# Patient Record
Sex: FEMALE | Race: WHITE | NOT HISPANIC OR LATINO | Employment: UNEMPLOYED | URBAN - METROPOLITAN AREA
[De-identification: names, ages, dates, MRNs, and addresses within clinical notes are randomized per-mention and may not be internally consistent; named-entity substitution may affect disease eponyms.]

---

## 2019-10-01 ENCOUNTER — HOSPITAL ENCOUNTER (EMERGENCY)
Facility: HOSPITAL | Age: 24
Discharge: HOME | End: 2019-10-01
Attending: EMERGENCY MEDICINE | Admitting: EMERGENCY MEDICINE

## 2019-10-01 VITALS
HEIGHT: 66 IN | SYSTOLIC BLOOD PRESSURE: 105 MMHG | BODY MASS INDEX: 33.27 KG/M2 | DIASTOLIC BLOOD PRESSURE: 58 MMHG | RESPIRATION RATE: 18 BRPM | HEART RATE: 87 BPM | OXYGEN SATURATION: 98 % | WEIGHT: 207 LBS | TEMPERATURE: 97.6 F

## 2019-10-01 DIAGNOSIS — J02.9 VIRAL PHARYNGITIS: Primary | ICD-10-CM

## 2019-10-01 LAB — S PYO AG THROAT QL: NEGATIVE

## 2019-10-01 PROCEDURE — 99284 EMERGENCY DEPT VISIT MOD MDM: CPT | Performed by: PHYSICIAN ASSISTANT

## 2019-10-01 PROCEDURE — 99283 EMERGENCY DEPT VISIT LOW MDM: CPT

## 2019-10-01 PROCEDURE — 87430 STREP A AG IA: CPT | Performed by: EMERGENCY MEDICINE

## 2019-10-01 RX ORDER — ACETAMINOPHEN 325 MG/1
650 TABLET ORAL ONCE
Status: COMPLETED | OUTPATIENT
Start: 2019-10-01 | End: 2019-10-01

## 2019-10-01 RX ADMIN — ACETAMINOPHEN 650 MG: 325 TABLET, FILM COATED ORAL at 12:23

## 2019-10-01 NOTE — ED PROVIDER NOTES
History  Chief Complaint   Patient presents with    Sore Throat     sore throat and bilat ear pain for about 5 days, no care Prior to today     Patient is a 28 y/o F that presents to the ED with sore throat and B/L ear pain x 4-5 days  She denies fevers, chills  She states other people at work are sick with similar symptoms  No rhinorrhea or cough  History provided by:  Patient  Sore Throat   Location:  Generalized  Quality:  Aching  Severity:  Moderate  Onset quality:  Gradual  Duration:  4 days  Timing:  Constant  Progression:  Worsening  Chronicity:  New  Relieved by:  Nothing  Worsened by:  Nothing  Ineffective treatments:  None tried  Associated symptoms: adenopathy and ear pain    Associated symptoms: no abdominal pain, no chills, no cough, no drooling, no fever, no rash, no rhinorrhea, no shortness of breath, no trouble swallowing and no voice change    Risk factors: sick contacts        None       Past Medical History:   Diagnosis Date    Asthma        History reviewed  No pertinent surgical history  History reviewed  No pertinent family history  I have reviewed and agree with the history as documented  Social History     Tobacco Use    Smoking status: Former Smoker    Smokeless tobacco: Never Used   Substance Use Topics    Alcohol use: Yes     Comment: socially    Drug use: Not Currently        Review of Systems   Constitutional: Negative for chills and fever  HENT: Positive for ear pain and sore throat  Negative for drooling, rhinorrhea, trouble swallowing and voice change  Respiratory: Negative for cough and shortness of breath  Gastrointestinal: Negative for abdominal pain  Musculoskeletal: Negative for back pain and neck pain  Skin: Negative for rash  Neurological: Negative for dizziness, weakness and numbness  Hematological: Positive for adenopathy  All other systems reviewed and are negative        Physical Exam  Physical Exam   Constitutional: She is oriented to person, place, and time  She appears well-developed and well-nourished  She is cooperative  She does not appear ill  No distress  HENT:   Head: Normocephalic and atraumatic  Right Ear: Hearing and tympanic membrane normal    Left Ear: Hearing and tympanic membrane normal    Nose: Nose normal    Mouth/Throat: Mucous membranes are normal  Oropharyngeal exudate and posterior oropharyngeal erythema present  No posterior oropharyngeal edema  Tonsillar exudate  Eyes: Conjunctivae are normal    Neck: Normal range of motion  Neck supple  Cardiovascular: Normal rate, regular rhythm and normal heart sounds  No murmur heard  Pulmonary/Chest: Effort normal and breath sounds normal  She has no wheezes  She has no rhonchi  She has no rales  Musculoskeletal: Normal range of motion  She exhibits no edema  Lymphadenopathy:     She has cervical adenopathy  Right cervical: Superficial cervical adenopathy present  Left cervical: Superficial cervical adenopathy present  Neurological: She is alert and oriented to person, place, and time  She has normal strength  No sensory deficit  Gait normal    Skin: Skin is warm and dry  No rash noted  She is not diaphoretic  No pallor  Nursing note and vitals reviewed        Vital Signs  ED Triage Vitals   Temperature Pulse Respirations Blood Pressure SpO2   10/01/19 1053 10/01/19 1053 10/01/19 1053 10/01/19 1053 10/01/19 1053   97 6 °F (36 4 °C) 87 18 105/58 98 %      Temp Source Heart Rate Source Patient Position - Orthostatic VS BP Location FiO2 (%)   10/01/19 1053 10/01/19 1053 -- -- --   Temporal Monitor         Pain Score       10/01/19 1052       7           Vitals:    10/01/19 1053   BP: 105/58   Pulse: 87         Visual Acuity      ED Medications  Medications   acetaminophen (TYLENOL) tablet 650 mg (650 mg Oral Given 10/1/19 1223)       Diagnostic Studies  Results Reviewed     Procedure Component Value Units Date/Time    Rapid Strep A Screen Only, Adults [[id number]]  (Normal) Collected:  10/01/19 1140    Lab Status:  Final result Specimen:  Throat Updated:  10/01/19 1216     Rapid Strep A Screen Negative                 No orders to display              Procedures  Procedures       ED Course                               MDM  Number of Diagnoses or Management Options  Viral pharyngitis: new and requires workup     Amount and/or Complexity of Data Reviewed  Clinical lab tests: ordered and reviewed    Patient Progress  Patient progress: stable      Disposition  Final diagnoses:   Viral pharyngitis     Time reflects when diagnosis was documented in both MDM as applicable and the Disposition within this note     Time User Action Codes Description Comment    10/1/2019 12:40 PM Je Fraction Add [J02 9] Viral pharyngitis       ED Disposition     ED Disposition Condition Date/Time Comment    Discharge Stable Tue Oct 1, 2019 12:40 PM Cristin De La Cruz discharge to home/self care  Follow-up Information     Follow up With Specialties Details Why Contact Info    your family doctor  Call in 1 week As needed, For recheck           There are no discharge medications for this patient  No discharge procedures on file      ED Provider  Electronically Signed by           Sheela Noel PA-C  10/01/19 1246

## 2019-10-01 NOTE — ED NOTES
Patient complains of sore throat and bilateral ear pain    White pustules noted on her tonsils     Emilia Castellanos RN  10/01/19 4370

## 2019-10-01 NOTE — DISCHARGE INSTRUCTIONS
Rest, increase fluids  Tylenol/motrin for discomfort  Warm salt water gargles  Follow up with family doctor if no improvement in 5-7 days

## 2019-10-06 ENCOUNTER — HOSPITAL ENCOUNTER (EMERGENCY)
Facility: HOSPITAL | Age: 24
Discharge: HOME | End: 2019-10-06
Attending: EMERGENCY MEDICINE | Admitting: EMERGENCY MEDICINE

## 2019-10-06 ENCOUNTER — APPOINTMENT (EMERGENCY)
Dept: RADIOLOGY | Facility: HOSPITAL | Age: 24
End: 2019-10-06

## 2019-10-06 VITALS
BODY MASS INDEX: 33.41 KG/M2 | SYSTOLIC BLOOD PRESSURE: 131 MMHG | DIASTOLIC BLOOD PRESSURE: 77 MMHG | HEART RATE: 79 BPM | OXYGEN SATURATION: 99 % | RESPIRATION RATE: 16 BRPM | TEMPERATURE: 97.4 F | WEIGHT: 207.01 LBS

## 2019-10-06 DIAGNOSIS — J06.9 URI (UPPER RESPIRATORY INFECTION): Primary | ICD-10-CM

## 2019-10-06 PROCEDURE — 71046 X-RAY EXAM CHEST 2 VIEWS: CPT

## 2019-10-06 PROCEDURE — 99284 EMERGENCY DEPT VISIT MOD MDM: CPT | Performed by: PHYSICIAN ASSISTANT

## 2019-10-06 PROCEDURE — 99283 EMERGENCY DEPT VISIT LOW MDM: CPT

## 2019-10-06 RX ORDER — AZITHROMYCIN 250 MG/1
TABLET, FILM COATED ORAL
Qty: 6 TABLET | Refills: 0 | Status: SHIPPED | OUTPATIENT
Start: 2019-10-06 | End: 2019-10-10

## 2019-10-06 NOTE — ED PROVIDER NOTES
History  Chief Complaint   Patient presents with    Sore Throat     sore throat x 1 5 weeks  Previously seen here  Patient is a 26 y/o F that presents to the ED with sore throat, rhinorrhea, cough x 10 days  SHe was seen in there ER 5 days ago and had a negative rapid strep test   SHe denies fevers,chills  She has taken tylenol cold medicine, but states it hasn't helped  She denies sick contacts  She is coughing up green sputum  History provided by:  Patient  Sore Throat   Location:  Generalized  Quality:  Aching  Severity:  Moderate  Onset quality:  Sudden  Duration:  10 days  Timing:  Constant  Progression:  Worsening  Chronicity:  New  Relieved by:  Nothing  Worsened by:  Nothing  Ineffective treatments:  Acetaminophen, NSAIDs and OTC medications  Associated symptoms: cough and rhinorrhea    Associated symptoms: no abdominal pain, no adenopathy, no chest pain, no chills, no fever, no rash, no shortness of breath and no trouble swallowing    Risk factors: no sick contacts        None       Past Medical History:   Diagnosis Date    Asthma        History reviewed  No pertinent surgical history  History reviewed  No pertinent family history  I have reviewed and agree with the history as documented  Social History     Tobacco Use    Smoking status: Former Smoker    Smokeless tobacco: Never Used   Substance Use Topics    Alcohol use: Yes     Comment: socially    Drug use: Not Currently        Review of Systems   Constitutional: Negative for chills and fever  HENT: Positive for congestion, rhinorrhea and sore throat  Negative for trouble swallowing  Respiratory: Positive for cough  Negative for shortness of breath  Cardiovascular: Negative for chest pain  Gastrointestinal: Negative for abdominal pain  Skin: Negative for color change and rash  Hematological: Negative for adenopathy  All other systems reviewed and are negative        Physical Exam  Physical Exam Constitutional: She is oriented to person, place, and time  She appears well-developed and well-nourished  She is cooperative  She does not appear ill  No distress  HENT:   Head: Normocephalic and atraumatic  Right Ear: Hearing and tympanic membrane normal    Left Ear: Hearing and tympanic membrane normal    Nose: Rhinorrhea present  No mucosal edema  Mouth/Throat: Uvula is midline, oropharynx is clear and moist and mucous membranes are normal    Eyes: Conjunctivae are normal    Neck: Normal range of motion  Cardiovascular: Normal rate, regular rhythm and normal heart sounds  No murmur heard  Pulmonary/Chest: Effort normal and breath sounds normal  She has no wheezes  She has no rhonchi  She has no rales  Musculoskeletal: Normal range of motion  She exhibits no edema  Neurological: She is alert and oriented to person, place, and time  She has normal strength  No sensory deficit  Skin: Skin is warm and dry  No rash noted  She is not diaphoretic  No pallor  Nursing note and vitals reviewed  Vital Signs  ED Triage Vitals [10/06/19 1256]   Temperature Pulse Respirations Blood Pressure SpO2   (!) 97 4 °F (36 3 °C) 79 16 131/77 99 %      Temp Source Heart Rate Source Patient Position - Orthostatic VS BP Location FiO2 (%)   Oral Monitor Lying Right arm --      Pain Score       8           Vitals:    10/06/19 1256   BP: 131/77   Pulse: 79   Patient Position - Orthostatic VS: Lying         Visual Acuity      ED Medications  Medications - No data to display    Diagnostic Studies  Results Reviewed     None                 XR chest 2 views   ED Interpretation by Franki Mckoy PA-C (10/06 1459)   No acute abnormalities                    Procedures  Procedures       ED Course                               MDM  Number of Diagnoses or Management Options  URI (upper respiratory infection): established and worsening  Diagnosis management comments: Patient with sore throat, runny nose, productive cough, will order CXR to r/o pneumonia  Rapid strep from last week negative  Most likely viral uri  ADvised OTC meds and zpak if no improvement in 1 week  Amount and/or Complexity of Data Reviewed  Tests in the radiology section of CPT®: ordered and reviewed  Independent visualization of images, tracings, or specimens: yes    Patient Progress  Patient progress: stable      Disposition  Final diagnoses:   URI (upper respiratory infection)     Time reflects when diagnosis was documented in both MDM as applicable and the Disposition within this note     Time User Action Codes Description Comment    10/6/2019  2:20 PM Lazarus Broderick Add [J06 9] URI (upper respiratory infection)       ED Disposition     ED Disposition Condition Date/Time Comment    Discharge Stable Sun Oct 6, 2019  2:20 PM Cristin De La Cruz discharge to home/self care  Follow-up Information     Follow up With Specialties Details Why Contact Info    your family doctor  Call in 1 week As needed, For recheck           Discharge Medication List as of 10/6/2019  2:21 PM      START taking these medications    Details   azithromycin (ZITHROMAX Z-LISA) 250 mg tablet Take 2 tablets today then 1 tablet daily x 4 days, Print           No discharge procedures on file      ED Provider  Electronically Signed by           Paulo Ware PA-C  10/06/19 0435

## 2020-09-08 ENCOUNTER — OCCMED (OUTPATIENT)
Dept: URGENT CARE | Facility: CLINIC | Age: 25
End: 2020-09-08

## 2020-09-08 DIAGNOSIS — Z02.1 PHYSICAL EXAM, PRE-EMPLOYMENT: Primary | ICD-10-CM

## 2020-09-08 PROCEDURE — 86480 TB TEST CELL IMMUN MEASURE: CPT | Performed by: PHYSICIAN ASSISTANT

## 2020-09-09 LAB
GAMMA INTERFERON BACKGROUND BLD IA-ACNC: 0.02 IU/ML
M TB IFN-G BLD-IMP: NEGATIVE
M TB IFN-G CD4+ BCKGRND COR BLD-ACNC: 0 IU/ML
M TB IFN-G CD4+ BCKGRND COR BLD-ACNC: 0 IU/ML
MITOGEN IGNF BCKGRD COR BLD-ACNC: >10 IU/ML

## 2020-09-30 ENCOUNTER — HOSPITAL ENCOUNTER (EMERGENCY)
Facility: HOSPITAL | Age: 25
Discharge: HOME | End: 2020-09-30
Attending: EMERGENCY MEDICINE
Payer: COMMERCIAL

## 2020-09-30 VITALS
HEART RATE: 85 BPM | OXYGEN SATURATION: 96 % | BODY MASS INDEX: 40.35 KG/M2 | SYSTOLIC BLOOD PRESSURE: 121 MMHG | DIASTOLIC BLOOD PRESSURE: 58 MMHG | RESPIRATION RATE: 16 BRPM | TEMPERATURE: 97.6 F | WEIGHT: 250 LBS

## 2020-09-30 DIAGNOSIS — H65.93 MIDDLE EAR EFFUSION, BILATERAL: ICD-10-CM

## 2020-09-30 DIAGNOSIS — R42 VERTIGO: Primary | ICD-10-CM

## 2020-09-30 LAB
ALBUMIN SERPL BCP-MCNC: 4.3 G/DL (ref 3.5–5)
ALP SERPL-CCNC: 73 U/L (ref 46–116)
ALT SERPL W P-5'-P-CCNC: 42 U/L (ref 12–78)
ANION GAP SERPL CALCULATED.3IONS-SCNC: 8 MMOL/L (ref 4–13)
AST SERPL W P-5'-P-CCNC: 16 U/L (ref 5–45)
ATRIAL RATE: 86 BPM
BASOPHILS # BLD AUTO: 0.03 THOUSANDS/ΜL (ref 0–0.1)
BASOPHILS NFR BLD AUTO: 0 % (ref 0–1)
BILIRUB SERPL-MCNC: 0.59 MG/DL (ref 0.2–1)
BILIRUB UR QL STRIP: NEGATIVE
BUN SERPL-MCNC: 13 MG/DL (ref 5–25)
CALCIUM SERPL-MCNC: 9.2 MG/DL (ref 8.3–10.1)
CHLORIDE SERPL-SCNC: 102 MMOL/L (ref 100–108)
CLARITY UR: CLEAR
CO2 SERPL-SCNC: 26 MMOL/L (ref 21–32)
COLOR UR: YELLOW
CREAT SERPL-MCNC: 0.82 MG/DL (ref 0.6–1.3)
D DIMER PPP FEU-MCNC: 0.33 UG/ML FEU
EOSINOPHIL # BLD AUTO: 0.12 THOUSAND/ΜL (ref 0–0.61)
EOSINOPHIL NFR BLD AUTO: 2 % (ref 0–6)
ERYTHROCYTE [DISTWIDTH] IN BLOOD BY AUTOMATED COUNT: 13.3 % (ref 11.6–15.1)
EXT PREG TEST URINE: NEGATIVE
EXT. CONTROL ED NAV: NORMAL
GFR SERPL CREATININE-BSD FRML MDRD: 101 ML/MIN/1.73SQ M
GLUCOSE SERPL-MCNC: 81 MG/DL (ref 65–140)
GLUCOSE UR STRIP-MCNC: NEGATIVE MG/DL
HCT VFR BLD AUTO: 41.6 % (ref 34.8–46.1)
HGB BLD-MCNC: 13.5 G/DL (ref 11.5–15.4)
HGB UR QL STRIP.AUTO: NEGATIVE
IMM GRANULOCYTES # BLD AUTO: 0.01 THOUSAND/UL (ref 0–0.2)
IMM GRANULOCYTES NFR BLD AUTO: 0 % (ref 0–2)
KETONES UR STRIP-MCNC: NEGATIVE MG/DL
LEUKOCYTE ESTERASE UR QL STRIP: NEGATIVE
LYMPHOCYTES # BLD AUTO: 2.01 THOUSANDS/ΜL (ref 0.6–4.47)
LYMPHOCYTES NFR BLD AUTO: 28 % (ref 14–44)
MCH RBC QN AUTO: 29.3 PG (ref 26.8–34.3)
MCHC RBC AUTO-ENTMCNC: 32.5 G/DL (ref 31.4–37.4)
MCV RBC AUTO: 90 FL (ref 82–98)
MONOCYTES # BLD AUTO: 0.32 THOUSAND/ΜL (ref 0.17–1.22)
MONOCYTES NFR BLD AUTO: 4 % (ref 4–12)
NEUTROPHILS # BLD AUTO: 4.76 THOUSANDS/ΜL (ref 1.85–7.62)
NEUTS SEG NFR BLD AUTO: 66 % (ref 43–75)
NITRITE UR QL STRIP: NEGATIVE
NRBC BLD AUTO-RTO: 0 /100 WBCS
P AXIS: 57 DEGREES
PH UR STRIP.AUTO: 5.5 [PH] (ref 4.5–8)
PLATELET # BLD AUTO: 298 THOUSANDS/UL (ref 149–390)
PMV BLD AUTO: 9.9 FL (ref 8.9–12.7)
POTASSIUM SERPL-SCNC: 4.1 MMOL/L (ref 3.5–5.3)
PR INTERVAL: 156 MS
PROT SERPL-MCNC: 8.5 G/DL (ref 6.4–8.2)
PROT UR STRIP-MCNC: NEGATIVE MG/DL
QRS AXIS: 69 DEGREES
QRSD INTERVAL: 92 MS
QT INTERVAL: 346 MS
QTC INTERVAL: 397 MS
RBC # BLD AUTO: 4.61 MILLION/UL (ref 3.81–5.12)
SODIUM SERPL-SCNC: 136 MMOL/L (ref 136–145)
SP GR UR STRIP.AUTO: 1.02 (ref 1–1.03)
T WAVE AXIS: 40 DEGREES
TROPONIN I SERPL-MCNC: <0.02 NG/ML
UROBILINOGEN UR QL STRIP.AUTO: 0.2 E.U./DL
VENTRICULAR RATE: 79 BPM
WBC # BLD AUTO: 7.25 THOUSAND/UL (ref 4.31–10.16)

## 2020-09-30 PROCEDURE — 93005 ELECTROCARDIOGRAM TRACING: CPT

## 2020-09-30 PROCEDURE — 96374 THER/PROPH/DIAG INJ IV PUSH: CPT

## 2020-09-30 PROCEDURE — 99284 EMERGENCY DEPT VISIT MOD MDM: CPT

## 2020-09-30 PROCEDURE — 85379 FIBRIN DEGRADATION QUANT: CPT | Performed by: PHYSICIAN ASSISTANT

## 2020-09-30 PROCEDURE — 99284 EMERGENCY DEPT VISIT MOD MDM: CPT | Performed by: PHYSICIAN ASSISTANT

## 2020-09-30 PROCEDURE — 80053 COMPREHEN METABOLIC PANEL: CPT | Performed by: PHYSICIAN ASSISTANT

## 2020-09-30 PROCEDURE — 36415 COLL VENOUS BLD VENIPUNCTURE: CPT | Performed by: PHYSICIAN ASSISTANT

## 2020-09-30 PROCEDURE — 81025 URINE PREGNANCY TEST: CPT | Performed by: PHYSICIAN ASSISTANT

## 2020-09-30 PROCEDURE — 85025 COMPLETE CBC W/AUTO DIFF WBC: CPT | Performed by: PHYSICIAN ASSISTANT

## 2020-09-30 PROCEDURE — 81003 URINALYSIS AUTO W/O SCOPE: CPT

## 2020-09-30 PROCEDURE — 84484 ASSAY OF TROPONIN QUANT: CPT | Performed by: PHYSICIAN ASSISTANT

## 2020-09-30 PROCEDURE — 93010 ELECTROCARDIOGRAM REPORT: CPT | Performed by: INTERNAL MEDICINE

## 2020-09-30 PROCEDURE — 96361 HYDRATE IV INFUSION ADD-ON: CPT

## 2020-09-30 RX ORDER — MECLIZINE HYDROCHLORIDE 25 MG/1
25 TABLET ORAL 3 TIMES DAILY PRN
Qty: 30 TABLET | Refills: 0 | Status: SHIPPED | OUTPATIENT
Start: 2020-09-30 | End: 2021-01-07

## 2020-09-30 RX ORDER — CETIRIZINE HYDROCHLORIDE, PSEUDOEPHEDRINE HYDROCHLORIDE 5; 120 MG/1; MG/1
1 TABLET, FILM COATED, EXTENDED RELEASE ORAL 2 TIMES DAILY
Qty: 14 TABLET | Refills: 0 | Status: SHIPPED | OUTPATIENT
Start: 2020-09-30 | End: 2020-10-01

## 2020-09-30 RX ORDER — MECLIZINE HCL 12.5 MG/1
25 TABLET ORAL ONCE
Status: COMPLETED | OUTPATIENT
Start: 2020-09-30 | End: 2020-09-30

## 2020-09-30 RX ORDER — FLUTICASONE PROPIONATE 50 MCG
1 SPRAY, SUSPENSION (ML) NASAL DAILY
Qty: 16 G | Refills: 0 | Status: SHIPPED | OUTPATIENT
Start: 2020-09-30 | End: 2021-03-16 | Stop reason: SDUPTHER

## 2020-09-30 RX ORDER — ONDANSETRON 2 MG/ML
4 INJECTION INTRAMUSCULAR; INTRAVENOUS ONCE
Status: COMPLETED | OUTPATIENT
Start: 2020-09-30 | End: 2020-09-30

## 2020-09-30 RX ADMIN — SODIUM CHLORIDE 1000 ML: 0.9 INJECTION, SOLUTION INTRAVENOUS at 11:36

## 2020-09-30 RX ADMIN — ONDANSETRON 4 MG: 2 INJECTION INTRAMUSCULAR; INTRAVENOUS at 11:35

## 2020-09-30 RX ADMIN — MECLIZINE 25 MG: 12.5 TABLET ORAL at 11:36

## 2020-10-01 ENCOUNTER — TELEPHONE (OUTPATIENT)
Dept: FAMILY MEDICINE CLINIC | Facility: CLINIC | Age: 25
End: 2020-10-01

## 2020-10-01 ENCOUNTER — OFFICE VISIT (OUTPATIENT)
Dept: FAMILY MEDICINE CLINIC | Facility: CLINIC | Age: 25
End: 2020-10-01
Payer: COMMERCIAL

## 2020-10-01 VITALS
BODY MASS INDEX: 40.4 KG/M2 | HEIGHT: 66 IN | RESPIRATION RATE: 18 BRPM | WEIGHT: 251.4 LBS | DIASTOLIC BLOOD PRESSURE: 60 MMHG | SYSTOLIC BLOOD PRESSURE: 110 MMHG | HEART RATE: 93 BPM | TEMPERATURE: 99.1 F | OXYGEN SATURATION: 98 %

## 2020-10-01 DIAGNOSIS — Z13.6 SCREENING FOR CARDIOVASCULAR CONDITION: ICD-10-CM

## 2020-10-01 DIAGNOSIS — E66.01 MORBID OBESITY (HCC): ICD-10-CM

## 2020-10-01 DIAGNOSIS — F41.9 ANXIETY: ICD-10-CM

## 2020-10-01 DIAGNOSIS — M79.10 MYALGIA: ICD-10-CM

## 2020-10-01 DIAGNOSIS — Z23 ENCOUNTER FOR IMMUNIZATION: ICD-10-CM

## 2020-10-01 DIAGNOSIS — Z13.1 SCREENING FOR DIABETES MELLITUS: ICD-10-CM

## 2020-10-01 DIAGNOSIS — R11.2 NAUSEA AND VOMITING, INTRACTABILITY OF VOMITING NOT SPECIFIED, UNSPECIFIED VOMITING TYPE: ICD-10-CM

## 2020-10-01 DIAGNOSIS — H81.13 BENIGN PAROXYSMAL POSITIONAL VERTIGO DUE TO BILATERAL VESTIBULAR DISORDER: ICD-10-CM

## 2020-10-01 DIAGNOSIS — M54.16 LUMBAR BACK PAIN WITH RADICULOPATHY AFFECTING LOWER EXTREMITY: ICD-10-CM

## 2020-10-01 DIAGNOSIS — J45.20 MILD INTERMITTENT ASTHMA WITHOUT COMPLICATION: ICD-10-CM

## 2020-10-01 DIAGNOSIS — Z12.4 SCREENING FOR CERVICAL CANCER: ICD-10-CM

## 2020-10-01 DIAGNOSIS — Z00.00 ANNUAL PHYSICAL EXAM: Primary | ICD-10-CM

## 2020-10-01 DIAGNOSIS — Z12.83 SKIN CANCER SCREENING: ICD-10-CM

## 2020-10-01 DIAGNOSIS — F32.A DEPRESSION, UNSPECIFIED DEPRESSION TYPE: ICD-10-CM

## 2020-10-01 PROBLEM — J45.909 ASTHMA: Status: ACTIVE | Noted: 2020-10-01

## 2020-10-01 PROBLEM — J45.909 ASTHMA: Status: RESOLVED | Noted: 2020-10-01 | Resolved: 2020-10-01

## 2020-10-01 PROCEDURE — 99203 OFFICE O/P NEW LOW 30 MIN: CPT | Performed by: FAMILY MEDICINE

## 2020-10-01 PROCEDURE — 99395 PREV VISIT EST AGE 18-39: CPT | Performed by: FAMILY MEDICINE

## 2020-10-01 PROCEDURE — 90471 IMMUNIZATION ADMIN: CPT | Performed by: FAMILY MEDICINE

## 2020-10-01 PROCEDURE — 90732 PPSV23 VACC 2 YRS+ SUBQ/IM: CPT | Performed by: FAMILY MEDICINE

## 2020-10-01 RX ORDER — LORATADINE AND PSEUDOEPHEDRINE 10; 240 MG/1; MG/1
1 TABLET, EXTENDED RELEASE ORAL DAILY
COMMUNITY
End: 2021-01-04 | Stop reason: ALTCHOICE

## 2020-10-01 RX ORDER — ONDANSETRON 8 MG/1
8 TABLET, ORALLY DISINTEGRATING ORAL EVERY 8 HOURS PRN
Qty: 30 TABLET | Refills: 0 | Status: SHIPPED | OUTPATIENT
Start: 2020-10-01 | End: 2021-04-05

## 2020-10-01 RX ORDER — ALBUTEROL SULFATE 90 UG/1
2 AEROSOL, METERED RESPIRATORY (INHALATION) EVERY 4 HOURS PRN
COMMUNITY
End: 2020-10-01 | Stop reason: SDUPTHER

## 2020-10-01 RX ORDER — DULOXETIN HYDROCHLORIDE 30 MG/1
30 CAPSULE, DELAYED RELEASE ORAL DAILY
Qty: 7 CAPSULE | Refills: 0 | Status: SHIPPED | OUTPATIENT
Start: 2020-10-01 | End: 2020-10-13

## 2020-10-01 RX ORDER — DULOXETIN HYDROCHLORIDE 60 MG/1
60 CAPSULE, DELAYED RELEASE ORAL DAILY
Qty: 30 CAPSULE | Refills: 1 | Status: SHIPPED | OUTPATIENT
Start: 2020-10-01 | End: 2020-11-16 | Stop reason: SDUPTHER

## 2020-10-01 RX ORDER — ALBUTEROL SULFATE 90 UG/1
2 AEROSOL, METERED RESPIRATORY (INHALATION) EVERY 4 HOURS PRN
Qty: 1 INHALER | Refills: 0 | Status: SHIPPED | OUTPATIENT
Start: 2020-10-01 | End: 2021-03-16 | Stop reason: SDUPTHER

## 2020-10-02 ENCOUNTER — TELEPHONE (OUTPATIENT)
Dept: PHYSICAL THERAPY | Facility: OTHER | Age: 25
End: 2020-10-02

## 2020-10-02 ENCOUNTER — TELEPHONE (OUTPATIENT)
Dept: ADMINISTRATIVE | Facility: OTHER | Age: 25
End: 2020-10-02

## 2020-10-05 ENCOUNTER — TELEPHONE (OUTPATIENT)
Dept: DERMATOLOGY | Facility: CLINIC | Age: 25
End: 2020-10-05

## 2020-10-08 ENCOUNTER — HOSPITAL ENCOUNTER (EMERGENCY)
Facility: HOSPITAL | Age: 25
Discharge: HOME | End: 2020-10-08
Attending: EMERGENCY MEDICINE
Payer: COMMERCIAL

## 2020-10-08 ENCOUNTER — EVALUATION (OUTPATIENT)
Dept: PHYSICAL THERAPY | Facility: REHABILITATION | Age: 25
End: 2020-10-08
Payer: COMMERCIAL

## 2020-10-08 ENCOUNTER — APPOINTMENT (EMERGENCY)
Dept: RADIOLOGY | Facility: HOSPITAL | Age: 25
End: 2020-10-08
Payer: COMMERCIAL

## 2020-10-08 VITALS
BODY MASS INDEX: 41.65 KG/M2 | HEART RATE: 89 BPM | OXYGEN SATURATION: 98 % | SYSTOLIC BLOOD PRESSURE: 142 MMHG | HEIGHT: 65 IN | RESPIRATION RATE: 16 BRPM | WEIGHT: 250 LBS | TEMPERATURE: 99 F | DIASTOLIC BLOOD PRESSURE: 98 MMHG

## 2020-10-08 DIAGNOSIS — R42 VERTIGO: Primary | ICD-10-CM

## 2020-10-08 DIAGNOSIS — H81.13 BENIGN PAROXYSMAL POSITIONAL VERTIGO DUE TO BILATERAL VESTIBULAR DISORDER: ICD-10-CM

## 2020-10-08 PROCEDURE — 97161 PT EVAL LOW COMPLEX 20 MIN: CPT | Performed by: PHYSICAL THERAPIST

## 2020-10-08 PROCEDURE — 99282 EMERGENCY DEPT VISIT SF MDM: CPT | Performed by: EMERGENCY MEDICINE

## 2020-10-08 PROCEDURE — 99284 EMERGENCY DEPT VISIT MOD MDM: CPT

## 2020-10-08 RX ORDER — MECLIZINE HYDROCHLORIDE 25 MG/1
50 TABLET ORAL ONCE
Status: DISCONTINUED | OUTPATIENT
Start: 2020-10-08 | End: 2020-10-09 | Stop reason: HOSPADM

## 2020-10-09 ENCOUNTER — NURSE TRIAGE (OUTPATIENT)
Dept: PHYSICAL THERAPY | Facility: OTHER | Age: 25
End: 2020-10-09

## 2020-10-09 DIAGNOSIS — M54.50 CHRONIC BILATERAL LOW BACK PAIN, UNSPECIFIED WHETHER SCIATICA PRESENT: Primary | ICD-10-CM

## 2020-10-09 DIAGNOSIS — G89.29 CHRONIC BILATERAL LOW BACK PAIN, UNSPECIFIED WHETHER SCIATICA PRESENT: Primary | ICD-10-CM

## 2020-10-12 ENCOUNTER — APPOINTMENT (OUTPATIENT)
Dept: PHYSICAL THERAPY | Facility: REHABILITATION | Age: 25
End: 2020-10-12
Payer: COMMERCIAL

## 2020-10-13 ENCOUNTER — OFFICE VISIT (OUTPATIENT)
Dept: FAMILY MEDICINE CLINIC | Facility: CLINIC | Age: 25
End: 2020-10-13
Payer: COMMERCIAL

## 2020-10-13 VITALS
WEIGHT: 253.2 LBS | HEIGHT: 65 IN | RESPIRATION RATE: 18 BRPM | DIASTOLIC BLOOD PRESSURE: 72 MMHG | BODY MASS INDEX: 42.19 KG/M2 | SYSTOLIC BLOOD PRESSURE: 116 MMHG | HEART RATE: 90 BPM | TEMPERATURE: 98.9 F | OXYGEN SATURATION: 97 %

## 2020-10-13 DIAGNOSIS — F41.9 ANXIETY: ICD-10-CM

## 2020-10-13 DIAGNOSIS — R11.0 NAUSEA: ICD-10-CM

## 2020-10-13 DIAGNOSIS — E66.01 MORBID OBESITY (HCC): ICD-10-CM

## 2020-10-13 DIAGNOSIS — H81.13 BENIGN PAROXYSMAL POSITIONAL VERTIGO DUE TO BILATERAL VESTIBULAR DISORDER: Primary | ICD-10-CM

## 2020-10-13 DIAGNOSIS — F32.A DEPRESSION, UNSPECIFIED DEPRESSION TYPE: ICD-10-CM

## 2020-10-13 PROCEDURE — 99214 OFFICE O/P EST MOD 30 MIN: CPT | Performed by: FAMILY MEDICINE

## 2020-10-15 ENCOUNTER — OFFICE VISIT (OUTPATIENT)
Dept: PHYSICAL THERAPY | Facility: REHABILITATION | Age: 25
End: 2020-10-15
Payer: COMMERCIAL

## 2020-10-15 DIAGNOSIS — G89.29 CHRONIC BILATERAL LOW BACK PAIN, UNSPECIFIED WHETHER SCIATICA PRESENT: Primary | ICD-10-CM

## 2020-10-15 DIAGNOSIS — H81.13 BENIGN PAROXYSMAL POSITIONAL VERTIGO DUE TO BILATERAL VESTIBULAR DISORDER: ICD-10-CM

## 2020-10-15 DIAGNOSIS — M54.50 CHRONIC BILATERAL LOW BACK PAIN, UNSPECIFIED WHETHER SCIATICA PRESENT: Primary | ICD-10-CM

## 2020-10-15 PROCEDURE — 97112 NEUROMUSCULAR REEDUCATION: CPT | Performed by: PHYSICAL THERAPIST

## 2020-10-15 PROCEDURE — 97110 THERAPEUTIC EXERCISES: CPT | Performed by: PHYSICAL THERAPIST

## 2020-10-20 ENCOUNTER — EVALUATION (OUTPATIENT)
Dept: PHYSICAL THERAPY | Facility: REHABILITATION | Age: 25
End: 2020-10-20
Payer: COMMERCIAL

## 2020-10-20 ENCOUNTER — APPOINTMENT (OUTPATIENT)
Dept: PHYSICAL THERAPY | Facility: REHABILITATION | Age: 25
End: 2020-10-20
Payer: COMMERCIAL

## 2020-10-20 DIAGNOSIS — G89.29 CHRONIC BILATERAL LOW BACK PAIN, UNSPECIFIED WHETHER SCIATICA PRESENT: ICD-10-CM

## 2020-10-20 DIAGNOSIS — H81.13 BENIGN PAROXYSMAL POSITIONAL VERTIGO DUE TO BILATERAL VESTIBULAR DISORDER: Primary | ICD-10-CM

## 2020-10-20 DIAGNOSIS — M54.50 CHRONIC BILATERAL LOW BACK PAIN, UNSPECIFIED WHETHER SCIATICA PRESENT: ICD-10-CM

## 2020-10-20 PROCEDURE — 97112 NEUROMUSCULAR REEDUCATION: CPT | Performed by: PHYSICAL THERAPIST

## 2020-10-20 PROCEDURE — 97110 THERAPEUTIC EXERCISES: CPT | Performed by: PHYSICAL THERAPIST

## 2020-10-20 PROCEDURE — 97164 PT RE-EVAL EST PLAN CARE: CPT | Performed by: PHYSICAL THERAPIST

## 2020-10-22 ENCOUNTER — OFFICE VISIT (OUTPATIENT)
Dept: PHYSICAL THERAPY | Facility: REHABILITATION | Age: 25
End: 2020-10-22
Payer: COMMERCIAL

## 2020-10-22 DIAGNOSIS — G89.29 CHRONIC BILATERAL LOW BACK PAIN, UNSPECIFIED WHETHER SCIATICA PRESENT: Primary | ICD-10-CM

## 2020-10-22 DIAGNOSIS — M54.50 CHRONIC BILATERAL LOW BACK PAIN, UNSPECIFIED WHETHER SCIATICA PRESENT: Primary | ICD-10-CM

## 2020-10-22 DIAGNOSIS — H81.13 BENIGN PAROXYSMAL POSITIONAL VERTIGO DUE TO BILATERAL VESTIBULAR DISORDER: ICD-10-CM

## 2020-10-22 PROCEDURE — 97110 THERAPEUTIC EXERCISES: CPT | Performed by: PHYSICAL THERAPIST

## 2020-10-22 PROCEDURE — 97112 NEUROMUSCULAR REEDUCATION: CPT | Performed by: PHYSICAL THERAPIST

## 2020-10-26 ENCOUNTER — OFFICE VISIT (OUTPATIENT)
Dept: PHYSICAL THERAPY | Facility: REHABILITATION | Age: 25
End: 2020-10-26
Payer: COMMERCIAL

## 2020-10-26 ENCOUNTER — TELEPHONE (OUTPATIENT)
Dept: PSYCHIATRY | Facility: CLINIC | Age: 25
End: 2020-10-26

## 2020-10-26 DIAGNOSIS — G89.29 CHRONIC BILATERAL LOW BACK PAIN, UNSPECIFIED WHETHER SCIATICA PRESENT: Primary | ICD-10-CM

## 2020-10-26 DIAGNOSIS — M54.50 CHRONIC BILATERAL LOW BACK PAIN, UNSPECIFIED WHETHER SCIATICA PRESENT: Primary | ICD-10-CM

## 2020-10-26 DIAGNOSIS — H81.13 BENIGN PAROXYSMAL POSITIONAL VERTIGO DUE TO BILATERAL VESTIBULAR DISORDER: ICD-10-CM

## 2020-10-26 PROCEDURE — 97140 MANUAL THERAPY 1/> REGIONS: CPT | Performed by: PHYSICAL THERAPIST

## 2020-10-26 PROCEDURE — 97110 THERAPEUTIC EXERCISES: CPT | Performed by: PHYSICAL THERAPIST

## 2020-10-26 PROCEDURE — 97112 NEUROMUSCULAR REEDUCATION: CPT | Performed by: PHYSICAL THERAPIST

## 2020-10-29 ENCOUNTER — OFFICE VISIT (OUTPATIENT)
Dept: OBGYN CLINIC | Facility: CLINIC | Age: 25
End: 2020-10-29
Payer: COMMERCIAL

## 2020-10-29 ENCOUNTER — APPOINTMENT (OUTPATIENT)
Dept: PHYSICAL THERAPY | Facility: REHABILITATION | Age: 25
End: 2020-10-29
Payer: COMMERCIAL

## 2020-10-29 ENCOUNTER — HOSPITAL ENCOUNTER (OUTPATIENT)
Dept: RADIOLOGY | Facility: HOSPITAL | Age: 25
Discharge: HOME | End: 2020-10-29
Payer: COMMERCIAL

## 2020-10-29 VITALS
DIASTOLIC BLOOD PRESSURE: 86 MMHG | SYSTOLIC BLOOD PRESSURE: 128 MMHG | TEMPERATURE: 97.8 F | BODY MASS INDEX: 40.34 KG/M2 | HEIGHT: 66 IN | WEIGHT: 251 LBS

## 2020-10-29 DIAGNOSIS — M54.16 LUMBAR BACK PAIN WITH RADICULOPATHY AFFECTING LOWER EXTREMITY: ICD-10-CM

## 2020-10-29 DIAGNOSIS — N92.6 IRREGULAR MENSTRUAL CYCLE: Primary | ICD-10-CM

## 2020-10-29 PROCEDURE — 72148 MRI LUMBAR SPINE W/O DYE: CPT

## 2020-10-29 PROCEDURE — G1004 CDSM NDSC: HCPCS

## 2020-10-29 PROCEDURE — 99204 OFFICE O/P NEW MOD 45 MIN: CPT | Performed by: OBSTETRICS & GYNECOLOGY

## 2020-11-05 ENCOUNTER — OFFICE VISIT (OUTPATIENT)
Dept: PHYSICAL THERAPY | Facility: REHABILITATION | Age: 25
End: 2020-11-05
Payer: COMMERCIAL

## 2020-11-05 DIAGNOSIS — G89.29 CHRONIC BILATERAL LOW BACK PAIN, UNSPECIFIED WHETHER SCIATICA PRESENT: Primary | ICD-10-CM

## 2020-11-05 DIAGNOSIS — H81.13 BENIGN PAROXYSMAL POSITIONAL VERTIGO DUE TO BILATERAL VESTIBULAR DISORDER: ICD-10-CM

## 2020-11-05 DIAGNOSIS — M54.50 CHRONIC BILATERAL LOW BACK PAIN, UNSPECIFIED WHETHER SCIATICA PRESENT: Primary | ICD-10-CM

## 2020-11-05 PROCEDURE — 97110 THERAPEUTIC EXERCISES: CPT | Performed by: PHYSICAL THERAPIST

## 2020-11-05 PROCEDURE — 97112 NEUROMUSCULAR REEDUCATION: CPT | Performed by: PHYSICAL THERAPIST

## 2020-11-06 ENCOUNTER — LAB (OUTPATIENT)
Dept: LAB | Facility: AMBULARY SURGERY CENTER | Age: 25
End: 2020-11-06
Payer: COMMERCIAL

## 2020-11-06 DIAGNOSIS — N92.6 IRREGULAR MENSTRUAL CYCLE: ICD-10-CM

## 2020-11-06 DIAGNOSIS — F41.9 ANXIETY: ICD-10-CM

## 2020-11-06 DIAGNOSIS — Z13.1 SCREENING FOR DIABETES MELLITUS: ICD-10-CM

## 2020-11-06 DIAGNOSIS — Z13.6 SCREENING FOR CARDIOVASCULAR CONDITION: ICD-10-CM

## 2020-11-06 DIAGNOSIS — F32.A DEPRESSION, UNSPECIFIED DEPRESSION TYPE: ICD-10-CM

## 2020-11-06 DIAGNOSIS — E66.01 MORBID OBESITY (HCC): ICD-10-CM

## 2020-11-06 DIAGNOSIS — M79.10 MYALGIA: ICD-10-CM

## 2020-11-06 LAB
25(OH)D3 SERPL-MCNC: 17.4 NG/ML (ref 30–100)
ALBUMIN SERPL BCP-MCNC: 4.2 G/DL (ref 3.5–5)
ALP SERPL-CCNC: 81 U/L (ref 46–116)
ALT SERPL W P-5'-P-CCNC: 30 U/L (ref 12–78)
ANION GAP SERPL CALCULATED.3IONS-SCNC: 7 MMOL/L (ref 4–13)
AST SERPL W P-5'-P-CCNC: 13 U/L (ref 5–45)
BILIRUB SERPL-MCNC: 0.98 MG/DL (ref 0.2–1)
BUN SERPL-MCNC: 12 MG/DL (ref 5–25)
CALCIUM SERPL-MCNC: 9.6 MG/DL (ref 8.3–10.1)
CHLORIDE SERPL-SCNC: 107 MMOL/L (ref 100–108)
CHOLEST SERPL-MCNC: 194 MG/DL (ref 50–200)
CO2 SERPL-SCNC: 26 MMOL/L (ref 21–32)
CREAT SERPL-MCNC: 0.81 MG/DL (ref 0.6–1.3)
EST. AVERAGE GLUCOSE BLD GHB EST-MCNC: 117 MG/DL
GFR SERPL CREATININE-BSD FRML MDRD: 103 ML/MIN/1.73SQ M
GLUCOSE P FAST SERPL-MCNC: 97 MG/DL (ref 65–99)
HBA1C MFR BLD: 5.7 %
HDLC SERPL-MCNC: 62 MG/DL
LDLC SERPL CALC-MCNC: 108 MG/DL (ref 0–100)
LDLC SERPL DIRECT ASSAY-MCNC: 124 MG/DL (ref 0–100)
NONHDLC SERPL-MCNC: 132 MG/DL
POTASSIUM SERPL-SCNC: 4.4 MMOL/L (ref 3.5–5.3)
PROGEST SERPL-MCNC: 1.1 NG/ML
PROLACTIN SERPL-MCNC: 13.9 NG/ML
PROT SERPL-MCNC: 8 G/DL (ref 6.4–8.2)
SODIUM SERPL-SCNC: 140 MMOL/L (ref 136–145)
TRIGL SERPL-MCNC: 121 MG/DL
TSH SERPL DL<=0.05 MIU/L-ACNC: 2.25 UIU/ML (ref 0.36–3.74)

## 2020-11-06 PROCEDURE — 83721 ASSAY OF BLOOD LIPOPROTEIN: CPT

## 2020-11-06 PROCEDURE — 83036 HEMOGLOBIN GLYCOSYLATED A1C: CPT

## 2020-11-06 PROCEDURE — 83498 ASY HYDROXYPROGESTERONE 17-D: CPT

## 2020-11-06 PROCEDURE — 84402 ASSAY OF FREE TESTOSTERONE: CPT

## 2020-11-06 PROCEDURE — 80053 COMPREHEN METABOLIC PANEL: CPT

## 2020-11-06 PROCEDURE — 84443 ASSAY THYROID STIM HORMONE: CPT

## 2020-11-06 PROCEDURE — 80061 LIPID PANEL: CPT

## 2020-11-06 PROCEDURE — 84144 ASSAY OF PROGESTERONE: CPT

## 2020-11-06 PROCEDURE — 82306 VITAMIN D 25 HYDROXY: CPT

## 2020-11-06 PROCEDURE — 84146 ASSAY OF PROLACTIN: CPT

## 2020-11-06 PROCEDURE — 84403 ASSAY OF TOTAL TESTOSTERONE: CPT

## 2020-11-06 PROCEDURE — 36415 COLL VENOUS BLD VENIPUNCTURE: CPT

## 2020-11-07 LAB
TESTOST FREE SERPL-MCNC: 3.6 PG/ML (ref 0–4.2)
TESTOST SERPL-MCNC: 38 NG/DL (ref 8–48)

## 2020-11-09 ENCOUNTER — OFFICE VISIT (OUTPATIENT)
Dept: PHYSICAL THERAPY | Facility: REHABILITATION | Age: 25
End: 2020-11-09
Payer: COMMERCIAL

## 2020-11-09 DIAGNOSIS — H81.13 BENIGN PAROXYSMAL POSITIONAL VERTIGO DUE TO BILATERAL VESTIBULAR DISORDER: ICD-10-CM

## 2020-11-09 DIAGNOSIS — M54.50 CHRONIC BILATERAL LOW BACK PAIN, UNSPECIFIED WHETHER SCIATICA PRESENT: Primary | ICD-10-CM

## 2020-11-09 DIAGNOSIS — G89.29 CHRONIC BILATERAL LOW BACK PAIN, UNSPECIFIED WHETHER SCIATICA PRESENT: Primary | ICD-10-CM

## 2020-11-09 PROCEDURE — 97112 NEUROMUSCULAR REEDUCATION: CPT | Performed by: PHYSICAL THERAPIST

## 2020-11-11 ENCOUNTER — HOSPITAL ENCOUNTER (OUTPATIENT)
Dept: RADIOLOGY | Facility: HOSPITAL | Age: 25
Discharge: HOME | End: 2020-11-11
Attending: OBSTETRICS & GYNECOLOGY
Payer: COMMERCIAL

## 2020-11-11 ENCOUNTER — TRANSCRIBE ORDERS (OUTPATIENT)
Dept: RADIOLOGY | Facility: HOSPITAL | Age: 25
End: 2020-11-11

## 2020-11-11 DIAGNOSIS — N92.6 IRREGULAR MENSTRUAL CYCLE: ICD-10-CM

## 2020-11-11 PROCEDURE — 76830 TRANSVAGINAL US NON-OB: CPT

## 2020-11-11 PROCEDURE — 76856 US EXAM PELVIC COMPLETE: CPT

## 2020-11-12 ENCOUNTER — APPOINTMENT (OUTPATIENT)
Dept: PHYSICAL THERAPY | Facility: REHABILITATION | Age: 25
End: 2020-11-12
Payer: COMMERCIAL

## 2020-11-13 LAB — 17OHP SERPL-MCNC: 49 NG/DL

## 2020-11-16 ENCOUNTER — OFFICE VISIT (OUTPATIENT)
Dept: FAMILY MEDICINE CLINIC | Facility: CLINIC | Age: 25
End: 2020-11-16
Payer: COMMERCIAL

## 2020-11-16 ENCOUNTER — APPOINTMENT (OUTPATIENT)
Dept: PHYSICAL THERAPY | Facility: REHABILITATION | Age: 25
End: 2020-11-16
Payer: COMMERCIAL

## 2020-11-16 VITALS
DIASTOLIC BLOOD PRESSURE: 60 MMHG | TEMPERATURE: 97.6 F | OXYGEN SATURATION: 97 % | BODY MASS INDEX: 39.86 KG/M2 | SYSTOLIC BLOOD PRESSURE: 108 MMHG | RESPIRATION RATE: 16 BRPM | HEART RATE: 97 BPM | HEIGHT: 66 IN | WEIGHT: 248 LBS

## 2020-11-16 DIAGNOSIS — E66.01 MORBID OBESITY (HCC): ICD-10-CM

## 2020-11-16 DIAGNOSIS — L50.9 HIVES: ICD-10-CM

## 2020-11-16 DIAGNOSIS — M54.16 LUMBAR BACK PAIN WITH RADICULOPATHY AFFECTING LOWER EXTREMITY: ICD-10-CM

## 2020-11-16 DIAGNOSIS — Z23 NEED FOR VACCINATION: ICD-10-CM

## 2020-11-16 DIAGNOSIS — E78.5 HYPERLIPIDEMIA, UNSPECIFIED HYPERLIPIDEMIA TYPE: ICD-10-CM

## 2020-11-16 DIAGNOSIS — F32.A DEPRESSION, UNSPECIFIED DEPRESSION TYPE: ICD-10-CM

## 2020-11-16 DIAGNOSIS — J45.20 MILD INTERMITTENT ASTHMA WITHOUT COMPLICATION: ICD-10-CM

## 2020-11-16 DIAGNOSIS — R73.01 IFG (IMPAIRED FASTING GLUCOSE): Primary | ICD-10-CM

## 2020-11-16 DIAGNOSIS — E55.9 VITAMIN D DEFICIENCY: ICD-10-CM

## 2020-11-16 DIAGNOSIS — F41.9 ANXIETY: ICD-10-CM

## 2020-11-16 PROCEDURE — 90471 IMMUNIZATION ADMIN: CPT | Performed by: FAMILY MEDICINE

## 2020-11-16 PROCEDURE — 99214 OFFICE O/P EST MOD 30 MIN: CPT | Performed by: FAMILY MEDICINE

## 2020-11-16 PROCEDURE — 90682 RIV4 VACC RECOMBINANT DNA IM: CPT | Performed by: FAMILY MEDICINE

## 2020-11-16 RX ORDER — DULOXETIN HYDROCHLORIDE 60 MG/1
60 CAPSULE, DELAYED RELEASE ORAL DAILY
Qty: 90 CAPSULE | Refills: 2 | Status: SHIPPED | OUTPATIENT
Start: 2020-11-16 | End: 2021-01-07

## 2020-11-16 RX ORDER — ERGOCALCIFEROL (VITAMIN D2) 1250 MCG
50000 CAPSULE ORAL WEEKLY
Qty: 12 CAPSULE | Refills: 0 | Status: SHIPPED | OUTPATIENT
Start: 2020-11-16 | End: 2021-01-07

## 2020-11-23 ENCOUNTER — SOCIAL WORK (OUTPATIENT)
Dept: BEHAVIORAL/MENTAL HEALTH CLINIC | Facility: CLINIC | Age: 25
End: 2020-11-23
Payer: COMMERCIAL

## 2020-11-23 ENCOUNTER — APPOINTMENT (OUTPATIENT)
Dept: PHYSICAL THERAPY | Facility: REHABILITATION | Age: 25
End: 2020-11-23
Payer: COMMERCIAL

## 2020-11-23 DIAGNOSIS — F41.9 ANXIETY: Primary | ICD-10-CM

## 2020-11-23 PROCEDURE — 90834 PSYTX W PT 45 MINUTES: CPT | Performed by: SOCIAL WORKER

## 2020-11-25 ENCOUNTER — APPOINTMENT (OUTPATIENT)
Dept: PHYSICAL THERAPY | Facility: REHABILITATION | Age: 25
End: 2020-11-25
Payer: COMMERCIAL

## 2020-11-30 ENCOUNTER — APPOINTMENT (OUTPATIENT)
Dept: PHYSICAL THERAPY | Facility: REHABILITATION | Age: 25
End: 2020-11-30
Payer: COMMERCIAL

## 2020-11-30 ENCOUNTER — OFFICE VISIT (OUTPATIENT)
Dept: OBGYN CLINIC | Facility: CLINIC | Age: 25
End: 2020-11-30
Payer: COMMERCIAL

## 2020-11-30 VITALS — SYSTOLIC BLOOD PRESSURE: 120 MMHG | DIASTOLIC BLOOD PRESSURE: 74 MMHG | WEIGHT: 249 LBS | BODY MASS INDEX: 40.19 KG/M2

## 2020-11-30 DIAGNOSIS — N97.0 FEMALE INFERTILITY ASSOCIATED WITH ANOVULATION: Primary | ICD-10-CM

## 2020-11-30 PROCEDURE — 99214 OFFICE O/P EST MOD 30 MIN: CPT | Performed by: OBSTETRICS & GYNECOLOGY

## 2020-12-11 ENCOUNTER — TELEPHONE (OUTPATIENT)
Dept: DERMATOLOGY | Facility: CLINIC | Age: 25
End: 2020-12-11

## 2020-12-11 NOTE — TELEPHONE ENCOUNTER
Patient called today about making an appointment with us  She has been on the wait list since October  I informed her that our January schedule isn't available yet and to give us a call the week of charito

## 2020-12-16 ENCOUNTER — CONSULT (OUTPATIENT)
Dept: DERMATOLOGY | Facility: CLINIC | Age: 25
End: 2020-12-16
Payer: COMMERCIAL

## 2020-12-16 VITALS — TEMPERATURE: 96.6 F | HEIGHT: 66 IN | BODY MASS INDEX: 39.53 KG/M2 | WEIGHT: 246 LBS

## 2020-12-16 DIAGNOSIS — L91.8 ACHROCHORDON: ICD-10-CM

## 2020-12-16 DIAGNOSIS — D22.9 MULTIPLE MELANOCYTIC NEVI: ICD-10-CM

## 2020-12-16 DIAGNOSIS — L70.0 ACNE VULGARIS: ICD-10-CM

## 2020-12-16 DIAGNOSIS — D23.9 DERMATOFIBROMA: ICD-10-CM

## 2020-12-16 DIAGNOSIS — L30.4 INTERTRIGO: ICD-10-CM

## 2020-12-16 DIAGNOSIS — L85.8 KERATOSIS PILARIS: Primary | ICD-10-CM

## 2020-12-16 PROCEDURE — 11900 INJECT SKIN LESIONS </W 7: CPT | Performed by: STUDENT IN AN ORGANIZED HEALTH CARE EDUCATION/TRAINING PROGRAM

## 2020-12-16 PROCEDURE — 99244 OFF/OP CNSLTJ NEW/EST MOD 40: CPT | Performed by: STUDENT IN AN ORGANIZED HEALTH CARE EDUCATION/TRAINING PROGRAM

## 2020-12-16 RX ORDER — CLINDAMYCIN PHOSPHATE 10 UG/ML
LOTION TOPICAL
Qty: 60 ML | Refills: 2 | Status: SHIPPED | OUTPATIENT
Start: 2020-12-16 | End: 2021-04-05

## 2020-12-16 RX ORDER — ADAPALENE 0.1 G/100G
CREAM TOPICAL
Qty: 45 G | Refills: 3 | Status: SHIPPED | OUTPATIENT
Start: 2020-12-16 | End: 2021-01-04

## 2020-12-18 ENCOUNTER — TELEPHONE (OUTPATIENT)
Dept: FAMILY MEDICINE CLINIC | Facility: CLINIC | Age: 25
End: 2020-12-18

## 2020-12-18 ENCOUNTER — OFFICE VISIT (OUTPATIENT)
Dept: URGENT CARE | Facility: CLINIC | Age: 25
End: 2020-12-18
Payer: COMMERCIAL

## 2020-12-18 VITALS
WEIGHT: 274 LBS | TEMPERATURE: 98.4 F | HEART RATE: 98 BPM | HEIGHT: 66 IN | BODY MASS INDEX: 44.03 KG/M2 | RESPIRATION RATE: 20 BRPM | OXYGEN SATURATION: 98 %

## 2020-12-18 DIAGNOSIS — J45.901 MILD ASTHMA WITH ACUTE EXACERBATION, UNSPECIFIED WHETHER PERSISTENT: Primary | ICD-10-CM

## 2020-12-18 DIAGNOSIS — Z11.59 SPECIAL SCREENING EXAMINATION FOR UNSPECIFIED VIRAL DISEASE: ICD-10-CM

## 2020-12-18 PROCEDURE — U0003 INFECTIOUS AGENT DETECTION BY NUCLEIC ACID (DNA OR RNA); SEVERE ACUTE RESPIRATORY SYNDROME CORONAVIRUS 2 (SARS-COV-2) (CORONAVIRUS DISEASE [COVID-19]), AMPLIFIED PROBE TECHNIQUE, MAKING USE OF HIGH THROUGHPUT TECHNOLOGIES AS DESCRIBED BY CMS-2020-01-R: HCPCS | Performed by: PHYSICIAN ASSISTANT

## 2020-12-18 PROCEDURE — G0382 LEV 3 HOSP TYPE B ED VISIT: HCPCS | Performed by: PHYSICIAN ASSISTANT

## 2020-12-18 RX ORDER — PREDNISONE 20 MG/1
40 TABLET ORAL DAILY
Qty: 10 TABLET | Refills: 0 | Status: SHIPPED | OUTPATIENT
Start: 2020-12-18 | End: 2020-12-23

## 2020-12-22 LAB — SARS-COV-2 RNA SPEC QL NAA+PROBE: NOT DETECTED

## 2020-12-28 ENCOUNTER — TELEPHONE (OUTPATIENT)
Dept: FAMILY MEDICINE CLINIC | Facility: CLINIC | Age: 25
End: 2020-12-28

## 2021-01-04 ENCOUNTER — TELEPHONE (OUTPATIENT)
Dept: OBGYN CLINIC | Facility: CLINIC | Age: 26
End: 2021-01-04

## 2021-01-04 NOTE — TELEPHONE ENCOUNTER
LMP 11/21/2020 postive UPT just wanted to review medication list  Advised she should stop Differin (face cream does contain retinol) and Claritin D (can take plain Claritin) advised to change to PNV  Advised she can take all other medication but will checkin with provider regarding Cymbalta and meclizine  Routing to provider for advice

## 2021-01-05 ENCOUNTER — TELEPHONE (OUTPATIENT)
Dept: FAMILY MEDICINE CLINIC | Facility: CLINIC | Age: 26
End: 2021-01-05

## 2021-01-05 NOTE — TELEPHONE ENCOUNTER
Yael Cody is calling because her OB said to check with her PCP if she should stop the  Metformin and duloxetine due to pregnancy      Please advise    Wayne Boyd

## 2021-01-05 NOTE — TELEPHONE ENCOUNTER
Reviewed provider recommendations and congratulations  Advised there is not much research on Cymbalta and meclizine for or against use in pregnancy  If she can do without than we would recommend, if she needs please talk to PCP or ordering provider to see if they can change Cymbalta to Zoloft or something more studied  If she is doing well on Cymbalta and wants to stay on it provider supports as well  Patient would like to know if she can use CeraVe products with hyaluronic acid      Routing to provider for advice
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
## 2021-01-07 ENCOUNTER — HOSPITAL ENCOUNTER (EMERGENCY)
Facility: HOSPITAL | Age: 26
Discharge: HOME | End: 2021-01-07
Attending: EMERGENCY MEDICINE
Payer: COMMERCIAL

## 2021-01-07 ENCOUNTER — APPOINTMENT (EMERGENCY)
Dept: ULTRASOUND IMAGING | Facility: HOSPITAL | Age: 26
End: 2021-01-07
Payer: COMMERCIAL

## 2021-01-07 ENCOUNTER — TELEPHONE (OUTPATIENT)
Dept: OBGYN CLINIC | Facility: CLINIC | Age: 26
End: 2021-01-07

## 2021-01-07 ENCOUNTER — PATIENT OUTREACH (OUTPATIENT)
Dept: FAMILY MEDICINE CLINIC | Facility: CLINIC | Age: 26
End: 2021-01-07

## 2021-01-07 ENCOUNTER — TELEMEDICINE (OUTPATIENT)
Dept: FAMILY MEDICINE CLINIC | Facility: CLINIC | Age: 26
End: 2021-01-07
Payer: COMMERCIAL

## 2021-01-07 VITALS
SYSTOLIC BLOOD PRESSURE: 119 MMHG | OXYGEN SATURATION: 97 % | WEIGHT: 248.6 LBS | HEART RATE: 95 BPM | RESPIRATION RATE: 20 BRPM | DIASTOLIC BLOOD PRESSURE: 68 MMHG | BODY MASS INDEX: 40.13 KG/M2 | TEMPERATURE: 98.5 F

## 2021-01-07 VITALS — WEIGHT: 247 LBS | TEMPERATURE: 98.2 F | BODY MASS INDEX: 39.7 KG/M2 | HEIGHT: 66 IN

## 2021-01-07 DIAGNOSIS — N93.9 VAGINAL BLEEDING: Primary | ICD-10-CM

## 2021-01-07 DIAGNOSIS — Z11.4 ENCOUNTER FOR SCREENING FOR HIV: Primary | ICD-10-CM

## 2021-01-07 DIAGNOSIS — E55.9 VITAMIN D DEFICIENCY: ICD-10-CM

## 2021-01-07 DIAGNOSIS — Z78.9 NEED FOR FOLLOW-UP BY SOCIAL WORKER: Primary | ICD-10-CM

## 2021-01-07 DIAGNOSIS — E66.01 CLASS 2 SEVERE OBESITY WITH SERIOUS COMORBIDITY AND BODY MASS INDEX (BMI) OF 39.0 TO 39.9 IN ADULT, UNSPECIFIED OBESITY TYPE (HCC): ICD-10-CM

## 2021-01-07 DIAGNOSIS — R73.01 IFG (IMPAIRED FASTING GLUCOSE): ICD-10-CM

## 2021-01-07 DIAGNOSIS — Z11.59 ENCOUNTER FOR HEPATITIS C SCREENING TEST FOR LOW RISK PATIENT: ICD-10-CM

## 2021-01-07 DIAGNOSIS — M54.16 LUMBAR BACK PAIN WITH RADICULOPATHY AFFECTING LOWER EXTREMITY: ICD-10-CM

## 2021-01-07 DIAGNOSIS — F32.A DEPRESSION, UNSPECIFIED DEPRESSION TYPE: ICD-10-CM

## 2021-01-07 DIAGNOSIS — F41.9 ANXIETY: ICD-10-CM

## 2021-01-07 LAB
ABO GROUP BLD: NORMAL
ABO GROUP BLD: NORMAL
ALBUMIN SERPL BCP-MCNC: 4.1 G/DL (ref 3.5–5)
ALP SERPL-CCNC: 65 U/L (ref 46–116)
ALT SERPL W P-5'-P-CCNC: 39 U/L (ref 12–78)
ANION GAP SERPL CALCULATED.3IONS-SCNC: 12 MMOL/L (ref 4–13)
AST SERPL W P-5'-P-CCNC: 20 U/L (ref 5–45)
B-HCG SERPL-ACNC: <2 MIU/ML
BACTERIA UR QL AUTO: NORMAL /HPF
BASOPHILS # BLD AUTO: 0.03 THOUSANDS/ΜL (ref 0–0.1)
BASOPHILS NFR BLD AUTO: 0 % (ref 0–1)
BILIRUB SERPL-MCNC: 0.9 MG/DL (ref 0.2–1)
BILIRUB UR QL STRIP: NEGATIVE
BLD GP AB SCN SERPL QL: NEGATIVE
BUN SERPL-MCNC: 11 MG/DL (ref 5–25)
CALCIUM SERPL-MCNC: 8.9 MG/DL (ref 8.3–10.1)
CHLORIDE SERPL-SCNC: 104 MMOL/L (ref 100–108)
CLARITY UR: CLEAR
CO2 SERPL-SCNC: 23 MMOL/L (ref 21–32)
COLOR UR: YELLOW
CREAT SERPL-MCNC: 0.79 MG/DL (ref 0.6–1.3)
EOSINOPHIL # BLD AUTO: 0.13 THOUSAND/ΜL (ref 0–0.61)
EOSINOPHIL NFR BLD AUTO: 1 % (ref 0–6)
ERYTHROCYTE [DISTWIDTH] IN BLOOD BY AUTOMATED COUNT: 14 % (ref 11.6–15.1)
EXT PREG TEST URINE: NEGATIVE
EXT. CONTROL ED NAV: NORMAL
GFR SERPL CREATININE-BSD FRML MDRD: 106 ML/MIN/1.73SQ M
GLUCOSE SERPL-MCNC: 91 MG/DL (ref 65–140)
GLUCOSE UR STRIP-MCNC: NEGATIVE MG/DL
HCT VFR BLD AUTO: 39.5 % (ref 34.8–46.1)
HGB BLD-MCNC: 13.1 G/DL (ref 11.5–15.4)
HGB UR QL STRIP.AUTO: ABNORMAL
IMM GRANULOCYTES # BLD AUTO: 0.03 THOUSAND/UL (ref 0–0.2)
IMM GRANULOCYTES NFR BLD AUTO: 0 % (ref 0–2)
KETONES UR STRIP-MCNC: NEGATIVE MG/DL
LEUKOCYTE ESTERASE UR QL STRIP: NEGATIVE
LYMPHOCYTES # BLD AUTO: 2.3 THOUSANDS/ΜL (ref 0.6–4.47)
LYMPHOCYTES NFR BLD AUTO: 25 % (ref 14–44)
MCH RBC QN AUTO: 29.7 PG (ref 26.8–34.3)
MCHC RBC AUTO-ENTMCNC: 33.2 G/DL (ref 31.4–37.4)
MCV RBC AUTO: 90 FL (ref 82–98)
MONOCYTES # BLD AUTO: 0.47 THOUSAND/ΜL (ref 0.17–1.22)
MONOCYTES NFR BLD AUTO: 5 % (ref 4–12)
NEUTROPHILS # BLD AUTO: 6.19 THOUSANDS/ΜL (ref 1.85–7.62)
NEUTS SEG NFR BLD AUTO: 69 % (ref 43–75)
NITRITE UR QL STRIP: NEGATIVE
NON-SQ EPI CELLS URNS QL MICRO: NORMAL /HPF
NRBC BLD AUTO-RTO: 0 /100 WBCS
PH UR STRIP.AUTO: 7 [PH] (ref 4.5–8)
PLATELET # BLD AUTO: 293 THOUSANDS/UL (ref 149–390)
PMV BLD AUTO: 9.9 FL (ref 8.9–12.7)
POTASSIUM SERPL-SCNC: 3.8 MMOL/L (ref 3.5–5.3)
PROT SERPL-MCNC: 8 G/DL (ref 6.4–8.2)
PROT UR STRIP-MCNC: NEGATIVE MG/DL
RBC # BLD AUTO: 4.41 MILLION/UL (ref 3.81–5.12)
RBC #/AREA URNS AUTO: NORMAL /HPF
RH BLD: POSITIVE
RH BLD: POSITIVE
SODIUM SERPL-SCNC: 139 MMOL/L (ref 136–145)
SP GR UR STRIP.AUTO: 1.02 (ref 1–1.03)
SPECIMEN EXPIRATION DATE: NORMAL
UROBILINOGEN UR QL STRIP.AUTO: 0.2 E.U./DL
WBC # BLD AUTO: 9.15 THOUSAND/UL (ref 4.31–10.16)
WBC #/AREA URNS AUTO: NORMAL /HPF

## 2021-01-07 PROCEDURE — 86900 BLOOD TYPING SEROLOGIC ABO: CPT | Performed by: PHYSICIAN ASSISTANT

## 2021-01-07 PROCEDURE — 80053 COMPREHEN METABOLIC PANEL: CPT | Performed by: PHYSICIAN ASSISTANT

## 2021-01-07 PROCEDURE — 99214 OFFICE O/P EST MOD 30 MIN: CPT | Performed by: FAMILY MEDICINE

## 2021-01-07 PROCEDURE — 81001 URINALYSIS AUTO W/SCOPE: CPT

## 2021-01-07 PROCEDURE — 84702 CHORIONIC GONADOTROPIN TEST: CPT | Performed by: PHYSICIAN ASSISTANT

## 2021-01-07 PROCEDURE — 85025 COMPLETE CBC W/AUTO DIFF WBC: CPT | Performed by: PHYSICIAN ASSISTANT

## 2021-01-07 PROCEDURE — 87086 URINE CULTURE/COLONY COUNT: CPT

## 2021-01-07 PROCEDURE — 99284 EMERGENCY DEPT VISIT MOD MDM: CPT

## 2021-01-07 PROCEDURE — 86901 BLOOD TYPING SEROLOGIC RH(D): CPT | Performed by: PHYSICIAN ASSISTANT

## 2021-01-07 PROCEDURE — 76815 OB US LIMITED FETUS(S): CPT

## 2021-01-07 PROCEDURE — 99285 EMERGENCY DEPT VISIT HI MDM: CPT | Performed by: PHYSICIAN ASSISTANT

## 2021-01-07 PROCEDURE — 86850 RBC ANTIBODY SCREEN: CPT | Performed by: PHYSICIAN ASSISTANT

## 2021-01-07 PROCEDURE — 81025 URINE PREGNANCY TEST: CPT | Performed by: PHYSICIAN ASSISTANT

## 2021-01-07 PROCEDURE — 36415 COLL VENOUS BLD VENIPUNCTURE: CPT | Performed by: PHYSICIAN ASSISTANT

## 2021-01-07 RX ORDER — DULOXETIN HYDROCHLORIDE 20 MG/1
CAPSULE, DELAYED RELEASE ORAL
Qty: 30 CAPSULE | Refills: 0 | Status: SHIPPED | OUTPATIENT
Start: 2021-01-07 | End: 2021-03-16 | Stop reason: ALTCHOICE

## 2021-01-07 NOTE — TELEPHONE ENCOUNTER
RC - approx 6 wks pregnant c/o vaginal bleeding with wiping and nausea  She is currently in ED  States she did have intercourse in last 24-48 hours  Advised this can be normal   Monitor bleeding if she bleeds like a period call the office otherwise monitor  Nausea is normal in the first trimester  Please try over the counter Vitamin B6 25 mg three times a day, Unisom (doxylamine) 25 mg at night (sleeping medication will make you tired), anything kristin in flavor (gingerale, kristin capsules 1000mg daily, kristin root)  Try to keep something in your stomach - small frequent meals  Avoid things that are gassy, greasy or spicy  Lemonade or lemon in your water will help, mint tea or anything orange in flavor  Try to eat a carbohydrate with a protein  Drink liquids sips of sports drinks and bouillon to avoid dehydration  Brothy soups and with noodles, avoid cream based soups, as fatty foods delay gastric emptying  Add solid starches like potatoes, rice, and pasta  Salty and sour foods are tolerated best  (salt and vinegar potato chips)  If you try all these measures and still feel nauseated, please call the office  Advised she can continue to wait in ED if she wants but we would recommend to just monitor for now  Routing to provider for further recommendations

## 2021-01-07 NOTE — ASSESSMENT & PLAN NOTE
Pending labs  Continue Metformin 500mg 2 pills in AM & 1 pill in PM   Recommend lifestyle modifications

## 2021-01-07 NOTE — DISCHARGE INSTRUCTIONS
Your urine and blood pregnancy test were negative today  We would like you to follow up with OBGYN in regards to your vaginal bleeding and recent positive pregnancy test     Urine pregnancy test   GENERAL INFORMATION:  What is this test?  This test measures a hormone called human chorionic gonadotropin (hCG) in urine  This test is used when pregnancy is suspected  Why do I need this test?  Laboratory tests may be done for many reasons  Tests are performed for routine health screenings or if a disease or toxicity is suspected  Lab tests may be used to determine if a medical condition is improving or worsening  Lab tests may also be used to measure the success or failure of a medication or treatment plan  Lab tests may be ordered for professional or legal reasons  You may need this test if you have:   Possible pregnancy  When and how often should I have this test?  When and how often laboratory tests are done may depend on many factors  The timing of laboratory tests may rely on the results or completion of other tests, procedures, or treatments  Lab tests may be performed immediately in an emergency, or tests may be delayed as a condition is treated or monitored  A test may be suggested or become necessary when certain signs or symptoms appear  Due to changes in the way your body naturally functions through the course of a day, lab tests may need to be performed at a certain time of day  If you have prepared for a test by changing your food or fluid intake, lab tests may be timed in accordance with those changes  Timing of tests may be based on increased and decreased levels of medications, drugs or other substances in the body  The age or gender of the person being tested may affect when and how often a lab test is required  Chronic or progressive conditions may need ongoing monitoring through the use of lab tests  Conditions that worsen and improve may also need frequent monitoring   Certain tests may be repeated to obtain a series of results, or tests may need to be repeated to confirm or disprove results  Timing and frequency of lab tests may vary if they are performed for professional or legal reasons  How should I get ready for the test?  To prepare for giving a urine sample, be sure to drink enough fluids before the test, unless you have been given other instructions  Try not to empty your bladder before the test   How is the test done? To provide a sample of urine, you will be asked to urinate into a container  Fill the container as much as you can, but do not overfill it  Urine samples may also be taken from a catheter  A sample of your first urination in the morning is preferred for this test   How will the test feel? The amount of discomfort you feel will depend on many factors, including your sensitivity to pain  Communicate how you are feeling with the person doing the test  Inform the person doing the test if you feel that you cannot continue with the test   This test usually causes no discomfort  What should I do after the test?  After collecting a urine sample, close the container if it has a lid  Place the container where the healthcare worker asked you to put it  Clean your hands with soap and water  What are the risks? Urine: A urine test is generally considered safe  Talk to your healthcare worker if you have questions or concerns about this test   What are normal results for this test?  Laboratory test results may vary depending on your age, gender, health history, the method used for the test, and many other factors  If your results are different from the results suggested below, this may not mean that you have a disease  Contact your healthcare worker if you have any questions  The following is considered to be a normal result for this test:  Negative  What follow up should I do after this test?  Ask your healthcare worker how you will be informed of the test results   You may be asked to call for results, schedule an appointment to discuss results, or notified of results by mail  Follow up care varies depending on many factors related to your test  Sometimes there is no follow up after you have been notified of test results  At other times follow up may be suggested or necessary  Some examples of follow up care include changes to medication or treatment plans, referral to a specialist, more or less frequent monitoring, and additional tests or procedures  Talk with your healthcare worker about any concerns or questions you have regarding follow up care or instructions  Where can I get more information? Related Companies   [employer]st[employer]nec[employer] - http://[employer]/  org  American Academy of Family Physicians - http://www  aafp org  [employer] - [employer] be  com    CARE AGREEMENT:   You have the right to help plan your care  To help with this plan, you must learn about your health condition and how it may be treated  You can then discuss treatment options with your caregivers  Work with them to decide what care may be used to treat you  You always have the right to refuse treatment  © Copyright [employer] 2018  Information is for End User's use only and may not be sold, redistributed or otherwise used for commercial purposes  The above information is an  only  It is not intended as a substitute for medical advice for your individual conditions or treatments  Talk to your doctor, nurse or pharmacist before following any medical regimen to see if it is safe and effective for you

## 2021-01-07 NOTE — ED PROVIDER NOTES
History  Chief Complaint   Patient presents with    Vaginal Bleeding - Pregnant     PT presents 6 weeks and 5 days pregnant with c/o vaginal bleeding and "some cramping"     Armin Sommers is a 23 y o   female who presents to the ED with complaints of light vaginal bleeding and lower abdominal cramping approximately 1 hour PTA  Patient states she saw pink blood with wiping  Patient states she did have intercourse in the last 24 hours  Patient has been taking prenatal vitamins and is currently being weaned from her Cymbalta due to current pregnancy status  LMP 2020  Patient believes her blood type is A+  Patient admits to increased heartburn and nausea  Patient has her first prenatal appointment scheduled in 2 weeks  Patient states she took 2 pregnancy tests at home (digital) which were positive  Last test was 3 days ago  Patient states she has been having unprotected sex for the past year  History provided by:  Patient  Vaginal Bleeding  Quality:  Spotting  Duration:  1 hour  Associated symptoms: abdominal pain and nausea    Associated symptoms: no back pain, no dizziness, no dyspareunia, no dysuria, no fatigue, no fever and no vaginal discharge        Prior to Admission Medications   Prescriptions Last Dose Informant Patient Reported? Taking? DULoxetine (CYMBALTA) 20 mg capsule   No No   Si pill by mouth daily x 1 week, then 1 pill by mouth daily x 1 week, then 1 pill by mouth every other day x 1 week, then stop     Prenatal Vit-Fe Fumarate-FA (PRENATAL VITAMIN PO)   Yes No   Sig: Take 1 capsule by mouth daily   albuterol (PROVENTIL HFA,VENTOLIN HFA) 90 mcg/act inhaler   No No   Sig: Inhale 2 puffs every 4 (four) hours as needed for wheezing   clindamycin (CLEOCIN T) 1 % lotion   No No   Sig: Apply topically once a day to face   fluticasone (FLONASE) 50 mcg/act nasal spray   No No   Si spray into each nostril daily   Patient taking differently: 2 sprays into each nostril daily as needed metFORMIN (GLUCOPHAGE) 500 mg tablet   No No   Sig: Increase Metformin 500mg every 3 days stomach tolerates:  1 pill in AM; 1 pill twice daily; 2 pills in AM & 1 pill in PM    Patient not taking: Reported on 1/7/2021   ondansetron (ZOFRAN-ODT) 8 mg disintegrating tablet   No No   Sig: Take 1 tablet (8 mg total) by mouth every 8 (eight) hours as needed for nausea or vomiting   Patient not taking: Reported on 1/7/2021      Facility-Administered Medications: None       Past Medical History:   Diagnosis Date    Anxiety     Depression     Endometriosis     History of ovarian cyst 2018    Hyperlipidemia     IFG (impaired fasting glucose)     Migraine     Mild intermittent asthma     Morbid obesity (HCC)     PID (pelvic inflammatory disease) 2018    Polycystic ovary syndrome     Vitamin D deficiency        Past Surgical History:   Procedure Laterality Date    WISDOM TOOTH EXTRACTION         Family History   Problem Relation Age of Onset    JOSELINE disease Mother     Seizures Father 15    Asperger's syndrome Sister     Autism Brother     Pancreatic cancer Maternal Grandmother     Heart attack Maternal Grandfather     No Known Problems Paternal Grandmother     No Known Problems Paternal Grandfather      I have reviewed and agree with the history as documented  E-Cigarette/Vaping    E-Cigarette Use Never User      E-Cigarette/Vaping Substances    Nicotine No     THC No     CBD No     Flavoring No      Social History     Tobacco Use    Smoking status: Former Smoker     Packs/day: 0 10     Years: 0 20     Pack years: 0 02     Types: Cigarettes     Start date: 5/1/2017     Quit date: 10/1/2017     Years since quitting: 3 2    Smokeless tobacco: Never Used    Tobacco comment: Smoked socially    Substance Use Topics    Alcohol use:  Yes     Alcohol/week: 1 0 standard drinks     Types: 1 Shots of liquor per week     Frequency: Monthly or less     Drinks per session: 1 or 2     Binge frequency: Never Comment: socially    Drug use: Never       Review of Systems   Constitutional: Negative for appetite change, chills, fatigue, fever and unexpected weight change  HENT: Negative for congestion, drooling, ear pain, rhinorrhea, sore throat, trouble swallowing and voice change  Eyes: Negative for pain, discharge, redness and visual disturbance  Respiratory: Negative for cough, shortness of breath, wheezing and stridor  Cardiovascular: Negative for chest pain, palpitations and leg swelling  Gastrointestinal: Positive for abdominal pain and nausea  Negative for blood in stool, constipation, diarrhea and vomiting  Genitourinary: Positive for vaginal bleeding  Negative for dyspareunia, dysuria, flank pain, frequency, hematuria, urgency and vaginal discharge  Musculoskeletal: Negative for back pain, gait problem, joint swelling, neck pain and neck stiffness  Skin: Negative for color change and rash  Neurological: Negative for dizziness, seizures, light-headedness and headaches  Physical Exam  Physical Exam  Vitals signs and nursing note reviewed  Constitutional:       Appearance: She is well-developed  HENT:      Head: Normocephalic and atraumatic  Nose: Nose normal    Eyes:      Conjunctiva/sclera: Conjunctivae normal       Pupils: Pupils are equal, round, and reactive to light  Cardiovascular:      Rate and Rhythm: Normal rate and regular rhythm  Pulmonary:      Effort: Pulmonary effort is normal       Breath sounds: Normal breath sounds  Abdominal:      General: Abdomen is flat  Bowel sounds are normal  There is no distension  Tenderness: There is no abdominal tenderness  There is no right CVA tenderness or left CVA tenderness  Musculoskeletal: Normal range of motion  Skin:     General: Skin is warm and dry  Capillary Refill: Capillary refill takes less than 2 seconds  Neurological:      Mental Status: She is alert and oriented to person, place, and time  Vital Signs  ED Triage Vitals [01/07/21 1454]   Temperature Pulse Respirations Blood Pressure SpO2   98 5 °F (36 9 °C) 95 20 119/68 97 %      Temp Source Heart Rate Source Patient Position - Orthostatic VS BP Location FiO2 (%)   Oral Monitor Sitting Left arm --      Pain Score       4           Vitals:    01/07/21 1454   BP: 119/68   Pulse: 95   Patient Position - Orthostatic VS: Sitting         Visual Acuity      ED Medications  Medications - No data to display    Diagnostic Studies  Results Reviewed     Procedure Component Value Units Date/Time    POCT pregnancy, urine [[id number]]  (Normal) Resulted: 01/07/21 1758    Lab Status: Final result Updated: 01/07/21 1758     EXT PREG TEST UR (Ref: Negative) negative     Control valid    Urine Microscopic [[id number]]  (Normal) Collected: 01/07/21 1659    Lab Status: Final result Specimen: Urine, Clean Catch Updated: 01/07/21 1753     RBC, UA None Seen /hpf      WBC, UA None Seen /hpf      Epithelial Cells Occasional /hpf      Bacteria, UA Occasional /hpf     Comprehensive metabolic panel [[id number]] Collected: 01/07/21 1623    Lab Status: Final result Specimen: Blood from Hand, Left Updated: 01/07/21 1714     Sodium 139 mmol/L      Potassium 3 8 mmol/L      Chloride 104 mmol/L      CO2 23 mmol/L      ANION GAP 12 mmol/L      BUN 11 mg/dL      Creatinine 0 79 mg/dL      Glucose 91 mg/dL      Calcium 8 9 mg/dL      AST 20 U/L      ALT 39 U/L      Alkaline Phosphatase 65 U/L      Total Protein 8 0 g/dL      Albumin 4 1 g/dL      Total Bilirubin 0 90 mg/dL      eGFR 106 ml/min/1 73sq m     Narrative:      Suleiman guidelines for Chronic Kidney Disease (CKD):     Stage 1 with normal or high GFR (GFR > 90 mL/min/1 73 square meters)    Stage 2 Mild CKD (GFR = 60-89 mL/min/1 73 square meters)    Stage 3A Moderate CKD (GFR = 45-59 mL/min/1 73 square meters)    Stage 3B Moderate CKD (GFR = 30-44 mL/min/1 73 square meters)    Stage 4 Severe CKD (GFR = 15-29 mL/min/1 73 square meters)    Stage 5 End Stage CKD (GFR <15 mL/min/1 73 square meters)  Note: GFR calculation is accurate only with a steady state creatinine    hCG, quantitative [[id number]]  (Normal) Collected: 01/07/21 1623    Lab Status: Final result Specimen: Blood from Hand, Left Updated: 01/07/21 1714     HCG, Quant <2 mIU/mL     Narrative:       Expected Ranges:     Approximate               Approximate HCG  Gestation age          Concentration ( mIU/mL)  _____________          ______________________   Asher Dunn                      HCG values  0 2-1                       5-50  1-2                           2-3                         100-5000  3-4                         500-09072  4-5                         1000-39419  5-6                         [id number]  6-8                         [id number]  8-12                        [id number]      Urine culture [[id number]] Collected: 01/07/21 1659    Lab Status:  In process Specimen: Urine, Clean Catch Updated: 01/07/21 1707    Urine Macroscopic, POC [[id number]]  (Abnormal) Collected: 01/07/21 1659    Lab Status: Final result Specimen: Urine Updated: 01/07/21 1700     Color, UA Yellow     Clarity, UA Clear     pH, UA 7 0     Leukocytes, UA Negative     Nitrite, UA Negative     Protein, UA Negative mg/dl      Glucose, UA Negative mg/dl      Ketones, UA Negative mg/dl      Urobilinogen, UA 0 2 E U /dl      Bilirubin, UA Negative     Blood, UA Moderate     Specific [place], UA 1 020    Narrative:      CLINITEK RESULT    CBC and differential [[id number]] Collected: 01/07/21 1623    Lab Status: Final result Specimen: Blood from Hand, Left Updated: 01/07/21 1641     WBC 9 15 Thousand/uL      RBC 4 41 Million/uL      Hemoglobin 13 1 g/dL      Hematocrit 39 5 %      MCV 90 fL      MCH 29 7 pg      MCHC 33 2 g/dL      RDW 14 0 %      MPV 9 9 fL      Platelets 204 Thousands/uL      nRBC 0 /100 WBCs      Neutrophils Relative 69 %      Immat GRANS % 0 % Lymphocytes Relative 25 %      Monocytes Relative 5 %      Eosinophils Relative 1 %      Basophils Relative 0 %      Neutrophils Absolute 6 19 Thousands/µL      Immature Grans Absolute 0 03 Thousand/uL      Lymphocytes Absolute 2 30 Thousands/µL      Monocytes Absolute 0 47 Thousand/µL      Eosinophils Absolute 0 13 Thousand/µL      Basophils Absolute 0 03 Thousands/µL                  US OB pregnancy limited with transvaginal   Final Result by Jc Oneill MD ( 1703)   No intrauterine gestation or adnexal mass identified  Indeterminate rounded hypoechoic structure interposed between the left ovary and adjacent sigmoid, only partially visible  Unclear whether this represents evolving left ovarian follicle or adjacent    ectopic pregnancy  Correlate with quantitative beta hCG  Differential remains early IUP, spontaneous  and ectopic pregnancy  The study was marked in [facility] for immediate notification  Workstation performed: [id number]                    Procedures  Procedures         ED Course  ED Course as of 1806   Thu  Educated patient regarding diagnosis and management  Advised patient to follow up with PCP  Advised patient to RTER for persistent or worsening symptoms  MDM  Number of Diagnoses or Management Options  Vaginal bleeding: new and requires workup  Diagnosis management comments: Urine pregnancy negative  HCG quantitative < 2  Labs WNL  TVUS without intrauterine gestation or adnexal mass identified  Indeterminate rounded hypoechoic structure interposed between the left ovary and adjacent sigmoid, only partially visible  Unclear whether this represents evolving left ovarian follicle or adjacent ectopic pregnancy  Correlate with quantitative beta hCG  Differential remains early IUP, spontaneous  and ectopic pregnancy   Case was discussed with OBGYN resident Dr Genevieve Villarreal who thinks the patient may have had a miscarriage or false positive pregnancy test  I provided patient with strict RTER precautions  I advised patient follow-up with PCP in 24-48 hours  Patient verbalized understanding  Amount and/or Complexity of Data Reviewed  Clinical lab tests: reviewed and ordered  Tests in the radiology section of CPT®: ordered and reviewed  Review and summarize past medical records: yes  Discuss the patient with other providers: yes (OBGYN Resident )    Patient Progress  Patient progress: stable      Disposition  Final diagnoses:   Vaginal bleeding     Time reflects when diagnosis was documented in both MDM as applicable and the Disposition within this note     Time User Action Codes Description Comment    1/7/2021  5:56 PM Kieran Scott Add [N93 9] Vaginal bleeding       ED Disposition     ED Disposition Condition Date/Time Comment    Discharge Stable u Jan 7, 2021  6:01 PM Talib discharge to home/self care  Follow-up Information     Follow up With Specialties Details Why Contact Info Additional [address] [facility] Emergency Department Emergency Medicine Go to  If symptoms worsen [address] [facility] [address],  [address], [place], [place], [address] [place] [address] Family Medicine Call   [facility] [address]  [address]  [place] [address]  [phone]             Patient's Medications   Discharge Prescriptions    No medications on file     No discharge procedures on file      PDMP Review       Value Time User    PDMP Reviewed  Yes 10/1/2020  1:42 PM Nick Shine DO          ED Provider  Electronically Signed by           Lucho Crawford PA-C  01/07/21 2111

## 2021-01-07 NOTE — ASSESSMENT & PLAN NOTE
Improved on Cymbalta 60 mg daily, but patient desires to wean to due pregnancy  Wean Cymbalta 20m pill by mouth daily x 1 week, then 1 pill by mouth daily x 1 week, then 1 pill by mouth every other day x 1 week, then stop  OB Dr Jack Tao states that although there are no studies, it would be ok to resume Cymbalta if benefits outweigh risks or try Zoloft instead   contacted to assist with patient's request to see female counselor and psychiatrist as access is limited at [place] [address] at this time, as well as insurance options, and HR communication

## 2021-01-07 NOTE — PATIENT INSTRUCTIONS
Vitamin D - Recommend start prenatal vitamin and over-the-counter vitamin D3 1000 - 3000 International Units daily  Apps and Websites for Counseling, Anxiety/Depression, Chronic Pain, Lifestyle Change, Problem-solving, Self-Esteem, Anger Management, Coping with Uncertainty    (Prices current as of 9/5/19)    1  Mood Kit - Available in [employer] Ama[employer] Store for $4 99  Supports a person's success in specific situations, includes thought , mood tracker, and journal   Can be accessed in an unstructured way and used as an unguided self-help amaury  2   Moodnotes - Available in [employer] Store for $4 99  Focuses on healthier thinking habits and identifying "traps" in thinking  Tracks mood over a period of time and identifies factors that influence mood  3   MoodMission - Available in [facility] for free  Includes five "missions" to promote confidence in handling stressors and coping in specific situations  The amaury personalizes its style and techniques based on when the user engages it most frequently  In-amaury rewards are used to motivate, increase fun and self-confidence  Helpful for patients who need improvement in mood or a decrease in anxiety and depression symptoms  4    What's Up - Available in [employer] for free  Recognizes negative thinking patterns and methods to overcome them  Uses helpful metaphors, a catastrophe scale, grounding techniques, and breathing exercises  Has the ability to sync data across multiple devices and protect this information with a unique pass code  Has the capability to be active in forums where people can discuss similar feelings and strategies that have been helpful  5   Moodpath - Available in [employer] for free  Uses daily screenings to create a better understanding of thoughts, feelings, and emotions    When needed, it provides a discussion guide to talking with a medical professional based on the responses to daily screenings  Includes over 150 psychological exercises and videos to promote and strengthen overall mental health  6   MindShift - Available in [employer] for free  Helpful for youth and young adults in coping with anxiety  Creates an individualized toolbox to help users deal with test anxiety, perfectionism, social anxiety, worry, panic, and conflict  Includes directions on how to construct belief experiments to trial common beliefs that feed anxiety, guided relaxation, as well as tools and tips to help establish and accomplish goals  Differentiates between helpful and unhelpful anxiety, and explains how to overcome fears by gradually facing them in manageable steps  7   CBT-I  - Available in [employer] for free  For insomnia  Educates about sleep, developing positive sleep routines, and improved sleep environment  Encourages user to change behaviors which will provide confidence for better sleep on a regular basis  8   Huoshi  - Free website  Provides self-help and therapy resources that encourages change to combat negative and destructive thought patterns  Includes access to numerous handouts on a variety of symptoms related to anxiety, depression, low self-esteem, panic attacks, social disorders, and more  The solution section has interventions that can be printed and saved for future use  9   Woebot - Available in [employer] for free  Recommended for age 16+  Friendly self-care  that helps you think through situations with step-by-step guidance using counseling tools, learn about yourself with intelligent mood tracking, and master skills to reduce stress and live happier through 100+ evidence-based stories from clinicians  Chat as often or as little as you'd like, whenever you'd like to        10   Wilfrido:  LUKE  CBT DBT Chatbot - Available in Apple appsstore and Google Play for free, has in-amaury purchases  Recommended for 12+  Psychologist support at $30/month, 50% off to start  Sydna Au / Kvng Form is free  Uses techniques of CBT, DBT, yoga, and meditation to support your needs regarding stress, anxiety, sleep, loss, and a full range of other mental health and wellness issues  11   Curable Pain Relief - Available in Apple Amaury store and Google Play for free, has in-amaury purchases  Teaches about the chronic pain cycle and how to reverse it, while retraining your brain and nervous system for long-term results  Smart  Kalina guides user through updates in pain science to identify, target, eliminate the factors that are keeping user "stuck" in the pain cycle  12   Talkspace Online Therapy - Available in Apple Amaury store and Google Play for a fee  Subscription service, starting at $59/week (billed monthly)  All plans include unlimited messaging, and add live video sessions if desired  Unlimited messaging therapy for teens ages 15-14 and special services for couples therapy  You can change therapists or stop subscription renewal at any time  Recommended for 13+  User is matched with a licensed therapist in your state and communicate on your device by text, audio, and video from anywhere, at any time  User will hear back at least once a day, 5 days per week  Refer to the back of insurance card for counseling options      Counseling services:    [facility]   82 [facility], [address] [place] [address], [facility], [address] (they have equine therapy too)  [address] [place] [address],  [facility], 120 [facility] 242, [address],  [facility], [address] [facility]  [address]    70 [facility], [address] [place] [address]   [phone]     1234 [facility]  200 [facility], 301 [facility] 83,8Th [address] 3  [place], [address]     162 [facility] Gallo Russel [address], [place], [address]     1200 [facility]   [address] [facility], [place], [address] [place] [address] Distad, MSW, LSW  (patients age 11-adult)   [address] [place] [address], 243 [facility], 243 [facility] [address] 69 [facility]  [address]  209 [facility], [address], [address]   [facility], [address] [place] [address] Ne  1000 [facility], 1777 [facility] 2131 [facility], [facility], [place], [address]  [facility] 108 (specializing in addiction)  [facility], [address]  1441 [facility]  [address], [place], 6100 [facility]   [place]      [address], 403 [facility] , [address], [place], 1601 [facility], [place], 9575 [facility], [address]  100 [facility], [address], [place], [address]  [address] [address], 2601 [facility], [address]   [phone]    Lucero Allison, [address]  [place] [address] [place] [id number] [phone]      Hotlines:   Please program these numbers into your phone in case you or someone you know needs them  All services are free  WarmLine:  [phone] or [phone]   They provide a supportive listening ear if you need it  They also can also provide information about mental health concerns and services  Crisis Line:  [facility] [phone],  [facility] [phone], [address] [place] [address]: [phone]   24/7 crisis counseling, on-site counseling and walk-in counseling services available  National Suicide Prevention Lifeline:  0-995-267-720-366-2272  En 1200 [facility] [phone]   This is free, confidential, and available 24/7  [place]: [phone]   For those facing or having survived abuse 24 hour confidential help line, emergency safe house, counseling, advocacy and education    Crisis Text Line: text 630696     You are connected to a Crisis Counselor to bring a hot moment to a cool calm through active listening and collaborative problem solving  If you or someone your know are feeling as though you are going to hurt yourself, do not wait - GET HELP RIGHT AWAY  Go to the closest Emergency Room, call 911, or call someone you trust, family member or friend to be with you until you can get some help

## 2021-01-07 NOTE — ASSESSMENT & PLAN NOTE
Improved on Cymbalta 60 mg daily, but patient desires to wean to due pregnancy  Wean Cymbalta 20m pill by mouth daily x 1 week, then 1 pill by mouth daily x 1 week, then 1 pill by mouth every other day x 1 week, then stop  OB Dr Turner Mendez states that although there are no studies, it would be ok to resume Cymbalta if benefits outweigh risks or try Zoloft instead   contacted to assist with patient's request to see female counselor and psychiatrist as access is limited at [place] [address] at this time, as well as insurance options, and HR communication

## 2021-01-07 NOTE — ASSESSMENT & PLAN NOTE
D/C Drisdol as not recommended in pregnancy or while breastfeeding  Vitamin D - Recommend prenatal vitamin and over-the-counter vitamin D3 1000 - 3000 International Units daily

## 2021-01-07 NOTE — PROGRESS NOTES
Virtual Regular Visit      Assessment/Plan:    Problem List Items Addressed This Visit        Endocrine    IFG (impaired fasting glucose)     Pending labs  Continue Metformin 500mg 2 pills in AM & 1 pill in PM   Recommend lifestyle modifications  Other    Class 2 severe obesity with serious comorbidity and body mass index (BMI) of 39 0 to 39 9 in adult (HCC)     Stable  Recommend lifestyle modifications  Anxiety     Improved on Cymbalta 60 mg daily, but patient desires to wean to due pregnancy  Wean Cymbalta 20m pill by mouth daily x 1 week, then 1 pill by mouth daily x 1 week, then 1 pill by mouth every other day x 1 week, then stop  OB Dr Blair Varghese states that although there are no studies, it would be ok to resume Cymbalta if benefits outweigh risks or try Zoloft instead   contacted to assist with patient's request to see female counselor and psychiatrist as access is limited at [place] [address] at this time, as well as insurance options, and HR communication  Relevant Medications    DULoxetine (CYMBALTA) 20 mg capsule    Depression     Improved on Cymbalta 60 mg daily, but patient desires to wean to due pregnancy  Wean Cymbalta 20m pill by mouth daily x 1 week, then 1 pill by mouth daily x 1 week, then 1 pill by mouth every other day x 1 week, then stop  OB Dr Blair Varghese states that although there are no studies, it would be ok to resume Cymbalta if benefits outweigh risks or try Zoloft instead   contacted to assist with patient's request to see female counselor and psychiatrist as access is limited at [place] [address] at this time, as well as insurance options, and HR communication  Relevant Medications    DULoxetine (CYMBALTA) 20 mg capsule    Vitamin D deficiency     D/C Drisdol as not recommended in pregnancy or while breastfeeding        Vitamin D - Recommend prenatal vitamin and over-the-counter vitamin D3 1000 - 3000 International Units daily  Other Visit Diagnoses     Encounter for screening for HIV    -  Primary    Relevant Orders    HIV 1/2 Antigen/Antibody (4th Generation) w Reflex [facility]    Encounter for hepatitis C screening test for low risk patient        Relevant Orders    Hepatitis C antibody    Lumbar back pain with radiculopathy affecting lower extremity        Relevant Medications    DULoxetine (CYMBALTA) 20 mg capsule               Reason for visit is   Chief Complaint   Patient presents with    Virtual Regular Visit     discuss medications in pregnancy (6 weeks 5 days)     Virtual Regular Visit        Encounter provider Michaela Schmitt DO    Provider located at [address] [place] [address] 200  404 [facility] [id number] [phone]      Recent Visits  Date Type Provider Dept   01/05/21 Telephone 1125 Sir Jonathan Lopez recent visits within past 7 days and meeting all other requirements     Today's Visits  Date Type Provider Dept   01/07/21 Telemedicine Lori Stein DO Pg [facility] 121 [facility] today's visits and meeting all other requirements     Future Appointments  No visits were found meeting these conditions  Showing future appointments within next 150 days and meeting all other requirements        The patient was identified by name and date of birth  [facility] was informed that this is a telemedicine visit and that the visit is being conducted through [facility] and patient was informed that this is a secure, HIPAA-compliant platform  She agrees to proceed     My office door was closed  No one else was in the room  She acknowledged consent and understanding of privacy and security of the video platform  The patient has agreed to participate and understands they can discontinue the visit at any time  Patient is aware this is a billable service  Subjective  Hermes Credit is a 23 y o  female          HPI     See other note         Past Medical History:   Diagnosis Date    Anxiety     Depression     Endometriosis     History of ovarian cyst 2018    Hyperlipidemia     IFG (impaired fasting glucose)     Migraine     Mild intermittent asthma     Morbid obesity (HCC)     PID (pelvic inflammatory disease) 2018    Polycystic ovary syndrome     Vitamin D deficiency        Past Surgical History:   Procedure Laterality Date    WISDOM TOOTH EXTRACTION         Current Outpatient Medications   Medication Sig Dispense Refill    albuterol (PROVENTIL HFA,VENTOLIN HFA) 90 mcg/act inhaler Inhale 2 puffs every 4 (four) hours as needed for wheezing 1 Inhaler 0    clindamycin (CLEOCIN T) 1 % lotion Apply topically once a day to face 60 mL 2    fluticasone (FLONASE) 50 mcg/act nasal spray 1 spray into each nostril daily (Patient taking differently: 2 sprays into each nostril daily as needed ) 16 g 0    Prenatal Vit-Fe Fumarate-FA (PRENATAL VITAMIN PO) Take 1 capsule by mouth daily      DULoxetine (CYMBALTA) 20 mg capsule 2 pill by mouth daily x 1 week, then 1 pill by mouth daily x 1 week, then 1 pill by mouth every other day x 1 week, then stop  30 capsule 0    metFORMIN (GLUCOPHAGE) 500 mg tablet Increase Metformin 500mg every 3 days stomach tolerates:  1 pill in AM; 1 pill twice daily; 2 pills in AM & 1 pill in PM  (Patient not taking: Reported on 1/7/2021) 90 tablet 1    ondansetron (ZOFRAN-ODT) 8 mg disintegrating tablet Take 1 tablet (8 mg total) by mouth every 8 (eight) hours as needed for nausea or vomiting (Patient not taking: Reported on 1/7/2021) 30 tablet 0     No current facility-administered medications for this visit  No Known Allergies    Review of Systems     See other note  Video Exam    Vitals:    01/07/21 0812   Temp: 98 2 °F (36 8 °C)   TempSrc: Oral   Weight: 112 kg (247 lb)   Height: 5' 6" (1 676 m)       Physical Exam     Vitals signs and nursing note reviewed     Constitutional:       General: She is not in acute distress  Appearance: Normal appearance  She is well-developed  She is obese  She is not diaphoretic  HENT:      Head: Normocephalic and atraumatic  Right Ear: External ear normal       Left Ear: External ear normal       Nose: Nose normal       Mouth/Throat:      Mouth: Mucous membranes are moist       Pharynx: Oropharynx is clear  Eyes:      General: No scleral icterus  Right eye: No discharge  Left eye: No discharge  Extraocular Movements: Extraocular movements intact  Conjunctiva/sclera: Conjunctivae normal    Neck:      Musculoskeletal: Normal range of motion  Thyroid: No thyromegaly  Pulmonary:      Effort: Pulmonary effort is normal  No respiratory distress  Breath sounds: No stridor  No wheezing  Abdominal:      Palpations: Abdomen is soft  Tenderness: There is no abdominal tenderness  There is no guarding or rebound  Musculoskeletal:         General: No swelling  Right lower leg: No edema  Left lower leg: No edema  Lymphadenopathy:      Cervical: No cervical adenopathy  Skin:     Findings: No rash  Neurological:      General: No focal deficit present  Mental Status: She is alert and oriented to person, place, and time  Psychiatric:         Attention and Perception: Attention and perception normal          Mood and Affect: Mood is depressed  Speech: Speech normal          Behavior: Behavior normal  Behavior is cooperative  Thought Content: Thought content normal          Cognition and Memory: Cognition and memory normal          Judgment: Judgment normal      I spent 45 minutes directly with the patient during this visit      VIRTUAL VISIT Paige Caldwell acknowledges that she has consented to an online visit or consultation   She understands that the online visit is based solely on information provided by her, and that, in the absence of a face-to-face physical evaluation by the physician, the diagnosis she receives is both limited and provisional in terms of accuracy and completeness  This is not intended to replace a full medical face-to-face evaluation by the physician  Pranay Mitchell understands and accepts these terms

## 2021-01-08 ENCOUNTER — PATIENT OUTREACH (OUTPATIENT)
Dept: FAMILY MEDICINE CLINIC | Facility: CLINIC | Age: 26
End: 2021-01-08

## 2021-01-08 NOTE — PROGRESS NOTES
OPCM,  is responding to consult regarding assisting patient with mental health follow up and insurance concerns  Telephone call placed to patient but she was not available at the time of my call  I left a message on her VM with my contact information and advised her to return my call so that I can provide needed assistance

## 2021-01-08 NOTE — PROGRESS NOTES
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 [employer]  Patient  informed me that she was in the ED yesterday due to vaginal bleeding  She thought that she was pregnant due to having two positive pregnancy tests  She reports being told that she in not pregnant at this time  Patient also reports having a history of anxiety and having a traumatic child plasencia due to the loss of her father at a young age  In addition,she informed me that she was homeless last year for eight months which was very stressful for her  Patient is presently living with her boyfriend in a rented apartment  She reports being independent with her ADL's and she drives her own vehicle  Patient is presently taking Cymbalta for anxiety and depression  Patient is requesting to speak to a female therapist and would like assistance locating a provider  Patient denies having any suicidal ideations and reports having a good support system  I provided supportive counseling and information on available community resources  Patient denies needing information on local food pantries and reports that her mother lives close by and can assist her with food if needed  I advised patient to call the [employer] Help line on Monday and ask about applying without using her boyfriend's income since she reports having to pay her own bills      I also advised patient to contact the EAP program through Joaquin Shaikh for additional support  I sent an e-mail to patient with local mental health providers who will be able to accept Access if she gets approved  Patient also informed me that she is planning on going back to [employer] in the near future  I also advised her to contact the local career office for assistance with finding employment  Patient denies having any other needs that I can assist her with at this time  I provided patient with my contact information and advised her to contact me if I can be of any further assistance or support

## 2021-01-09 LAB — BACTERIA UR CULT: NORMAL

## 2021-01-12 ENCOUNTER — TELEPHONE (OUTPATIENT)
Dept: FAMILY MEDICINE CLINIC | Facility: CLINIC | Age: 26
End: 2021-01-12

## 2021-01-12 NOTE — TELEPHONE ENCOUNTER
Patient called looking for her FMLA forms, Arelisbreezy Bam has the forms and will speak with Dr Yeny Chino about them today and then let patient know the status of them

## 2021-01-15 NOTE — TELEPHONE ENCOUNTER
Patient called, advised per note on 12/28/20 we are unable to complete updated FMLA forms  Letter written for patient to provide to HR with Dr Carolina Dillard direction

## 2021-01-19 ENCOUNTER — INITIAL PRENATAL (OUTPATIENT)
Dept: OBGYN CLINIC | Facility: CLINIC | Age: 26
End: 2021-01-19
Payer: COMMERCIAL

## 2021-01-19 VITALS — WEIGHT: 246.4 LBS | HEIGHT: 65 IN | BODY MASS INDEX: 41.05 KG/M2

## 2021-01-19 DIAGNOSIS — E66.01 CLASS 3 SEVERE OBESITY WITH BODY MASS INDEX (BMI) OF 40.0 TO 44.9 IN ADULT, UNSPECIFIED OBESITY TYPE, UNSPECIFIED WHETHER SERIOUS COMORBIDITY PRESENT (HCC): ICD-10-CM

## 2021-01-19 DIAGNOSIS — N97.9 INFERTILITY, FEMALE: ICD-10-CM

## 2021-01-19 DIAGNOSIS — F32.A DEPRESSION, UNSPECIFIED DEPRESSION TYPE: ICD-10-CM

## 2021-01-19 DIAGNOSIS — Z3A.08 8 WEEKS GESTATION OF PREGNANCY: ICD-10-CM

## 2021-01-19 DIAGNOSIS — Z34.01 ENCOUNTER FOR SUPERVISION OF NORMAL FIRST PREGNANCY IN FIRST TRIMESTER: ICD-10-CM

## 2021-01-19 DIAGNOSIS — O20.9 BLEEDING IN EARLY PREGNANCY: Primary | ICD-10-CM

## 2021-01-19 LAB — SL AMB POCT URINE HCG: NEGATIVE

## 2021-01-19 PROCEDURE — 81025 URINE PREGNANCY TEST: CPT | Performed by: OBSTETRICS & GYNECOLOGY

## 2021-01-19 PROCEDURE — 99211 OFF/OP EST MAY X REQ PHY/QHP: CPT | Performed by: OBSTETRICS & GYNECOLOGY

## 2021-01-19 NOTE — PROGRESS NOTES
Patient here for OB intake states she is not pregnant  She was seen in ED 1/7/2021(vaginal bleeding in early pregnancy) had negative HCG and negative ultrasound  She did have bleeding from 1/7-1/10  She began to cry, very upset states she had two positive tests at home  UPT done in office today - results negative  Advised sounds like she had a chemical pregnancy  Referral given for [facility] at [facility]  Advised to monitor for now  Call office on first day of menses to schedule HSG  Verbalized understanding  Routing to provider to update

## 2021-03-02 ENCOUNTER — LAB (OUTPATIENT)
Dept: LAB | Facility: AMBULARY SURGERY CENTER | Age: 26
End: 2021-03-02
Attending: OBSTETRICS & GYNECOLOGY
Payer: COMMERCIAL

## 2021-03-02 ENCOUNTER — INITIAL PRENATAL (OUTPATIENT)
Dept: OBGYN CLINIC | Facility: CLINIC | Age: 26
End: 2021-03-02

## 2021-03-02 VITALS — BODY MASS INDEX: 41.94 KG/M2 | WEIGHT: 254 LBS

## 2021-03-02 DIAGNOSIS — R73.01 IFG (IMPAIRED FASTING GLUCOSE): ICD-10-CM

## 2021-03-02 DIAGNOSIS — Z34.01 ENCOUNTER FOR SUPERVISION OF NORMAL FIRST PREGNANCY IN FIRST TRIMESTER: Primary | ICD-10-CM

## 2021-03-02 DIAGNOSIS — F32.A DEPRESSION, UNSPECIFIED DEPRESSION TYPE: ICD-10-CM

## 2021-03-02 DIAGNOSIS — E78.5 HYPERLIPIDEMIA, UNSPECIFIED HYPERLIPIDEMIA TYPE: ICD-10-CM

## 2021-03-02 DIAGNOSIS — F41.9 ANXIETY: ICD-10-CM

## 2021-03-02 DIAGNOSIS — Z11.59 ENCOUNTER FOR HEPATITIS C SCREENING TEST FOR LOW RISK PATIENT: ICD-10-CM

## 2021-03-02 DIAGNOSIS — Z34.01 ENCOUNTER FOR SUPERVISION OF NORMAL FIRST PREGNANCY IN FIRST TRIMESTER: ICD-10-CM

## 2021-03-02 DIAGNOSIS — O99.810 ABNORMAL GLUCOSE AFFECTING PREGNANCY: Primary | ICD-10-CM

## 2021-03-02 DIAGNOSIS — E55.9 VITAMIN D DEFICIENCY: ICD-10-CM

## 2021-03-02 DIAGNOSIS — E66.01 MORBID OBESITY (HCC): ICD-10-CM

## 2021-03-02 LAB
25(OH)D3 SERPL-MCNC: 26.5 NG/ML (ref 30–100)
ABO GROUP BLD: NORMAL
ALBUMIN SERPL BCP-MCNC: 4 G/DL (ref 3.5–5)
ALP SERPL-CCNC: 67 U/L (ref 46–116)
ALT SERPL W P-5'-P-CCNC: 32 U/L (ref 12–78)
ANION GAP SERPL CALCULATED.3IONS-SCNC: 10 MMOL/L (ref 4–13)
AST SERPL W P-5'-P-CCNC: 14 U/L (ref 5–45)
BASOPHILS # BLD AUTO: 0.04 THOUSANDS/ΜL (ref 0–0.1)
BASOPHILS NFR BLD AUTO: 0 % (ref 0–1)
BILIRUB SERPL-MCNC: 0.87 MG/DL (ref 0.2–1)
BILIRUB UR QL STRIP: NEGATIVE
BLD GP AB SCN SERPL QL: NEGATIVE
BUN SERPL-MCNC: 7 MG/DL (ref 5–25)
CALCIUM SERPL-MCNC: 9.8 MG/DL (ref 8.3–10.1)
CHLORIDE SERPL-SCNC: 104 MMOL/L (ref 100–108)
CHOLEST SERPL-MCNC: 188 MG/DL (ref 50–200)
CLARITY UR: CLEAR
CO2 SERPL-SCNC: 23 MMOL/L (ref 21–32)
COLOR UR: YELLOW
CREAT SERPL-MCNC: 0.7 MG/DL (ref 0.6–1.3)
EOSINOPHIL # BLD AUTO: 0.06 THOUSAND/ΜL (ref 0–0.61)
EOSINOPHIL NFR BLD AUTO: 1 % (ref 0–6)
ERYTHROCYTE [DISTWIDTH] IN BLOOD BY AUTOMATED COUNT: 13.3 % (ref 11.6–15.1)
EST. AVERAGE GLUCOSE BLD GHB EST-MCNC: 114 MG/DL
GFR SERPL CREATININE-BSD FRML MDRD: 123 ML/MIN/1.73SQ M
GLUCOSE 1H P MEAL SERPL-MCNC: 159 MG/DL (ref 70–140)
GLUCOSE P FAST SERPL-MCNC: 155 MG/DL (ref 65–99)
GLUCOSE UR STRIP-MCNC: NEGATIVE MG/DL
HBA1C MFR BLD: 5.6 %
HBV SURFACE AG SER QL: NORMAL
HCT VFR BLD AUTO: 40.3 % (ref 34.8–46.1)
HCV AB SER QL: NORMAL
HDLC SERPL-MCNC: 52 MG/DL
HGB BLD-MCNC: 13.3 G/DL (ref 11.5–15.4)
HGB UR QL STRIP.AUTO: NEGATIVE
IMM GRANULOCYTES # BLD AUTO: 0.05 THOUSAND/UL (ref 0–0.2)
IMM GRANULOCYTES NFR BLD AUTO: 0 % (ref 0–2)
KETONES UR STRIP-MCNC: NEGATIVE MG/DL
LDLC SERPL CALC-MCNC: 98 MG/DL (ref 0–100)
LDLC SERPL DIRECT ASSAY-MCNC: 108 MG/DL (ref 0–100)
LEUKOCYTE ESTERASE UR QL STRIP: NEGATIVE
LYMPHOCYTES # BLD AUTO: 2.14 THOUSANDS/ΜL (ref 0.6–4.47)
LYMPHOCYTES NFR BLD AUTO: 19 % (ref 14–44)
MAGNESIUM SERPL-MCNC: 2.1 MG/DL (ref 1.6–2.6)
MCH RBC QN AUTO: 29.6 PG (ref 26.8–34.3)
MCHC RBC AUTO-ENTMCNC: 33 G/DL (ref 31.4–37.4)
MCV RBC AUTO: 90 FL (ref 82–98)
MONOCYTES # BLD AUTO: 0.38 THOUSAND/ΜL (ref 0.17–1.22)
MONOCYTES NFR BLD AUTO: 3 % (ref 4–12)
NEUTROPHILS # BLD AUTO: 8.57 THOUSANDS/ΜL (ref 1.85–7.62)
NEUTS SEG NFR BLD AUTO: 77 % (ref 43–75)
NITRITE UR QL STRIP: NEGATIVE
NONHDLC SERPL-MCNC: 136 MG/DL
NRBC BLD AUTO-RTO: 0 /100 WBCS
PH UR STRIP.AUTO: 6.5 [PH]
PLATELET # BLD AUTO: 314 THOUSANDS/UL (ref 149–390)
PMV BLD AUTO: 10 FL (ref 8.9–12.7)
POTASSIUM SERPL-SCNC: 3.6 MMOL/L (ref 3.5–5.3)
PROT SERPL-MCNC: 8 G/DL (ref 6.4–8.2)
PROT UR STRIP-MCNC: NEGATIVE MG/DL
RBC # BLD AUTO: 4.5 MILLION/UL (ref 3.81–5.12)
RH BLD: POSITIVE
RUBV IGG SERPL IA-ACNC: 84 IU/ML
SODIUM SERPL-SCNC: 137 MMOL/L (ref 136–145)
SP GR UR STRIP.AUTO: 1.01 (ref 1–1.03)
SPECIMEN EXPIRATION DATE: NORMAL
TRIGL SERPL-MCNC: 188 MG/DL
TSH SERPL DL<=0.05 MIU/L-ACNC: 1.99 UIU/ML (ref 0.36–3.74)
UROBILINOGEN UR QL STRIP.AUTO: 0.2 E.U./DL
WBC # BLD AUTO: 11.24 THOUSAND/UL (ref 4.31–10.16)

## 2021-03-02 PROCEDURE — 81003 URINALYSIS AUTO W/O SCOPE: CPT

## 2021-03-02 PROCEDURE — 87389 HIV-1 AG W/HIV-1&-2 AB AG IA: CPT

## 2021-03-02 PROCEDURE — 86803 HEPATITIS C AB TEST: CPT

## 2021-03-02 PROCEDURE — 82306 VITAMIN D 25 HYDROXY: CPT

## 2021-03-02 PROCEDURE — 86850 RBC ANTIBODY SCREEN: CPT

## 2021-03-02 PROCEDURE — 80053 COMPREHEN METABOLIC PANEL: CPT

## 2021-03-02 PROCEDURE — 87086 URINE CULTURE/COLONY COUNT: CPT

## 2021-03-02 PROCEDURE — 86900 BLOOD TYPING SEROLOGIC ABO: CPT

## 2021-03-02 PROCEDURE — 83735 ASSAY OF MAGNESIUM: CPT

## 2021-03-02 PROCEDURE — 36415 COLL VENOUS BLD VENIPUNCTURE: CPT

## 2021-03-02 PROCEDURE — 84443 ASSAY THYROID STIM HORMONE: CPT

## 2021-03-02 PROCEDURE — 85025 COMPLETE CBC W/AUTO DIFF WBC: CPT

## 2021-03-02 PROCEDURE — 87340 HEPATITIS B SURFACE AG IA: CPT

## 2021-03-02 PROCEDURE — 80061 LIPID PANEL: CPT

## 2021-03-02 PROCEDURE — 86901 BLOOD TYPING SEROLOGIC RH(D): CPT

## 2021-03-02 PROCEDURE — 83721 ASSAY OF BLOOD LIPOPROTEIN: CPT

## 2021-03-02 PROCEDURE — 86592 SYPHILIS TEST NON-TREP QUAL: CPT

## 2021-03-02 PROCEDURE — 83036 HEMOGLOBIN GLYCOSYLATED A1C: CPT

## 2021-03-02 PROCEDURE — 86762 RUBELLA ANTIBODY: CPT

## 2021-03-02 PROCEDURE — 82947 ASSAY GLUCOSE BLOOD QUANT: CPT

## 2021-03-02 PROCEDURE — OBC: Performed by: OBSTETRICS & GYNECOLOGY

## 2021-03-02 NOTE — RESULT ENCOUNTER NOTE
Unstable labs - will review with patient at upcoming appointment  IFG - Fasting   Hyperlipidemia - Recommend lifestyle modifications  Low vitamin D - Recommend start multivitamin and over-the-counter vitamin D3 1000 - 3000 International Units daily

## 2021-03-02 NOTE — PROGRESS NOTES
Patient presents for OB intake interview  Patient oriented to practice, different practice providers, different practice locations  Reviewed expected prenatal visit schedule  Accompanied by: Boyfriend Marilia Filemon)    OB History        1    Para   0    Term   0       0    AB   0    Living   0       SAB   0    TAB   0    Ectopic   0    Multiple   0    Live Births   0           Obstetric Comments   Menarche 17               Hx of  delivery prior to 36 weeks 6 days: No     Last Menstrual Period: Patient's last menstrual period was 2021 (exact date)  Estimated Date of Delivery: 10/14/21                Signs/Symptoms of Pregnancy                            Breast tenderness: Yes              Fatigue: Yes              Cramping: Yes              Nausea/Vomiting: + Nausea/ No vomiting      Pregravid BMI: Body mass index is 41 94 kg/m²  Discussed appropriate weight gain in pregnancy based on pre-gravid BMI  Diabetes              Pregestational DM:  No              hx of GDM: No              BMI >35: Yes              first degree relative with type 2 diabetes: No              hx of PCOS: yes              current metformin use: no              prior hx of LGA/macrosomia: no                   Hypertension              Hx of chronic HTN: No              hx of gestational HTN: No              hx of preeclampsia, eclampsia, or HELLP syndrome: No              Family h/o preeclampsia: No              Age 28 or older: No              Multifetal gestation: No  Type 1 or Type 2 DM: No  Renal Disease: No  Autoimmune disease (systemic lupus erythematosus, antiphospholipid antibody syndrome): No  Nulliparity: No  Obesity (BMI over 30): No  More than 10 year pregnancy interval: No  Previous IUGR, low birthweight or small for gestational age: No        Infection Screening              Does the pt have a hx of MRSA? Not screened              Recent travel outside of [place]?  No  Zika precautions discussed Immunizations:              influenza vaccine given today: No, per pt  Previously vaccinated              discussed Tdap vaccine administration at 27-28 weeks     Interview education              [facility] reviewed and discussed                          Handouts given:                          Nutrition During Pregnancy  Prenatal Genetic Screening Tests                          Immunization & Pregnancy                          Medications & Pregnancy                          Warning Signs During Pregnancy                          [facility] and Me support center                          [facility] in Pregnancy    Dental visit with last 6 months - unknown  EPDS: 2          MyChart activated (not 1518 years of age)? : previously activated  § Code provided?       [facility]: No   ONAF form submitted: n/a     Interview Done by: Luis Barahona MA

## 2021-03-03 LAB
BACTERIA UR CULT: NORMAL
HIV 1+2 AB+HIV1 P24 AG SERPL QL IA: NORMAL
RPR SER QL: NORMAL

## 2021-03-05 ENCOUNTER — TELEPHONE (OUTPATIENT)
Dept: OBGYN CLINIC | Facility: CLINIC | Age: 26
End: 2021-03-05

## 2021-03-05 ENCOUNTER — TRANSCRIBE ORDERS (OUTPATIENT)
Dept: LAB | Facility: CLINIC | Age: 26
End: 2021-03-05

## 2021-03-05 ENCOUNTER — LAB (OUTPATIENT)
Dept: LAB | Facility: CLINIC | Age: 26
End: 2021-03-05

## 2021-03-05 DIAGNOSIS — R73.09 ABNORMAL GLUCOSE: Primary | ICD-10-CM

## 2021-03-05 DIAGNOSIS — Z3A.08 8 WEEKS GESTATION OF PREGNANCY: ICD-10-CM

## 2021-03-05 DIAGNOSIS — Z34.01 ENCOUNTER FOR SUPERVISION OF NORMAL FIRST PREGNANCY IN FIRST TRIMESTER: ICD-10-CM

## 2021-03-05 DIAGNOSIS — O99.810 ABNORMAL GLUCOSE AFFECTING PREGNANCY: ICD-10-CM

## 2021-03-05 PROBLEM — O24.410 DIET CONTROLLED GESTATIONAL DIABETES MELLITUS (GDM) IN FIRST TRIMESTER: Status: ACTIVE | Noted: 2021-03-05

## 2021-03-05 LAB — GLUCOSE P FAST SERPL-MCNC: 104 MG/DL (ref 65–99)

## 2021-03-05 PROCEDURE — 36415 COLL VENOUS BLD VENIPUNCTURE: CPT

## 2021-03-05 PROCEDURE — 82951 GLUCOSE TOLERANCE TEST (GTT): CPT

## 2021-03-05 NOTE — TELEPHONE ENCOUNTER
Patient states she could not continue her 3hr glucola due to elevated fasting  Advised she is not considered GDM  Referral placed for Diabetic education  Advised to call [phone] to schedule  Verbalized understanding  Routing to provider to update

## 2021-03-08 ENCOUNTER — HOSPITAL ENCOUNTER (OUTPATIENT)
Dept: ULTRASOUND IMAGING | Facility: HOSPITAL | Age: 26
Discharge: HOME | End: 2021-03-08
Attending: OBSTETRICS & GYNECOLOGY
Payer: COMMERCIAL

## 2021-03-08 DIAGNOSIS — Z34.01 ENCOUNTER FOR SUPERVISION OF NORMAL FIRST PREGNANCY IN FIRST TRIMESTER: ICD-10-CM

## 2021-03-08 PROCEDURE — 76801 OB US < 14 WKS SINGLE FETUS: CPT

## 2021-03-10 ENCOUNTER — TELEPHONE (OUTPATIENT)
Dept: OBGYN CLINIC | Facility: CLINIC | Age: 26
End: 2021-03-10

## 2021-03-10 NOTE — TELEPHONE ENCOUNTER
8w6d OB c/o nausea in early pregnancy  Nausea is normal in the first trimester  Please try over the counter Vitamin B6 25 mg three times a day, Unisom (doxylamine) 25 mg at night (sleeping medication will make you tired), anything kristin in flavor (gingerale, kristin capsules 1000mg daily, kristin root)  Try to keep something in your stomach - small frequent meals  Avoid things that are gassy, greasy or spicy  Lemonade or lemon in your water will help, mint tea or anything orange in flavor  Try to eat a carbohydrate with a protein  Drink liquids sips of sports drinks and bouillon to avoid dehydration  Brothy soups and with noodles, avoid cream based soups, as fatty foods delay gastric emptying  Add solid starches like potatoes, rice, and pasta  Salty and sour foods are tolerated best  (salt and vinegar potato chips)  If you try all these measures and still feel nauseated, please call the office  Routing to provider for further recommendations

## 2021-03-11 ENCOUNTER — TELEMEDICINE (OUTPATIENT)
Dept: PERINATAL CARE | Facility: CLINIC | Age: 26
End: 2021-03-11
Payer: COMMERCIAL

## 2021-03-11 VITALS — BODY MASS INDEX: 40.82 KG/M2 | WEIGHT: 254 LBS | HEIGHT: 66 IN

## 2021-03-11 DIAGNOSIS — Z3A.09 9 WEEKS GESTATION OF PREGNANCY: ICD-10-CM

## 2021-03-11 DIAGNOSIS — O99.810 ABNORMAL GLUCOSE IN PREGNANCY, ANTEPARTUM: Primary | ICD-10-CM

## 2021-03-11 DIAGNOSIS — O99.211 OBESITY AFFECTING PREGNANCY IN FIRST TRIMESTER: ICD-10-CM

## 2021-03-11 DIAGNOSIS — E55.9 VITAMIN D DEFICIENCY: ICD-10-CM

## 2021-03-11 DIAGNOSIS — O24.419 GESTATIONAL DIABETES MELLITUS (GDM) IN FIRST TRIMESTER, GESTATIONAL DIABETES METHOD OF CONTROL UNSPECIFIED: ICD-10-CM

## 2021-03-11 DIAGNOSIS — O99.810 ABNORMAL GLUCOSE IN PREGNANCY, ANTEPARTUM: ICD-10-CM

## 2021-03-11 PROBLEM — R73.09 ABNORMAL GLUCOSE: Status: ACTIVE | Noted: 2021-03-11

## 2021-03-11 PROCEDURE — 99205 OFFICE O/P NEW HI 60 MIN: CPT | Performed by: NURSE PRACTITIONER

## 2021-03-11 RX ORDER — BLOOD SUGAR DIAGNOSTIC
STRIP MISCELLANEOUS
Qty: 100 EACH | Refills: 3 | Status: SHIPPED | OUTPATIENT
Start: 2021-03-11 | End: 2021-03-11 | Stop reason: SDUPTHER

## 2021-03-11 RX ORDER — CHOLECALCIFEROL (VITAMIN D3) 125 MCG
CAPSULE ORAL DAILY
COMMUNITY

## 2021-03-11 RX ORDER — PYRIDOXINE HCL (VITAMIN B6) 25 MG
25 TABLET ORAL 3 TIMES DAILY
COMMUNITY
End: 2021-04-09 | Stop reason: ALTCHOICE

## 2021-03-11 RX ORDER — LANCETS
EACH MISCELLANEOUS
Qty: 100 EACH | Refills: 3 | Status: SHIPPED | OUTPATIENT
Start: 2021-03-11 | End: 2021-03-11 | Stop reason: SDUPTHER

## 2021-03-11 RX ORDER — LANCETS
EACH MISCELLANEOUS
Qty: 100 EACH | Refills: 3 | Status: SHIPPED | OUTPATIENT
Start: 2021-03-11 | End: 2021-05-03

## 2021-03-11 RX ORDER — BLOOD SUGAR DIAGNOSTIC
STRIP MISCELLANEOUS
Qty: 100 EACH | Refills: 3 | Status: SHIPPED | OUTPATIENT
Start: 2021-03-11 | End: 2021-05-03

## 2021-03-11 NOTE — ASSESSMENT & PLAN NOTE
-A1c 5 6% at goal   -Start GDM diet with 3 meals and 3 snacks including recommended combination of carb, protein and fat per meal/snack   -No more than 8 to 10 hours fasting overnight   -Start SMBG fasting and 2 hours post start of every meal   -Report glucose readings via flowsheet every Thursday or as sooner if needed  -Glucose goals fasting 60-90; 1 hour post meal 135 or less and 2 hours post meal 120 or less  -Walk up to 30 minutes a day  -Keep dietitian appointment as scheduled  -Keep 3 day diet log prior to visit   -After Level II ultrasound, fetal growth ultrasound every 4 weeks or as recommended    -Stay in close contact with diabetes education team    -Continue follow up with MFM and OB as recommended     Lab Results   Component Value Date    HGBA1C 5 6 03/02/2021

## 2021-03-11 NOTE — PROGRESS NOTES
Virtual Regular Visit      Assessment/Plan:    Problem List Items Addressed This Visit        Endocrine    Gestational diabetes mellitus (GDM) in first trimester     -A1c 5 6% at goal   -Start GDM diet with 3 meals and 3 snacks including recommended combination of carb, protein and fat per meal/snack   -No more than 8 to 10 hours fasting overnight   -Start SMBG fasting and 2 hours post start of every meal   -Report glucose readings via flowsheet every Thursday or as sooner if needed  -Glucose goals fasting 60-90; 1 hour post meal 135 or less and 2 hours post meal 120 or less  -Walk up to 30 minutes a day  -Keep dietitian appointment as scheduled  -Keep 3 day diet log prior to visit   -After Level II ultrasound, fetal growth ultrasound every 4 weeks or as recommended    -Stay in close contact with diabetes education team    -Continue follow up with MFM and OB as recommended  Lab Results   Component Value Date    HGBA1C 5 6 03/02/2021            Relevant Medications    Accu-Chek Guide test strip    Accu-Chek Softclix Lancets lancets    Other Relevant Orders    [employer] glucose flowsheet       Other    Vitamin D deficiency     -Vitam D level 17 to 26   -Completed 50K times 8 weeks   -On Vitamin D3 2000 iu daily and prenatal vitamin  Relevant Medications    Accu-Chek Guide test strip    Accu-Chek Softclix Lancets lancets    Abnormal glucose - Primary    Relevant Medications    Accu-Chek Guide test strip    Accu-Chek Softclix Lancets lancets    Obesity affecting pregnancy in first trimester     -Pregnancy weight 250 lbs  -Current weight 254 lbs  -Current BMI 41   -Recommended weight gain during pregnancy 11 to 20 lbs  -Continue walking 30 minutes a day  -Start GDM diet, avoid high fat and high carbohydrates meals/snacks            BMI 40 0-44 9, adult (HCC)    Relevant Medications    Accu-Chek Guide test strip    Accu-Chek Softclix Lancets lancets      Other Visit Diagnoses     9 weeks gestation of pregnancy        Relevant Medications    Accu-Chek Guide test strip    Accu-Chek Softclix Lancets lancets    Other Relevant Orders    [employer] glucose flowsheet        -A1c 5 6% at goal   -Keep dietitian appointment as scheduled  -Keep 3 day diet log prior to visit   -After Level II ultrasound, fetal growth ultrasound every 4 weeks or as recommended    -Stay in close contact with diabetes education team    -Insulin requirements during pregnancy; basal/bolus concept and Metformin discussed   -Labs reviewed  -Importance of tight glucose control during pregnancy to decrease risk factors including fetal macrosomia; birth injury; risk of ; pre-eclampsia; polyhydramnios; pre-term labor;  hypoglycemia; jaundice and  hypoglycemia    -Go online and watch how to use Accu-chek glucose meter  1  Start self monitoring blood glucose fasting and 2 hours after start of each meal  Keep glucose log  Glucose goals: fasting 60-90 mg/dL, 135 mg/dL or less 1 hour post meals, and 120 mg/dL or less 2 hours post meal    2  Report glucose readings weekly via [employer] every Thursday or as needed  3  Start GDM diet with 3 meals and 3 snacks including recommended combination of carb, protein and fat per meal/snack  4  Please eat meal or snack every 2-3 5 hours while awake  5  No more than 8 to 10 hours of fasting overnight  6  Stay active if no restriction from your OB, walk up to 30 minutes a day  7  Always have glucose available to treat hypoglycemia  Use 15:15 rule  Refer to hypoglycemia patient education sheet  Test blood sugar when experiencing signs and symptoms of hypoglycemia and prior to driving  8  Continue prenatal vitamin and Vitamin D3 as recommended  9  Continue follow-up with your OB and MFM as recommended  10  Follow up in: 2 months                Reason for visit is   Chief Complaint   Patient presents with    Virtual Regular Visit    Gestational Diabetes        Encounter provider Alex Dixon [place]    Provider located at [facility] [address] [address] [phone]      Recent Visits  No visits were found meeting these conditions  Showing recent visits within past 7 days and meeting all other requirements     Today's Visits  Date Type Provider Dept   21 Telemedicine Josefina Winters, [facility] today's visits and meeting all other requirements     Future Appointments  No visits were found meeting these conditions  Showing future appointments within next 150 days and meeting all other requirements        The patient was identified by name and date of birth  Melody Brooks was informed that this is a telemedicine visit and that the visit is being conducted through [employer] and patient was informed that this is a secure, HIPAA-compliant platform  She agrees to proceed     My office door was closed  No one else was in the room  She acknowledged consent and understanding of privacy and security of the video platform  The patient has agreed to participate and understands they can discontinue the visit at any time  Patient is aware this is a billable service  Subjective  Melody Brooks is a 23 y o  female  9 0/7 weeks gestation GDM  History of PCOS and impaired fasting glucose  Was on Metformin 1500 mg daily, tolerated well and stopped using about 2 months ago due to loss of insurance  Cymbalta stopped abruptly 2 months ago due to insurance  Applied for MA  Currently not working  Support at home SO  Walking daily with SO and plays to start swimming for exercise           Past Medical History:   Diagnosis Date    Anxiety     Depression     History of ovarian cyst 2018    Hyperlipidemia     IFG (impaired fasting glucose)     Migraine     Mild intermittent asthma     Morbid obesity (HCC)     PID (pelvic inflammatory disease) 2018    Polycystic ovary syndrome     Pre-diabetes     Vitamin D deficiency        Past Surgical History:   Procedure Laterality Date    WISDOM TOOTH EXTRACTION         Current Outpatient Medications   Medication Sig Dispense Refill    albuterol (PROVENTIL HFA,VENTOLIN HFA) 90 mcg/act inhaler Inhale 2 puffs every 4 (four) hours as needed for wheezing 1 Inhaler 0    Cholecalciferol (Vitamin D3) 50 MCG (2000 UT) TABS Take 2,000 Units by mouth daily      clindamycin (CLEOCIN T) 1 % lotion Apply topically once a day to face 60 mL 2    Doxylamine Succinate, Sleep, (UNISOM PO) Take 1 tablet by mouth daily at bedtime as needed      fluticasone (FLONASE) 50 mcg/act nasal spray 1 spray into each nostril daily (Patient taking differently: 2 sprays into each nostril daily as needed ) 16 g 0    pyridoxine (B-6) 25 MG tablet Take 25 mg by mouth 3 (three) times a day Will start today for nausea      Accu-Chek Guide test strip Test 4 times a day and as instructed  GDM  Patient using discount card  100 each 3    Accu-Chek Softclix Lancets lancets Use 4 a day or as directed  Patient using discount card  100 each 3    DULoxetine (CYMBALTA) 20 mg capsule 2 pill by mouth daily x 1 week, then 1 pill by mouth daily x 1 week, then 1 pill by mouth every other day x 1 week, then stop  (Patient not taking: Reported on 3/2/2021) 30 capsule 0    metFORMIN (GLUCOPHAGE) 500 mg tablet Increase Metformin 500mg every 3 days stomach tolerates:  1 pill in AM; 1 pill twice daily; 2 pills in AM & 1 pill in PM  (Patient not taking: Reported on 1/7/2021) 90 tablet 1    ondansetron (ZOFRAN-ODT) 8 mg disintegrating tablet Take 1 tablet (8 mg total) by mouth every 8 (eight) hours as needed for nausea or vomiting (Patient not taking: Reported on 1/7/2021) 30 tablet 0    Prenatal Vit-Fe Fumarate-FA (PRENATAL VITAMIN PO) Take 1 capsule by mouth daily       No current facility-administered medications for this visit           No Known Allergies    Review of Systems   Constitutional: Positive for appetite change and fatigue  Negative for fever  HENT: Negative for congestion, sore throat and trouble swallowing  Eyes: Negative for visual disturbance  Respiratory: Negative for cough and shortness of breath  Cardiovascular: Negative for chest pain and palpitations  Gastrointestinal: Positive for nausea  Negative for constipation and vomiting  Endocrine: Negative for polydipsia, polyphagia and polyuria  Genitourinary: Negative for difficulty urinating and vaginal bleeding  Neurological: Negative for headaches  History of migraines  Psychiatric/Behavioral: Negative for sleep disturbance  Video Exam  Labs reviewed  Vitals:    03/11/21 0828   Weight: 115 kg (254 lb)   Height: 5' 6" (1 676 m)       Physical Exam  Constitutional:       Appearance: She is obese  HENT:      Head: Normocephalic  Nose: Nose normal    Eyes:      Conjunctiva/sclera: Conjunctivae normal    Pulmonary:      Effort: Pulmonary effort is normal    Skin:     Comments: Facial hirsutism   Neurological:      Mental Status: She is alert and oriented to person, place, and time  Psychiatric:         Mood and Affect: Mood normal          Behavior: Behavior normal          Thought Content: Thought content normal          Judgment: Judgment normal           I spent 60 minutes with patient today in which greater than 50% of the time was spent in counseling/coordination of care regarding 10 minutes  VIRTUAL VISIT DISCLAIMER    Eriberto Cowan acknowledges that she has consented to an online visit or consultation  She understands that the online visit is based solely on information provided by her, and that, in the absence of a face-to-face physical evaluation by the physician, the diagnosis she receives is both limited and provisional in terms of accuracy and completeness  This is not intended to replace a full medical face-to-face evaluation by the physician   Eriberto Cowan understands and accepts these terms

## 2021-03-11 NOTE — PATIENT INSTRUCTIONS
-A1c 5 6% at goal   -Keep dietitian appointment as scheduled  -Keep 3 day diet log prior to visit   -After Level II ultrasound, fetal growth ultrasound every 4 weeks or as recommended    -Stay in close contact with diabetes education team    -Insulin requirements during pregnancy; basal/bolus concept and Metformin discussed   -Labs reviewed  -Importance of tight glucose control during pregnancy to decrease risk factors including fetal macrosomia; birth injury; risk of ; pre-eclampsia; polyhydramnios; pre-term labor;  hypoglycemia; jaundice and  hypoglycemia    -Go online and watch how to use Accu-chek glucose meter  1  Start self monitoring blood glucose fasting and 2 hours after start of each meal  Keep glucose log  Glucose goals: fasting 60-90 mg/dL, 135 mg/dL or less 1 hour post meals, and 120 mg/dL or less 2 hours post meal    2  Report glucose readings weekly via [employer] every Thursday or as needed  3  Start GDM diet with 3 meals and 3 snacks including recommended combination of carb, protein and fat per meal/snack  4  Please eat meal or snack every 2-3 5 hours while awake  5  No more than 8 to 10 hours of fasting overnight  6  Stay active if no restriction from your OB, walk up to 30 minutes a day  7  Always have glucose available to treat hypoglycemia  Use 15:15 rule  Refer to hypoglycemia patient education sheet  Test blood sugar when experiencing signs and symptoms of hypoglycemia and prior to driving  8  Continue prenatal vitamin and Vitamin D3 as recommended  9  Continue follow-up with your OB and MFM as recommended  10  Follow up in: 2 months

## 2021-03-11 NOTE — ASSESSMENT & PLAN NOTE
-Vitam D level 17 to 26   -Completed 50K times 8 weeks   -On Vitamin D3 2000 iu daily and prenatal vitamin

## 2021-03-11 NOTE — ASSESSMENT & PLAN NOTE
-Pregnancy weight 250 lbs  -Current weight 254 lbs  -Current BMI 41   -Recommended weight gain during pregnancy 11 to 20 lbs  -Continue walking 30 minutes a day  -Start GDM diet, avoid high fat and high carbohydrates meals/snacks

## 2021-03-15 DIAGNOSIS — Z3A.09 9 WEEKS GESTATION OF PREGNANCY: Primary | ICD-10-CM

## 2021-03-15 NOTE — RESULT ENCOUNTER NOTE
Robles Amaral,     I have reviewed your ultrasound, and everything looks great  The baby's heart rate was 185 beats per minute and baby is measuring on target  We will keep your estimated due date 10/14/21  If you are interested in early, noninvasive genetic testing, please contact the  Center now at [phone] to set up an appointment around 13 weeks  Please contact our office at [phone] with any questions  We look forward to taking care of you and your baby!      Nima
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
## 2021-03-16 ENCOUNTER — OFFICE VISIT (OUTPATIENT)
Dept: FAMILY MEDICINE CLINIC | Facility: CLINIC | Age: 26
End: 2021-03-16
Payer: COMMERCIAL

## 2021-03-16 VITALS
OXYGEN SATURATION: 97 % | HEIGHT: 66 IN | BODY MASS INDEX: 39.82 KG/M2 | TEMPERATURE: 96.1 F | RESPIRATION RATE: 20 BRPM | WEIGHT: 247.8 LBS | DIASTOLIC BLOOD PRESSURE: 64 MMHG | HEART RATE: 77 BPM | SYSTOLIC BLOOD PRESSURE: 110 MMHG

## 2021-03-16 DIAGNOSIS — R73.01 IFG (IMPAIRED FASTING GLUCOSE): Primary | ICD-10-CM

## 2021-03-16 DIAGNOSIS — E66.01 MORBID OBESITY (HCC): ICD-10-CM

## 2021-03-16 DIAGNOSIS — J45.20 MILD INTERMITTENT ASTHMA WITHOUT COMPLICATION: ICD-10-CM

## 2021-03-16 DIAGNOSIS — E78.5 HYPERLIPIDEMIA, UNSPECIFIED HYPERLIPIDEMIA TYPE: ICD-10-CM

## 2021-03-16 DIAGNOSIS — F41.9 ANXIETY: ICD-10-CM

## 2021-03-16 DIAGNOSIS — E55.9 VITAMIN D DEFICIENCY: ICD-10-CM

## 2021-03-16 DIAGNOSIS — J30.9 ALLERGIC RHINITIS, UNSPECIFIED SEASONALITY, UNSPECIFIED TRIGGER: ICD-10-CM

## 2021-03-16 DIAGNOSIS — F32.A DEPRESSION, UNSPECIFIED DEPRESSION TYPE: ICD-10-CM

## 2021-03-16 DIAGNOSIS — O24.419 GESTATIONAL DIABETES MELLITUS (GDM) IN FIRST TRIMESTER, GESTATIONAL DIABETES METHOD OF CONTROL UNSPECIFIED: ICD-10-CM

## 2021-03-16 PROCEDURE — 99214 OFFICE O/P EST MOD 30 MIN: CPT | Performed by: FAMILY MEDICINE

## 2021-03-16 RX ORDER — FLUTICASONE PROPIONATE 50 MCG
2 SPRAY, SUSPENSION (ML) NASAL DAILY PRN
Qty: 1 BOTTLE | Refills: 8 | Status: SHIPPED | OUTPATIENT
Start: 2021-03-16 | End: 2021-08-26 | Stop reason: SDUPTHER

## 2021-03-16 RX ORDER — ALBUTEROL SULFATE 90 UG/1
2 AEROSOL, METERED RESPIRATORY (INHALATION) EVERY 4 HOURS PRN
Qty: 1 INHALER | Refills: 0 | Status: SHIPPED | OUTPATIENT
Start: 2021-03-16 | End: 2021-03-16 | Stop reason: SDUPTHER

## 2021-03-16 RX ORDER — ALBUTEROL SULFATE 90 UG/1
2 AEROSOL, METERED RESPIRATORY (INHALATION) EVERY 4 HOURS PRN
Qty: 1 INHALER | Refills: 0 | Status: SHIPPED | OUTPATIENT
Start: 2021-03-16 | End: 2022-03-02 | Stop reason: SDUPTHER

## 2021-03-16 RX ORDER — FLUTICASONE PROPIONATE 50 MCG
2 SPRAY, SUSPENSION (ML) NASAL DAILY PRN
Qty: 1 BOTTLE | Refills: 8 | Status: SHIPPED | OUTPATIENT
Start: 2021-03-16 | End: 2021-03-16 | Stop reason: SDUPTHER

## 2021-03-16 NOTE — ASSESSMENT & PLAN NOTE
Lab Results   Component Value Date    HGBA1C 5 6 03/02/2021     Gestational diabetes currently under the management of Maternal-Fetal Medicine  As she is currently diet-controlled, patient will contact Maternal-Fetal Medicine to see if she should continue Metformin 500mg 2 pills in AM & 1 pill in PM at their discretion  Recommend lifestyle modifications

## 2021-03-16 NOTE — PATIENT INSTRUCTIONS
To Do: 1  Call  medicine to ask if you should restart Metformin per their advice  Robles Amaral, -       Thank you for notifying us regarding your interest in the Covid-19 vaccine  At this time, until vaccine supply ramps up, [facility] is currently only scheduling vaccines for healthcare workers, first responders and individuals ages 76 and older to ensure we have adequate vaccine supply for these high-risk groups  In an effort to give you the tools to help you, your family, and loved ones who are asking for this information, and/or seeking assistance, we highly encourage you to take either step below to help pre-register for the Covid-19 vaccine:     1  If you do not have a [employer] account, visit [facility] [email or web address] and sign-up, and then complete the brief questionnaire within [address]  Anyone, of any age, may pre-register on [employer]  OR    2  If you are unable to use [employer], please call [phone], option [phone], and follow the prompts  Note: Due to high call volumes, we are hoping to reserve the use of the call-in option for individuals 75+ at this time  Please encourage your loved ones to use [employer]  Need further assistance?   Visit:  Raleigh castillo       Thank you,       [address]

## 2021-03-16 NOTE — ASSESSMENT & PLAN NOTE
Gestational diabetes currently under the management of Maternal-Fetal Medicine  As she is currently diet-controlled, patient will contact Maternal-Fetal Medicine to see if she should continue Metformin 500mg 2 pills in AM & 1 pill in PM at their discretion  Recommend lifestyle modifications

## 2021-03-16 NOTE — ASSESSMENT & PLAN NOTE
Stable on albuterol HFA p r n     Check pre and post spirometry in the future s/p delivery and when COVID-19 restrictions are lifted

## 2021-03-16 NOTE — ASSESSMENT & PLAN NOTE
Stable off Cymbalta 60 mg daily as patient desired D/C due pregnancy  OB Dr Hiram Gray states that although there are no studies, it would be ok to resume Cymbalta if benefits outweigh risks or try Zoloft instead     previously contacted patient to assist with patient's request to see female counselor and psychiatrist

## 2021-03-16 NOTE — ASSESSMENT & PLAN NOTE
Low vitamin D - Recommend PNV and over-the-counter vitamin D3 1000 - 3000 International Units daily

## 2021-03-16 NOTE — ASSESSMENT & PLAN NOTE
Stable off Cymbalta 60 mg daily as patient desired D/C due pregnancy  OB Dr Julissa Mera states that although there are no studies, it would be ok to resume Cymbalta if benefits outweigh risks or try Zoloft instead     previously contacted patient to assist with patient's request to see female counselor and psychiatrist

## 2021-03-22 ENCOUNTER — TELEPHONE (OUTPATIENT)
Dept: PERINATAL CARE | Facility: CLINIC | Age: 26
End: 2021-03-22

## 2021-03-22 NOTE — TELEPHONE ENCOUNTER
Called patient and left voicemail message to remind her of her 9:30 am appointment with me today, and provided call back number [phone] if need to reschedule

## 2021-03-24 ENCOUNTER — TELEPHONE (OUTPATIENT)
Dept: PERINATAL CARE | Facility: CLINIC | Age: 26
End: 2021-03-24

## 2021-03-24 NOTE — TELEPHONE ENCOUNTER
Called patient demetria schedule VIRTUAL appointment:  DIABETES CLASS 1 & 2 - INDIVIDUAL APPT NOT IN CLASS, per [employer] staff message    [address]             Hi,   This patient was a no show for class 1 with me today  Can someone call her to reschedule both her class 1 and class 2 (60 minutes each) 1 week apart with one of the dietitians  These should be both individual appointments not in group setting,          Left voicemail request patient to call back to schedule at [phone]

## 2021-03-29 ENCOUNTER — TELEPHONE (OUTPATIENT)
Dept: OTHER | Facility: HOSPITAL | Age: 26
End: 2021-03-29

## 2021-03-29 ENCOUNTER — TELEPHONE (OUTPATIENT)
Dept: OBGYN CLINIC | Facility: CLINIC | Age: 26
End: 2021-03-29

## 2021-03-29 DIAGNOSIS — O21.9 NAUSEA AND VOMITING DURING PREGNANCY: Primary | ICD-10-CM

## 2021-03-29 RX ORDER — ONDANSETRON 4 MG/1
4 TABLET, ORALLY DISINTEGRATING ORAL EVERY 6 HOURS PRN
Qty: 20 TABLET | Refills: 0 | Status: SHIPPED | OUTPATIENT
Start: 2021-03-29 | End: 2021-03-31

## 2021-03-29 NOTE — TELEPHONE ENCOUNTER
I called pt that we reviewed her blood sugars and she will need insulin therapy  I asked her to schedule appointment ASAP

## 2021-03-29 NOTE — TELEPHONE ENCOUNTER
11w4d OB would like prescription for nausea, states b6 and unisom are not helping  Pharmacy updated  Routing to provider for further recommendations

## 2021-03-30 ENCOUNTER — DOCUMENTATION (OUTPATIENT)
Dept: PERINATAL CARE | Facility: CLINIC | Age: 26
End: 2021-03-30

## 2021-03-30 ENCOUNTER — OFFICE VISIT (OUTPATIENT)
Dept: PERINATAL CARE | Facility: CLINIC | Age: 26
End: 2021-03-30
Payer: COMMERCIAL

## 2021-03-30 VITALS
DIASTOLIC BLOOD PRESSURE: 65 MMHG | HEIGHT: 66 IN | SYSTOLIC BLOOD PRESSURE: 111 MMHG | WEIGHT: 243.6 LBS | BODY MASS INDEX: 39.15 KG/M2 | HEART RATE: 74 BPM

## 2021-03-30 DIAGNOSIS — Z3A.11 11 WEEKS GESTATION OF PREGNANCY: ICD-10-CM

## 2021-03-30 DIAGNOSIS — O99.211 OBESITY COMPLICATING PREGNANCY, FIRST TRIMESTER: ICD-10-CM

## 2021-03-30 DIAGNOSIS — O24.410 DIET CONTROLLED GESTATIONAL DIABETES MELLITUS (GDM) IN FIRST TRIMESTER: Primary | ICD-10-CM

## 2021-03-30 PROCEDURE — G0108 DIAB MANAGE TRN  PER INDIV: HCPCS | Performed by: DIETITIAN, REGISTERED

## 2021-03-30 NOTE — PROGRESS NOTES
21  Cristin De La Cruz   1995  Estimated Date of Delivery: 10/14/21   EGA: 11w5d    Dear Drs  At [facility]:    Thank you for referring your patient to the Diabetes and Pregnancy Program at [address] [place] [address]  The patient attended class 1 and patient received the following education:     Pathophysiology of diabetes and pregnancy  This includes maternal-fetal complications such as fetal macrosomia,  hypoglycemia, polyhydramnios, increased incidence of  section, pre-term labor and in severe cases, fetal demise and stillbirth   Instruction on diet and glucometer use was provided  Self-monitoring of blood glucose levels: fasting (goal 60mg/dl to 90mg/dl) and two hours after the start of the meal less (goal less than 120mg/dl)  The patient is already monitoring with an Accu-Chek Guide glucose meter  klarissa received a [employer] card & paid for the meter supplies on own  To begin Heart [employer]abolics on 4/15/21 & will need her meter & supplies changes to a One-Touch Verio   Medical Nutrition Therapy for diabetes and pregnancy  The patient was provided with 2000 (1st trimester) & 2300 (2nd/3rd trimesters) calorie  meal plans and the following was reviewed:     o Basic review of macronutrients    o Meal pattern should consist of three small meals and three snacks daily  o Carbohydrate gram amounts per meal   o Instructions on how to read a food label  o Appropriate serving sizes for carbohydrates and proteins  o Incorporating protein at each meal and snack  o Maintain a three day food diary and bring to class 2    Report blood glucose levels to the [address] [place] [address] Way weekly or as directed:  o Phone : [phone]  If no response in 24 hours, call [phone]   o Fax: [phone]  o Email: [email or web address]  The patient is scheduled to attend class 2 on Wednesday, 21    Additionally, fetal ultrasound evaluation by the Perinatologist has been scheduled to assure continuity of care  Patient Stated Goal: "I will walk 20-30 minutes after dinner daily"     Diabetes Self Management Support Plan outside of ongoing care: Spouse/Family    Please contact the Diabetes and Pregnancy Program at [phone] if you have any questions  Time spent with patient 1-2 PM; time spent face to face counseling greater than 50% of the appointment      Sincerely,   Elda Herbert  Diabetes Educator   Diabetes and Pregnancy Program

## 2021-03-30 NOTE — PROGRESS NOTES
Demographics:  Language: English  Ethnicity: White/   Country of Origin: [place]    Education/Occupation:  Highest grade completed: [employer] Diploma  Occupation: OtherSelf-Employes at a   Personal & Family History:  Personal history of diabetes? no  Family members with diabetes: Mother    Pregnancy History:  How many total pregnancies have you had? 1  How many children do you have? 0  Are you having swelling or fluid retention? no  Have you been placed on bedrest? no    Physical Activity:  Do you exercise? yes  Type of Exercise: Walking  Frequency of Exercise: 4 x per week    Nutrition Questionnaire: How many meals do you eat daily? 3  List times of meals: Breakfast: 8 AM/ Lunch: 1 PM/ Dinner: 5 PM  How many snacks do you eat daily? 3  List times of snacks: AM Snack: 10 AM/ PM Snack: 2 PM/ Bedtime Snack: 7 PM  What type of diet are you following at home? Regular  Do you have special or ethnic dietary preferences? no  Do you have any food allergies or intolerances? yes Sensitive to hot food aromas; tolerates cold foods better  When was your last meal? Breakfast  List what you ate and the amounts: Cereal with milk    Learning preferences: How do you learn best? Video, Slides & interactive small groups  How do you rate your health? [address]  Who is your primary support person? Significant Other  How do you cope with stress? Adult Coloring & Relaxes  Do you have any cultural or Nondenominational beliefs we should be aware of? yes Judaism  How do you feel the diabetes diagnosis will affect the rest of your pregnancy? Hopes to be as healthy as possible  Do you receive WIC or food stamps?  yes WIC foods

## 2021-03-30 NOTE — PROGRESS NOTES
Date:  21  RE: Minerva Pantoja    : 1995  Estimated Date of Delivery: 10/14/21  EGA: 11w5d  OB/GYN: [facility]  Diet controlled gestational diabetes          Reports her BG on the Glucose Flow Sheet    Current regimen:  Regular diet with 3 meals & 3 snacks  Is avoiding sweets & changed to [employer]  Avoids Fried foods & Fast foods  Reports she has some nausea yari with hot foods due to strong aroma  Doses best with cold foods  Self-Blood Glucose Monitoring with an Accu-Chek Guide glucose meter that paid on own as never received a [place] Medical Assistance card  To change to Femi Puckett on 4/15/21 & will need her meter changed to a One-Touch Verio  Walks 3-4 times weekly  Plan:  Begin 2000 calorie GDM diet with 3 meals & 3 snacks for the 1st trimester & to increase to 2300 calories for 2nd/3rd trimesters  Continue monitoring  3/2/21 [id number] 6%; reorder week of 21  To have 1st ultrasound on 21  Class 2 scheduled for 21  Diabetes Self Management Support Plan outside of ongoing care: Spouse/Family    Date due to report next:  Tuesday, 21      Krystal Cole  Diabetes Educator   Diabetes and Pregnancy Program

## 2021-03-31 ENCOUNTER — TELEPHONE (OUTPATIENT)
Dept: OBGYN CLINIC | Facility: CLINIC | Age: 26
End: 2021-03-31

## 2021-03-31 DIAGNOSIS — O21.9 NAUSEA AND VOMITING DURING PREGNANCY: ICD-10-CM

## 2021-03-31 RX ORDER — ONDANSETRON 4 MG/1
4 TABLET, ORALLY DISINTEGRATING ORAL EVERY 4 HOURS PRN
Qty: 20 TABLET | Refills: 0 | Status: SHIPPED | OUTPATIENT
Start: 2021-03-31 | End: 2021-04-05

## 2021-03-31 NOTE — TELEPHONE ENCOUNTER
Patient requesting Zofran be [facility]  It was sent to [facility] which she doesn't use anymore

## 2021-04-01 ENCOUNTER — TELEPHONE (OUTPATIENT)
Dept: OBGYN CLINIC | Facility: CLINIC | Age: 26
End: 2021-04-01

## 2021-04-01 DIAGNOSIS — O21.9 NAUSEA AND VOMITING DURING PREGNANCY: Primary | ICD-10-CM

## 2021-04-01 RX ORDER — METOCLOPRAMIDE 5 MG/1
5 TABLET ORAL 4 TIMES DAILY
Qty: 30 TABLET | Refills: 0 | Status: SHIPPED | OUTPATIENT
Start: 2021-04-01 | End: 2021-04-27 | Stop reason: SDUPTHER

## 2021-04-01 NOTE — TELEPHONE ENCOUNTER
12wk OB left message on nurse line states insurance will not cover Ondanestron - can we please send a different prescription for nausea to the pharmacy  Routing to provider for advice

## 2021-04-05 ENCOUNTER — ROUTINE PRENATAL (OUTPATIENT)
Dept: PERINATAL CARE | Facility: OTHER | Age: 26
End: 2021-04-05
Payer: COMMERCIAL

## 2021-04-05 VITALS
HEART RATE: 85 BPM | BODY MASS INDEX: 39.26 KG/M2 | SYSTOLIC BLOOD PRESSURE: 112 MMHG | WEIGHT: 244.27 LBS | DIASTOLIC BLOOD PRESSURE: 74 MMHG | HEIGHT: 66 IN

## 2021-04-05 DIAGNOSIS — Z3A.12 12 WEEKS GESTATION OF PREGNANCY: ICD-10-CM

## 2021-04-05 DIAGNOSIS — O34.10 UTERINE FIBROID IN PREGNANCY: ICD-10-CM

## 2021-04-05 DIAGNOSIS — Z36.82 ENCOUNTER FOR (NT) NUCHAL TRANSLUCENCY SCAN: Primary | ICD-10-CM

## 2021-04-05 DIAGNOSIS — D25.9 UTERINE FIBROID IN PREGNANCY: ICD-10-CM

## 2021-04-05 DIAGNOSIS — O24.419 GESTATIONAL DIABETES MELLITUS (GDM) IN FIRST TRIMESTER, GESTATIONAL DIABETES METHOD OF CONTROL UNSPECIFIED: ICD-10-CM

## 2021-04-05 DIAGNOSIS — O99.211 OBESITY AFFECTING PREGNANCY IN FIRST TRIMESTER: ICD-10-CM

## 2021-04-05 PROBLEM — R73.09 ABNORMAL GLUCOSE: Status: RESOLVED | Noted: 2021-03-11 | Resolved: 2021-04-05

## 2021-04-05 PROCEDURE — 76801 OB US < 14 WKS SINGLE FETUS: CPT | Performed by: OBSTETRICS & GYNECOLOGY

## 2021-04-05 PROCEDURE — 76813 OB US NUCHAL MEAS 1 GEST: CPT | Performed by: OBSTETRICS & GYNECOLOGY

## 2021-04-05 PROCEDURE — 99242 OFF/OP CONSLTJ NEW/EST SF 20: CPT | Performed by: OBSTETRICS & GYNECOLOGY

## 2021-04-05 RX ORDER — ASPIRIN 81 MG/1
162 TABLET ORAL DAILY
Qty: 60 TABLET | Refills: 60 | Status: SHIPPED | OUTPATIENT
Start: 2021-04-05 | End: 2021-04-27 | Stop reason: SDUPTHER

## 2021-04-05 NOTE — PROGRESS NOTES
Patient chose to have Part 1 Sequential Screening from [facility] [facility]  Finger stick specimen collected from right middle finger and patient tolerated procedure well  Sample mailed to [facility] via [employer]  Explained results will be available in 7-10 business days  [facility] office will call her with results or she can view in [address]  [place] collection for Part 2 is between 16-18 weeks gestation, a lab slip and instruction letter will be mailed from our office  Patient instructed to take the paper lab slip to lab, this specialty genetic test is not yet in Epic  Patient verbalized understanding of all instructions

## 2021-04-05 NOTE — LETTER
April 6, 5713     [facility] 78 , DO  [address]  [address] [facility]    Patient: Zach Blankenship   YOB: 1995   Date of Visit: 4/5/2021       Dear Dr Alberto Course:    Thank you for referring Zach Blankenship to me for evaluation  Below are my notes for this consultation  If you have questions, please do not hesitate to call me  I look forward to following your patient along with you  Sincerely,        Everardo Martinez MD        CC: No Recipients  Everardo Martinez MD  4/6/2021  6:49 PM  Sign when Signing Visit  [address]: Ms Saundra Hassan was seen today at 12w4d for nuchal translucency ultrasound  See ultrasound report under "OB Procedures" tab  My recommendations are as follows:  1  We reviewed the availability of genetic screening, as well as diagnostic testing, which are available to all pregnant women  We reviewed limitations, risks, and benefits of screening and testing  We reviewed the differences between Sequential Screen and NIPT (non-invasive prenatal screening) as well as that insurance coverage and therefore out-of-pocket costs may vary  She elected to proceed with sequential screen part 1, which was drawn in our office today  She does not wish to pursue diagnostic testing at this time  A detailed anatomic survey as well as transvaginal cervical length screening are recommended between 18-22 weeks gestation  2  In light of her brother with Autism, I recommend genetic counseling, and consideration to have Fragile X carrier screening, which will be coordinated through our office  3  The use of low dose aspirin in pregnancy (81-162mg) is recommended in women with a high risk, or multiple moderate risk factors for preeclampsia  Aspirin therapy should be initiated between 12-28 weeks gestation, and is most effective if started prior to 16 weeks gestation, and stopped by 37 weeks gestation   Low dose aspirin in pregnancy has been shown to reduce the incidence of preeclampsia in women with risk factors, and has been shown to be safe and without significant maternal or fetal risk  In light of her risk factors which include primigravida, and body mass index, I recommend initiating aspirin therapy, which was prescribed today  4  Body mass index > 30 kg/m2 is associated with an increased risk of several pregnancy complications, including hypertensive disorders, diabetes, abnormal fetal growth, fetal malformations  The risk of  delivery is also increased, as are wound complications in the event of  delivery  A healthy diet and exercise, as well as appropriate gestational weight gain (no more than 11-20 pounds) can help reduce risk of these complications  150 minutes of moderate exercise per week is recommended for all pregnant women   fetal surveillance is also recommended as follows:BMI >40: Fetal growth assessment will be indicated by GDM (see #5)  If not otherwise indicated, she will need once weekly antepartum fetal surveillance at 36 weeks gestation if her BMI is >40kg/m2    5  We discussed her diet-controlled gestational diabetes (GDMA1)  We reviewed the importance of glycemic control in promoting normal fetal growth and well-being  Goal blood glucose ranges in pregnancy are fasting glucose <90 and two hour post-prandial <120  Exercise is encouraged, and improves glycemic control  A diabetic diet is also recommended, and nutrition counseling is integrated into education and follow-up through our program  If she remains well controlled with diet, we recommend only serial ultrasound evaluation of fetal growth and amniotic fluid every 4 weeks (beginning at 28 weeks gestation)  Should she require medical management of blood glucose, antepartum fetal surveillance should also be initiated twice weekly (at diagnosis or 32 weeks, whichever is later) until delivery   For well-controlled GDMA1, delivery is recommended between 39 0/7 and 40 6/7 weeks gestation  I recommend that she continue to be closely followed by our diabetes in pregnancy program   6  Given history of breast cancer in her maternal aunts, I recommend that she be referred to the Family Cancer Risk Evaluation Program for genetic counseling  She can schedule an appointment either at the [facility] ([phone]), or [facility] ([phone])      Please don't hesitate to contact our office with any concerns or questions     -Gonzales Downey MD

## 2021-04-05 NOTE — PROGRESS NOTES
[address] [place] [address] Tjjoel: Ms Clark Whitney was seen today at 12w4d for nuchal translucency ultrasound  See ultrasound report under "OB Procedures" tab  My recommendations are as follows:  1  We reviewed the availability of genetic screening, as well as diagnostic testing, which are available to all pregnant women  We reviewed limitations, risks, and benefits of screening and testing  We reviewed the differences between Sequential Screen and NIPT (non-invasive prenatal screening) as well as that insurance coverage and therefore out-of-pocket costs may vary  She elected to proceed with sequential screen part 1, which was drawn in our office today  She does not wish to pursue diagnostic testing at this time  A detailed anatomic survey as well as transvaginal cervical length screening are recommended between 18-22 weeks gestation  2  In light of her brother with Autism, I recommend genetic counseling, and consideration to have Fragile X carrier screening, which will be coordinated through our office  3  The use of low dose aspirin in pregnancy (81-162mg) is recommended in women with a high risk, or multiple moderate risk factors for preeclampsia  Aspirin therapy should be initiated between 12-28 weeks gestation, and is most effective if started prior to 16 weeks gestation, and stopped by 37 weeks gestation  Low dose aspirin in pregnancy has been shown to reduce the incidence of preeclampsia in women with risk factors, and has been shown to be safe and without significant maternal or fetal risk  In light of her risk factors which include primigravida, and body mass index, I recommend initiating aspirin therapy, which was prescribed today  4  Body mass index > 30 kg/m2 is associated with an increased risk of several pregnancy complications, including hypertensive disorders, diabetes, abnormal fetal growth, fetal malformations   The risk of  delivery is also increased, as are wound complications in the event of  delivery  A healthy diet and exercise, as well as appropriate gestational weight gain (no more than 11-20 pounds) can help reduce risk of these complications  150 minutes of moderate exercise per week is recommended for all pregnant women   fetal surveillance is also recommended as follows:BMI >40: Fetal growth assessment will be indicated by GDM (see #5)  If not otherwise indicated, she will need once weekly antepartum fetal surveillance at 36 weeks gestation if her BMI is >40kg/m2    5  We discussed her diet-controlled gestational diabetes (GDMA1)  We reviewed the importance of glycemic control in promoting normal fetal growth and well-being  Goal blood glucose ranges in pregnancy are fasting glucose <90 and two hour post-prandial <120  Exercise is encouraged, and improves glycemic control  A diabetic diet is also recommended, and nutrition counseling is integrated into education and follow-up through our program  If she remains well controlled with diet, we recommend only serial ultrasound evaluation of fetal growth and amniotic fluid every 4 weeks (beginning at 28 weeks gestation)  Should she require medical management of blood glucose, antepartum fetal surveillance should also be initiated twice weekly (at diagnosis or 32 weeks, whichever is later) until delivery  For well-controlled GDMA1, delivery is recommended between 39 0/7 and 40 6/7 weeks gestation  I recommend that she continue to be closely followed by our diabetes in pregnancy program   6  Given history of breast cancer in her maternal aunts, I recommend that she be referred to the Family Cancer Risk Evaluation Program for genetic counseling  She can schedule an appointment either at the [facility] ([phone]), or [facility] ([phone])      Please don't hesitate to contact our office with any concerns or questions     -Sanjay Mendoza MD

## 2021-04-05 NOTE — PATIENT INSTRUCTIONS
The use of low dose aspirin in pregnancy (81-162mg) is recommended in women with a high risk, or multiple moderate risk factors for preeclampsia  Aspirin therapy should be initiated between 12-28 weeks gestation, and is most effective if started prior to 16 weeks gestation, and continued until 37 weeks gestation  Low dose aspirin in pregnancy has been shown to reduce the incidence of preeclampsia in women with risk factors, and has been shown to be safe and without significant maternal or fetal risk  In light of your risk factors for preeclampsia, including: Primiparity (first pregnancy) and Body Mass Index 30 or greater I recommend initiating aspirin therapy, which was prescribed today  Thank you for choosing [address] [place] [address] [place] for your visit today  We appreciate your trust and the opportunity to assist your obstetrician with your care  We value your feedback regarding the care we are providing  Following today's appointment, you may receive a patient satisfaction survey by mail or e-mail requesting feedback on your visit  We ask that you complete the survey to  help us understand how we are doing  Thank you for in advance for your feedback

## 2021-04-06 ENCOUNTER — TELEPHONE (OUTPATIENT)
Dept: PERINATAL CARE | Facility: CLINIC | Age: 26
End: 2021-04-06

## 2021-04-06 PROBLEM — Z81.8 FAMILY HISTORY OF AUTISM: Status: ACTIVE | Noted: 2021-04-06

## 2021-04-06 NOTE — TELEPHONE ENCOUNTER
Called patient to confirm Maternal Fetal Medicine Virtual appointment scheduled for 4/7/21  10:15  Mary Baird  Confirmed she received e-mail and has successfully downloaded the [address]  Explained procedure for virtual visit and requested she log into appointment approximately 10 minutes prior to scheduled start time  Explained the [facility] Dr  will join her at the scheduled appointment time  Patient instructed to call [facility] office @ #[phone] for technical support issues using [employer] TEAMS  Patient verbalized understanding and has no questions at time

## 2021-04-07 ENCOUNTER — TELEMEDICINE (OUTPATIENT)
Dept: PERINATAL CARE | Facility: CLINIC | Age: 26
End: 2021-04-07

## 2021-04-07 ENCOUNTER — DOCUMENTATION (OUTPATIENT)
Dept: PERINATAL CARE | Facility: CLINIC | Age: 26
End: 2021-04-07

## 2021-04-07 DIAGNOSIS — Z31.5 ENCOUNTER FOR PROCREATIVE GENETIC COUNSELING: ICD-10-CM

## 2021-04-07 DIAGNOSIS — Z31.430 ENCOUNTER OF FEMALE FOR TESTING FOR GENETIC DISEASE CARRIER STATUS FOR PROCREATIVE MANAGEMENT: ICD-10-CM

## 2021-04-07 DIAGNOSIS — Z81.8 FAMILY HISTORY OF AUTISM: Primary | ICD-10-CM

## 2021-04-07 PROCEDURE — NC001 PR NO CHARGE: Performed by: GENETIC COUNSELOR, MS

## 2021-04-07 NOTE — PROGRESS NOTES
[facility] labslip written and mailed to patient's home address in advance of prior authorization being obtained  Patient is aware she is not to go for blood work untile she is notified that prior authorization as been received

## 2021-04-07 NOTE — PROGRESS NOTES
Virtual Regular Visit      Assessment/Plan:    Problem List Items Addressed This Visit     None               Reason for visit is   Chief Complaint   Patient presents with    Virtual Regular Visit        Encounter provider Wilmar Fragoso    Provider located at [address] [place] [id number] [phone]      Recent Visits  No visits were found meeting these conditions  Showing recent visits within past 7 days and meeting all other requirements     Future Appointments  No visits were found meeting these conditions  Showing future appointments within next 150 days and meeting all other requirements        The patient was identified by name and date of birth  Ashleyarnaud Barragan was informed that this is a telemedicine visit and that the visit is being conducted through [employer] and patient was informed that this is a secure, HIPAA-compliant platform  She agrees to proceed     My office door was closed  No one else was in the room  She acknowledged consent and understanding of privacy and security of the video platform  The patient has agreed to participate and understands they can discontinue the visit at any time  Patient is aware this is a billable service  Subjective  Genetic Counseling Note        Ashley Robleswood     Appointment Date:  2021  Referred By: Robert Miguel DO  YOB: 1995  Partner:  Joslyn Nunes    Pregnancy History:   Estimated Date of Delivery: 10/14/21  Estimated Gestational Age: 16 weeks 6 days     Genetic Counseling:personal and/or family history of genetic disorder:  ron    Naheed Osman is a(n) 27 y o  female who is here to discuss recurrence risks and testing options related to family history of autism    Issues Discussed:  Average population risk: 3-4% in the average pregnancy of serious condition or birth defect  2-3% risk of mental retardation   Not all detected by prenatal testing  Chromosomal: non-disjunction 1/475 overall, 1/1404 for Down syndrome specifically at the age of 27 at delivery  Family history of brother with sever autism and sister with Asperger syndrome diagnosis  Further discussion will occur once results have been received    Options Discussed:  Amniocentesis: risks and limitations discussed  CVS: risks and limitations discussed  Ethnic screening discussed: clinical and genetic basis of CF, SMA, Fragile X and expanded carrier screening  Level II ultrasound to screen for structural anomalies  Nuchal translucency/1st trimester serum screen: goals and limitations discussed  Serum AFP screen recommended at 15-17 weeks to check for open neural tube defects  Cell free fetal DNA testing    Additional Information / Impression / Plan / Tests Ordered:  Coral Saucedo presents for genetic counseling to discuss concerns related to a family history of autism  To review, Coral Saucedo reports having a 20-year-old brother who she describes as having severe autism and a 51-year-old sister with Asperger syndrome  She denies either of her siblings having any genetic testing performed or known genetic diagnosis associated with their autism spectrum diagnosis  We discussed that this history is highly suspicious for Fragile X syndrome which is the most common inherited form of intellectual disability and can have associated autistic features  We reviewed the X-linked inheritance of Fragile X syndrome  The patient was offered the option of Fragile X carrier screening which she elected to pursue  Fragile X carrier testing requires prior authorization with the patient's insurance which does not become effective until 4/15/21  Our office will initiate a prior authorization once her insurance is effective  If the patient tests positive as a carrier for a Fragile X premutation clear risks for her to have a child affected with Fragile X syndrome will be based on those test results   We did discuss that if she tests positive as a carrier of a Fragile X premutation then confirmatory testing could be performed on her brother  If the patient tests negative for a Fragile X premutation then her risk for having a child with autism would be based solely on her family history and is likely to be increased over that of the general population  In the absence of a known genetic diagnosis, prenatal diagnosis would not be available  We discussed that the most effective way to clarify risk for autism is to have an affected family member undergo genetic testing  The best person to test in the patient's family would be her brother given that he is the more significantly affected  I reviewed the option of chromosomal microarray, Fragile X and whole exome sequencing which are all options that could be performed on her brother  Angel Arevalo indicated that she would discuss this with her mother but she is uncertain whether or not her mother will elect to pursue testing on her affected brother  During our counseling session histories were taken on the patient's family and her partner's family  Several multifactorial conditions were identified including her father having a seizure disorder and her partner's father having rheumatoid arthritis  In addition, the patient's mother has a diagnosis of type 2 diabetes and she was recently diagnosed with gestational diabetes  We discussed that multifactorial conditions are expected to have a genetic component as well as an environmental component and there is an increased risk to close family members  She was encouraged to make her family physician and pediatrician aware of this history  The patient is being followed with the [facility] physicians and diabetic team of the [facility] department for management and care of her gestational diabetes  The patient reports being of [place] descent and that her partner is of [place] descent    She denies either of them having any known Anabaptism ancestry  Carrier screening for CF, SMA and expanded carrier screening was discussed  Gerard Sosa elected to pursue carrier screening for CF and SMA  She opted to decline expanded carrier screening  These tests will be prior authorized once her insurance becomes effective  Lastly, we discussed the fact that it is important to keep in mind that everyone in the general population regardless of age, family history, or medical background has approximately a 3% risk of having a child with some type of her defect, genetic disease or intellectual disability  Currently there are no tests available to rule out all birth defects or health problems  Part of this risk is an age specific risk for aneuploidy as indicated above  I reviewed with the patient the options regarding prenatal diagnosis for chromosome abnormalities including CVS and amniocentesis  Chorionic villus sampling(CVS) is generally performed between 10 and 12 weeks of pregnancy and amniocentesis is generally performed at 16 weeks or later  Recent literature supports that CVS and amniocentsis both have an associated  procedure related risk of miscarriage of less than 1/300  Chromosome results from CVS or amniocentesis are greater than 99 9% accurate as is genetic testing for single gene disorders like Fragile X syndrome if indicated  Occasionally a repeat procedure may be necessary due to cell culture failure  Approximately 1% of the time, a mosaic chromosome result from CVS will necessitate a followup amniocentesis  Measurement of AFP from amniotic fluid is able to detect approximately 95% of open neural tube defects  Maternal serum AFP is recommended for patients who elect CVS     We also reviewed the availability and limitations of sequential screening, NIPS, and level II ultrasound evaluation   Sequential screening consisting of first trimester measurement of nuchal translucency combined with first trimester biochemical analysis as well as second trimester biochemical analysis is able to detect approximately 90% of pregnancies in which the fetus has Down syndrome, 90% of pregnancies in which the fetus has trisomy 25 and 80% of pregnancies which appears has an open neural tube defect  NIPS involves assessment of fragments of fetal DNA in maternal blood  This test has varying detection rates depending on the laboratory used though the quoted detection rate for Down syndrome and Trisomy 18 is generally greater than 99% and detection rate for Ttrisomy 13 is greater than 90%  NIPS has a low false positive rate however consideration of prenatal diagnostic testing is always recommended following a positive NIPS  Level II ultrasound evaluation is able to detect  many major physical birth defects and variations in fetal development that may be associated with chromosome abnormalities  Level II ultrasound evaluation is not able to detect all birth defects or health problems  The patient had been seen yesterday for nuchal translucency ultrasound the results of which were within normal limits  She opted to pursue sequential screening at that time and is awaiting results  She is planning to pursue the 2nd trimester portion of sequential screening and level 2 ultrasound evaluation  Adriana Arciniega elected to decline prenatal diagnostic testing based on information currently available but may reconsider that decision after receiving carrier screening test results and possibly receiving test results on her brother with a diagnosis of autism  Plan:  1  Carrier screening for CF, SMA and Fragile X to be prior authorized once the patient's insurance becomes effective  2  Level II ultrasound and the second trimester portion of sequential screening to be performed at the appropriate times  3  Patient to discuss referral for genetic testing for her brother affected with autism     4  Patient to consider prenatal diagnosis based on test results        HPI Past Medical History:   Diagnosis Date    Anxiety     Asthma 2005    BMI 40 0-44 9, adult ([address] ) 3/11/2021    Depression     History of ovarian cyst 2018    Hyperlipidemia     IFG (impaired fasting glucose)     Migraine     Mild intermittent asthma     Morbid obesity (HCC)     PID (pelvic inflammatory disease) 2018    Polycystic ovary syndrome     Pre-diabetes     Vitamin D deficiency        Past Surgical History:   Procedure Laterality Date    WISDOM TOOTH EXTRACTION         Current Outpatient Medications   Medication Sig Dispense Refill    Accu-Chek Guide test strip Test 4 times a day and as instructed  GDM  Patient using discount card  100 each 3    Accu-Chek Softclix Lancets lancets Use 4 a day or as directed  Patient using discount card  100 each 3    albuterol (PROVENTIL HFA,VENTOLIN HFA) 90 mcg/act inhaler Inhale 2 puffs every 4 (four) hours as needed for wheezing 1 Inhaler 0    aspirin (ECOTRIN LOW STRENGTH) 81 mg EC tablet Take 2 tablets (162 mg total) by mouth daily 60 tablet 60    Cholecalciferol (Vitamin D3) 50 MCG (2000 UT) TABS Take 2,000 Units by mouth daily      Doxylamine Succinate, Sleep, (UNISOM PO) Take 1 tablet by mouth daily at bedtime as needed      fluticasone (FLONASE) 50 mcg/act nasal spray 2 sprays into each nostril daily as needed for rhinitis 1 Bottle 8    metoclopramide (REGLAN) 5 mg tablet Take 1 tablet (5 mg total) by mouth 4 (four) times a day 30 tablet 0    Prenatal Vit-Fe Fumarate-FA (PRENATAL VITAMIN PO) Take 1 capsule by mouth daily      pyridoxine (B-6) 25 MG tablet Take 25 mg by mouth 3 (three) times a day Will start today for nausea       No current facility-administered medications for this visit  No Known Allergies    Review of Systems    Video Exam    There were no vitals filed for this visit      Physical Exam     I spent 35 minutes directly with the patient during this visit      VIRTUAL VISIT Paige Caldwell acknowledges that she has consented to an online visit or consultation  She understands that the online visit is based solely on information provided by her, and that, in the absence of a face-to-face physical evaluation by the physician, the diagnosis she receives is both limited and provisional in terms of accuracy and completeness  This is not intended to replace a full medical face-to-face evaluation by the physician  Davi Lane understands and accepts these terms

## 2021-04-09 ENCOUNTER — ROUTINE PRENATAL (OUTPATIENT)
Dept: OBGYN CLINIC | Facility: CLINIC | Age: 26
End: 2021-04-09
Payer: COMMERCIAL

## 2021-04-09 VITALS — SYSTOLIC BLOOD PRESSURE: 118 MMHG | DIASTOLIC BLOOD PRESSURE: 76 MMHG | BODY MASS INDEX: 39.38 KG/M2 | WEIGHT: 244 LBS

## 2021-04-09 DIAGNOSIS — Z3A.13 13 WEEKS GESTATION OF PREGNANCY: Primary | ICD-10-CM

## 2021-04-09 DIAGNOSIS — Z12.4 SCREENING FOR MALIGNANT NEOPLASM OF CERVIX: ICD-10-CM

## 2021-04-09 DIAGNOSIS — O24.419 GESTATIONAL DIABETES MELLITUS (GDM) IN FIRST TRIMESTER, GESTATIONAL DIABETES METHOD OF CONTROL UNSPECIFIED: ICD-10-CM

## 2021-04-09 DIAGNOSIS — Z11.3 SCREENING FOR STD (SEXUALLY TRANSMITTED DISEASE): ICD-10-CM

## 2021-04-09 DIAGNOSIS — O99.211 OBESITY AFFECTING PREGNANCY IN FIRST TRIMESTER: ICD-10-CM

## 2021-04-09 PROCEDURE — 99212 OFFICE O/P EST SF 10 MIN: CPT | Performed by: OBSTETRICS & GYNECOLOGY

## 2021-04-09 PROCEDURE — G0145 SCR C/V CYTO,THINLAYER,RESCR: HCPCS | Performed by: OBSTETRICS & GYNECOLOGY

## 2021-04-09 NOTE — PROGRESS NOTES
27 y o    female at 13w1d EGA for PNV  BP : 118/76  TWG: -2OM52  Reviewed practice providers and prenatal schedule  Denies contractions/bleeding/lof  She had NT scan  She is following DIP re her blood sugars  Plans to breast feed  RTO in 4 weeks  Slight bleeding from cervix with pap

## 2021-04-12 ENCOUNTER — TELEPHONE (OUTPATIENT)
Dept: PERINATAL CARE | Facility: CLINIC | Age: 26
End: 2021-04-12

## 2021-04-12 NOTE — LETTER
04/12/21  Darrelllizandro Dixon  1995    Thank you for completing Part 1 of your Sequential Screen  To obtain a complete test result, complete blood work for Part 2 Sequential Screen between the weeks of 5/3/21 to 5/17/21  Please verify which laboratory is In Network with your insurance plan ([facility] or [employer])      If you choose to use a [facility] lab, please go to a location from this list:     Mynor Vargas 6961  [address] [facility], [facility] [address]                [facility]ron [facility] 5, [facility] 425 [facility]  26[address] [facility], [facility] [address]                    [facility] 19, [facility] 80 [facility]  [facility], 3669 [facility]             [address]  [address], [facility] [address]             819 [facility] [address]  1430 [facility] 3              [facility], [address] [place] [address] Drive 8400 [facility]  [address], [facility] [address]                        [address], [facility] [address] [facility]  [address] [address], [address] [facility] [address] [address]            207 [facility], [facility] 969 [facility]                            [address], [facility] [address]    For list of Jeanette Mathur [facility]   If you choose [facility], please remind the phlebotomist the screen is ordered for 5 [facility]  You can take this letter with you to the lab  Call Maternal Fetal Medicine nurse line any questions at [phone]     Thank you,  [facility] Staff

## 2021-04-12 NOTE — TELEPHONE ENCOUNTER
----- Message from Cathie Tomlinson MD sent at 2021  1:11 PM EDT -----  Hi  RN staff, I've reviewed this Sequential Part 1 result which shows low preliminary risk for the conditions tested (trisomies 21 and 18), can you call her to inform her of this result? Please also mail her the requisition form for the Sequential Screen Part 2  Thank you    Cathie Tomlinson MD

## 2021-04-12 NOTE — TELEPHONE ENCOUNTER
Phone call to patient, reviewed [facility] Part 1 Sequential Screen result  Patient given instructions for Part 2 collection and she verbalized understanding  TRF for Part 2 and collection instruction letter mailed  Patient given phone number to call [facility] nurse line any questions [phone]

## 2021-04-13 ENCOUNTER — DOCUMENTATION (OUTPATIENT)
Dept: PERINATAL CARE | Facility: CLINIC | Age: 26
End: 2021-04-13

## 2021-04-13 NOTE — PROGRESS NOTES
Date:  21  RE: Argentina Henderson    : 1995  Estimated Date of Delivery: 10/14/21  EGA: 13w56  OB/GYN: [facility]  Diet controlled gestational diabetes      Reports her BG on the Glucose Flow Sheet  Discussed at Class 2 today  Current regimen:  Regular diet with 3 meals & 3 snacks  Is avoiding sweets & changed to [employer]  Avoids Fried foods  Nausea has decreased  Reports her SO likes to dine our frequently & when her BG was increased, she had dined out  Self-Blood Glucose Monitoring with an Accu-Chek Guide glucose meter that paid on own as never received a [place] Medical Assistance card  To change to [employer] on 4/15/21 & will need her meter changed to a One-Touch Verio  Walks 3-4 times weekly  Plan:Mychart message to patient from [employer]        If needs medicine, prefers insulin rather than metformin ( had taken metformin prior to the pregnancy)  Her 24 hour diet recall indicates she is following the meal plan closely  Dines out at many chain restaurants & discussed in more detail at Class 2 today  Has been looking at [email or web address] & nutrition facts labels before dining out     3/2/21 [id number]-3 6%; reorder week of 21    Diabetes Self Management Support Plan outside of ongoing care: Spouse/Family    Date due to report next:  Weekly     Brayan Fajardo, MS, RD, CDE  Diabetes Educator   Diabetes and Pregnancy Program

## 2021-04-14 ENCOUNTER — OFFICE VISIT (OUTPATIENT)
Dept: PERINATAL CARE | Facility: CLINIC | Age: 26
End: 2021-04-14
Payer: COMMERCIAL

## 2021-04-14 VITALS
DIASTOLIC BLOOD PRESSURE: 70 MMHG | HEART RATE: 86 BPM | BODY MASS INDEX: 39.15 KG/M2 | WEIGHT: 243.6 LBS | SYSTOLIC BLOOD PRESSURE: 117 MMHG | HEIGHT: 66 IN

## 2021-04-14 DIAGNOSIS — O99.211 OBESITY AFFECTING PREGNANCY IN FIRST TRIMESTER: ICD-10-CM

## 2021-04-14 DIAGNOSIS — Z3A.13 13 WEEKS GESTATION OF PREGNANCY: ICD-10-CM

## 2021-04-14 DIAGNOSIS — O24.410 DIET CONTROLLED GESTATIONAL DIABETES MELLITUS (GDM) IN FIRST TRIMESTER: Primary | ICD-10-CM

## 2021-04-14 LAB
LAB AP GYN PRIMARY INTERPRETATION: NORMAL
Lab: NORMAL

## 2021-04-14 PROCEDURE — G0108 DIAB MANAGE TRN  PER INDIV: HCPCS | Performed by: DIETITIAN, REGISTERED

## 2021-04-14 NOTE — PROGRESS NOTES
DATE:  21  RE: Becca Blankenship    : 1995    ROCIO: Estimated Date of Delivery: 10/14/21    EGA: 13w6d    Dear Drs  At [facility]:    Thank you for referring your patient to the Diabetes and Pregnancy Program at [address]  The patient attended Class 2 received the following education:    Weight gain during in pregnancy  Based on the patients height of 5' 6" (1 676 m) inches, pre-pregnancy weight of 115 kg (253 lb 15 5 oz) pounds (BMI- 41 01), we would recommend a total weight gain of 11-20 pounds for the pregnancy   The patients current weight is 110 kg (243 lb 9 6 oz)pounds, and she lost 10 pounds to date  Her weight has been stable since beginning the meal plan  Based on this, we recommend she maintain her weight for the remainder of the pregnancy   Medical Nutrition Therapy for diabetes and pregnancy  The patients 24 hour diet recall  was reviewed and discussed  The patient was instructed on the following:  o Individualized meal plan  Her diet recall indicates she has been following the meal plan closely  Eats extra vegetables to make her feel full  Stated that her SO likes to dine our frequently  Reported when her BG was increased, she had dined out  Eats at many chains & discussed beetter food choices at those restaurants  o Use of food diary to maintain a meal plan    o Importance of protein as it relates to blood glucose control   Review of blood glucose log  Reinforcement of blood glucose goals and reporting guidelines  Reported if she needs medicine, she prefers insulin rather than metformin   Ultrasounds every four weeks in the [address] to evaluate fetal growth   Exercise Guidelines:   o Walking up to thirty minutes daily can reduce blood glucose levels     o Monitor for greater than four contractions per hour     o The patient has been instructed not to begin physical activity if she has been instructed not to exercise by your office   Sick day guidelines and hypoglycemia with treatment   Post-partum guidelines:  o Completion of a 75 gram glucose tolerance test at 6 weeks post-partum to check for type 2 diabetes  o 20% weight loss and 30 minutes of exercise 5 times per week reduces the risk of type 2 diabetes   Breastfeeding guidelines   Report blood glucose levels to [address] weekly or as directed  o Phone: [phone]  If no response in 24 hours, call [phone]    o Fax: [phone]  o Email: [email or web address]    Diabetes Self Management Support Plan outside of ongoing care: Spouse/Family    Please contact the Diabetes and Pregnancy Program at [phone] if you have questions  Time spent with patient 9:35-10:31 AM; time spent face to face counseling greater than 50% of the appointment      Sincerely,     David Dudley  Diabetes Educator  Diabetes and Pregnancy Program

## 2021-04-15 ENCOUNTER — DOCUMENTATION (OUTPATIENT)
Dept: PERINATAL CARE | Facility: CLINIC | Age: 26
End: 2021-04-15

## 2021-04-15 LAB
C TRACH DNA SPEC QL NAA+PROBE: ABNORMAL
N GONORRHOEA DNA SPEC QL NAA+PROBE: ABNORMAL

## 2021-04-15 NOTE — PROGRESS NOTES
Faxed prior auth form to insurance to request [facility] for [phone], [phone] and [phone]

## 2021-04-20 DIAGNOSIS — J45.20 MILD INTERMITTENT ASTHMA WITHOUT COMPLICATION: ICD-10-CM

## 2021-04-20 RX ORDER — ALBUTEROL SULFATE 90 UG/1
AEROSOL, METERED RESPIRATORY (INHALATION)
Qty: 6.7 INHALER | OUTPATIENT
Start: 2021-04-20

## 2021-04-20 NOTE — RESULT ENCOUNTER NOTE
Robles Amaral,     Your testing was unable to be read  I recommend we repeat the test at your next visit, which we can run off a urine sample  Please contact the office at [phone] with any questions       Nima

## 2021-04-27 DIAGNOSIS — Z3A.12 12 WEEKS GESTATION OF PREGNANCY: ICD-10-CM

## 2021-04-27 DIAGNOSIS — O99.211 OBESITY AFFECTING PREGNANCY IN FIRST TRIMESTER: ICD-10-CM

## 2021-04-27 DIAGNOSIS — O21.9 NAUSEA AND VOMITING DURING PREGNANCY: ICD-10-CM

## 2021-04-27 DIAGNOSIS — O24.419 GESTATIONAL DIABETES MELLITUS (GDM) IN FIRST TRIMESTER, GESTATIONAL DIABETES METHOD OF CONTROL UNSPECIFIED: ICD-10-CM

## 2021-04-28 ENCOUNTER — TELEPHONE (OUTPATIENT)
Dept: PERINATAL CARE | Facility: CLINIC | Age: 26
End: 2021-04-28

## 2021-04-28 DIAGNOSIS — Z81.8 FAMILY HISTORY OF AUTISM: ICD-10-CM

## 2021-04-28 DIAGNOSIS — Z31.430 ENCOUNTER OF FEMALE FOR TESTING FOR GENETIC DISEASE CARRIER STATUS FOR PROCREATIVE MANAGEMENT: Primary | ICD-10-CM

## 2021-04-28 RX ORDER — ASPIRIN 81 MG/1
162 TABLET ORAL DAILY
Qty: 60 TABLET | Refills: 0 | Status: SHIPPED | OUTPATIENT
Start: 2021-04-28 | End: 2021-07-02 | Stop reason: SDUPTHER

## 2021-04-28 RX ORDER — METOCLOPRAMIDE 5 MG/1
5 TABLET ORAL 4 TIMES DAILY
Qty: 30 TABLET | Refills: 0 | Status: SHIPPED | OUTPATIENT
Start: 2021-04-28 | End: 2021-08-24 | Stop reason: ALTCHOICE

## 2021-04-28 NOTE — TELEPHONE ENCOUNTER
Received prior Auth for carrier screening for CF, SMA and fragile X  Telephone call to patient  Voicemail confirm correct number  Left message reporting the prior authorization was received and that I will be placing orders electronically to be performed at [facility] and mailing the patient the paperwork  Left GC line for call back with any questions  Orders placed electronically and TRF mailed to patient's home address

## 2021-05-03 ENCOUNTER — ROUTINE PRENATAL (OUTPATIENT)
Dept: OBGYN CLINIC | Facility: CLINIC | Age: 26
End: 2021-05-03

## 2021-05-03 VITALS — BODY MASS INDEX: 38.67 KG/M2 | WEIGHT: 239.6 LBS | SYSTOLIC BLOOD PRESSURE: 118 MMHG | DIASTOLIC BLOOD PRESSURE: 72 MMHG

## 2021-05-03 DIAGNOSIS — Z11.3 SCREENING FOR STD (SEXUALLY TRANSMITTED DISEASE): ICD-10-CM

## 2021-05-03 DIAGNOSIS — Z3A.16 16 WEEKS GESTATION OF PREGNANCY: ICD-10-CM

## 2021-05-03 DIAGNOSIS — O24.410 DIET CONTROLLED GESTATIONAL DIABETES MELLITUS (GDM) IN SECOND TRIMESTER: Primary | ICD-10-CM

## 2021-05-03 DIAGNOSIS — O99.212 OBESITY AFFECTING PREGNANCY IN SECOND TRIMESTER: ICD-10-CM

## 2021-05-03 PROCEDURE — 87491 CHLMYD TRACH DNA AMP PROBE: CPT | Performed by: OBSTETRICS & GYNECOLOGY

## 2021-05-03 PROCEDURE — 87591 N.GONORRHOEAE DNA AMP PROB: CPT | Performed by: OBSTETRICS & GYNECOLOGY

## 2021-05-03 PROCEDURE — PNV: Performed by: OBSTETRICS & GYNECOLOGY

## 2021-05-03 RX ORDER — LANCETS 33 GAUGE
EACH MISCELLANEOUS
Qty: 100 EACH | Refills: 3 | Status: SHIPPED | OUTPATIENT
Start: 2021-05-03 | End: 2021-06-15 | Stop reason: SDUPTHER

## 2021-05-03 RX ORDER — BLOOD SUGAR DIAGNOSTIC
STRIP MISCELLANEOUS
Qty: 100 EACH | Refills: 3 | Status: SHIPPED | OUTPATIENT
Start: 2021-05-03 | End: 2021-06-15 | Stop reason: SDUPTHER

## 2021-05-03 RX ORDER — BLOOD-GLUCOSE METER
EACH MISCELLANEOUS
Qty: 1 KIT | Refills: 0 | Status: SHIPPED | OUTPATIENT
Start: 2021-05-03 | End: 2021-11-05

## 2021-05-03 NOTE — PROGRESS NOTES
27 y o    female at 17 2 wga EGA for PNV  BP : 118/72  TWG: -14lb    Patient presents for return OB visit  Feeling well  Reports significant improvement in nausea  States she occasionally gets heartburn that is relieved with TUMS  Level 2 US scheduled for   Follow up report  Repeat GC/Chlamydia ordered through urine sample    Follow up in 4 weeks

## 2021-05-04 ENCOUNTER — HOSPITAL ENCOUNTER (EMERGENCY)
Facility: HOSPITAL | Age: 26
Discharge: HOME | End: 2021-05-04
Attending: EMERGENCY MEDICINE
Payer: COMMERCIAL

## 2021-05-04 VITALS
SYSTOLIC BLOOD PRESSURE: 136 MMHG | HEART RATE: 98 BPM | WEIGHT: 240 LBS | TEMPERATURE: 98.1 F | OXYGEN SATURATION: 96 % | HEIGHT: 66 IN | BODY MASS INDEX: 38.57 KG/M2 | DIASTOLIC BLOOD PRESSURE: 69 MMHG | RESPIRATION RATE: 20 BRPM

## 2021-05-04 DIAGNOSIS — L60.0 INGROWN TOENAIL OF RIGHT FOOT: Primary | ICD-10-CM

## 2021-05-04 PROCEDURE — 11730 AVULSION NAIL PLATE SIMPLE 1: CPT | Performed by: PHYSICIAN ASSISTANT

## 2021-05-04 PROCEDURE — 99283 EMERGENCY DEPT VISIT LOW MDM: CPT

## 2021-05-04 PROCEDURE — 99282 EMERGENCY DEPT VISIT SF MDM: CPT | Performed by: PHYSICIAN ASSISTANT

## 2021-05-04 RX ORDER — LIDOCAINE HYDROCHLORIDE 20 MG/ML
5 INJECTION, SOLUTION EPIDURAL; INFILTRATION; INTRACAUDAL; PERINEURAL ONCE
Status: COMPLETED | OUTPATIENT
Start: 2021-05-04 | End: 2021-05-04

## 2021-05-04 RX ADMIN — LIDOCAINE HYDROCHLORIDE 5 ML: 20 INJECTION, SOLUTION EPIDURAL; INFILTRATION; INTRACAUDAL; PERINEURAL at 10:39

## 2021-05-04 NOTE — ED PROVIDER NOTES
History  Chief Complaint   Patient presents with    Toe Pain     patient presents to the ED with c/o pain in right great toe pain after a pedicure last evening      26 yo female,  currently Rita Single, presents to the Emergency Department for evaluation of R great toe ingrowing nail  Nail fold became red and inflamed after pedicure yesterday  No purulent discharge  No fevers or chills  Prior to Admission Medications   Prescriptions Last Dose Informant Patient Reported? Taking? Blood Glucose Monitoring Suppl (OneTouch Verio Flex System) w/Device KIT   No No   Sig: Test 4 times per day  Gestational Diabetes  Cholecalciferol (Vitamin D3) 50 MCG ( UT) TABS  Self Yes No   Sig: Take 2,000 Units by mouth daily   OneTouch Delica Lancets 78Z MISC   No No   Sig: Test 4 times per day  Gestational Diabetes  OneTouch Verio test strip   No No   Sig: Test 4 times per day  Gestational Diabetes     Prenatal Vit-Fe Fumarate-FA (PRENATAL VITAMIN PO)  Self Yes No   Sig: Take 1 capsule by mouth daily   albuterol (PROVENTIL HFA,VENTOLIN HFA) 90 mcg/act inhaler  Self No No   Sig: Inhale 2 puffs every 4 (four) hours as needed for wheezing   aspirin (ECOTRIN LOW STRENGTH) 81 mg EC tablet   No No   Sig: Take 2 tablets (162 mg total) by mouth daily   fluticasone (FLONASE) 50 mcg/act nasal spray  Self No No   Si sprays into each nostril daily as needed for rhinitis   metoclopramide (REGLAN) 5 mg tablet   No No   Sig: Take 1 tablet (5 mg total) by mouth 4 (four) times a day      Facility-Administered Medications: None       Past Medical History:   Diagnosis Date    Anxiety     Asthma     BMI 40 0-44 9, adult ([facility] [address] ) 3/11/2021    Depression     History of ovarian cyst 2018    Hyperlipidemia     IFG (impaired fasting glucose)     Migraine     Mild intermittent asthma     Morbid obesity (HCC)     PID (pelvic inflammatory disease) 2018    Polycystic ovary syndrome     Pre-diabetes     Vitamin D deficiency Past Surgical History:   Procedure Laterality Date    WISDOM TOOTH EXTRACTION         Family History   Problem Relation Age of Onset    JOSELINE disease Mother     Diabetes unspecified Mother     Asthma Mother     Seizures Father 15    Asperger's syndrome Sister     Autism Brother     Colon cancer Maternal Grandmother     Heart attack Maternal Grandfather     No Known Problems Paternal Grandmother     No Known Problems Paternal Grandfather      I have reviewed and agree with the history as documented  E-Cigarette/Vaping    E-Cigarette Use Never User      E-Cigarette/Vaping Substances    Nicotine No     THC No     CBD No     Flavoring No      Social History     Tobacco Use    Smoking status: Former Smoker     Packs/day: 0 10     Years: 0 20     Pack years: 0 02     Types: Cigarettes     Start date: 5/1/2017     Quit date: 10/1/2017     Years since quitting: 3 5    Smokeless tobacco: Never Used    Tobacco comment: Smoked socially    Substance Use Topics    Alcohol use: Yes     Alcohol/week: 1 0 standard drinks     Types: 1 Glasses of wine per week     Comment: socially    Drug use: Never       Review of Systems   Constitutional: Negative for chills, diaphoresis and fever  Gastrointestinal: Negative for nausea and vomiting  Musculoskeletal: Negative for gait problem  Skin: Positive for color change  All other systems reviewed and are negative  Physical Exam  Physical Exam  Vitals signs reviewed  Cardiovascular:      Rate and Rhythm: Normal rate and regular rhythm  Pulmonary:      Effort: Pulmonary effort is normal    Musculoskeletal:      Comments: R great toe medial nail fold erythematous and swollen, no paronychia or purulent discharge  Normal ROM   Skin:     General: Skin is warm and dry  Capillary Refill: Capillary refill takes less than 2 seconds  Neurological:      General: No focal deficit present        Mental Status: She is alert and oriented to person, place, and time  Psychiatric:         Mood and Affect: Mood normal          Behavior: Behavior normal          Vital Signs  ED Triage Vitals [05/04/21 1006]   Temperature Pulse Respirations Blood Pressure SpO2   98 1 °F (36 7 °C) 98 20 136/69 96 %      Temp Source Heart Rate Source Patient Position - Orthostatic VS BP Location FiO2 (%)   Temporal Monitor Sitting Right arm --      Pain Score       7           Vitals:    05/04/21 1006   BP: 136/69   Pulse: 98   Patient Position - Orthostatic VS: Sitting         Visual Acuity      ED Medications  Medications   lidocaine (PF) (XYLOCAINE-MPF) 2 % injection 5 mL (5 mL Infiltration Given by Other 5/4/21 1039)       Diagnostic Studies  Results Reviewed     None                 No orders to display              Procedures  Nail removal    Date/Time: 5/4/2021 10:37 AM  Performed by: Dana Albright PA-C  Authorized by: Dana Albright PA-C     Patient location:  ED  Indications / Diagnosis:  Ingrown nail  Universal Protocol:  Consent: Verbal consent obtained  Risks and benefits: risks, benefits and alternatives were discussed  Consent given by: patient  Patient understanding: patient states understanding of the procedure being performed  Patient consent: the patient's understanding of the procedure matches consent given  Procedure consent: procedure consent matches procedure scheduled  Relevant documents: relevant documents present and verified  Required items: required blood products, implants, devices, and special equipment available  Patient identity confirmed: verbally with patient      Location:     Foot:  R big toe  Pre-procedure details:     Skin preparation:  ChloraPrep    Preparation: Patient was prepped and draped in the usual sterile fashion    Anesthesia (see MAR for exact dosages):      Anesthesia method:  Nerve block    Block needle gauge:  25 G    Block anesthetic:  Lidocaine 1% w/o epi    Block injection procedure:  Anatomic landmarks identified, introduced needle, negative aspiration for blood and incremental injection  Ingrown nail:     Wedge excision of skin: yes      Nail matrix removed or ablated:  None  Nails trimmed:     Number of nails trimmed:  1  Post-procedure details:     Dressing:  4x4 sterile gauze    Patient tolerance of procedure: Tolerated well, no immediate complications             ED Course                             SBIRT 20yo+      Most Recent Value   SBIRT (24 yo +)   In order to provide better care to our patients, we are screening all of our patients for alcohol and drug use  Would it be okay to ask you these screening questions? No Filed at: 05/04/2021 1020                    MDM  Number of Diagnoses or Management Options  Ingrown toenail of right foot: new and does not require workup  Diagnosis management comments: Wedge excision performed, Epsom salt soaks advised  Encouraged podiatry f/u       Amount and/or Complexity of Data Reviewed  Review and summarize past medical records: yes        Disposition  Final diagnoses:   Ingrown toenail of right foot     Time reflects when diagnosis was documented in both MDM as applicable and the Disposition within this note     Time User Action Codes Description Comment    5/4/2021 10:35 AM Milton Cam Add [L60 0] Ingrown toenail of right foot       ED Disposition     ED Disposition Condition Date/Time Comment    Discharge Stable Tue May 4, 2021 10:35 AM Jessica Lafleur discharge to home/self care              Follow-up Information     Follow up With Specialties Details Why Contact Info Additional Information    [facility] Schedule an appointment as soon as possible for a visit   Pod Chrissy [phone] [facility] [phone]  [address] [place] [address], [address] [place] [address], [address] [place] [address] 6          Discharge Medication List as of 5/4/2021 10:36 AM      CONTINUE these medications which have NOT CHANGED    Details   albuterol (PROVENTIL HFA,VENTOLIN HFA) 90 mcg/act inhaler Inhale 2 puffs every 4 (four) hours as needed for wheezing, Starting Tue 3/16/2021, Normal      aspirin (ECOTRIN LOW STRENGTH) 81 mg EC tablet Take 2 tablets (162 mg total) by mouth daily, Starting Wed 4/28/2021, Normal      Blood Glucose Monitoring Suppl (OneTouch Verio Flex System) w/Device KIT Test 4 times per day  Gestational Diabetes  , Normal      Cholecalciferol (Vitamin D3) 50 MCG (2000 UT) TABS Take 2,000 Units by mouth daily, Historical Med      fluticasone (FLONASE) 50 mcg/act nasal spray 2 sprays into each nostril daily as needed for rhinitis, Starting Tue 3/16/2021, Normal      metoclopramide (REGLAN) 5 mg tablet Take 1 tablet (5 mg total) by mouth 4 (four) times a day, Starting Wed 4/28/2021, Normal      OneTouch Delica Lancets 34S MISC Test 4 times per day  Gestational Diabetes  , Normal      OneTouch Verio test strip Test 4 times per day  Gestational Diabetes  , Normal      Prenatal Vit-Fe Fumarate-FA (PRENATAL VITAMIN PO) Take 1 capsule by mouth daily, Historical Med           No discharge procedures on file      PDMP Review       Value Time User    PDMP Reviewed  Yes 10/1/2020  1:42 PM Jay Cornelius,           ED Provider  Electronically Signed by           Roland Henry PA-C  05/04/21 0362

## 2021-05-05 LAB
C TRACH DNA SPEC QL NAA+PROBE: NEGATIVE
N GONORRHOEA DNA SPEC QL NAA+PROBE: NEGATIVE

## 2021-05-06 ENCOUNTER — DOCUMENTATION (OUTPATIENT)
Dept: PERINATAL CARE | Facility: CLINIC | Age: 26
End: 2021-05-06

## 2021-05-06 DIAGNOSIS — O24.410 DIET CONTROLLED GESTATIONAL DIABETES MELLITUS (GDM) IN SECOND TRIMESTER: Primary | ICD-10-CM

## 2021-05-06 NOTE — PROGRESS NOTES
Date:  21  RE: Reynaldo Yee    : 1995  Estimated Date of Delivery: 10/14/21  EGA: 17w1d  OB/GYN: [facility]  Diet controlled gestational diabetes          Reports her BG on the Glucose Flow Sheet  Current regimen:  Regular diet with 3 meals & 3 snacks  Is avoiding sweets & changed to [employer]  Avoids Fried foods  Nausea has decreased  Reports her SO likes to dine out frequently & when her BG was increased, she had dined out  Her 24 hour diet recall indicates she is following the meal plan closely  Dines out at many chain restaurants & discussed in more detail at Class 2 today  Has been looking at [employer] & nutrition facts labels before dining out  SMBG fasting and 2 hr PP after the start of each meal   Walks 3-4 times weekly  Plan:  Fasting glucose borderline or slightly above goal, multiple 2 hr PP elevations  Discussed case with Dr Howard, who is agreeable to starting low dose basal insulin  If needs medicine, prefers insulin rather than metformin ( had taken metformin prior to the pregnancy)  Spoke with patient over the telephone today and informed her that insulin is recommended  She is currently at the [facility] for the weekend, but agreeable to set up 30 minute office visit with one of the diabetes educators for next week when she returns  Sent message to clerical pool to call patient to schedule f/U     3/2/21 [id number]-7 6%; reordered today, and encouraged patient to have complete at her earliest convenience  Diabetes Self Management Support Plan outside of ongoing care: Spouse/Family    Date due to report next:  Have available at insulin pen education      Shagufta Esqueda RD, DONNIEN  Diabetes Educator   Diabetes and Pregnancy Program

## 2021-05-12 ENCOUNTER — OFFICE VISIT (OUTPATIENT)
Dept: PERINATAL CARE | Facility: CLINIC | Age: 26
End: 2021-05-12
Payer: COMMERCIAL

## 2021-05-12 VITALS
BODY MASS INDEX: 39.37 KG/M2 | DIASTOLIC BLOOD PRESSURE: 75 MMHG | HEIGHT: 66 IN | WEIGHT: 245 LBS | HEART RATE: 97 BPM | SYSTOLIC BLOOD PRESSURE: 128 MMHG

## 2021-05-12 DIAGNOSIS — E55.9 VITAMIN D DEFICIENCY: ICD-10-CM

## 2021-05-12 DIAGNOSIS — Z71.89 ENCOUNTER FOR EDUCATION ABOUT INJECTION: ICD-10-CM

## 2021-05-12 DIAGNOSIS — O99.810 HYPERGLYCEMIA IN PREGNANCY: ICD-10-CM

## 2021-05-12 DIAGNOSIS — O24.419 GESTATIONAL DIABETES MELLITUS (GDM) IN SECOND TRIMESTER, GESTATIONAL DIABETES METHOD OF CONTROL UNSPECIFIED: Primary | ICD-10-CM

## 2021-05-12 DIAGNOSIS — O99.212 OBESITY AFFECTING PREGNANCY IN SECOND TRIMESTER: ICD-10-CM

## 2021-05-12 PROCEDURE — 99214 OFFICE O/P EST MOD 30 MIN: CPT | Performed by: NURSE PRACTITIONER

## 2021-05-12 NOTE — ASSESSMENT & PLAN NOTE
-A1c 5 6%, repeat A1c   -Start Lantus, continue GDM diet and SMBG    Lab Results   Component Value Date    HGBA1C 5 6 03/02/2021

## 2021-05-12 NOTE — ASSESSMENT & PLAN NOTE
-Starting weight 254 lbs  -Current BMI 39 54   -Recommended weight gain during pregnancy 11 to 20 lbls  -Continue GDM diet   -Continue walking 30 minutes and weekly swim class

## 2021-05-12 NOTE — ASSESSMENT & PLAN NOTE
-Start Lantus 22 units at 10 PM daily   -Be sure to have bedtime snack that includes at least 15 grams of carbohydrates and 2 to 3 servings of protein   -Add 3 AM glucose with starting insulin for 3 days    -Overnight glucose goal     -Continue GDM diet and no more than 8 to 10 hours of fasting overnight   -Continue SMBG fasting and 2 hours post start of each meal    -Continue reporting via glucose flowsheet   -Continue walking 30 minutes daily and weekly swim class  -After Level II ultrasound, fetal growth US every 4 weeks or as recommended   -Starting at 32 weeks gestation, NST twice a week and GUERDA weekly    -Continue follow up with OB and MFM as recommended   -Stay in close contact with diabetes education team    -Always have glucose available for hypoglycemia, use 15 by 15 rule

## 2021-05-12 NOTE — PROGRESS NOTES
Assessment/Plan:    Obesity affecting pregnancy in second trimester  -Starting weight 254 lbs  -Current BMI 39 54   -Recommended weight gain during pregnancy 11 to 20 lbls  -Continue GDM diet   -Continue walking 30 minutes and weekly swim class  Vitamin D deficiency  -On Vitamin D3 2000 iu daily and prenatal vitamin  Hyperglycemia in pregnancy  -Start Lantus 22 units at 10 PM daily   -Be sure to have bedtime snack that includes at least 15 grams of carbohydrates and 2 to 3 servings of protein   -Add 3 AM glucose with starting insulin for 3 days    -Overnight glucose goal     -Continue GDM diet and no more than 8 to 10 hours of fasting overnight   -Continue SMBG fasting and 2 hours post start of each meal    -Continue reporting via glucose flowsheet   -Continue walking 30 minutes daily and weekly swim class  -After Level II ultrasound, fetal growth US every 4 weeks or as recommended   -Starting at 32 weeks gestation, NST twice a week and GUERDA weekly    -Continue follow up with OB and MFM as recommended   -Stay in close contact with diabetes education team    -Always have glucose available for hypoglycemia, use 15 by 15 rule  Gestational diabetes mellitus (GDM) in second trimester  -A1c 5 6%, repeat A1c   -Start Lantus, continue GDM diet and SMBG  Lab Results   Component Value Date    HGBA1C 5 6 2021     Insulin pen education with returned demonstration without difficulty  · Insulin administration times, insulin action  · Hypoglycemia signs, symptoms and treatment  · Increase in maternal-fetal surveillance with insulin initiation  · Side effects of hyperglycemia in pregnancy including macrosomia,  hypoglycemia, polyhydramnios, pre-term labor and stillbirth             Subjective:      Patient ID: Nino Emery is a 27 y o  female  16 6/7 weeks gestation GDM  Eating 3 meals and 3 snacks daily  Higher glucose readings noted post breakfast due to longer fasting hours   Working on different bedtime snacks  Walking 30 minutes daily and swim class weekly  Review of Systems   Constitutional: Negative for fatigue and fever  HENT: Positive for congestion  Negative for sore throat and trouble swallowing  Eyes: Negative for visual disturbance  Respiratory: Negative for cough and shortness of breath  Cardiovascular: Negative for chest pain and palpitations  Gastrointestinal: Negative for constipation, nausea and vomiting  Endocrine: Positive for polydipsia, polyphagia and polyuria  Genitourinary: Negative for difficulty urinating and vaginal bleeding  Neurological: Negative for headaches  Psychiatric/Behavioral: Negative for sleep disturbance  Objective:    Refer to glucose flowsheet  /75 (BP Location: Right arm, Patient Position: Sitting, Cuff Size: Standard)   Pulse 97   Ht 5' 6" (1 676 m)   Wt 111 kg (245 lb)   LMP 01/07/2021 (Exact Date)   BMI 39 54 kg/m²          Physical Exam  Constitutional:       Appearance: She is obese  HENT:      Head: Normocephalic  Eyes:      Conjunctiva/sclera: Conjunctivae normal    Pulmonary:      Effort: Pulmonary effort is normal    Neurological:      Mental Status: She is alert and oriented to person, place, and time  Psychiatric:         Mood and Affect: Mood normal          Behavior: Behavior normal          Thought Content:  Thought content normal          Judgment: Judgment normal

## 2021-05-13 DIAGNOSIS — O24.419 GESTATIONAL DIABETES MELLITUS (GDM) IN SECOND TRIMESTER, GESTATIONAL DIABETES METHOD OF CONTROL UNSPECIFIED: ICD-10-CM

## 2021-05-13 DIAGNOSIS — O99.810 HYPERGLYCEMIA IN PREGNANCY: Primary | ICD-10-CM

## 2021-05-13 RX ORDER — INSULIN GLARGINE 100 [IU]/ML
22 INJECTION, SOLUTION SUBCUTANEOUS
Qty: 15 ML | Refills: 0 | Status: SHIPPED | OUTPATIENT
Start: 2021-05-13 | End: 2021-06-23

## 2021-05-18 ENCOUNTER — TELEPHONE (OUTPATIENT)
Dept: OBGYN CLINIC | Facility: CLINIC | Age: 26
End: 2021-05-18

## 2021-05-18 NOTE — TELEPHONE ENCOUNTER
Patient left message on nurse line stating she is having lower back pain  She is currently 18w5d pregnant and asking if she can take anything other than Tylenol  R/C asking patient to call the office

## 2021-05-21 ENCOUNTER — DOCUMENTATION (OUTPATIENT)
Dept: PERINATAL CARE | Facility: CLINIC | Age: 26
End: 2021-05-21

## 2021-05-21 NOTE — PROGRESS NOTES
Date: 05/21/21  Guido Nieves  1995  Estimated Date of Delivery: 10/14/21  19w1d  OB/GYN: [facility]  Diagnosis: Gestational diabetes mellitus (GDM) in second trimester            Assessment and Plan:   Lantus 22 units started 05/12/2021 by BRIAN  Continue current regimen,   Gestational diabetes meal plan; 3 meals and 3 snacks  Checks blood sugar 4 times per day; fasting and 2 hrs pp with a One-Touch Verio meter and steri strips  Walks 30 minutes daily, follow OB recommendations       Labs:  3/2/2021 Hbg A1c = 5 6%  Orders are placed for HgbA1c and CMP, pt to complete    Ultrasounds:  04/05/2021 : Level 1 NT   Level 2 scheduled for 05/27/2021    Testing:  Beginning at 32 weeks, NST / GUERDA twice a week       [facility]

## 2021-05-24 ENCOUNTER — OFFICE VISIT (OUTPATIENT)
Dept: PODIATRY | Facility: CLINIC | Age: 26
End: 2021-05-24
Payer: COMMERCIAL

## 2021-05-24 VITALS
HEIGHT: 66 IN | HEART RATE: 98 BPM | BODY MASS INDEX: 39.6 KG/M2 | SYSTOLIC BLOOD PRESSURE: 113 MMHG | DIASTOLIC BLOOD PRESSURE: 70 MMHG | WEIGHT: 246.4 LBS

## 2021-05-24 DIAGNOSIS — L60.0 INGROWN RIGHT GREATER TOENAIL: Primary | ICD-10-CM

## 2021-05-24 DIAGNOSIS — L60.0 INGROWN LEFT BIG TOENAIL: ICD-10-CM

## 2021-05-24 PROCEDURE — 99202 OFFICE O/P NEW SF 15 MIN: CPT | Performed by: PODIATRIST

## 2021-05-24 PROCEDURE — 11750 EXCISION NAIL&NAIL MATRIX: CPT | Performed by: PODIATRIST

## 2021-05-24 RX ORDER — LIDOCAINE HYDROCHLORIDE 10 MG/ML
6 INJECTION, SOLUTION INFILTRATION; PERINEURAL ONCE
Status: COMPLETED | OUTPATIENT
Start: 2021-05-24 | End: 2021-05-24

## 2021-05-24 RX ADMIN — LIDOCAINE HYDROCHLORIDE 6 ML: 10 INJECTION, SOLUTION INFILTRATION; PERINEURAL at 08:33

## 2021-05-24 NOTE — PROGRESS NOTES
Assessment/Plan:         Diagnoses and all orders for this visit:    Ingrown right greater toenail  -     lidocaine (XYLOCAINE) 1 % injection 6 mL  -     Nail removal    Ingrown left big toenail  -     lidocaine (XYLOCAINE) 1 % injection 6 mL  -     Nail removal          Diagnosis and options discussed  Given history of multiple ingrown nails to the medial corners recommended matrixectomy  See procedure below  Postop instructions given  Educated on signs of infection  Check in 2 weeks  Nail removal    Date/Time: 5/24/2021 8:43 AM  Performed by: Liz Arnold DPM  Authorized by: Liz Arnold DPM     Patient location:  ClinicUniversal Protocol:  Risks and benefits: risks, benefits and alternatives were discussed  Consent given by: patient  Timeout called at: 5/24/2021 8:43 AM   Patient understanding: patient states understanding of the procedure being performed      Location:     Foot:  L big toe  Pre-procedure details:     Skin preparation:  Betadine  Anesthesia (see MAR for exact dosages): Anesthesia method:  Local infiltration    Local anesthetic:  Lidocaine 1% w/o epi (3cc)  Nail Removal:     Nail removed:  Partial    Nail side:  Medial  Ingrown nail:     Nail matrix removed or ablated:  Partial  Post-procedure details:     Dressing:  4x4 sterile gauze, antibiotic ointment and gauze roll    Patient tolerance of procedure: Tolerated well, no immediate complications  Nail removal    Date/Time: 5/24/2021 8:43 AM  Performed by: Liz Arnold DPM  Authorized by: Liz Arnold DPM     Patient location:  ClinicUniversal Protocol:  Consent: Verbal consent obtained  Risks and benefits: risks, benefits and alternatives were discussed  Consent given by: patient  Time out: Immediately prior to procedure a "time out" was called to verify the correct patient, procedure, equipment, support staff and site/side marked as required    Timeout called at: 5/24/2021 8:43 AM   Patient understanding: patient states understanding of the procedure being performed  Patient identity confirmed: verbally with patient      Location:     Foot:  R big toe  Anesthesia (see MAR for exact dosages): Anesthesia method:  Local infiltration    Local anesthetic:  Lidocaine 1% w/o epi (3cc)  Nail Removal:     Nail removed:  Partial    Nail side:  Medial  Ingrown nail:     Nail matrix removed or ablated:  Partial  Post-procedure details:     Dressing:  Antibiotic ointment, 4x4 sterile gauze and gauze roll    Patient tolerance of procedure: Tolerated well, no immediate complications          Subjective:      Patient ID: Reynaldo Yee is a 27 y o  female  Patient gets recurrent ingrown toenails to both left and right hallux medial nails  They are not currently infected but they keep recurring  Patient is 19 weeks pregnant  The following portions of the patient's history were reviewed and updated as appropriate: allergies, current medications, past family history, past medical history, past social history, past surgical history and problem list     Review of Systems   Constitutional: Negative  Gastrointestinal: Negative for diarrhea, nausea and vomiting  Musculoskeletal: Negative for arthralgias and gait problem  Skin: Positive for color change (ingrown toenailks)  Neurological: Negative for weakness and numbness  Objective:      /70   Pulse 98   Ht 5' 6" (1 676 m) Comment: verbal  Wt 112 kg (246 lb 6 4 oz)   LMP 01/07/2021 (Exact Date)   BMI 39 77 kg/m²          Physical Exam  Vitals signs reviewed  Constitutional:       Appearance: She is obese  She is not ill-appearing or diaphoretic  Cardiovascular:      Pulses: Normal pulses  Pulmonary:      Effort: Pulmonary effort is normal  No respiratory distress  Musculoskeletal:         General: Tenderness present  Right lower leg: No edema  Left lower leg: No edema  Skin:     Findings: No erythema or rash  Comments: Chronic ingrown nail with inflammation B/L medial hallux   Neurological:      Mental Status: She is alert and oriented to person, place, and time  Sensory: No sensory deficit

## 2021-05-26 ENCOUNTER — TELEPHONE (OUTPATIENT)
Dept: OBGYN CLINIC | Facility: CLINIC | Age: 26
End: 2021-05-26

## 2021-05-26 DIAGNOSIS — O99.810 HYPERGLYCEMIA IN PREGNANCY: ICD-10-CM

## 2021-05-26 DIAGNOSIS — Z71.89 ENCOUNTER FOR EDUCATION ABOUT INJECTION: ICD-10-CM

## 2021-05-26 DIAGNOSIS — O99.212 OBESITY AFFECTING PREGNANCY IN SECOND TRIMESTER: ICD-10-CM

## 2021-05-26 NOTE — TELEPHONE ENCOUNTER
Pt  Would like to know if it is okay to have hair and nails done  Advised this is okay, ensure she is in well ventilated space during treatments  C/o sharp shooting in lower back that will happen after standing on feet for too long  Pain is not constant, only lasts about 30 seconds  Advised pt  She can use support belt and take frequent breaks  Pt  Verbalized understanding

## 2021-05-27 ENCOUNTER — ROUTINE PRENATAL (OUTPATIENT)
Dept: PERINATAL CARE | Facility: OTHER | Age: 26
End: 2021-05-27
Payer: COMMERCIAL

## 2021-05-27 VITALS
BODY MASS INDEX: 39.54 KG/M2 | SYSTOLIC BLOOD PRESSURE: 111 MMHG | HEIGHT: 66 IN | WEIGHT: 246.03 LBS | DIASTOLIC BLOOD PRESSURE: 72 MMHG | HEART RATE: 91 BPM

## 2021-05-27 DIAGNOSIS — Z36.86 ENCOUNTER FOR ANTENATAL SCREENING FOR CERVICAL LENGTH: ICD-10-CM

## 2021-05-27 DIAGNOSIS — Z36.3 ENCOUNTER FOR ANTENATAL SCREENING FOR MALFORMATIONS: ICD-10-CM

## 2021-05-27 DIAGNOSIS — O99.212 MATERNAL MORBID OBESITY IN SECOND TRIMESTER, ANTEPARTUM (HCC): ICD-10-CM

## 2021-05-27 DIAGNOSIS — E66.01 MATERNAL MORBID OBESITY IN SECOND TRIMESTER, ANTEPARTUM (HCC): ICD-10-CM

## 2021-05-27 DIAGNOSIS — O24.414 INSULIN CONTROLLED GESTATIONAL DIABETES MELLITUS (GDM) IN SECOND TRIMESTER: Primary | ICD-10-CM

## 2021-05-27 PROCEDURE — 76811 OB US DETAILED SNGL FETUS: CPT | Performed by: OBSTETRICS & GYNECOLOGY

## 2021-05-27 PROCEDURE — 76817 TRANSVAGINAL US OBSTETRIC: CPT | Performed by: OBSTETRICS & GYNECOLOGY

## 2021-05-27 PROCEDURE — 99213 OFFICE O/P EST LOW 20 MIN: CPT | Performed by: OBSTETRICS & GYNECOLOGY

## 2021-05-27 NOTE — PATIENT INSTRUCTIONS
Thank you for choosing us for your  care today  If you have any questions about your ultrasound or care, please do not hesitate to contact us or your primary obstetrician  Some general instructions for your pregnancy are:     Protect against coronavirus: Continue to practice social distancing, wear a mask, and wash your hands often  Pregnant women are increased risk of severe COVID  Notify your primary care doctor if you have any symptoms including cough, shortness of breath or difficulty breathing, fever, chills, muscle pain, sore throat, or loss of taste or smell  Pregnant women can receive the coronavirus vaccine   Exercise: Aim for 22 minutes per day (150 minutes per week) of regular exercise  Walking is great!  Nutrition: aim for calcium-rich and iron-rich foods as well as healthy sources of protein   Protect against the flu: get yourself and your entire household vaccinated against influenza  This will protect your baby   Learn about Preeclampsia: preeclampsia is a common, serious high blood pressure complication in pregnancy  A blood pressure of 334QPDJ (systolic or top number) or 15WHMT (diastolic or bottom number) is not normal and needs evaluation by your doctor   If you smoke, try to reduce how many cigarettes you smoke or try to quit completely  Do not vape   Other warning signs to watch out for in pregnancy or postpartum: chest pain, obstructed breathing or shortness of breath, seizures, thoughts of hurting yourself or your baby, bleeding, a painful or swollen leg, fever, or headache (see [facility] POST-BIRTH Warning Signs campaign)  If these happen call 911  Itching is also not normal in pregnancy and if you experience this, especially over your hands and feet, potentially worse at night, notify your doctors     Lastly, if you are contacted regarding participation in a survey about your experience in our office, please know that we take any feedback you provide seriously and use it to improve how we deliver care through our center

## 2021-05-27 NOTE — PROGRESS NOTES
[address]: Ms Citlaly Culp was seen today at 20w0d for anatomic survey and cervical length screening ultrasound  See ultrasound report under "OB Procedures" tab  Please don't hesitate to contact our office with any concerns or questions    Travis Garcia MD

## 2021-06-02 ENCOUNTER — APPOINTMENT (OUTPATIENT)
Dept: LAB | Facility: AMBULARY SURGERY CENTER | Age: 26
End: 2021-06-02
Payer: COMMERCIAL

## 2021-06-02 DIAGNOSIS — O24.410 DIET CONTROLLED GESTATIONAL DIABETES MELLITUS (GDM) IN SECOND TRIMESTER: ICD-10-CM

## 2021-06-02 LAB
EST. AVERAGE GLUCOSE BLD GHB EST-MCNC: 103 MG/DL
HBA1C MFR BLD: 5.2 %

## 2021-06-02 PROCEDURE — 83036 HEMOGLOBIN GLYCOSYLATED A1C: CPT

## 2021-06-02 PROCEDURE — 36415 COLL VENOUS BLD VENIPUNCTURE: CPT

## 2021-06-03 ENCOUNTER — DOCUMENTATION (OUTPATIENT)
Dept: PERINATAL CARE | Facility: CLINIC | Age: 26
End: 2021-06-03

## 2021-06-03 NOTE — PROGRESS NOTES
Date: 06/03/21  Ashley Barragan  1995  Estimated Date of Delivery: 10/14/21  21w0d  OB/GYN: [facility]  Diagnosis: Gestational diabetes mellitus (GDM) in second trimester   Insulin controlled            Assessment and Plan:   Continue Lantus 22 units at bedtime   -Lantus 22 units started 05/12/2021 by BRIAN  Gestational diabetes meal plan; 3 meals and 3 snacks  Checks blood sugar 4 times per day; fasting and 2 hrs pp with a One-Touch Verio meter and steri strips  Walks 30 minutes daily, follow OB recommendations       Labs  3/2/2021 Hbg A1c = 5 6%  06/02/2021 A1c = 5 2%  Next A1x due by July 28, 2021    Ultrasounds  04/05/2021 : Level 1 NT   05/27/2021  Normal growth and GUERDA     Testing  Beginning at 32 weeks, NST / GUERDA twice a week     Lucho Rivera RN

## 2021-06-04 ENCOUNTER — ROUTINE PRENATAL (OUTPATIENT)
Dept: OBGYN CLINIC | Facility: CLINIC | Age: 26
End: 2021-06-04
Payer: COMMERCIAL

## 2021-06-04 VITALS — BODY MASS INDEX: 39.71 KG/M2 | SYSTOLIC BLOOD PRESSURE: 112 MMHG | DIASTOLIC BLOOD PRESSURE: 78 MMHG | WEIGHT: 246 LBS

## 2021-06-04 DIAGNOSIS — E66.01 MATERNAL MORBID OBESITY IN SECOND TRIMESTER, ANTEPARTUM (HCC): Primary | ICD-10-CM

## 2021-06-04 DIAGNOSIS — O24.414 INSULIN CONTROLLED GESTATIONAL DIABETES MELLITUS (GDM) IN SECOND TRIMESTER: ICD-10-CM

## 2021-06-04 DIAGNOSIS — Z71.89 ENCOUNTER FOR EDUCATION ABOUT INJECTION: ICD-10-CM

## 2021-06-04 DIAGNOSIS — Z3A.21 21 WEEKS GESTATION OF PREGNANCY: ICD-10-CM

## 2021-06-04 DIAGNOSIS — O99.212 MATERNAL MORBID OBESITY IN SECOND TRIMESTER, ANTEPARTUM (HCC): Primary | ICD-10-CM

## 2021-06-04 DIAGNOSIS — O99.810 HYPERGLYCEMIA IN PREGNANCY: ICD-10-CM

## 2021-06-04 PROCEDURE — 99213 OFFICE O/P EST LOW 20 MIN: CPT | Performed by: OBSTETRICS & GYNECOLOGY

## 2021-06-04 NOTE — PROGRESS NOTES
27 y o    female at 21 1 wga EGA for PNV  BP : 112/78   TWG: -7  A2GDM - well controlled, fetal echo next month  Recommended the covid vaccine  Reviewed PTL precautions  F/u 4 weeks

## 2021-06-04 NOTE — PATIENT INSTRUCTIONS
Below are the statements from the [address] [place] [address] of Maternal Fetal Medicine regarding COVID-19 vaccination and pregnancy  ACOG:  [employer] [facility]:  [email or web address]  [email or web address])  pdf

## 2021-06-07 ENCOUNTER — OFFICE VISIT (OUTPATIENT)
Dept: PODIATRY | Facility: CLINIC | Age: 26
End: 2021-06-07
Payer: COMMERCIAL

## 2021-06-07 VITALS
DIASTOLIC BLOOD PRESSURE: 80 MMHG | HEIGHT: 66 IN | BODY MASS INDEX: 39.31 KG/M2 | SYSTOLIC BLOOD PRESSURE: 120 MMHG | WEIGHT: 244.6 LBS | HEART RATE: 80 BPM

## 2021-06-07 DIAGNOSIS — L60.0 INGROWN RIGHT GREATER TOENAIL: ICD-10-CM

## 2021-06-07 DIAGNOSIS — L60.0 INGROWN LEFT BIG TOENAIL: Primary | ICD-10-CM

## 2021-06-07 PROCEDURE — 99212 OFFICE O/P EST SF 10 MIN: CPT | Performed by: PODIATRIST

## 2021-06-07 NOTE — PROGRESS NOTES
Assessment/Plan:      Diagnoses and all orders for this visit:    Ingrown left big toenail    Ingrown right greater toenail      Resolved  Discussed nail trimming/hygeine  She may call as needed  Subjective:     Patient ID: Leidy Israel is a 27 y o  female  Patient is following up for bilateral great toe partial matrixectomy from 2-3 weeks ago  She reports there was significant drainage for about a week to 10 days  At this point there is no drainage redness or pain  Review of Systems   Constitutional: Negative  Objective:     Physical Exam  Vitals signs reviewed  Constitutional:       Appearance: She is obese  She is not ill-appearing or diaphoretic  Cardiovascular:      Pulses: Normal pulses  Skin:     Findings: No erythema  Comments: Partial nail matrixectomy site to both great toenails stable  No pain drainage cellulitis or purulence  Neurological:      Mental Status: She is alert

## 2021-06-13 NOTE — PROGRESS NOTES
Assessment/Plan:  Problem List Items Addressed This Visit        Endocrine    Insulin controlled gestational diabetes mellitus (GDM) in second trimester       Lab Results   Component Value Date    HGBA1C 5 2 2021     Recommend lifestyle modifications  Caution c possible future CSI  Other Visit Diagnoses     Left wrist pain    -  Primary    Relevant Orders    Ambulatory referral to Hand Surgery    Ambulatory referral to Occupational Therapy    May be due to increased volume causing nerve irritation in pregnancy  Avoid NSAIDs in pregnancy  You may use Tylenol (Acetaminophen) up to 3,000mg daily (in 24 hours) as needed for pain or fever  22 weeks gestation of pregnancy        Management per OB  Return if symptoms worsen or fail to improve  Future Appointments   Date Time Provider Pia Leigh   2021  8:00 AM   [address]   2021  9:30  [address], MD [facility]   2021  8:30 AM [address], [address] [place] [address]   2021  8:00 AM Elias Gibson DO [facility]   2021 11:00 AM  [facility] [address] [facility]    2021  9:30 AM Elgin Mishra MD [facility]        Subjective:     Laureen Spatz is a 27 y o  female who presents today for a follow-up on her acute medical conditions  HPI:  Chief Complaint   Patient presents with    Wrist Pain     ongoing since pregnancy      -- Above per clinical staff and reviewed  --    HPI      Today:      Patient is 22 weeks pregnant with 1st child - son Heike Low on 10/14/21  Left Wrist Pain - Symptoms x 3 months, intermittent  Occurs for 4-5 days, then resolves  Only has minimal symptoms today  She denies swelling, but sometimes has difficulty with wrist flexion/extension, and lifting a drinking glass  +Right hand dominant  Denies trauma  Using Tylenol 2 pills once QD PRN s benefit  The following portions of the patient's history were reviewed and updated as appropriate: allergies, current medications, past family history, past medical history, past social history, past surgical history and problem list       Review of Systems   Constitutional: Negative for appetite change, chills, diaphoresis, fatigue and fever  Respiratory: Negative for chest tightness and shortness of breath  Cardiovascular: Negative for chest pain  Gastrointestinal: Negative for abdominal pain, blood in stool, diarrhea, nausea and vomiting  Genitourinary: Negative for dysuria  Musculoskeletal: Positive for arthralgias  Current Outpatient Medications   Medication Sig Dispense Refill    albuterol (PROVENTIL HFA,VENTOLIN HFA) 90 mcg/act inhaler Inhale 2 puffs every 4 (four) hours as needed for wheezing 1 Inhaler 0    aspirin (ECOTRIN LOW STRENGTH) 81 mg EC tablet Take 2 tablets (162 mg total) by mouth daily 60 tablet 0    Blood Glucose Monitoring Suppl (OneTouch Verio Flex System) w/Device KIT Test 4 times per day  Gestational Diabetes  1 kit 0    Cholecalciferol (Vitamin D3) 50 MCG (2000 UT) TABS Take 2,000 Units by mouth daily      fluticasone (FLONASE) 50 mcg/act nasal spray 2 sprays into each nostril daily as needed for rhinitis 1 Bottle 8    insulin glargine (Lantus SoloStar) 100 units/mL injection pen Inject 22 Units under the skin daily at bedtime At 9 or 10 PM daily  To be titrated  15 mL 0    Insulin Pen Needle 31G X 5 MM MISC Inject under the skin daily at bedtime Use one a day or as directed  100 each 1    metoclopramide (REGLAN) 5 mg tablet Take 1 tablet (5 mg total) by mouth 4 (four) times a day 30 tablet 0    OneTouch Delica Lancets 97P MISC Test 4 times per day  Gestational Diabetes  100 each 3    OneTouch Verio test strip Test 4 times per day  Gestational Diabetes   100 each 3    Prenatal Vit-Fe Fumarate-FA (PRENATAL VITAMIN PO) Take 1 capsule by mouth daily       No current facility-administered medications for this visit  Objective:  BP 98/60   Pulse 91   Temp 97 6 °F (36 4 °C)   Resp 14   Ht 5' 6" (1 676 m)   Wt 112 kg (246 lb 9 6 oz)   LMP 01/07/2021 (Exact Date)   SpO2 99%   BMI 39 80 kg/m²    Wt Readings from Last 3 Encounters:   06/14/21 112 kg (246 lb 9 6 oz)   06/07/21 111 kg (244 lb 9 6 oz)   06/04/21 112 kg (246 lb)      BP Readings from Last 3 Encounters:   06/14/21 98/60   06/07/21 120/80   06/04/21 112/78          Physical Exam  Vitals and nursing note reviewed  Constitutional:       Appearance: Normal appearance  She is well-developed  She is obese  HENT:      Head: Normocephalic and atraumatic  Eyes:      Conjunctiva/sclera: Conjunctivae normal    Neck:      Thyroid: No thyromegaly  Cardiovascular:      Rate and Rhythm: Normal rate and regular rhythm  Heart sounds: Normal heart sounds  Pulmonary:      Effort: Pulmonary effort is normal       Breath sounds: Normal breath sounds  Musculoskeletal:         General: No swelling  Tenderness: B/L Calf  Right wrist: Normal       Left wrist: Tenderness (Dorsal wrist) present  No swelling, deformity or effusion  Normal range of motion  Normal pulse  Right hand: Normal       Left hand: Normal       Cervical back: Neck supple  Right lower leg: No edema  Left lower leg: No edema  Neurological:      General: No focal deficit present  Mental Status: She is alert and oriented to person, place, and time     Psychiatric:         Mood and Affect: Mood normal          Lab Results:      Lab Results   Component Value Date    WBC 11 24 (H) 03/02/2021    HGB 13 3 03/02/2021    HCT 40 3 03/02/2021     03/02/2021    TRIG 188 (H) 03/02/2021    HDL 52 03/02/2021    LDLDIRECT 108 (H) 03/02/2021    ALT 32 03/02/2021    AST 14 03/02/2021    K 3 6 03/02/2021     03/02/2021    CREATININE 0 70 03/02/2021    BUN 7 03/02/2021    CO2 23 03/02/2021    GLUF 104 (H) 03/05/2021    HGBA1C 5 2 06/02/2021     No results found for: URICACID  Invalid input(s): BASENAME Vitamin D    No results found       POCT Labs

## 2021-06-14 ENCOUNTER — OFFICE VISIT (OUTPATIENT)
Dept: FAMILY MEDICINE CLINIC | Facility: CLINIC | Age: 26
End: 2021-06-14
Payer: COMMERCIAL

## 2021-06-14 VITALS
HEART RATE: 91 BPM | DIASTOLIC BLOOD PRESSURE: 60 MMHG | RESPIRATION RATE: 14 BRPM | HEIGHT: 66 IN | BODY MASS INDEX: 39.63 KG/M2 | SYSTOLIC BLOOD PRESSURE: 98 MMHG | OXYGEN SATURATION: 99 % | WEIGHT: 246.6 LBS | TEMPERATURE: 97.6 F

## 2021-06-14 DIAGNOSIS — O24.414 INSULIN CONTROLLED GESTATIONAL DIABETES MELLITUS (GDM) IN SECOND TRIMESTER: ICD-10-CM

## 2021-06-14 DIAGNOSIS — Z3A.22 22 WEEKS GESTATION OF PREGNANCY: ICD-10-CM

## 2021-06-14 DIAGNOSIS — M25.532 LEFT WRIST PAIN: Primary | ICD-10-CM

## 2021-06-14 PROCEDURE — 99214 OFFICE O/P EST MOD 30 MIN: CPT | Performed by: FAMILY MEDICINE

## 2021-06-14 PROCEDURE — 1036F TOBACCO NON-USER: CPT | Performed by: FAMILY MEDICINE

## 2021-06-14 PROCEDURE — 3008F BODY MASS INDEX DOCD: CPT | Performed by: FAMILY MEDICINE

## 2021-06-15 DIAGNOSIS — O24.410 DIET CONTROLLED GESTATIONAL DIABETES MELLITUS (GDM) IN SECOND TRIMESTER: ICD-10-CM

## 2021-06-15 RX ORDER — BLOOD SUGAR DIAGNOSTIC
STRIP MISCELLANEOUS
Qty: 100 EACH | Refills: 0 | Status: SHIPPED | OUTPATIENT
Start: 2021-06-15 | End: 2021-07-12

## 2021-06-15 RX ORDER — LANCETS 33 GAUGE
EACH MISCELLANEOUS
Qty: 100 EACH | Refills: 0 | Status: SHIPPED | OUTPATIENT
Start: 2021-06-15 | End: 2021-07-12 | Stop reason: SDUPTHER

## 2021-06-15 NOTE — ASSESSMENT & PLAN NOTE
Lab Results   Component Value Date    HGBA1C 5 2 06/02/2021     Recommend lifestyle modifications  Caution c possible future CSI

## 2021-06-17 ENCOUNTER — DOCUMENTATION (OUTPATIENT)
Dept: PERINATAL CARE | Facility: CLINIC | Age: 26
End: 2021-06-17

## 2021-06-17 NOTE — PROGRESS NOTES
Date: 06/17/21  Araceli Phillips  1995  Estimated Date of Delivery: 10/14/21  23w0d  OB/GYN: [facility]  Diagnosis: Gestational diabetes mellitus (GDM) in second trimester   Insulin controlled            Assessment and Plan:   Increase Lantus 25 units at bedtime   -Lantus 22 units started 05/12/2021 by BRIAN  -Recommend follow up with dietician regarding after meal blood sugar elevations     -Last seen by CRNP on 05/12/2021 to start Lantus     -Next CRNP appointment 07/12/2021   Gestational diabetes meal plan; 3 meals and 3 snacks  Checks blood sugar 4 times per day; fasting and 2 hrs pp with a One-Touch Verio meter and steri strips  Walks 30 minutes daily, follow OB recommendations       Labs  3/2/2021 Hbg A1c = 5 6%  06/02/2021 A1c = 5 2%  Next A1c due by July 28, 2021    Ultrasounds  04/05/2021 : Level 1 NT   05/27/2021  Normal growth and GUERDA   Next US scheduled for 06/28/2021    Testing  Beginning at 32 weeks, NST / GUERDA twice a week     Osvaldo Waite RN

## 2021-06-23 ENCOUNTER — DOCUMENTATION (OUTPATIENT)
Dept: PERINATAL CARE | Facility: CLINIC | Age: 26
End: 2021-06-23

## 2021-06-23 DIAGNOSIS — O24.419 GESTATIONAL DIABETES MELLITUS (GDM) IN SECOND TRIMESTER, GESTATIONAL DIABETES METHOD OF CONTROL UNSPECIFIED: ICD-10-CM

## 2021-06-23 DIAGNOSIS — O99.810 HYPERGLYCEMIA IN PREGNANCY: ICD-10-CM

## 2021-06-23 RX ORDER — INSULIN GLARGINE 100 [IU]/ML
25 INJECTION, SOLUTION SUBCUTANEOUS
Qty: 15 ML | Refills: 0 | Status: SHIPPED | OUTPATIENT
Start: 2021-06-23 | End: 2021-06-23 | Stop reason: SDUPTHER

## 2021-06-23 RX ORDER — INSULIN GLARGINE 100 [IU]/ML
30 INJECTION, SOLUTION SUBCUTANEOUS
Qty: 15 ML | Refills: 0 | Status: SHIPPED | OUTPATIENT
Start: 2021-06-23 | End: 2021-07-02 | Stop reason: SDUPTHER

## 2021-06-27 NOTE — PROGRESS NOTES
Please refer to the [facility] ultrasound report in Ob Procedures for additional information regarding today's visit

## 2021-06-28 ENCOUNTER — ROUTINE PRENATAL (OUTPATIENT)
Dept: PERINATAL CARE | Facility: CLINIC | Age: 26
End: 2021-06-28
Payer: COMMERCIAL

## 2021-06-28 VITALS
BODY MASS INDEX: 40.34 KG/M2 | DIASTOLIC BLOOD PRESSURE: 68 MMHG | SYSTOLIC BLOOD PRESSURE: 117 MMHG | WEIGHT: 251 LBS | HEIGHT: 66 IN | HEART RATE: 91 BPM

## 2021-06-28 DIAGNOSIS — O24.414 INSULIN CONTROLLED GESTATIONAL DIABETES MELLITUS (GDM) IN SECOND TRIMESTER: Primary | ICD-10-CM

## 2021-06-28 DIAGNOSIS — Z3A.24 24 WEEKS GESTATION OF PREGNANCY: ICD-10-CM

## 2021-06-28 PROCEDURE — 76827 ECHO EXAM OF FETAL HEART: CPT | Performed by: OBSTETRICS & GYNECOLOGY

## 2021-06-28 PROCEDURE — 93325 DOPPLER ECHO COLOR FLOW MAPG: CPT | Performed by: OBSTETRICS & GYNECOLOGY

## 2021-06-28 PROCEDURE — 76825 ECHO EXAM OF FETAL HEART: CPT | Performed by: OBSTETRICS & GYNECOLOGY

## 2021-06-28 NOTE — LETTER
June 28, 2021     Prudence Sesay MD  [address]    Patient: Juliana Rinaldi   YOB: 1995   Date of Visit: 6/28/2021       Dear Dr Ivory Shine: Thank you for referring Juliana Rinaldi to me for evaluation  Below are my notes for this consultation  If you have questions, please do not hesitate to call me  I look forward to following your patient along with you  Sincerely,        Gilbert Mccollum MD        CC: No Recipients  Gilbert Mccollum MD  6/27/2021  7:12 AM  Sign when Signing Visit   Please refer to the [facility] ultrasound report in Ob Procedures for additional information regarding today's visit

## 2021-06-30 ENCOUNTER — DOCUMENTATION (OUTPATIENT)
Dept: PERINATAL CARE | Facility: CLINIC | Age: 26
End: 2021-06-30

## 2021-06-30 NOTE — PROGRESS NOTES
Date: 06/30/21  Betty Zuniga  1995  Estimated Date of Delivery: 10/14/21  24w6d  OB/GYN: [facility]  Diagnosis: Insulin GDM second trimester    Insulin controlled        Assessment and Plan:   Maintain Lantus to 30 units daily  - Continue to monitor post meal blood sugars   - If 2 hours continue PP>120 will need bolus insulin added to regimen  - Follow-up with BRIAN 07/12/2021     Diet: Gestational diabetes meal plan; 3 meals and 3 snacks  SMBG: Checks blood sugar 4 times per day; fasting and 2 hour start of each meal    Meter: One-Touch Verio glucose meter  Activity: Walks 30 minutes daily, follow OB recommendations     Support System: Significant Other  Patient Goal: "I will walk 20-30 minutes after dinner daily "    Labs  3/2/2021 Hbg A1c = 5 6%  06/02/2021 A1c = 5 2%  Next A1c due by July 28, 2021    Ultrasounds  04/05/2021 : Level 1 NT   05/27/2021  Normal growth and GUERDA   06/28/2021 Fetal echo normal   Next US scheduled for 07/23/2021    Further fetal surveillance  Beginning at 32 weeks, NST / GUERDA twice a week     Tanesha Carvalho RN

## 2021-07-02 ENCOUNTER — ROUTINE PRENATAL (OUTPATIENT)
Dept: OBGYN CLINIC | Facility: CLINIC | Age: 26
End: 2021-07-02
Payer: COMMERCIAL

## 2021-07-02 VITALS — DIASTOLIC BLOOD PRESSURE: 68 MMHG | WEIGHT: 253 LBS | BODY MASS INDEX: 40.84 KG/M2 | SYSTOLIC BLOOD PRESSURE: 114 MMHG

## 2021-07-02 DIAGNOSIS — Z3A.12 12 WEEKS GESTATION OF PREGNANCY: ICD-10-CM

## 2021-07-02 DIAGNOSIS — O99.810 HYPERGLYCEMIA IN PREGNANCY: ICD-10-CM

## 2021-07-02 DIAGNOSIS — O99.212 MATERNAL MORBID OBESITY IN SECOND TRIMESTER, ANTEPARTUM (HCC): Primary | ICD-10-CM

## 2021-07-02 DIAGNOSIS — E66.01 MATERNAL MORBID OBESITY IN SECOND TRIMESTER, ANTEPARTUM (HCC): Primary | ICD-10-CM

## 2021-07-02 DIAGNOSIS — O99.211 OBESITY AFFECTING PREGNANCY IN FIRST TRIMESTER: ICD-10-CM

## 2021-07-02 DIAGNOSIS — O24.419 GESTATIONAL DIABETES MELLITUS (GDM) IN SECOND TRIMESTER, GESTATIONAL DIABETES METHOD OF CONTROL UNSPECIFIED: ICD-10-CM

## 2021-07-02 DIAGNOSIS — O24.419 GESTATIONAL DIABETES MELLITUS (GDM) IN FIRST TRIMESTER, GESTATIONAL DIABETES METHOD OF CONTROL UNSPECIFIED: ICD-10-CM

## 2021-07-02 DIAGNOSIS — O24.414 INSULIN CONTROLLED GESTATIONAL DIABETES MELLITUS (GDM) IN SECOND TRIMESTER: ICD-10-CM

## 2021-07-02 DIAGNOSIS — Z3A.25 25 WEEKS GESTATION OF PREGNANCY: ICD-10-CM

## 2021-07-02 DIAGNOSIS — Z3A.21 21 WEEKS GESTATION OF PREGNANCY: ICD-10-CM

## 2021-07-02 PROCEDURE — 99213 OFFICE O/P EST LOW 20 MIN: CPT | Performed by: OBSTETRICS & GYNECOLOGY

## 2021-07-02 NOTE — PROGRESS NOTES
Patient would like to discuss her weight gain and diabetes  Patient would also like to discuss she is having some cramping when she stands after sitting for awhile, cramps last roughly a few seconds then go away  Patient denies any contractions, pelvic pressure/pain or fluid leakage  Urine dip neg/neg

## 2021-07-03 PROBLEM — Z3A.25 25 WEEKS GESTATION OF PREGNANCY: Status: ACTIVE | Noted: 2021-06-04

## 2021-07-03 NOTE — PROGRESS NOTES
27 y o    female at 25 3 wga EGA for PNV  BP : 114/68  TWG: -15  Feeling well    No compliants  Gave 28 week labs  A2GDM - controlled  Recommended COVID vaccine  F/u 4 weeks

## 2021-07-03 NOTE — PATIENT INSTRUCTIONS
Below are the statements from the [address] [place] [address] of Maternal Fetal Medicine regarding COVID-19 vaccination and pregnancy  ACOG:  [employer] [facility]:  [email or web address]  [email or web address]  [facility] [email or web address])  pdf

## 2021-07-06 RX ORDER — INSULIN GLARGINE 100 [IU]/ML
30 INJECTION, SOLUTION SUBCUTANEOUS
Qty: 15 ML | Refills: 0 | Status: SHIPPED | OUTPATIENT
Start: 2021-07-06 | End: 2021-08-24 | Stop reason: SDUPTHER

## 2021-07-06 RX ORDER — ASPIRIN 81 MG/1
162 TABLET ORAL DAILY
Qty: 60 TABLET | Refills: 0 | Status: SHIPPED | OUTPATIENT
Start: 2021-07-06 | End: 2021-09-27

## 2021-07-08 ENCOUNTER — TELEPHONE (OUTPATIENT)
Dept: PERINATAL CARE | Facility: CLINIC | Age: 26
End: 2021-07-08

## 2021-07-08 ENCOUNTER — DOCUMENTATION (OUTPATIENT)
Dept: PERINATAL CARE | Facility: CLINIC | Age: 26
End: 2021-07-08

## 2021-07-08 DIAGNOSIS — O24.414 INSULIN CONTROLLED GESTATIONAL DIABETES MELLITUS (GDM) IN SECOND TRIMESTER: Primary | ICD-10-CM

## 2021-07-08 NOTE — PROGRESS NOTES
Date: 07/08/21  Minerva Arriaga  1995  Estimated Date of Delivery: 10/14/21  26w0d  OB/GYN: [place]  Insulin controlled        Assessment and Plan:   Maintain Lantus to 30 units daily  Start Novolog for @pp >120    - Start  4-4-4   - Follow-up with BRIAN 07/12/2021     Diet: Gestational diabetes meal plan; 3 meals and 3 snacks  SMBG: Checks blood sugar 4 times per day; fasting and 2 hour start of each meal    Meter: One-Touch Verio glucose meter  Activity: Walks 30 minutes daily, follow OB recommendations  Support System: Significant Other  Patient Goal: I will walk 20-30 minutes after dinner daily      Labs  3/2/2021 Hbg A1c = 5 6%  06/02/2021 A1c = 5 2%  Next A1c due by July 28, 2021    Ultrasounds  04/05/2021 : Level 1 NT   05/27/2021  Normal growth and GUERDA   06/28/2021 Fetal echo normal   Next US scheduled for 07/23/2021    Further fetal surveillance  Beginning at 32 weeks, NST / GUERDA twice a week     Iona Vigil RN

## 2021-07-09 RX ORDER — INSULIN ASPART 100 [IU]/ML
4 INJECTION, SOLUTION INTRAVENOUS; SUBCUTANEOUS
Qty: 15 ML | Refills: 1 | Status: SHIPPED | OUTPATIENT
Start: 2021-07-09 | End: 2021-08-24 | Stop reason: SDUPTHER

## 2021-07-12 ENCOUNTER — OFFICE VISIT (OUTPATIENT)
Dept: PERINATAL CARE | Facility: CLINIC | Age: 26
End: 2021-07-12

## 2021-07-12 VITALS
HEART RATE: 96 BPM | HEIGHT: 66 IN | SYSTOLIC BLOOD PRESSURE: 109 MMHG | DIASTOLIC BLOOD PRESSURE: 78 MMHG | BODY MASS INDEX: 41.01 KG/M2 | WEIGHT: 255.2 LBS

## 2021-07-12 DIAGNOSIS — O99.810 HYPERGLYCEMIA IN PREGNANCY: ICD-10-CM

## 2021-07-12 DIAGNOSIS — E66.01 MATERNAL MORBID OBESITY IN SECOND TRIMESTER, ANTEPARTUM (HCC): ICD-10-CM

## 2021-07-12 DIAGNOSIS — O99.212 MATERNAL MORBID OBESITY IN SECOND TRIMESTER, ANTEPARTUM (HCC): ICD-10-CM

## 2021-07-12 DIAGNOSIS — E55.9 VITAMIN D DEFICIENCY: ICD-10-CM

## 2021-07-12 DIAGNOSIS — Z3A.26 26 WEEKS GESTATION OF PREGNANCY: ICD-10-CM

## 2021-07-12 DIAGNOSIS — O24.414 INSULIN CONTROLLED GESTATIONAL DIABETES MELLITUS (GDM) IN SECOND TRIMESTER: Primary | ICD-10-CM

## 2021-07-12 PROCEDURE — NC001 PR NO CHARGE: Performed by: NURSE PRACTITIONER

## 2021-07-12 RX ORDER — LANCETS 33 GAUGE
EACH MISCELLANEOUS
Qty: 100 EACH | Refills: 0 | Status: SHIPPED | OUTPATIENT
Start: 2021-07-12 | End: 2021-08-12 | Stop reason: SDUPTHER

## 2021-07-12 RX ORDER — BLOOD SUGAR DIAGNOSTIC
STRIP MISCELLANEOUS
Qty: 100 STRIP | Refills: 3 | Status: SHIPPED | OUTPATIENT
Start: 2021-07-12 | End: 2021-08-24 | Stop reason: SDUPTHER

## 2021-07-12 NOTE — PROGRESS NOTES
Gatito Ortiz 27 y o  female MRN: [id number]    Encounter: [id number]      Assessment/Plan     Assessment/Plan:    Renaldo Garza was seen today for gestational diabetes  Diagnoses and all orders for this visit:    Insulin controlled gestational diabetes mellitus (GDM) in second trimester  -     OneTouch Delica Lancets 92T MISC; Test 4 times per day  Gestational Diabetes  -     OneTouch Verio test strip; Use as instructed  -     Insulin Pen Needle 31G X 5 MM MISC; Inject under the skin daily at bedtime Use up to 5 a day or as directed  -     Hemoglobin A1C; Standing    Hyperglycemia in pregnancy    Vitamin D deficiency    Maternal morbid obesity in second trimester, antepartum ([facility] )    BMI 40 0-44 9, adult ([facility] )    26 weeks gestation of pregnancy    -A1c 5 2% at goal, repeat A1c with next prenatal labs  -Continue Lantus 30 units daily   -Continue Novolog 4 units before meals    -Follow GDM diet 3 meals and 3 snacks  Avoid honey wheat bread and switch to whole wheat for breakfast   -Continue testing fasting and 1 hour post start of each meal   -Continue reporting via glucose flowsheet   -Glucose goals fasting 60-90; 1 hour post meal 140 or less and 2 hours post meal 120 or less    -Always have glucose available for hypoglycemia, use 15 by 15 rule  -Try to walk up to 30 minutes a day if no restrictions from your OB  -Fetal growth ultrasound every 4 weeks or as recommended   -Starting at 32 weeks gestation, NST twice a week and GUERDA weekly   -Continue follow up with OB and MFM as recommended   -Stay in close contact with diabetes education team      CC: Gestational Diabetes    History of Present Illness     HPI:  This is a 27 y o  female  comes for follow up for gestational diabetes  She is currently 26 weeks, 4 days gestational age  She was started on insulin therapy in 2021  She has hx of prediabetes prior to pregnancy, was not on any medications  She is currently on lantus 30 units at bedtime   On  -- she started novolog 4 units before meals b/c of postprandial hyperglycemia  Diet: breakfast - eggs, honey wheat toast, butter  lunch - usually has salad and soup, cheese or crackers or healthy choices/ frozen meals  dinner - spaghetti, meatballs, chicken, veggies, rice    She does not exercise, does not walk 30 minutes daily  Starting weight 254 lb, current weight 255 lb, BMI 41 19       Review of Systems   Constitutional: Negative for chills, fatigue and fever  HENT: Negative for congestion, postnasal drip and sore throat  Eyes: Negative for pain  Respiratory: Negative for cough and shortness of breath  Cardiovascular: Negative for chest pain, palpitations and leg swelling  Gastrointestinal: Positive for nausea  Negative for abdominal pain, blood in stool, constipation and diarrhea  Endocrine: Negative for polydipsia and polyuria  Genitourinary: Negative for dysuria  Musculoskeletal: Negative for arthralgias and back pain  Neurological: Negative for dizziness, light-headedness and headaches  Psychiatric/Behavioral: Negative for behavioral problems  The patient is not nervous/anxious  All other systems reviewed and are negative        Historical Information   Past Medical History:   Diagnosis Date    Anxiety     Asthma 2005    BMI 40 0-44 9, adult [facility] 3/11/2021    Depression     History of ovarian cyst 2018    Hyperlipidemia     IFG (impaired fasting glucose)     Migraine     Mild intermittent asthma     Morbid obesity (HCC)     PID (pelvic inflammatory disease) 2018    Polycystic ovary syndrome     Pre-diabetes     Vitamin D deficiency      Past Surgical History:   Procedure Laterality Date    WISDOM TOOTH EXTRACTION       Social History   Social History     Substance and Sexual Activity   Alcohol Use Yes    Alcohol/week: 1 0 standard drinks    Types: 1 Glasses of wine per week    Comment: socially     Social History     Substance and Sexual Activity   Drug Use Never     Social History     Tobacco Use   Smoking Status Former Smoker    Packs/day: 0 10    Years: 0 20    Pack years: 0 02    Types: Cigarettes    Start date: 5/1/2017 [place] January Quit date: 10/1/2017    Years since quitting: 3 7   Smokeless Tobacco Never Used   Tobacco Comment    Smoked socially      Family History:   Family History   Problem Relation Age of Onset    JOSELINE disease Mother     Diabetes unspecified Mother     Asthma Mother     Seizures Father 15    Asperger's syndrome Sister     Autism Brother     Colon cancer Maternal Grandmother     Heart attack Maternal Grandfather     No Known Problems Paternal Grandmother     No Known Problems Paternal Grandfather        Meds/Allergies   Current Outpatient Medications   Medication Sig Dispense Refill    albuterol (PROVENTIL HFA,VENTOLIN HFA) 90 mcg/act inhaler Inhale 2 puffs every 4 (four) hours as needed for wheezing 1 Inhaler 0    aspirin (ECOTRIN LOW STRENGTH) 81 mg EC tablet Take 2 tablets (162 mg total) by mouth daily 60 tablet 0    Blood Glucose Monitoring Suppl (OneTouch Verio Flex System) w/Device KIT Test 4 times per day  Gestational Diabetes  1 kit 0    Cholecalciferol (Vitamin D3) 50 MCG (2000 UT) TABS Take 2,000 Units by mouth daily      fluticasone (FLONASE) 50 mcg/act nasal spray 2 sprays into each nostril daily as needed for rhinitis 1 Bottle 8    insulin aspart (NovoLOG FlexPen) 100 UNIT/ML injection pen Inject 4 Units under the skin 3 (three) times a day before meals Titrate as needed 15 mL 1    insulin glargine (Lantus SoloStar) 100 units/mL injection pen Inject 30 Units under the skin daily at bedtime At 9 or 10 PM daily  To be titrated  15 mL 0    Insulin Pen Needle 31G X 5 MM MISC Inject under the skin daily at bedtime Use one a day or as directed   100 each 1    metoclopramide (REGLAN) 5 mg tablet Take 1 tablet (5 mg total) by mouth 4 (four) times a day 30 tablet 0    OneTouch Delica Lancets 73A MISC Test 4 times per day Gestational Diabetes  100 each 0    OneTouch Verio test strip TEST 4 TIMES PER DAY  GESTATIONAL DIABETES  100 strip 3    Prenatal Vit-Fe Fumarate-FA (PRENATAL VITAMIN PO) Take 1 capsule by mouth daily       No current facility-administered medications for this visit  No Known Allergies    Objective   Vitals: Last menstrual period 01/07/2021, not currently breastfeeding  Physical Exam  Constitutional:       Appearance: She is well-developed  HENT:      Head: Normocephalic and atraumatic  Eyes:      General: No scleral icterus  Right eye: No discharge  Left eye: No discharge  Conjunctiva/sclera: Conjunctivae normal       Pupils: Pupils are equal, round, and reactive to light  Neck:      Thyroid: No thyromegaly  Cardiovascular:      Rate and Rhythm: Normal rate and regular rhythm  Heart sounds: No murmur heard  Pulmonary:      Effort: Pulmonary effort is normal  No respiratory distress  Breath sounds: Normal breath sounds  Abdominal:      General: There is no distension  Palpations: Abdomen is soft  Tenderness: There is no abdominal tenderness  Musculoskeletal:      Cervical back: Neck supple  Skin:     General: Skin is warm and dry  Neurological:      Mental Status: She is alert     Psychiatric:         Mood and Affect: Mood normal          The history was obtained from the review of the chart, Reviewed    Lab Results:   Lab Results   Component Value Date/Time    Hemoglobin A1C 5 2 06/02/2021 04:58 PM    Hemoglobin A1C 5 6 03/02/2021 12:37 PM    Hemoglobin A1C 5 7 (H) 11/06/2020 08:49 AM    WBC 11 24 (H) 03/02/2021 12:37 PM    WBC 9 15 01/07/2021 04:23 PM    WBC 7 25 09/30/2020 11:35 AM    Hemoglobin 13 3 03/02/2021 12:37 PM    Hemoglobin 13 1 01/07/2021 04:23 PM    Hemoglobin 13 5 09/30/2020 11:35 AM    Hematocrit 40 3 03/02/2021 12:37 PM    Hematocrit 39 5 01/07/2021 04:23 PM    Hematocrit 41 6 09/30/2020 11:35 AM    MCV 90 03/02/2021 12:37 PM MCV 90 01/07/2021 04:23 PM    MCV 90 09/30/2020 11:35 AM    Platelets 523 24/70/0419 12:37 PM    Platelets 458 86/40/2573 04:23 PM    Platelets 895 75/06/2192 11:35 AM    BUN 7 03/02/2021 12:37 PM    BUN 11 01/07/2021 04:23 PM    BUN 12 11/06/2020 08:49 AM    Potassium 3 6 03/02/2021 12:37 PM    Potassium 3 8 01/07/2021 04:23 PM    Potassium 4 4 11/06/2020 08:49 AM    Chloride 104 03/02/2021 12:37 PM    Chloride 104 01/07/2021 04:23 PM    Chloride 107 11/06/2020 08:49 AM    CO2 23 03/02/2021 12:37 PM    CO2 23 01/07/2021 04:23 PM    CO2 26 11/06/2020 08:49 AM    Creatinine 0 70 03/02/2021 12:37 PM    Creatinine 0 79 01/07/2021 04:23 PM    Creatinine 0 81 11/06/2020 08:49 AM    AST 14 03/02/2021 12:37 PM    AST 20 01/07/2021 04:23 PM    AST 13 11/06/2020 08:49 AM    ALT 32 03/02/2021 12:37 PM    ALT 39 01/07/2021 04:23 PM    ALT 30 11/06/2020 08:49 AM    Albumin 4 0 03/02/2021 12:37 PM    Albumin 4 1 01/07/2021 04:23 PM    Albumin 4 2 11/06/2020 08:49 AM    HDL, Direct 52 03/02/2021 12:37 PM    HDL, Direct 62 11/06/2020 08:49 AM    Triglycerides 188 (H) 03/02/2021 12:37 PM    Triglycerides 121 11/06/2020 08:49 AM       Imaging Studies: Reviewed    Portions of the record may have been created with voice recognition software  Occasional wrong word or "sound a like" substitutions may have occurred due to the inherent limitations of voice recognition software  Read the chart carefully and recognize, using context, where substitutions have occurred

## 2021-07-12 NOTE — ASSESSMENT & PLAN NOTE
-A1c 5 2% at goal, repeat A1c with next prenatal labs  -Continue Lantus 30 units daily   -Continue Novolog 4 units before meals    -Follow GDM diet 3 meals and 3 snacks  Avoid honey wheat bread and switch to whole wheat for breakfast   -Continue testing fasting and 1 hour post start of each meal   -Continue reporting via glucose flowsheet   -Glucose goals fasting 60-90; 1 hour post meal 140 or less and 2 hours post meal 120 or less    -Always have glucose available for hypoglycemia, use 15 by 15 rule  -Try to walk up to 30 minutes a day if no restrictions from your OB    -Fetal growth ultrasound every 4 weeks or as recommended   -Starting at 32 weeks gestation, NST twice a week and GUERDA weekly   -Continue follow up with OB and MFM as recommended   -Stay in close contact with diabetes education team      Lab Results   Component Value Date    HGBA1C 5 2 06/02/2021

## 2021-07-12 NOTE — PATIENT INSTRUCTIONS
-A1c 5 2% at goal, repeat A1c with next prenatal labs  -Continue Lantus 30 units daily   -Continue Novolog 4 units before meals    -Follow GDM diet 3 meals and 3 snacks  Avoid honey wheat bread and switch to whole wheat for breakfast   -Continue testing fasting and 1 hour post start of each meal   -Continue reporting via glucose flowsheet   -Glucose goals fasting 60-90; 1 hour post meal 140 or less and 2 hours post meal 120 or less    -Always have glucose available for hypoglycemia, use 15 by 15 rule  -Try to walk up to 30 minutes a day if no restrictions from your OB    -Fetal growth ultrasound every 4 weeks or as recommended   -Starting at 32 weeks gestation, NST twice a week and GUERDA weekly   -Continue follow up with OB and MFM as recommended   -Stay in close contact with diabetes education team

## 2021-07-12 NOTE — ASSESSMENT & PLAN NOTE
-Starting weight 254 lbs  -Current weight 255 lbs  -Current BMI 41 19   -Recommended weight gain during pregnancy 11 to 20 lbs  -Continue GDM diet and start walking up to 30 minutes a day
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
## 2021-07-16 ENCOUNTER — OFFICE VISIT (OUTPATIENT)
Dept: FAMILY MEDICINE CLINIC | Facility: CLINIC | Age: 26
End: 2021-07-16
Payer: COMMERCIAL

## 2021-07-16 ENCOUNTER — LAB (OUTPATIENT)
Dept: LAB | Facility: AMBULARY SURGERY CENTER | Age: 26
End: 2021-07-16
Payer: COMMERCIAL

## 2021-07-16 VITALS
WEIGHT: 255.4 LBS | RESPIRATION RATE: 14 BRPM | OXYGEN SATURATION: 98 % | BODY MASS INDEX: 41.05 KG/M2 | SYSTOLIC BLOOD PRESSURE: 100 MMHG | HEIGHT: 66 IN | TEMPERATURE: 97.5 F | DIASTOLIC BLOOD PRESSURE: 62 MMHG | HEART RATE: 97 BPM

## 2021-07-16 DIAGNOSIS — E78.5 HYPERLIPIDEMIA, UNSPECIFIED HYPERLIPIDEMIA TYPE: Primary | ICD-10-CM

## 2021-07-16 DIAGNOSIS — E55.9 VITAMIN D DEFICIENCY: ICD-10-CM

## 2021-07-16 DIAGNOSIS — O99.810 HYPERGLYCEMIA IN PREGNANCY: ICD-10-CM

## 2021-07-16 DIAGNOSIS — O99.212 MATERNAL MORBID OBESITY IN SECOND TRIMESTER, ANTEPARTUM (HCC): ICD-10-CM

## 2021-07-16 DIAGNOSIS — O24.419 GESTATIONAL DIABETES MELLITUS (GDM) IN FIRST TRIMESTER, GESTATIONAL DIABETES METHOD OF CONTROL UNSPECIFIED: ICD-10-CM

## 2021-07-16 DIAGNOSIS — F32.A DEPRESSION, UNSPECIFIED DEPRESSION TYPE: ICD-10-CM

## 2021-07-16 DIAGNOSIS — Z13.6 SCREENING FOR CARDIOVASCULAR CONDITION: ICD-10-CM

## 2021-07-16 DIAGNOSIS — F41.9 ANXIETY: ICD-10-CM

## 2021-07-16 DIAGNOSIS — E66.01 MATERNAL MORBID OBESITY IN SECOND TRIMESTER, ANTEPARTUM (HCC): ICD-10-CM

## 2021-07-16 DIAGNOSIS — J30.9 ALLERGIC RHINITIS, UNSPECIFIED SEASONALITY, UNSPECIFIED TRIGGER: ICD-10-CM

## 2021-07-16 DIAGNOSIS — Z3A.25 25 WEEKS GESTATION OF PREGNANCY: ICD-10-CM

## 2021-07-16 DIAGNOSIS — E78.5 HYPERLIPIDEMIA, UNSPECIFIED HYPERLIPIDEMIA TYPE: ICD-10-CM

## 2021-07-16 DIAGNOSIS — J45.20 MILD INTERMITTENT ASTHMA WITHOUT COMPLICATION: ICD-10-CM

## 2021-07-16 DIAGNOSIS — R73.01 IFG (IMPAIRED FASTING GLUCOSE): ICD-10-CM

## 2021-07-16 DIAGNOSIS — O24.414 INSULIN CONTROLLED GESTATIONAL DIABETES MELLITUS (GDM) IN SECOND TRIMESTER: ICD-10-CM

## 2021-07-16 LAB
25(OH)D3 SERPL-MCNC: 29.7 NG/ML (ref 30–100)
ALBUMIN SERPL BCP-MCNC: 2.9 G/DL (ref 3.5–5)
ALP SERPL-CCNC: 69 U/L (ref 46–116)
ALT SERPL W P-5'-P-CCNC: 18 U/L (ref 12–78)
ANION GAP SERPL CALCULATED.3IONS-SCNC: 9 MMOL/L (ref 4–13)
AST SERPL W P-5'-P-CCNC: 9 U/L (ref 5–45)
BILIRUB SERPL-MCNC: 0.42 MG/DL (ref 0.2–1)
BUN SERPL-MCNC: 6 MG/DL (ref 5–25)
CALCIUM ALBUM COR SERPL-MCNC: 9.9 MG/DL (ref 8.3–10.1)
CALCIUM SERPL-MCNC: 9 MG/DL (ref 8.3–10.1)
CHLORIDE SERPL-SCNC: 106 MMOL/L (ref 100–108)
CO2 SERPL-SCNC: 20 MMOL/L (ref 21–32)
CREAT SERPL-MCNC: 0.48 MG/DL (ref 0.6–1.3)
ERYTHROCYTE [DISTWIDTH] IN BLOOD BY AUTOMATED COUNT: 14.1 % (ref 11.6–15.1)
EST. AVERAGE GLUCOSE BLD GHB EST-MCNC: 108 MG/DL
GFR SERPL CREATININE-BSD FRML MDRD: 138 ML/MIN/1.73SQ M
GLUCOSE SERPL-MCNC: 83 MG/DL (ref 65–140)
HBA1C MFR BLD: 5.4 %
HCT VFR BLD AUTO: 37.9 % (ref 34.8–46.1)
HGB BLD-MCNC: 12.2 G/DL (ref 11.5–15.4)
MCH RBC QN AUTO: 29.5 PG (ref 26.8–34.3)
MCHC RBC AUTO-ENTMCNC: 32.2 G/DL (ref 31.4–37.4)
MCV RBC AUTO: 92 FL (ref 82–98)
PLATELET # BLD AUTO: 230 THOUSANDS/UL (ref 149–390)
PMV BLD AUTO: 11.1 FL (ref 8.9–12.7)
POTASSIUM SERPL-SCNC: 3.9 MMOL/L (ref 3.5–5.3)
PROT SERPL-MCNC: 7.2 G/DL (ref 6.4–8.2)
RBC # BLD AUTO: 4.14 MILLION/UL (ref 3.81–5.12)
RPR SER QL: NORMAL
SODIUM SERPL-SCNC: 135 MMOL/L (ref 136–145)
WBC # BLD AUTO: 11.35 THOUSAND/UL (ref 4.31–10.16)

## 2021-07-16 PROCEDURE — 99214 OFFICE O/P EST MOD 30 MIN: CPT | Performed by: FAMILY MEDICINE

## 2021-07-16 PROCEDURE — 3725F SCREEN DEPRESSION PERFORMED: CPT | Performed by: FAMILY MEDICINE

## 2021-07-16 PROCEDURE — 83036 HEMOGLOBIN GLYCOSYLATED A1C: CPT

## 2021-07-16 PROCEDURE — 80053 COMPREHEN METABOLIC PANEL: CPT

## 2021-07-16 PROCEDURE — 82306 VITAMIN D 25 HYDROXY: CPT

## 2021-07-16 PROCEDURE — 1036F TOBACCO NON-USER: CPT | Performed by: FAMILY MEDICINE

## 2021-07-16 PROCEDURE — 36415 COLL VENOUS BLD VENIPUNCTURE: CPT

## 2021-07-16 PROCEDURE — 85027 COMPLETE CBC AUTOMATED: CPT

## 2021-07-16 PROCEDURE — 86592 SYPHILIS TEST NON-TREP QUAL: CPT

## 2021-07-16 PROCEDURE — 3008F BODY MASS INDEX DOCD: CPT | Performed by: FAMILY MEDICINE

## 2021-07-16 RX ORDER — CALCIUM CARBONATE 750 MG/1
2 TABLET, CHEWABLE ORAL 2 TIMES DAILY
COMMUNITY
End: 2021-11-05

## 2021-07-16 NOTE — ASSESSMENT & PLAN NOTE
Lab Results   Component Value Date    HGBA1C 5 2 2021     Management per  medicine  Increase protein intake to help c satiety

## 2021-07-16 NOTE — ASSESSMENT & PLAN NOTE
Stable off Cymbalta 60 mg daily as patient desired D/C due pregnancy  OB Dr Jesusita Lou states that although there are no studies, it would be ok to resume Cymbalta if benefits outweigh risks or try Zoloft instead     previously contacted patient to assist with patient's request to see female counselor and psychiatrist

## 2021-07-16 NOTE — RESULT ENCOUNTER NOTE
Low vitamin D - Recommend start multivitamin and over-the-counter vitamin D3 3000 International Units daily if ok with OB  Other labs stable        Message sent to patient via [facility] patient portal

## 2021-07-16 NOTE — ASSESSMENT & PLAN NOTE
Stable off Cymbalta 60 mg daily as patient desired D/C due pregnancy  OB Dr Adair Fair states that although there are no studies, it would be ok to resume Cymbalta if benefits outweigh risks or try Zoloft instead     previously contacted patient to assist with patient's request to see female counselor and psychiatrist

## 2021-07-19 ENCOUNTER — DOCUMENTATION (OUTPATIENT)
Dept: PERINATAL CARE | Facility: CLINIC | Age: 26
End: 2021-07-19

## 2021-07-19 ENCOUNTER — PATIENT MESSAGE (OUTPATIENT)
Dept: PERINATAL CARE | Facility: CLINIC | Age: 26
End: 2021-07-19

## 2021-07-19 DIAGNOSIS — O24.414 INSULIN CONTROLLED GESTATIONAL DIABETES MELLITUS (GDM) IN SECOND TRIMESTER: Primary | ICD-10-CM

## 2021-07-19 NOTE — PROGRESS NOTES
Date: 07/19/21  Fatuma Cam  1995  Estimated Date of Delivery: 10/14/21  27w4d  OB/GYN: [facility]  Insulin controlled        Assessment and Plan:   Increase Lantus to 34 units daily  Novolog    - Breakfast:  Increase to 6   -Lunch Increase to 6   -Dinner  Maintain 4    - Completed follow-up with BRIAN 07/12/2021     Diet: Gestational diabetes meal plan; 3 meals and 3 snacks  SMBG: Checks blood sugar 4 times per day; fasting and 1 hour start of each meal    Meter: One-Touch Verio glucose meter  Activity: Walks 30 minutes daily, follow OB recommendations  Support System: Significant Other  Patient Goal: I will walk 20-30 minutes after dinner daily      Labs  3/2/2021 Hbg A1c = 5 6%  06/02/2021 A1c = 5 2%  Next A1c due by July 28, 2021    Ultrasounds  04/05/2021 : Level 1 NT   05/27/2021  Normal growth and GUERDA   06/28/2021 Fetal echo normal   Next US scheduled for 07/23/2021    Further fetal surveillance  Beginning at 32 weeks, NST / GUERDA twice a week     Sindhu Merritt RN

## 2021-07-22 NOTE — PATIENT INSTRUCTIONS
Thank you for choosing us for your  care today  If you have any questions about your ultrasound or care, please do not hesitate to contact us or your primary obstetrician  Some general instructions for your pregnancy are:     Protect against coronavirus: Continue to practice social distancing, wear a mask, and wash your hands often  Pregnant women are increased risk of severe COVID  Notify your primary care doctor if you have any symptoms including cough, shortness of breath or difficulty breathing, fever, chills, muscle pain, sore throat, or loss of taste or smell  Pregnant women can receive the coronavirus vaccine   Exercise: Aim for 22 minutes per day (150 minutes per week) of regular exercise  Walking is great!  Nutrition: aim for calcium-rich and iron-rich foods as well as healthy sources of protein   Protect against the flu: get yourself and your entire household vaccinated against influenza  This will protect your baby   Learn about Preeclampsia: preeclampsia is a common, serious high blood pressure complication in pregnancy  A blood pressure of 147FDFM (systolic or top number) or 59NYXY (diastolic or bottom number) is not normal and needs evaluation by your doctor   If you smoke, try to reduce how many cigarettes you smoke or try to quit completely  Do not vape   Other warning signs to watch out for in pregnancy or postpartum: chest pain, obstructed breathing or shortness of breath, seizures, thoughts of hurting yourself or your baby, bleeding, a painful or swollen leg, fever, or headache (see [facility] POST-BIRTH Warning Signs campaign)  If these happen call 911  Itching is also not normal in pregnancy and if you experience this, especially over your hands and feet, potentially worse at night, notify your doctors     Lastly, if you are contacted regarding participation in a survey about your experience in our office, please know that we take any feedback you provide seriously and use it to improve how we deliver care through our center

## 2021-07-23 ENCOUNTER — APPOINTMENT (OUTPATIENT)
Dept: LAB | Facility: AMBULARY SURGERY CENTER | Age: 26
End: 2021-07-23
Payer: COMMERCIAL

## 2021-07-23 ENCOUNTER — ULTRASOUND (OUTPATIENT)
Dept: PERINATAL CARE | Facility: OTHER | Age: 26
End: 2021-07-23
Payer: COMMERCIAL

## 2021-07-23 VITALS
SYSTOLIC BLOOD PRESSURE: 141 MMHG | HEIGHT: 66 IN | DIASTOLIC BLOOD PRESSURE: 83 MMHG | BODY MASS INDEX: 41.38 KG/M2 | HEART RATE: 106 BPM | WEIGHT: 257.5 LBS

## 2021-07-23 DIAGNOSIS — Z36.89 ENCOUNTER FOR ULTRASOUND TO CHECK FETAL GROWTH: ICD-10-CM

## 2021-07-23 DIAGNOSIS — R03.0 ELEVATED BLOOD PRESSURE READING IN OFFICE WITHOUT DIAGNOSIS OF HYPERTENSION: ICD-10-CM

## 2021-07-23 DIAGNOSIS — Z3A.28 28 WEEKS GESTATION OF PREGNANCY: ICD-10-CM

## 2021-07-23 DIAGNOSIS — O24.414 INSULIN CONTROLLED GESTATIONAL DIABETES MELLITUS (GDM) IN SECOND TRIMESTER: ICD-10-CM

## 2021-07-23 DIAGNOSIS — Z36.2 ENCOUNTER FOR FOLLOW-UP ULTRASOUND OF FETAL ANATOMY: ICD-10-CM

## 2021-07-23 DIAGNOSIS — O24.414 INSULIN CONTROLLED GESTATIONAL DIABETES MELLITUS (GDM) IN THIRD TRIMESTER: Primary | ICD-10-CM

## 2021-07-23 DIAGNOSIS — E66.01 MATERNAL MORBID OBESITY IN SECOND TRIMESTER, ANTEPARTUM (HCC): ICD-10-CM

## 2021-07-23 DIAGNOSIS — E55.9 VITAMIN D DEFICIENCY: ICD-10-CM

## 2021-07-23 DIAGNOSIS — O99.212 MATERNAL MORBID OBESITY IN SECOND TRIMESTER, ANTEPARTUM (HCC): ICD-10-CM

## 2021-07-23 DIAGNOSIS — O99.810 HYPERGLYCEMIA IN PREGNANCY: ICD-10-CM

## 2021-07-23 LAB
ALBUMIN SERPL BCP-MCNC: 3.3 G/DL (ref 3.5–5)
ALP SERPL-CCNC: 74 U/L (ref 46–116)
ALT SERPL W P-5'-P-CCNC: 18 U/L (ref 12–78)
ANION GAP SERPL CALCULATED.3IONS-SCNC: 7 MMOL/L (ref 4–13)
AST SERPL W P-5'-P-CCNC: 10 U/L (ref 5–45)
BILIRUB SERPL-MCNC: 0.47 MG/DL (ref 0.2–1)
BUN SERPL-MCNC: 8 MG/DL (ref 5–25)
CALCIUM ALBUM COR SERPL-MCNC: 10 MG/DL (ref 8.3–10.1)
CALCIUM SERPL-MCNC: 9.4 MG/DL (ref 8.3–10.1)
CHLORIDE SERPL-SCNC: 103 MMOL/L (ref 100–108)
CO2 SERPL-SCNC: 23 MMOL/L (ref 21–32)
CREAT SERPL-MCNC: 0.56 MG/DL (ref 0.6–1.3)
CREAT UR-MCNC: 87.8 MG/DL
ERYTHROCYTE [DISTWIDTH] IN BLOOD BY AUTOMATED COUNT: 14.2 % (ref 11.6–15.1)
EST. AVERAGE GLUCOSE BLD GHB EST-MCNC: 105 MG/DL
GFR SERPL CREATININE-BSD FRML MDRD: 131 ML/MIN/1.73SQ M
GLUCOSE SERPL-MCNC: 86 MG/DL (ref 65–140)
HBA1C MFR BLD: 5.3 %
HCT VFR BLD AUTO: 38.8 % (ref 34.8–46.1)
HGB BLD-MCNC: 12.3 G/DL (ref 11.5–15.4)
MCH RBC QN AUTO: 29.1 PG (ref 26.8–34.3)
MCHC RBC AUTO-ENTMCNC: 31.7 G/DL (ref 31.4–37.4)
MCV RBC AUTO: 92 FL (ref 82–98)
PLATELET # BLD AUTO: 246 THOUSANDS/UL (ref 149–390)
PMV BLD AUTO: 10.9 FL (ref 8.9–12.7)
POTASSIUM SERPL-SCNC: 3.8 MMOL/L (ref 3.5–5.3)
PROT SERPL-MCNC: 7.4 G/DL (ref 6.4–8.2)
PROT UR-MCNC: 12 MG/DL
PROT/CREAT UR: 0.14 MG/G{CREAT} (ref 0–0.1)
RBC # BLD AUTO: 4.23 MILLION/UL (ref 3.81–5.12)
SODIUM SERPL-SCNC: 133 MMOL/L (ref 136–145)
URATE SERPL-MCNC: 2.5 MG/DL (ref 2–6.8)
WBC # BLD AUTO: 13.02 THOUSAND/UL (ref 4.31–10.16)

## 2021-07-23 PROCEDURE — 83036 HEMOGLOBIN GLYCOSYLATED A1C: CPT

## 2021-07-23 PROCEDURE — 84550 ASSAY OF BLOOD/URIC ACID: CPT

## 2021-07-23 PROCEDURE — 80053 COMPREHEN METABOLIC PANEL: CPT

## 2021-07-23 PROCEDURE — 1036F TOBACCO NON-USER: CPT | Performed by: OBSTETRICS & GYNECOLOGY

## 2021-07-23 PROCEDURE — 82570 ASSAY OF URINE CREATININE: CPT | Performed by: OBSTETRICS & GYNECOLOGY

## 2021-07-23 PROCEDURE — 85027 COMPLETE CBC AUTOMATED: CPT

## 2021-07-23 PROCEDURE — 84156 ASSAY OF PROTEIN URINE: CPT | Performed by: OBSTETRICS & GYNECOLOGY

## 2021-07-23 PROCEDURE — 99214 OFFICE O/P EST MOD 30 MIN: CPT | Performed by: OBSTETRICS & GYNECOLOGY

## 2021-07-23 PROCEDURE — 36415 COLL VENOUS BLD VENIPUNCTURE: CPT

## 2021-07-23 PROCEDURE — 76816 OB US FOLLOW-UP PER FETUS: CPT | Performed by: OBSTETRICS & GYNECOLOGY

## 2021-07-23 NOTE — PROGRESS NOTES
[address]: Ms Eden Caban was seen today at 28w1d for fetal growth and followup missed anatomy ultrasound  See ultrasound report under "OB Procedures" tab  Please don't hesitate to contact our office with any concerns or questions    Margaret Ovalle MD

## 2021-07-29 ENCOUNTER — DOCUMENTATION (OUTPATIENT)
Dept: PERINATAL CARE | Facility: CLINIC | Age: 26
End: 2021-07-29

## 2021-07-29 NOTE — PROGRESS NOTES
Date: 07/29/21  Anais Slater  1995  Estimated Date of Delivery: 10/14/21  29w0d  OB/GYN: [facility]  Insulin controlled      Assessment and Plan:   Increase Lantus from 34 to 40 units daily  Novolog    -Breakfast:  Increase to 8   -Lunch Continue 6   -Dinner  Increase to 6   Follow up with Randi Shine on 08/05/2021  Follow up with Srinivas Sierra on 08/24/2021     Diet: Gestational diabetes meal plan; 3 meals and 3 snacks  SMBG: Checks blood sugar 4 times per day; fasting and 1 hour start of each meal    Meter: One-Touch Verio glucose meter  Activity: Walks 30 minutes daily, follow OB recommendations  Support System: Significant Other  Patient Goal: I will walk 20-30 minutes after dinner daily  Labs  3/2/2021 Hbg A1c = 5 6%  06/02/2021 A1c = 5 2%  Next A1c due by July 28, 2021    Ultrasounds  04/05/2021 : Level 1 NT   05/27/2021  Normal growth and GUERDA   06/28/2021 Fetal echo normal   07/23/2021 Dr Rosary Moritz:  There is a single live intrauterine pregnancy with growth at the [address] [place] [address] scheduled for 08/20/2021    Further fetal surveillance  Beginning at 32 weeks, NST / GUERDA twice a week     Ashkan Alan RN

## 2021-07-30 ENCOUNTER — HOSPITAL ENCOUNTER (OUTPATIENT)
Facility: HOSPITAL | Age: 26
Discharge: HOME | End: 2021-07-30
Attending: OBSTETRICS & GYNECOLOGY | Admitting: OBSTETRICS & GYNECOLOGY
Payer: COMMERCIAL

## 2021-07-30 ENCOUNTER — TELEPHONE (OUTPATIENT)
Dept: OBGYN CLINIC | Facility: CLINIC | Age: 26
End: 2021-07-30

## 2021-07-30 VITALS
HEART RATE: 100 BPM | SYSTOLIC BLOOD PRESSURE: 116 MMHG | TEMPERATURE: 99.1 F | RESPIRATION RATE: 18 BRPM | DIASTOLIC BLOOD PRESSURE: 59 MMHG

## 2021-07-30 PROBLEM — Z3A.29 29 WEEKS GESTATION OF PREGNANCY: Status: ACTIVE | Noted: 2021-06-04

## 2021-07-30 PROCEDURE — NC001 PR NO CHARGE: Performed by: OBSTETRICS & GYNECOLOGY

## 2021-07-30 PROCEDURE — 99213 OFFICE O/P EST LOW 20 MIN: CPT

## 2021-07-30 PROCEDURE — 76815 OB US LIMITED FETUS(S): CPT

## 2021-07-30 NOTE — DISCHARGE INSTRUCTIONS
Pregnancy at 27 to 30 [address]:   You may notice new symptoms such as shortness of breath, heartburn, or swelling of your ankles and feet  You may also have trouble sleeping or contractions  DISCHARGE INSTRUCTIONS:   Seek care immediately if:   · You develop a severe headache that does not go away  · You have new or increased vision changes, such as blurred or spotted vision  · You have new or increased swelling in your face or hands  · You have vaginal spotting or bleeding  · Your water broke or you feel warm water gushing or trickling from your vagina  Contact your healthcare provider if:   · You have more than 5 contractions in 1 hour  · You notice any changes in your baby's movements  · You have abdominal cramps, pressure, or tightening  · You have a change in vaginal discharge  · You have chills or a fever  · You have vaginal itching, burning, or pain  · You have yellow, green, white, or foul-smelling vaginal discharge  · You have pain or burning when you urinate, less urine than usual, or pink or bloody urine  · You have questions or concerns about your condition or care  How to care for yourself at this stage of your pregnancy:   · Eat a variety of healthy foods  Healthy foods include fruits, vegetables, whole-grain breads, low-fat dairy foods, beans, lean meats, and fish  Drink liquids as directed  Ask how much liquid to drink each day and which liquids are best for you  Limit caffeine to less than 200 milligrams each day  Limit your intake of fish to 2 servings each week  Choose fish low in mercury such as canned light tuna, shrimp, salmon, cod, or tilapia  Do not  eat fish high in mercury such as swordfish, tilefish, chet mackerel, and shark  · Manage heartburn  by eating 4 or 5 small meals each day instead of large meals  Avoid spicy food  · Manage swelling  by lying down and putting your feet up           · Take prenatal vitamins as directed  Your need for certain vitamins and minerals, such as folic acid, increases during pregnancy  Prenatal vitamins provide some of the extra vitamins and minerals you need  Prenatal vitamins may also help to decrease the risk of certain birth defects  · Talk to your healthcare provider about exercise  Moderate exercise can help you stay fit  Your healthcare provider will help you plan an exercise program that is safe for you during pregnancy  · Do not smoke  Smoking increases your risk of a miscarriage and other health problems during your pregnancy  Smoking can cause your baby to be born too early or weigh less at birth  Ask your healthcare provider for information if you need help quitting  · Do not drink alcohol  Alcohol passes from your body to your baby through the placenta  It can affect your baby's brain development and cause fetal alcohol syndrome (FAS)  FAS is a group of conditions that causes mental, behavior, and growth problems  · Talk to your healthcare provider before you take any medicines  Many medicines may harm your baby if you take them when you are pregnant  Do not take any medicines, vitamins, herbs, or supplements without first talking to your healthcare provider  Never use illegal or street drugs (such as marijuana or cocaine) while you are pregnant  Safety tips during pregnancy:   · Avoid hot tubs and saunas  Do not use a hot tub or sauna while you are pregnant, especially during your first trimester  Hot tubs and saunas may raise your baby's temperature and increase the risk of birth defects  · Avoid toxoplasmosis  This is an infection caused by eating raw meat or being around infected cat feces  It can cause birth defects, miscarriages, and other problems  Wash your hands after you touch raw meat  Make sure any meat is well-cooked before you eat it  Avoid raw eggs and unpasteurized milk   Use gloves or ask someone else to clean your cat's litter box while you are pregnant  Changes that are happening with your baby:  By 30 weeks, your baby may weigh more than 3 pounds  Your baby may be about 11 inches long from the top of the head to the rump (baby's bottom)  Your baby's eyes open and close now  Your baby's kicks and movements are more forceful at this time  What you need to know about prenatal care: Your healthcare provider will check your blood pressure and weight  You may also need the following:  · Blood tests  may be done to check for anemia or blood type  · A urine test  may also be done to check for sugar and protein  These can be signs of gestational diabetes or infection  Protein in your urine may also be a sign of preeclampsia  Preeclampsia is a condition that can develop during week 20 or later of your pregnancy  It causes high blood pressure, and it can cause problems with your kidneys and other organs  · A Tdap vaccine and flu vaccine  may be recommended by your healthcare provider  · A gestational diabetes screen  will be done using an oral glucose tolerance test (OGTT)  An OGTT starts with a blood sugar level check after you have not eaten for 8 hours  You are then given a glucose drink  Your blood sugar level is checked after 1 hour, 2 hours, and sometimes 3 hours  Healthcare providers look at how much your blood sugar level increases from the first check  · Fundal height  is a measurement of your uterus to check your baby's growth  This number is usually the same as the number of weeks that you have been pregnant  Your healthcare provider may also check your baby's position  · Your baby's heart rate  will be checked  © Copyright [employer] 2021 Information is for End User's use only and may not be sold, redistributed or otherwise used for commercial purposes   All illustrations and images included in CareNotes® are the copyrighted property of A D A M , Inc  or [facility] Melyssa Jameson   The above information is an  only  It is not intended as medical advice for individual conditions or treatments  Talk to your doctor, nurse or pharmacist before following any medical regimen to see if it is safe and effective for you

## 2021-07-30 NOTE — PROCEDURES
Suni Vonda, a  at 29w1d with an ROCIO of 10/14/2021, by Last Menstrual Period, was seen at [address] for the following procedure(s): $Procedure Type: GUERDA]         4 Quadrant GUERDA  GUERDA Q1 (cm): 1 6 cm  GUERDA Q2 (cm): 1 8 cm  GUERDA Q3 (cm): 3 9 cm  GUERDA Q4 (cm): 1 3 cm  GUERDA TOTAL (cm): 8 6 cm  LVP (cm): 3 9 cm

## 2021-07-30 NOTE — TELEPHONE ENCOUNTER
Pt is currently 29 weeks  Pt states she has not felt the baby in the last two hours but did feel the baby this morning  Pt told to drink two large cold glasses of water and to call the office back in an hour to let us know if she felt the baby  Will update Dr Maia Dudley

## 2021-07-30 NOTE — PROGRESS NOTES
L&D Triage Note - OB/GYN  Sujata Butler 27 y o  female MRN: [id number]  Unit/Bed#: LD TRIAGE 4-01 Encounter: [id number]      ASSESSMENT:    Sujata Butler is a 27 y o  Zion Araujo at 29w1d who presents for decreased fetal movement    PLAN:    1) Decreased fetal movement  - NST reactive  - GUERDA 8 59cm  - Counseled patient on kick counts, 10 kicks/hr for 2 hr  - Encouraged patient to try cold water or sugary drinks if experiencing similar symptoms  - Counseled patient on fetal sleep cycles and what to expect regarding fetal movement    2) A2GDM  - Maintained on Lantus 40U qhs, NovoLog 8-6-6 with meals  - Blood glucose readings have been WNL    3) Mild intermittent asthma  - Continue PRN albuterol inhaler    4) Continue routine prenatal care  - Continue PNVs  - Encouraged patient to call with any concerns with decreased fetal movement  - Counseled patient on kick counts, 10 counts/hr for 2 hr    5) Discharge from [facility] triage with term labor precautions    - Reviewed rupture of membranes, false vs true labor, decreased fetal movement, and vaginal bleeding   - Pt to call provider with any concerns and follow up at her next scheduled prenatal appointment    - Case discussed with Dr Marcio Howard:    Sujata Butler 27 y o  Zion Araujo at 29w1d with an Estimated Date of Delivery: 10/14/21   Patient contacted office due to decreased fetal movement this morning  Patient tried drinking two glasses of cold water but did not appreciate resumed fetal movement  Referred to [facility] for workup      Her current obstetrical history is significant for A2GDM, asthma    Contractions: no  Leakage of fluid: no  Vaginal Bleeding: no  Fetal movement: no    OBJECTIVE:    Vitals:    07/30/21 1500   BP: 116/59   Pulse: 100   Resp: 18   Temp: 99 1 °F (37 3 °C)       ROS:  Constitutional: Negative  Respiratory: Negative  Cardiovascular: Negative    Gastrointestinal: Negative    General Physical Exam:  General: in no apparent distress and well developed and well nourished  Cardiovascular: Cor RRR  Lungs: non-labored breathing  Abdomen: abdomen is soft without significant tenderness, masses, organomegaly or guarding  Lower extremeties: nontender    Fetal monitoring:  FHT:  150 bpm/ Moderate 6 - 25 bpm / 10 x 10 accelerations present, no decelerations  [place]: contractions absent     Imaging:      Abd  US   GUERDA      - Q1 1 57cm     - Q2 1 79cm     - Q3 3 93cm     - Q4 1 30cm     - Total: 8 59cm   Placenta: anterior   Presentation: transverse      Sol Medel MD  OBGYN PGY-1  7/30/2021 4:01 PM

## 2021-07-30 NOTE — TELEPHONE ENCOUNTER
Agree with recommendations  If she calls and is still not feeling the baby moving, recommend evaluation on L&D for decreased fetal movement

## 2021-07-30 NOTE — TELEPHONE ENCOUNTER
Pt called back and states that she has not felt the baby move since we last spoke  As per Dr Pravin Gillette, Pt instructed to go to L&D and they were notified

## 2021-08-04 ENCOUNTER — ROUTINE PRENATAL (OUTPATIENT)
Dept: OBGYN CLINIC | Facility: CLINIC | Age: 26
End: 2021-08-04
Payer: COMMERCIAL

## 2021-08-04 VITALS — SYSTOLIC BLOOD PRESSURE: 122 MMHG | WEIGHT: 259.6 LBS | DIASTOLIC BLOOD PRESSURE: 80 MMHG | BODY MASS INDEX: 41.9 KG/M2

## 2021-08-04 DIAGNOSIS — O99.212 MATERNAL MORBID OBESITY IN SECOND TRIMESTER, ANTEPARTUM (HCC): ICD-10-CM

## 2021-08-04 DIAGNOSIS — Z23 NEED FOR TDAP VACCINATION: ICD-10-CM

## 2021-08-04 DIAGNOSIS — O24.414 INSULIN CONTROLLED GESTATIONAL DIABETES MELLITUS (GDM) IN THIRD TRIMESTER: ICD-10-CM

## 2021-08-04 DIAGNOSIS — E66.01 MATERNAL MORBID OBESITY IN SECOND TRIMESTER, ANTEPARTUM (HCC): ICD-10-CM

## 2021-08-04 DIAGNOSIS — Z3A.29 29 WEEKS GESTATION OF PREGNANCY: Primary | ICD-10-CM

## 2021-08-04 PROCEDURE — 90471 IMMUNIZATION ADMIN: CPT | Performed by: OBSTETRICS & GYNECOLOGY

## 2021-08-04 PROCEDURE — PNV: Performed by: OBSTETRICS & GYNECOLOGY

## 2021-08-04 PROCEDURE — 90715 TDAP VACCINE 7 YRS/> IM: CPT | Performed by: OBSTETRICS & GYNECOLOGY

## 2021-08-04 NOTE — PROGRESS NOTES
27 y o    female at 30 5 wga EGA for PNV  BP : 122/80  TWlb    Patient presents for return OB visit  Feeling well and has no complaints  Patient denies contractions, bleeding or leakage of fluid  Good fetal movement  28 wk labs reviewed  - early dx GDM -- A2GDM   - Hgb 12 3, plts 246  PreE baseline labs WNL  Consent signed  Ok to blood transfusion  Full code  Patient plans to breastfeed  Skin to skin, rooming in, delayed cord clamp,  pain management in labor discussed  TDAP given  Rhogam not indicated  Fetal kick counts reviewed  Follow up in 2 weeks

## 2021-08-04 NOTE — PROGRESS NOTES
Red folder due today   Pt has no problems     Breast pump script submitted   TDAP given in right deltoid without difficulty, pt tolerated well  Urine- 1+ glucose, protein negative

## 2021-08-05 ENCOUNTER — OFFICE VISIT (OUTPATIENT)
Dept: PERINATAL CARE | Facility: CLINIC | Age: 26
End: 2021-08-05
Payer: COMMERCIAL

## 2021-08-05 ENCOUNTER — DOCUMENTATION (OUTPATIENT)
Dept: PERINATAL CARE | Facility: CLINIC | Age: 26
End: 2021-08-05

## 2021-08-05 VITALS
SYSTOLIC BLOOD PRESSURE: 135 MMHG | DIASTOLIC BLOOD PRESSURE: 62 MMHG | BODY MASS INDEX: 42.17 KG/M2 | HEART RATE: 102 BPM | HEIGHT: 66 IN | WEIGHT: 262.4 LBS

## 2021-08-05 DIAGNOSIS — O24.414 INSULIN CONTROLLED GESTATIONAL DIABETES MELLITUS (GDM) IN THIRD TRIMESTER: Primary | ICD-10-CM

## 2021-08-05 DIAGNOSIS — Z3A.30 30 WEEKS GESTATION OF PREGNANCY: ICD-10-CM

## 2021-08-05 DIAGNOSIS — O99.213 OBESITY AFFECTING PREGNANCY IN THIRD TRIMESTER, ANTEPARTUM: ICD-10-CM

## 2021-08-05 PROCEDURE — G0108 DIAB MANAGE TRN  PER INDIV: HCPCS

## 2021-08-05 PROCEDURE — 3008F BODY MASS INDEX DOCD: CPT | Performed by: OBSTETRICS & GYNECOLOGY

## 2021-08-05 NOTE — PROGRESS NOTES
DATE: 21   RE: Gatito Ortiz   : 1995  ROCIO: Estimated Date of Delivery: 10/14/21  EGA:  30w0d  Referring Provider: Dr Angela Casey    Thank you for referring your patient to the Diabetes and Pregnancy Program at [address] [place] [address]  The patient was seen today in-office for medical nutrition therapy for the treatment of diabetes during pregnancy  In addition to diabetes, the nutrition status is complicated by obesity and also noted 20 A1c-5 7%, most recent 21 A1c- 5 3%  Patient reported having taken Metformin prepregnancy but stopped when pregnant  Patient would prefer to avoid Metformin since it crosses the placenta to baby and continue insulin therapy  The following was reviewed with the patient:      Weight gain during in pregnancy  Based on the patients height of 5' 6" (1 676 m) inches, pre-pregnancy weight of 253  pounds (BMI 40 83) we would recommend a total weight gain of no more than 11-20 pounds for the pregnancy  o The patients current weight is 119 kg (262 lb 6 4 oz) pounds, and her weight gain to date is 9 pounds  Based on this, we are recommending the patient no more than 11 pounds for the remainder of the pregnancy   Basic review of macronutrients   Meal pattern should consist of three small meals and three snacks daily   Carbohydrate gram amounts per meal    Instructions on how to read a food label   Appropriate serving size of foods   Incorporating protein at each meal and snack in the importance of protein in relationship to blood glucose control   Individualized meal plan: 2200 calorie gestational diabetes diet   Use of food diary to maintain a meal plan  Patient has been working very hard to follow the meal plan  Food diary revealed patient is sometimes eating convenience food items with a higher fat content causing 1 hr pp elevations above target range  Lunch meal is under on protein amount per meal plan   Patient's timing of meals and insulin is appropriate and consistent daily  Sometimes FBG measurements follow 10 hrs fast to slightly longer  Suggested setting phone alarm to maintain no longer than 10 hr fast overnight until breakfast meal the next day   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 working full time or part time days  Encouraged consistency of physical activity daily  Patient to start walking after dinner 20-30 minutes daily especially on nonworking days  Patient to talk with OB for exercise guidelines   Insulin regimen discussed today  Advised to inject Novolog 10-15 minutes before eating a meal consistently   hypoglycemia with treatment   Breastfeeding guidelines   Post-partum diet recommendations   Report blood glucose levels to [address] weekly or as directed  Patient reported she is not always thoroughly washing her hands after preparing foods prior to testing  Testing instructions again reviewed  o Phone: [phone]  If no response in 24 hours, call [phone]   o Fax: [phone]  o [facility]    Follow up: 8/24/21 BRIAN Cervantes    Diabetes Self Management Support Plan outside of ongoing care: Spouse/Family significant other fiance'    Thank you for the opportunity to participate in the care of this patient  I can be reached at [phone] should you have any questions  Time spent with patient 11:40 AM-12:40 PM; time spent face to face counseling greater than 50% of the appointment      Kerri Talamantes, VISHNU,LDN,CDE  Diabetes Educator  Diabetes and Pregnancy [facility]

## 2021-08-05 NOTE — PROGRESS NOTES
Date: 08/05/21  Cindy Cameron  1995  Estimated Date of Delivery: 10/14/21  30w0d  OB/GYN: [place]  Insulin controlled        Patient reports via flow sheet  Blood sugars and insulin regimen as well as diet guidelines reviewed during follow up appointment today  Assessment and Plan:  Spoke with BRIAN Disla regarding insulin doses  Communicated plan to patient by phone following office visit today  Increase Lantus from 40 to 46 units daily (last increase of lantus and novolog on 7/29) Advised patient to divide Lantus into 2 separate injections of 23 units each and inject one right after the other into 2 different areas of the body  Novolog    -Breakfast: increase 8 to 12 units    -Lunch: increase 6 to 8 units    -Dinner:continue 6   Advised to inject Novolog 10-15 minutes before eating the meal  Patient would prefer to avoid Metformin  Follow up with Matthew Magana on 08/24/2021     Diet:2200 calorie Gestational diabetes meal plan; 3 meals and 3 snacks  Diet recall revealed patient is trying to follow meal plan however is sometimes eating high fat convenience foods causing 1 hr pp>140  Also under eating protein at lunch meal  Timing of meals and snacks are appropriate  Advised to maintain bedtime snack 15 gms CHO and 2-3 ounces of protein  SMBG: Checks blood sugar 4 times per day; fasting and 1 hour start of each meal  Patient is not always washing hands thoroughly after food preparation or after eating a meal prior to check  Advised to maintain no longer than 10 hr fast until breakfast meal   Meter: One-Touch Verio glucose meter  Activity: 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  Patient agreeable to walk following dinner 20-30 minutes per day especially on nonworking days  Advised to check with OB for exercise guidelines      Support System: Significant Other  Patient Goal: I will walk 20-30 minutes after dinner daily   Patient agreeable to try this consistently  Labs  3/2/2021 Hbg A1c = 5 6%  06/02/2021 A1c = 5 2%  Next A1c due by July 28, 2021    Ultrasounds  04/05/2021 : Level 1 NT   05/27/2021  Normal growth and GUERDA   06/28/2021 Fetal echo normal   07/23/2021 Dr Granda Brought:  There is a single live intrauterine pregnancy with growth at the 64th percentile  Next US scheduled for 08/20/2021    Further fetal surveillance  Beginning at 32 weeks, NST / GUERDA twice a week     Gonzalo Altamirano RD,LDN,CDE  Diabetes Educator  Diabetes [facility] Pregnancy Program

## 2021-08-12 ENCOUNTER — DOCUMENTATION (OUTPATIENT)
Dept: PERINATAL CARE | Facility: CLINIC | Age: 26
End: 2021-08-12

## 2021-08-12 DIAGNOSIS — O99.810 HYPERGLYCEMIA IN PREGNANCY: ICD-10-CM

## 2021-08-12 DIAGNOSIS — Z3A.31 31 WEEKS GESTATION OF PREGNANCY: ICD-10-CM

## 2021-08-12 DIAGNOSIS — E55.9 VITAMIN D DEFICIENCY: ICD-10-CM

## 2021-08-12 DIAGNOSIS — E66.01 MATERNAL MORBID OBESITY IN SECOND TRIMESTER, ANTEPARTUM (HCC): ICD-10-CM

## 2021-08-12 DIAGNOSIS — O24.414 INSULIN CONTROLLED GESTATIONAL DIABETES MELLITUS (GDM) IN SECOND TRIMESTER: Primary | ICD-10-CM

## 2021-08-12 DIAGNOSIS — O99.212 MATERNAL MORBID OBESITY IN SECOND TRIMESTER, ANTEPARTUM (HCC): ICD-10-CM

## 2021-08-12 DIAGNOSIS — O24.414 INSULIN CONTROLLED GESTATIONAL DIABETES MELLITUS (GDM) IN THIRD TRIMESTER: Primary | ICD-10-CM

## 2021-08-12 RX ORDER — LANCETS 33 GAUGE
EACH MISCELLANEOUS
Qty: 100 EACH | Refills: 1 | Status: SHIPPED | OUTPATIENT
Start: 2021-08-12 | End: 2021-08-24 | Stop reason: SDUPTHER

## 2021-08-12 NOTE — PROGRESS NOTES
Date: 08/12/21  Jean-Claude Yeh  1995  Estimated Date of Delivery: 10/14/21  31w0d  OB/GYN: [facility]  Insulin controlled              Assessment and Plan:    Increase Lantus from 46 to 56 units daily  Advised patient to divide Lantus into 2 separate injections of 28 units each and inject one right after the other into 2 different areas of the body  Novolog    -Breakfast: increase 12 to 14 units    -Lunch: increase 8 to 10 units    -Dinner: increase 6 to 10 units  Patient would prefer to avoid Metformin  Follow up with Ambrocio Monk on 08/24/2021     Diet:2200 calorie Gestational diabetes meal plan; 3 meals and 3 snacks  Diet recall revealed patient is trying to follow meal plan however is sometimes eating high fat convenience foods causing 1 hr pp>140  Also under eating protein at lunch meal  Timing of meals and snacks are appropriate  Advised to maintain bedtime snack 15 gms CHO and 2-3 ounces of protein  SMBG: Checks blood sugar 4 times per day; fasting and 1 hour start of each meal  Patient is not always washing hands thoroughly after food preparation or after eating a meal prior to check  Advised to maintain no longer than 10 hr fast until breakfast meal   Meter: One-Touch Verio glucose meter  Activity: 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  Patient agreeable to walk following dinner 20-30 minutes per day especially on nonworking days  Advised to check with OB for exercise guidelines      Support System: Significant Other  Patient Goal: I will walk 20-30 minutes after dinner daily  Patient agreeable to try this consistently  Labs  3/2/2021 Hbg A1c = 5 6%  06/02/2021 A1c = 5 2%  0/7/23/2021 A1c 5 3%    Ultrasounds  04/05/2021 : Level 1 NT   05/27/2021  Normal growth and GUERDA   06/28/2021 Fetal echo normal   07/23/2021 Dr Guaman Olp:  There is a single live intrauterine pregnancy with growth at the [address] [place] [address] scheduled for 08/20/2021    Further fetal surveillance  Beginning at 32 weeks, NST / GUERDA twice a week       Diabetes and Pregnancy Program

## 2021-08-13 ENCOUNTER — ROUTINE PRENATAL (OUTPATIENT)
Dept: OBGYN CLINIC | Facility: CLINIC | Age: 26
End: 2021-08-13

## 2021-08-13 VITALS — DIASTOLIC BLOOD PRESSURE: 72 MMHG | SYSTOLIC BLOOD PRESSURE: 122 MMHG | WEIGHT: 262.4 LBS | BODY MASS INDEX: 42.35 KG/M2

## 2021-08-13 DIAGNOSIS — O24.414 INSULIN CONTROLLED GESTATIONAL DIABETES MELLITUS (GDM) IN THIRD TRIMESTER: ICD-10-CM

## 2021-08-13 DIAGNOSIS — Z3A.31 31 WEEKS GESTATION OF PREGNANCY: Primary | ICD-10-CM

## 2021-08-13 DIAGNOSIS — O99.212 MATERNAL MORBID OBESITY IN SECOND TRIMESTER, ANTEPARTUM (HCC): ICD-10-CM

## 2021-08-13 DIAGNOSIS — E66.01 MATERNAL MORBID OBESITY IN SECOND TRIMESTER, ANTEPARTUM (HCC): ICD-10-CM

## 2021-08-13 PROCEDURE — PNV: Performed by: OBSTETRICS & GYNECOLOGY

## 2021-08-13 NOTE — PROGRESS NOTES
27 y o    female at 32 2 wga EGA for PNV  BP : 122/72  TWlb    Patient presents for return OB visit  Feeling well and has minimal complaints  Discussed hydration and skin sensitivities  A2GDM - lantus 56U qhs, Novolog 14U breakfast, 10U lunch and dinner  PTL precautions reviewed  Birth plan: hoping to have unmedicated birth, but open to epidural  Planning circumcision for son  Contraception plan: planning to breastfeed, discussed recommendation for progesterone only options  Considering minipill vs IUD  Follow up in 2 weeks

## 2021-08-20 ENCOUNTER — DOCUMENTATION (OUTPATIENT)
Dept: PERINATAL CARE | Facility: CLINIC | Age: 26
End: 2021-08-20

## 2021-08-20 ENCOUNTER — ULTRASOUND (OUTPATIENT)
Dept: PERINATAL CARE | Facility: CLINIC | Age: 26
End: 2021-08-20
Payer: COMMERCIAL

## 2021-08-20 VITALS
DIASTOLIC BLOOD PRESSURE: 66 MMHG | HEIGHT: 66 IN | HEART RATE: 90 BPM | BODY MASS INDEX: 42.56 KG/M2 | SYSTOLIC BLOOD PRESSURE: 115 MMHG | WEIGHT: 264.8 LBS

## 2021-08-20 DIAGNOSIS — Z3A.32 32 WEEKS GESTATION OF PREGNANCY: Primary | ICD-10-CM

## 2021-08-20 DIAGNOSIS — O24.414 INSULIN CONTROLLED GESTATIONAL DIABETES MELLITUS (GDM) IN THIRD TRIMESTER: ICD-10-CM

## 2021-08-20 PROCEDURE — 59025 FETAL NON-STRESS TEST: CPT | Performed by: OBSTETRICS & GYNECOLOGY

## 2021-08-20 PROCEDURE — 76815 OB US LIMITED FETUS(S): CPT | Performed by: OBSTETRICS & GYNECOLOGY

## 2021-08-20 NOTE — PATIENT INSTRUCTIONS
Kick Counts in Pregnancy   AMBULATORY CARE:   Kick counts  measure how much your baby is moving in your womb  A kick from your baby can be felt as a twist, turn, swish, roll, or jab  It is common to feel your baby kicking at 26 to 28 weeks of pregnancy  You may feel your baby kick as early as 20 weeks of pregnancy  You may want to start counting at 28 weeks  Contact your healthcare provider immediately if:   · You feel a change in the number of kicks or movements of your baby  · You feel fewer than 10 kicks within 2 hours  · You have questions or concerns about your baby's movements  Why measure kick counts:  Your baby's movement may provide information about your baby's health  He or she may move less, or not at all, if there are problems  Your baby may move less if he or she is not getting enough oxygen or nutrition from the placenta  Do not smoke while you are pregnant  Smoking decreases the amount of oxygen that gets to your baby  Talk to your healthcare provider if you need help to quit smoking  Tell your healthcare provider as soon as you feel a change in your baby's movements  When to measure kick counts:   · Measure kick counts at the same time every day  · Measure kick counts when your baby is awake and most active  Your baby may be most active in the evening  How to measure kick counts:  Check that your baby is awake before you measure kick counts  You can wake up your baby by lightly pushing on your belly, walking, or drinking something cold  Your healthcare provider may tell you different ways to measure kick counts  You may be told to do the following:  · Use a chart or clock to keep track of the time you start and finish counting  · Sit in a chair or lie on your left side  · Place your hands on the largest part of your belly  · Count until you reach 10 kicks  Write down how much time it takes to count 10 kicks  · It may take 30 minutes to 2 hours to count 10 kicks  It should not take more than 2 hours to count 10 kicks  Follow up with your healthcare provider as directed:  Write down your questions so you remember to ask them during your visits  © Copyright [employer] 2021 Information is for End User's use only and may not be sold, redistributed or otherwise used for commercial purposes  All illustrations and images included in CareNotes® are the copyrighted property of A D A M , Inc  or [facility] Melyssa Jameson   The above information is an  only  It is not intended as medical advice for individual conditions or treatments  Talk to your doctor, nurse or pharmacist before following any medical regimen to see if it is safe and effective for you

## 2021-08-20 NOTE — PROGRESS NOTES
Date: 08/20/21  Jean-Claude Yeh  1995  Estimated Date of Delivery: 10/14/21  32w1d  OB/GYN: [facility]  Insulin controlled GDM Third trimester        Note: Patient testing 1 hr PP     Assessment and Plan:    Lantus-  Continue 56 units split dose into 2 equal injections of 28 units each  Novolog:  Continue 14-10-10-0 before meals 1-2-3-0  Hold novolog if not eating  Patient would prefer to avoid Metformin  Follow up with Ambrocio Monk on 08/24/2021  Advised patient via [employer] message to keep food log to be reviewed at her next appointment  Diet:2200 calorie Gestational diabetes meal plan; 3 meals and 3 snacks  Diet recall revealed patient is trying to follow meal plan however is sometimes eating high fat convenience foods causing 1 hr pp>140  Also under eating protein at lunch meal  Timing of meals and snacks are appropriate  Advised to maintain bedtime snack 15 gms CHO and 2-3 ounces of protein  SMBG: Checks blood sugar 4 times per day; fasting and 1 hour start of each meal    Advised to maintain no longer than 10 hr fast until breakfast meal   Meter: One-Touch Verio glucose meter  Activity: Patient is trying to add 20-30 minute walks on non work days   Support System: Significant Other  Patient Goal: I will walk 20-30 minutes after dinner daily  Patient agreeable to try this consistently  Labs  3/2/2021 Hbg A1c = 5 6%  06/02/2021 A1c = 5 2%  Next A1c due by July 28, 2021    Ultrasounds  07/23/2021 Dr Guaman Olp:  There is a single live intrauterine pregnancy with growth at the [address] [place] [address] Avenue scheduled for 08/20/2021    Further fetal surveillance  Beginning at 32 weeks, NST / GUERDA twice a week     Callum Parikh RD,LDN  Diabetes Educator  Diabetes and Pregnancy Program

## 2021-08-20 NOTE — LETTER
NST sleeve cover sheet    Patient name: Sujata Butler  : 1995  MRN: [id number]    ROCIO: Estimated Date of Delivery: 10/14/21    Obstetrician: Rahat_    Reason(s) for testing:  GDM, MO      Testing frequency:    __X_ 2x/wk  ___ 1x/wk  ___ Dopplers  ___ BPP?       Last growth scan: __________________________________________

## 2021-08-20 NOTE — PROGRESS NOTES
Please refer to the [facility] ultrasound report in Ob Procedures for additional information regarding today's visit

## 2021-08-20 NOTE — PROGRESS NOTES
Repeat Non-Stress Testing:    Pt verbalizes +FM  Pt denies ALL:               Leaking of fluid   Contractions   Vaginal bleeding   Decreased fetal movement    Patient has no questions or concerns  DR Gato Melvin viewed NST prior to completion of appointment

## 2021-08-20 NOTE — LETTER
August 20, 2021     Bishop Louise MD  [address]   [address]  [facility]    Patient: Ellyn January   YOB: 1995   Date of Visit: 8/20/2021       Dear Dr Sanjana Wallis:    Thank you for referring Ellyn Coon to me for evaluation  Below are my notes for this consultation  If you have questions, please do not hesitate to call me  I look forward to following your patient along with you  Sincerely,        Jackie Silva RN        CC: No Recipients  Uzair Arroyo MD  8/20/2021  7:40 AM  Sign when Signing Visit   Please refer to the [facility] ultrasound report in Ob Procedures for additional information regarding today's visit

## 2021-08-24 ENCOUNTER — ROUTINE PRENATAL (OUTPATIENT)
Dept: PERINATAL CARE | Facility: CLINIC | Age: 26
End: 2021-08-24
Payer: COMMERCIAL

## 2021-08-24 ENCOUNTER — TELEPHONE (OUTPATIENT)
Dept: OBGYN CLINIC | Facility: CLINIC | Age: 26
End: 2021-08-24

## 2021-08-24 ENCOUNTER — ROUTINE PRENATAL (OUTPATIENT)
Dept: OBGYN CLINIC | Facility: CLINIC | Age: 26
End: 2021-08-24

## 2021-08-24 ENCOUNTER — OFFICE VISIT (OUTPATIENT)
Dept: PERINATAL CARE | Facility: CLINIC | Age: 26
End: 2021-08-24
Payer: COMMERCIAL

## 2021-08-24 VITALS — BODY MASS INDEX: 43 KG/M2 | WEIGHT: 266.4 LBS | SYSTOLIC BLOOD PRESSURE: 126 MMHG | DIASTOLIC BLOOD PRESSURE: 70 MMHG

## 2021-08-24 VITALS
WEIGHT: 266.4 LBS | HEIGHT: 66 IN | SYSTOLIC BLOOD PRESSURE: 121 MMHG | DIASTOLIC BLOOD PRESSURE: 60 MMHG | BODY MASS INDEX: 42.81 KG/M2 | HEART RATE: 82 BPM

## 2021-08-24 DIAGNOSIS — O99.212 MATERNAL MORBID OBESITY IN SECOND TRIMESTER, ANTEPARTUM (HCC): ICD-10-CM

## 2021-08-24 DIAGNOSIS — O99.810 HYPERGLYCEMIA IN PREGNANCY: ICD-10-CM

## 2021-08-24 DIAGNOSIS — O99.213 OBESITY AFFECTING PREGNANCY IN THIRD TRIMESTER: Primary | ICD-10-CM

## 2021-08-24 DIAGNOSIS — O24.414 INSULIN CONTROLLED GESTATIONAL DIABETES MELLITUS (GDM) IN THIRD TRIMESTER: ICD-10-CM

## 2021-08-24 DIAGNOSIS — Z3A.32 32 WEEKS GESTATION OF PREGNANCY: ICD-10-CM

## 2021-08-24 DIAGNOSIS — O24.414 INSULIN CONTROLLED GESTATIONAL DIABETES MELLITUS (GDM) IN SECOND TRIMESTER: ICD-10-CM

## 2021-08-24 DIAGNOSIS — E55.9 VITAMIN D DEFICIENCY: ICD-10-CM

## 2021-08-24 DIAGNOSIS — O24.414 INSULIN CONTROLLED GESTATIONAL DIABETES MELLITUS (GDM) IN THIRD TRIMESTER: Primary | ICD-10-CM

## 2021-08-24 DIAGNOSIS — O24.419 GESTATIONAL DIABETES MELLITUS (GDM) IN SECOND TRIMESTER, GESTATIONAL DIABETES METHOD OF CONTROL UNSPECIFIED: ICD-10-CM

## 2021-08-24 DIAGNOSIS — E66.01 MATERNAL MORBID OBESITY IN SECOND TRIMESTER, ANTEPARTUM (HCC): ICD-10-CM

## 2021-08-24 PROCEDURE — 59025 FETAL NON-STRESS TEST: CPT | Performed by: OBSTETRICS & GYNECOLOGY

## 2021-08-24 PROCEDURE — 99214 OFFICE O/P EST MOD 30 MIN: CPT | Performed by: NURSE PRACTITIONER

## 2021-08-24 PROCEDURE — PNV: Performed by: OBSTETRICS & GYNECOLOGY

## 2021-08-24 RX ORDER — BLOOD SUGAR DIAGNOSTIC
STRIP MISCELLANEOUS
Qty: 100 STRIP | Refills: 2 | Status: SHIPPED | OUTPATIENT
Start: 2021-08-24 | End: 2021-10-10 | Stop reason: HOSPADM

## 2021-08-24 RX ORDER — INSULIN ASPART 100 [IU]/ML
INJECTION, SOLUTION INTRAVENOUS; SUBCUTANEOUS
Qty: 15 ML | Refills: 1 | Status: SHIPPED | OUTPATIENT
Start: 2021-08-24 | End: 2021-10-10 | Stop reason: HOSPADM

## 2021-08-24 RX ORDER — INSULIN GLARGINE 100 [IU]/ML
60 INJECTION, SOLUTION SUBCUTANEOUS
Qty: 15 ML | Refills: 1 | Status: SHIPPED | OUTPATIENT
Start: 2021-08-24 | End: 2021-10-10 | Stop reason: HOSPADM

## 2021-08-24 RX ORDER — LANCETS 33 GAUGE
EACH MISCELLANEOUS
Qty: 100 EACH | Refills: 2 | Status: SHIPPED | OUTPATIENT
Start: 2021-08-24 | End: 2021-11-05

## 2021-08-24 NOTE — TELEPHONE ENCOUNTER
Patient states she has not heard from [id number] regarding her breast pump  Per [facility], attempts have made to reach the patient to make her aware that per her insurance, she must wait until she delivers  I have reached out to [facility] and request that they again try reaching the patient and make sure she understands

## 2021-08-24 NOTE — ASSESSMENT & PLAN NOTE
-Basal/bolus insulin adjusted  Pt up for the first time post cath. RN advised to dangle at bedside. Pt did so. Tolerated ambulating well. Steady on feet. R Groin continues to be free of complications. Pt now up independent.  Electronically signed by Homer Kyle RN on 11/21/2018 at 8:06 PM

## 2021-08-24 NOTE — PROGRESS NOTES
Patient notes a sharp pain when changing positions  She would like to discuss Vitamin D 3000 supplement as well as cream she plans to use  Urine neg protein, +trace glucose

## 2021-08-24 NOTE — PATIENT INSTRUCTIONS
Kick Counts in Pregnancy   WHAT YOU NEED TO KNOW:   Kick counts measure how much your baby is moving in your womb  A kick from your baby can be felt as a twist, turn, swish, roll, or jab  It is common to feel your baby kicking at 26 to 28 weeks of pregnancy  You may feel your baby kick as early as 20 weeks of pregnancy  You may want to start counting at 28 weeks  DISCHARGE INSTRUCTIONS:   Contact your healthcare provider immediately if:   · You feel a change in the number of kicks or movements of your baby  · You feel fewer than 10 kicks within 2 hours  · You have questions or concerns about your baby's movements  Why measure kick counts:  Your baby's movement may provide information about your baby's health  He or she may move less, or not at all, if there are problems  Your baby may move less if he or she is not getting enough oxygen or nutrition from the placenta  Do not smoke while you are pregnant  Smoking decreases the amount of oxygen that gets to your baby  Talk to your healthcare provider if you need help to quit smoking  Tell your healthcare provider as soon as you feel a change in your baby's movements  When to measure kick counts:   · Measure kick counts at the same time every day  · Measure kick counts when your baby is awake and most active  Your baby may be most active in the evening  How to measure kick counts:  Check that your baby is awake before you measure kick counts  You can wake up your baby by lightly pushing on your belly, walking, or drinking something cold  Your healthcare provider may tell you different ways to measure kick counts  You may be told to do the following:  · Use a chart or clock to keep track of the time you start and finish counting  · Sit in a chair or lie on your left side  · Place your hands on the largest part of your belly  · Count until you reach 10 kicks  Write down how much time it takes to count 10 kicks       · It may take 30 minutes to 2 hours to count 10 kicks  It should not take more than 2 hours to count 10 kicks  Follow up with your healthcare provider as directed:  Write down your questions so you remember to ask them during your visits  © Copyright [employer] 2021 Information is for End User's use only and may not be sold, redistributed or otherwise used for commercial purposes  All illustrations and images included in CareNotes® are the copyrighted property of [employer]  or Effie Jameson   The above information is an  only  It is not intended as medical advice for individual conditions or treatments  Talk to your doctor, nurse or pharmacist before following any medical regimen to see if it is safe and effective for you

## 2021-08-24 NOTE — PATIENT INSTRUCTIONS
-Due to hyperglycemia, increase Lantus from 56 to 60 units a day split into 2 doses (30 units injected one after another into 2 different sites)  -Increase Novolog to 16-10-14-0 before meals 1-2-3   -Continue GDM diet 3 meals and 3 snacks a day  -Continue SMBG fasting and 1 hour post meal, reporting via glucose flowsheet   -Continue walking up to 30 minutes a day  -Always have glucose available to treat hypoglycemia, use 15 by 15 rule  -Continue follow up with OB and MFM as recommended  -NST twice a week and GUERDA weekly    -Fetal growth ultrasound every 4 to 6 weeks as recommended   -Continue close contact with diabetes education team

## 2021-08-24 NOTE — ASSESSMENT & PLAN NOTE
-Due to hyperglycemia, increase Lantus from 56 to 60 units a day split into 2 doses (30 units injected one after another into 2 different sites)  -Increase Novolog to 16-10-14-0 before meals 1-2-3   -Continue GDM diet 3 meals and 3 snacks a day  -Continue SMBG fasting and 1 hour post meal, reporting via glucose flowsheet   -Continue walking up to 30 minutes a day  -Always have glucose available to treat hypoglycemia, use 15 by 15 rule  -Continue follow up with OB and MFM as recommended  -NST twice a week and GUERDA weekly    -Fetal growth ultrasound every 4 to 6 weeks as recommended   -Continue close contact with diabetes education team      Lab Results   Component Value Date    HGBA1C 5 3 07/23/2021

## 2021-08-24 NOTE — ASSESSMENT & PLAN NOTE
-Pre-pregnancy weight 254 lbs  -Current weight 266 lbs  -Current BMI 43   -Recommended weight gain during pregnancy   -Continue GDM diet

## 2021-08-24 NOTE — PROGRESS NOTES
Assessment/Plan:    Maternal morbid obesity in third trimester, antepartum ([address] )  -Pre-pregnancy weight 254 lbs  -Current weight 266 lbs  -Current BMI 43   -Recommended weight gain during pregnancy   -Continue GDM diet  Insulin controlled gestational diabetes mellitus (GDM) in third trimester  -Due to hyperglycemia, increase Lantus from 56 to 60 units a day split into 2 doses (30 units injected one after another into 2 different sites)  -Increase Novolog to 16-10-14-0 before meals 1-2-3   -Continue GDM diet 3 meals and 3 snacks a day  -Continue SMBG fasting and 1 hour post meal, reporting via glucose flowsheet   -Continue walking up to 30 minutes a day  -Always have glucose available to treat hypoglycemia, use 15 by 15 rule  -Continue follow up with OB and MFM as recommended  -NST twice a week and GUERDA weekly  -Fetal growth ultrasound every 4 to 6 weeks as recommended   -Continue close contact with diabetes education team      Lab Results   Component Value Date    HGBA1C 5 3 07/23/2021       Hyperglycemia in pregnancy  -Basal/bolus insulin adjusted  Diagnoses and all orders for this visit:    Insulin controlled gestational diabetes mellitus (GDM) in third trimester  -     OneTouch Delica Lancets 64T MISC; Test 4 times per day  Gestational Diabetes  -     OneTouch Verio test strip; Use as instructed  -     insulin glargine (Lantus SoloStar) 100 units/mL injection pen; Inject 60 Units under the skin daily at bedtime At 9 or 10 PM daily  To be titrated  -     insulin aspart (NovoLOG FlexPen) 100 UNIT/ML injection pen; 16-10-14-0 before meals 1-2-3  Titrate as needed    Hyperglycemia in pregnancy  -     OneTouch Delica Lancets 67J MISC; Test 4 times per day  Gestational Diabetes  -     OneTouch Verio test strip; Use as instructed  -     insulin glargine (Lantus SoloStar) 100 units/mL injection pen; Inject 60 Units under the skin daily at bedtime At 9 or 10 PM daily  To be titrated    -     insulin aspart (NovoLOG FlexPen) 100 UNIT/ML injection pen; 16-10-14-0 before meals 1-2-3  Titrate as needed    32 weeks gestation of pregnancy  -     OneTouch Delica Lancets 99C MISC; Test 4 times per day  Gestational Diabetes  -     OneTouch Verio test strip; Use as instructed  -     insulin glargine (Lantus SoloStar) 100 units/mL injection pen; Inject 60 Units under the skin daily at bedtime At 9 or 10 PM daily  To be titrated  -     insulin aspart (NovoLOG FlexPen) 100 UNIT/ML injection pen; 16-10-14-0 before meals 1-2-3  Titrate as needed    BMI 40 0-44 9, adult ([facility] )  -     OneTouch Delica Lancets 41Q MISC; Test 4 times per day  Gestational Diabetes  -     OneTouch Verio test strip; Use as instructed  -     insulin glargine (Lantus SoloStar) 100 units/mL injection pen; Inject 60 Units under the skin daily at bedtime At 9 or 10 PM daily  To be titrated  -     insulin aspart (NovoLOG FlexPen) 100 UNIT/ML injection pen; 16-10-14-0 before meals 1-2-3  Titrate as needed    Insulin controlled gestational diabetes mellitus (GDM) in second trimester    Vitamin D deficiency    Maternal morbid obesity in second trimester, antepartum ([facility] )    Gestational diabetes mellitus (GDM) in second trimester, gestational diabetes method of control unspecified          Subjective:      Patient ID: Uriel Angel is a 27 y o  female  27 5/7 weeks GDM on Lantus 56 units split dose daily at 10 PM  Novolog 14-10-10-0 before meals 1-2-3  Eating 3 meals and 3 snacks a day  Testing 4 times a day  Positive fetal movements  Without complaints today  Had NST today         The following portions of the patient's history were reviewed and updated as appropriate: allergies, current medications, past family history, past medical history, past social history, past surgical history and problem list     No Known Allergies  Current Outpatient Medications on File Prior to Visit   Medication Sig Dispense Refill    albuterol (PROVENTIL HFA,VENTOLIN HFA) 90 mcg/act inhaler Inhale 2 puffs every 4 (four) hours as needed for wheezing 1 Inhaler 0    aspirin (ECOTRIN LOW STRENGTH) 81 mg EC tablet Take 2 tablets (162 mg total) by mouth daily 60 tablet 0    Blood Glucose Monitoring Suppl (OneTouch Verio Flex System) w/Device KIT Test 4 times per day  Gestational Diabetes  1 kit 0    calcium carbonate (TUMS EX) 750 mg chewable tablet Chew 2 tablets 2 (two) times a day      Cholecalciferol (Vitamin D3) 50 MCG (2000 UT) TABS Take 2,000 Units by mouth daily      fluticasone (FLONASE) 50 mcg/act nasal spray 2 sprays into each nostril daily as needed for rhinitis 1 Bottle 8    Insulin Pen Needle 31G X 5 MM MISC Inject under the skin daily at bedtime Use up to 5 a day or as directed  150 each 1    Prenatal Vit-Fe Fumarate-FA (PRENATAL VITAMIN PO) Take 1 capsule by mouth daily      [DISCONTINUED] insulin aspart (NovoLOG FlexPen) 100 UNIT/ML injection pen Inject 4 Units under the skin 3 (three) times a day before meals Titrate as needed (Patient taking differently: Inject 14 Units under the skin 3 (three) times a day before meals Titrate as needed   14 units at breakfast, 10 units at lunch, 10 units at dinner ) 15 mL 1    [DISCONTINUED] insulin glargine (Lantus SoloStar) 100 units/mL injection pen Inject 30 Units under the skin daily at bedtime At 9 or 10 PM daily  To be titrated  (Patient taking differently: Inject 56 Units under the skin daily at bedtime At 9 or 10 PM daily  To be titrated ) 15 mL 0    [DISCONTINUED] metoclopramide (REGLAN) 5 mg tablet Take 1 tablet (5 mg total) by mouth 4 (four) times a day 30 tablet 0    [DISCONTINUED] OneTouch Delica Lancets 14X MISC Test 4 times per day  Gestational Diabetes  100 each 1    [DISCONTINUED] OneTouch Verio test strip Use as instructed 100 strip 3     No current facility-administered medications on file prior to visit  Review of Systems   Constitutional: Negative for fatigue and fever     Eyes: Negative for visual disturbance  Respiratory: Negative for cough and shortness of breath  Cardiovascular: Negative for chest pain and palpitations  Gastrointestinal: Negative for nausea and vomiting  Endocrine: Negative for polydipsia, polyphagia and polyuria  Genitourinary: Negative for difficulty urinating and vaginal bleeding  Neurological: Negative for headaches  Psychiatric/Behavioral: Negative for sleep disturbance  Objective:  Refer to glucose flowsheet  Component Value Date/Time    HGBA1C 5 3 07/23/2021 1239    HGBA1C 5 4 07/16/2021 0902      LMP 01/07/2021 (Exact Date)          Physical Exam  HENT:      Head: Normocephalic  Eyes:      Conjunctiva/sclera: Conjunctivae normal    Pulmonary:      Effort: Pulmonary effort is normal    Musculoskeletal:         General: Normal range of motion  Neurological:      Mental Status: She is alert and oriented to person, place, and time  Psychiatric:         Mood and Affect: Mood normal          Behavior: Behavior normal          Thought Content:  Thought content normal          Judgment: Judgment normal          Time in:3:55 PM  Time out:4:15 PM

## 2021-08-24 NOTE — PROGRESS NOTES
This is a 27 y o  Cleotha Bury at [address] [facility] who presents for return OB visit  No complaints  Denies contractions, leakage, bleeding  Endorses fetal movement   BP: 126/70 TWlb    Patient plans to breastfeed  Plans for contraception: IUD  Discussed postplacental vs interval insertion  Still considering  Discussed and recommended covid vaccination  GDMA2: followed by [facility]  Wants to do all testing with their office   Discussed delivery timing  F/up 2 wks

## 2021-08-24 NOTE — PROGRESS NOTES
Nonstress test at 32 and 5 7th weeks  Indication GDM, maternal obesity  Interpretation reactive  Plan:    1  Twice a week nonstress test       Two daily fetal movement counts      Marylen Parma, MD

## 2021-08-25 ENCOUNTER — TELEPHONE (OUTPATIENT)
Dept: PERINATAL CARE | Facility: OTHER | Age: 26
End: 2021-08-25

## 2021-08-26 ENCOUNTER — HOSPITAL ENCOUNTER (OUTPATIENT)
Facility: HOSPITAL | Age: 26
Discharge: HOME | End: 2021-08-27
Attending: EMERGENCY MEDICINE | Admitting: OBSTETRICS & GYNECOLOGY
Payer: COMMERCIAL

## 2021-08-26 DIAGNOSIS — R07.9 CHEST PAIN: ICD-10-CM

## 2021-08-26 DIAGNOSIS — R06.02 SHORTNESS OF BREATH: Primary | ICD-10-CM

## 2021-08-26 DIAGNOSIS — J30.9 ALLERGIC RHINITIS, UNSPECIFIED SEASONALITY, UNSPECIFIED TRIGGER: ICD-10-CM

## 2021-08-26 LAB
ALBUMIN SERPL BCP-MCNC: 2.9 G/DL (ref 3.5–5)
ALP SERPL-CCNC: 96 U/L (ref 46–116)
ALT SERPL W P-5'-P-CCNC: 16 U/L (ref 12–78)
ANION GAP SERPL CALCULATED.3IONS-SCNC: 13 MMOL/L (ref 4–13)
AST SERPL W P-5'-P-CCNC: 10 U/L (ref 5–45)
BASOPHILS # BLD AUTO: 0.02 THOUSANDS/ΜL (ref 0–0.1)
BASOPHILS NFR BLD AUTO: 0 % (ref 0–1)
BILIRUB SERPL-MCNC: 0.35 MG/DL (ref 0.2–1)
BILIRUB UR QL STRIP: NEGATIVE
BUN SERPL-MCNC: 7 MG/DL (ref 5–25)
CALCIUM ALBUM COR SERPL-MCNC: 9.8 MG/DL (ref 8.3–10.1)
CALCIUM SERPL-MCNC: 8.9 MG/DL (ref 8.3–10.1)
CHLORIDE SERPL-SCNC: 106 MMOL/L (ref 100–108)
CLARITY UR: CLEAR
CO2 SERPL-SCNC: 20 MMOL/L (ref 21–32)
COLOR UR: YELLOW
CREAT SERPL-MCNC: 0.6 MG/DL (ref 0.6–1.3)
EOSINOPHIL # BLD AUTO: 0.09 THOUSAND/ΜL (ref 0–0.61)
EOSINOPHIL NFR BLD AUTO: 1 % (ref 0–6)
ERYTHROCYTE [DISTWIDTH] IN BLOOD BY AUTOMATED COUNT: 14.1 % (ref 11.6–15.1)
GFR SERPL CREATININE-BSD FRML MDRD: 128 ML/MIN/1.73SQ M
GLUCOSE SERPL-MCNC: 174 MG/DL (ref 65–140)
GLUCOSE UR STRIP-MCNC: NEGATIVE MG/DL
HCT VFR BLD AUTO: 37.1 % (ref 34.8–46.1)
HGB BLD-MCNC: 12 G/DL (ref 11.5–15.4)
HGB UR QL STRIP.AUTO: NEGATIVE
IMM GRANULOCYTES # BLD AUTO: 0.07 THOUSAND/UL (ref 0–0.2)
IMM GRANULOCYTES NFR BLD AUTO: 1 % (ref 0–2)
KETONES UR STRIP-MCNC: NEGATIVE MG/DL
LEUKOCYTE ESTERASE UR QL STRIP: NEGATIVE
LYMPHOCYTES # BLD AUTO: 2.16 THOUSANDS/ΜL (ref 0.6–4.47)
LYMPHOCYTES NFR BLD AUTO: 19 % (ref 14–44)
MCH RBC QN AUTO: 28.6 PG (ref 26.8–34.3)
MCHC RBC AUTO-ENTMCNC: 32.3 G/DL (ref 31.4–37.4)
MCV RBC AUTO: 89 FL (ref 82–98)
MONOCYTES # BLD AUTO: 0.55 THOUSAND/ΜL (ref 0.17–1.22)
MONOCYTES NFR BLD AUTO: 5 % (ref 4–12)
NEUTROPHILS # BLD AUTO: 8.75 THOUSANDS/ΜL (ref 1.85–7.62)
NEUTS SEG NFR BLD AUTO: 74 % (ref 43–75)
NITRITE UR QL STRIP: NEGATIVE
NRBC BLD AUTO-RTO: 0 /100 WBCS
PH UR STRIP.AUTO: 6 [PH]
PLATELET # BLD AUTO: 235 THOUSANDS/UL (ref 149–390)
PMV BLD AUTO: 11 FL (ref 8.9–12.7)
POTASSIUM SERPL-SCNC: 3.8 MMOL/L (ref 3.5–5.3)
PROT SERPL-MCNC: 7 G/DL (ref 6.4–8.2)
PROT UR STRIP-MCNC: NEGATIVE MG/DL
RBC # BLD AUTO: 4.19 MILLION/UL (ref 3.81–5.12)
SARS-COV-2 RNA RESP QL NAA+PROBE: NEGATIVE
SODIUM SERPL-SCNC: 139 MMOL/L (ref 136–145)
SP GR UR STRIP.AUTO: 1.01 (ref 1–1.03)
TROPONIN I SERPL-MCNC: <0.02 NG/ML
UROBILINOGEN UR QL STRIP.AUTO: 0.2 E.U./DL
WBC # BLD AUTO: 11.64 THOUSAND/UL (ref 4.31–10.16)

## 2021-08-26 PROCEDURE — U0005 INFEC AGEN DETEC AMPLI PROBE: HCPCS

## 2021-08-26 PROCEDURE — 36415 COLL VENOUS BLD VENIPUNCTURE: CPT

## 2021-08-26 PROCEDURE — 85025 COMPLETE CBC W/AUTO DIFF WBC: CPT | Performed by: EMERGENCY MEDICINE

## 2021-08-26 PROCEDURE — 99285 EMERGENCY DEPT VISIT HI MDM: CPT | Performed by: EMERGENCY MEDICINE

## 2021-08-26 PROCEDURE — 93005 ELECTROCARDIOGRAM TRACING: CPT

## 2021-08-26 PROCEDURE — 80053 COMPREHEN METABOLIC PANEL: CPT | Performed by: EMERGENCY MEDICINE

## 2021-08-26 PROCEDURE — 84484 ASSAY OF TROPONIN QUANT: CPT | Performed by: EMERGENCY MEDICINE

## 2021-08-26 PROCEDURE — U0003 INFECTIOUS AGENT DETECTION BY NUCLEIC ACID (DNA OR RNA); SEVERE ACUTE RESPIRATORY SYNDROME CORONAVIRUS 2 (SARS-COV-2) (CORONAVIRUS DISEASE [COVID-19]), AMPLIFIED PROBE TECHNIQUE, MAKING USE OF HIGH THROUGHPUT TECHNOLOGIES AS DESCRIBED BY CMS-2020-01-R: HCPCS

## 2021-08-26 PROCEDURE — 87086 URINE CULTURE/COLONY COUNT: CPT

## 2021-08-26 PROCEDURE — 81003 URINALYSIS AUTO W/O SCOPE: CPT

## 2021-08-26 PROCEDURE — 99285 EMERGENCY DEPT VISIT HI MDM: CPT

## 2021-08-26 RX ORDER — FLUTICASONE PROPIONATE 50 MCG
2 SPRAY, SUSPENSION (ML) NASAL DAILY PRN
Qty: 16 G | Refills: 1 | Status: SHIPPED | OUTPATIENT
Start: 2021-08-26 | End: 2021-11-05

## 2021-08-26 NOTE — PROGRESS NOTES
Please refer to the [facility] ultrasound report in Ob Procedures for additional information regarding today's visit

## 2021-08-27 ENCOUNTER — ULTRASOUND (OUTPATIENT)
Dept: PERINATAL CARE | Facility: CLINIC | Age: 26
End: 2021-08-27
Payer: COMMERCIAL

## 2021-08-27 ENCOUNTER — ROUTINE PRENATAL (OUTPATIENT)
Dept: PERINATAL CARE | Facility: CLINIC | Age: 26
End: 2021-08-27

## 2021-08-27 VITALS
HEART RATE: 103 BPM | DIASTOLIC BLOOD PRESSURE: 60 MMHG | TEMPERATURE: 98.2 F | RESPIRATION RATE: 20 BRPM | OXYGEN SATURATION: 97 % | SYSTOLIC BLOOD PRESSURE: 118 MMHG

## 2021-08-27 VITALS
HEART RATE: 94 BPM | BODY MASS INDEX: 42.59 KG/M2 | DIASTOLIC BLOOD PRESSURE: 63 MMHG | HEIGHT: 66 IN | WEIGHT: 265 LBS | SYSTOLIC BLOOD PRESSURE: 112 MMHG

## 2021-08-27 DIAGNOSIS — Z3A.33 33 WEEKS GESTATION OF PREGNANCY: ICD-10-CM

## 2021-08-27 DIAGNOSIS — O24.414 INSULIN CONTROLLED GESTATIONAL DIABETES MELLITUS (GDM) IN THIRD TRIMESTER: Primary | ICD-10-CM

## 2021-08-27 PROBLEM — R07.9 CHEST PAIN: Status: ACTIVE | Noted: 2021-08-27

## 2021-08-27 PROBLEM — R06.02 SHORTNESS OF BREATH: Status: ACTIVE | Noted: 2021-08-27

## 2021-08-27 LAB
ATRIAL RATE: 103 BPM
P AXIS: 34 DEGREES
PR INTERVAL: 136 MS
QRS AXIS: 81 DEGREES
QRSD INTERVAL: 88 MS
QT INTERVAL: 314 MS
QTC INTERVAL: 403 MS
T WAVE AXIS: 5 DEGREES
VENTRICULAR RATE: 99 BPM

## 2021-08-27 PROCEDURE — 59025 FETAL NON-STRESS TEST: CPT | Performed by: OBSTETRICS & GYNECOLOGY

## 2021-08-27 PROCEDURE — 76816 OB US FOLLOW-UP PER FETUS: CPT | Performed by: OBSTETRICS & GYNECOLOGY

## 2021-08-27 PROCEDURE — 99213 OFFICE O/P EST LOW 20 MIN: CPT | Performed by: OBSTETRICS & GYNECOLOGY

## 2021-08-27 PROCEDURE — NC001 PR NO CHARGE: Performed by: OBSTETRICS & GYNECOLOGY

## 2021-08-27 PROCEDURE — 99213 OFFICE O/P EST LOW 20 MIN: CPT

## 2021-08-27 PROCEDURE — 93010 ELECTROCARDIOGRAM REPORT: CPT | Performed by: INTERNAL MEDICINE

## 2021-08-27 NOTE — LETTER
August 27, 2021     DO [address]ita [address]  [address] [facility]    Patient: Danita Pavon   YOB: 1995   Date of Visit: 8/27/2021       Dear Dr Lisa Dimas:    Thank you for referring Danita Pavon to me for evaluation  Below are my notes for this consultation  If you have questions, please do not hesitate to call me  I look forward to following your patient along with you  Sincerely,        Kim Smith MD        CC: No Recipients  Kim Smith MD  8/26/2021  5:33 PM  Sign when Signing Visit   Please refer to the [facility] ultrasound report in Ob Procedures for additional information regarding today's visit

## 2021-08-27 NOTE — PATIENT INSTRUCTIONS
Kick Counts in Pregnancy   AMBULATORY CARE:   Kick counts  measure how much your baby is moving in your womb  A kick from your baby can be felt as a twist, turn, swish, roll, or jab  It is common to feel your baby kicking at 26 to 28 weeks of pregnancy  You may feel your baby kick as early as 20 weeks of pregnancy  You may want to start counting at 28 weeks  Contact your healthcare provider immediately if:   · You feel a change in the number of kicks or movements of your baby  · You feel fewer than 10 kicks within 2 hours  · You have questions or concerns about your baby's movements  Why measure kick counts:  Your baby's movement may provide information about your baby's health  He or she may move less, or not at all, if there are problems  Your baby may move less if he or she is not getting enough oxygen or nutrition from the placenta  Do not smoke while you are pregnant  Smoking decreases the amount of oxygen that gets to your baby  Talk to your healthcare provider if you need help to quit smoking  Tell your healthcare provider as soon as you feel a change in your baby's movements  When to measure kick counts:   · Measure kick counts at the same time every day  · Measure kick counts when your baby is awake and most active  Your baby may be most active in the evening  How to measure kick counts:  Check that your baby is awake before you measure kick counts  You can wake up your baby by lightly pushing on your belly, walking, or drinking something cold  Your healthcare provider may tell you different ways to measure kick counts  You may be told to do the following:  · Use a chart or clock to keep track of the time you start and finish counting  · Sit in a chair or lie on your left side  · Place your hands on the largest part of your belly  · Count until you reach 10 kicks  Write down how much time it takes to count 10 kicks  · It may take 30 minutes to 2 hours to count 10 kicks  It should not take more than 2 hours to count 10 kicks  Follow up with your healthcare provider as directed:  Write down your questions so you remember to ask them during your visits  © Copyright [employer] 2021 Information is for End User's use only and may not be sold, redistributed or otherwise used for commercial purposes  All illustrations and images included in CareNotes® are the copyrighted property of A D A M , Inc  or [facility] Melyssa Jameson   The above information is an  only  It is not intended as medical advice for individual conditions or treatments  Talk to your doctor, nurse or pharmacist before following any medical regimen to see if it is safe and effective for you

## 2021-08-28 LAB — BACTERIA UR CULT: NORMAL

## 2021-08-31 ENCOUNTER — ROUTINE PRENATAL (OUTPATIENT)
Dept: PERINATAL CARE | Facility: CLINIC | Age: 26
End: 2021-08-31
Payer: COMMERCIAL

## 2021-08-31 VITALS
DIASTOLIC BLOOD PRESSURE: 66 MMHG | HEIGHT: 66 IN | BODY MASS INDEX: 42.97 KG/M2 | HEART RATE: 90 BPM | SYSTOLIC BLOOD PRESSURE: 115 MMHG | WEIGHT: 267.4 LBS

## 2021-08-31 DIAGNOSIS — O24.414 INSULIN CONTROLLED GESTATIONAL DIABETES MELLITUS (GDM) IN THIRD TRIMESTER: Primary | ICD-10-CM

## 2021-08-31 DIAGNOSIS — Z3A.33 33 WEEKS GESTATION OF PREGNANCY: ICD-10-CM

## 2021-08-31 PROCEDURE — 59025 FETAL NON-STRESS TEST: CPT | Performed by: OBSTETRICS & GYNECOLOGY

## 2021-08-31 NOTE — PROGRESS NOTES
Repeat Non-Stress Testing:    Pt verbalizes +FM  Pt denies ALL:               Leaking of fluid   Contractions   Vaginal bleeding   Decreased fetal movement    Patient has no questions or concerns             Dr Robie Alpers viewed strip prior to completion of visit

## 2021-08-31 NOTE — PATIENT INSTRUCTIONS
Kick Counts in Pregnancy   AMBULATORY CARE:   Kick counts  measure how much your baby is moving in your womb  A kick from your baby can be felt as a twist, turn, swish, roll, or jab  It is common to feel your baby kicking at 26 to 28 weeks of pregnancy  You may feel your baby kick as early as 20 weeks of pregnancy  You may want to start counting at 28 weeks  Contact your healthcare provider immediately if:   · You feel a change in the number of kicks or movements of your baby  · You feel fewer than 10 kicks within 2 hours  · You have questions or concerns about your baby's movements  Why measure kick counts:  Your baby's movement may provide information about your baby's health  He or she may move less, or not at all, if there are problems  Your baby may move less if he or she is not getting enough oxygen or nutrition from the placenta  Do not smoke while you are pregnant  Smoking decreases the amount of oxygen that gets to your baby  Talk to your healthcare provider if you need help to quit smoking  Tell your healthcare provider as soon as you feel a change in your baby's movements  When to measure kick counts:   · Measure kick counts at the same time every day  · Measure kick counts when your baby is awake and most active  Your baby may be most active in the evening  How to measure kick counts:  Check that your baby is awake before you measure kick counts  You can wake up your baby by lightly pushing on your belly, walking, or drinking something cold  Your healthcare provider may tell you different ways to measure kick counts  You may be told to do the following:  · Use a chart or clock to keep track of the time you start and finish counting  · Sit in a chair or lie on your left side  · Place your hands on the largest part of your belly  · Count until you reach 10 kicks  Write down how much time it takes to count 10 kicks  · It may take 30 minutes to 2 hours to count 10 kicks  It should not take more than 2 hours to count 10 kicks  Follow up with your healthcare provider as directed:  Write down your questions so you remember to ask them during your visits  © Copyright [employer] 2021 Information is for End User's use only and may not be sold, redistributed or otherwise used for commercial purposes  All illustrations and images included in CareNotes® are the copyrighted property of A D A M , Inc  or [facility] Melyssa Jameson   The above information is an  only  It is not intended as medical advice for individual conditions or treatments  Talk to your doctor, nurse or pharmacist before following any medical regimen to see if it is safe and effective for you

## 2021-09-01 ENCOUNTER — DOCUMENTATION (OUTPATIENT)
Dept: PERINATAL CARE | Facility: CLINIC | Age: 26
End: 2021-09-01

## 2021-09-01 NOTE — PROGRESS NOTES
Date: 09/01/21  Paula Maynard  1995  Estimated Date of Delivery: 10/14/21  33w6d  OB/GYN: [place]  Insulin controlled GDM Third trimester      Plan:    Lantus-  Increase to 68 units split dose into 2 equal injections of 34 units each  Novolog:  Increase before breakfast 18-10-14-0 before meals 1-2-3-0  Hold novolog if not eating   -Patient would prefer to avoid Metformin  Diet: 2200 calorie Gestational diabetes meal plan; 3 meals and 3 snacks  Diet recall revealed patient is trying to follow meal plan however is sometimes eating high fat convenience foods causing 1 hr pp>140  Also under eating protein at lunch meal  Timing of meals and snacks are appropriate  Advised to maintain bedtime snack 15 gms CHO and 2-3 ounces of protein  SMBG: Checks blood sugar 4 times per day; fasting and 1 hour start of each meal     -Advised to maintain no longer than 10 hr fast until breakfast meal   Meter: One-Touch Verio glucose meter  Activity: Patient is trying to add 20-30 minute walks on non work days   Support System: Significant Other  Patient Goal: I will walk 20-30 minutes after dinner daily  Patient agreeable to try this consistently      Labs  3/2/2021 Hbg A1c = 5 6%  06/02/2021 A1c = 5 2%    08/26/2021 CMP completed  07/23/2021 A1c = 5 3%    Ultrasounds  07/23/2021 Dr Briseida Hackett: EFW 64th percentile  08/27/2021 Dr Armando Kelley - increased to 64 units - EFW 74%, GUERDA normal     Further fetal surveillance  Beginning at 32 weeks, NST / GUERDA twice a week     Helen Sharma RN

## 2021-09-03 ENCOUNTER — ULTRASOUND (OUTPATIENT)
Dept: PERINATAL CARE | Facility: CLINIC | Age: 26
End: 2021-09-03
Payer: COMMERCIAL

## 2021-09-03 VITALS
HEIGHT: 66 IN | WEIGHT: 268.2 LBS | SYSTOLIC BLOOD PRESSURE: 109 MMHG | HEART RATE: 95 BPM | DIASTOLIC BLOOD PRESSURE: 60 MMHG | BODY MASS INDEX: 43.1 KG/M2

## 2021-09-03 DIAGNOSIS — Z3A.34 34 WEEKS GESTATION OF PREGNANCY: ICD-10-CM

## 2021-09-03 DIAGNOSIS — O24.414 INSULIN CONTROLLED GESTATIONAL DIABETES MELLITUS (GDM) IN THIRD TRIMESTER: Primary | ICD-10-CM

## 2021-09-03 DIAGNOSIS — Z3A.33 33 WEEKS GESTATION OF PREGNANCY: Primary | ICD-10-CM

## 2021-09-03 PROCEDURE — 59025 FETAL NON-STRESS TEST: CPT | Performed by: OBSTETRICS & GYNECOLOGY

## 2021-09-03 PROCEDURE — 76815 OB US LIMITED FETUS(S): CPT | Performed by: OBSTETRICS & GYNECOLOGY

## 2021-09-03 NOTE — PATIENT INSTRUCTIONS
Thank you for choosing us for your  care today  If you have any questions about your ultrasound or care, please do not hesitate to contact us or your primary obstetrician  Some general instructions for your pregnancy are:     Protect against coronavirus: get vaccinated and mask up  Pregnant women are increased risk of severe COVID  Notify your primary care doctor if you have any symptoms including cough, shortness of breath or difficulty breathing, fever, chills, muscle pain, sore throat, or loss of taste or smell   Exercise: Aim for 22 minutes per day (150 minutes per week) of regular exercise  Walking is great!  Nutrition: aim for calcium-rich and iron-rich foods as well as healthy sources of protein   Learn about Preeclampsia: preeclampsia is a common, serious high blood pressure complication in pregnancy  A blood pressure of 888RYYR (systolic or top number) or 05DQHI (diastolic or bottom number) is not normal and needs evaluation by your doctor  Aspirin is sometimes prescribed in early pregnancy to prevent preeclampsia in women with risk factors - ask your obstetrician if you should be on this medication   If you smoke, try to reduce how many cigarettes you smoke or try to quit completely  Do not vape   Other warning signs to watch out for in pregnancy or postpartum: chest pain, obstructed breathing or shortness of breath, seizures, thoughts of hurting yourself or your baby, bleeding, a painful or swollen leg, fever, or headache (see [facility] POST-BIRTH Warning Signs campaign)  If these happen call 911  Itching is also not normal in pregnancy and if you experience this, especially over your hands and feet, potentially worse at night, notify your doctors  Kick Counts in Pregnancy   AMBULATORY CARE:   Kick counts  measure how much your baby is moving in your womb  A kick from your baby can be felt as a twist, turn, swish, roll, or jab   It is common to feel your baby kicking at 26 to 28 weeks of pregnancy  You may feel your baby kick as early as 20 weeks of pregnancy  You may want to start counting at 28 weeks  Contact your healthcare provider immediately if:   · You feel a change in the number of kicks or movements of your baby  · You feel fewer than 10 kicks within 2 hours  · You have questions or concerns about your baby's movements  Why measure kick counts:  Your baby's movement may provide information about your baby's health  He or she may move less, or not at all, if there are problems  Your baby may move less if he or she is not getting enough oxygen or nutrition from the placenta  Do not smoke while you are pregnant  Smoking decreases the amount of oxygen that gets to your baby  Talk to your healthcare provider if you need help to quit smoking  Tell your healthcare provider as soon as you feel a change in your baby's movements  When to measure kick counts:   · Measure kick counts at the same time every day  · Measure kick counts when your baby is awake and most active  Your baby may be most active in the evening  How to measure kick counts:  Check that your baby is awake before you measure kick counts  You can wake up your baby by lightly pushing on your belly, walking, or drinking something cold  Your healthcare provider may tell you different ways to measure kick counts  You may be told to do the following:  · Use a chart or clock to keep track of the time you start and finish counting  · Sit in a chair or lie on your left side  · Place your hands on the largest part of your belly  · Count until you reach 10 kicks  Write down how much time it takes to count 10 kicks  · It may take 30 minutes to 2 hours to count 10 kicks  It should not take more than 2 hours to count 10 kicks  Follow up with your healthcare provider as directed:  Write down your questions so you remember to ask them during your visits     © Copyright [employer] 2021 Information is for [employer] use only and may not be sold, redistributed or otherwise used for commercial purposes  All illustrations and images included in CareNotes® are the copyrighted property of A D A M , Inc  or Effie Morgan  The above information is an  only  It is not intended as medical advice for individual conditions or treatments  Talk to your doctor, nurse or pharmacist before following any medical regimen to see if it is safe and effective for you

## 2021-09-03 NOTE — PROGRESS NOTES
[id number] [facility]: Ms Harrison Stacy was seen today for NST (found under the pregnancy episode) which I reviewed the RN assessment and agree, and GUERDA (see ultrasound report under OB procedures tab)  Please don't hesitate to contact our office with any concerns or questions    Indra Mcclure MD

## 2021-09-07 ENCOUNTER — ROUTINE PRENATAL (OUTPATIENT)
Dept: PERINATAL CARE | Facility: CLINIC | Age: 26
End: 2021-09-07
Payer: COMMERCIAL

## 2021-09-07 VITALS
HEART RATE: 78 BPM | SYSTOLIC BLOOD PRESSURE: 109 MMHG | DIASTOLIC BLOOD PRESSURE: 55 MMHG | WEIGHT: 269.8 LBS | HEIGHT: 66 IN | BODY MASS INDEX: 43.36 KG/M2

## 2021-09-07 DIAGNOSIS — O24.414 INSULIN CONTROLLED GESTATIONAL DIABETES MELLITUS (GDM) IN THIRD TRIMESTER: Primary | ICD-10-CM

## 2021-09-07 DIAGNOSIS — Z3A.34 34 WEEKS GESTATION OF PREGNANCY: ICD-10-CM

## 2021-09-07 PROCEDURE — 59025 FETAL NON-STRESS TEST: CPT | Performed by: OBSTETRICS & GYNECOLOGY

## 2021-09-07 NOTE — PATIENT INSTRUCTIONS
Kick Counts in Pregnancy   AMBULATORY CARE:   Kick counts  measure how much your baby is moving in your womb  A kick from your baby can be felt as a twist, turn, swish, roll, or jab  It is common to feel your baby kicking at 26 to 28 weeks of pregnancy  You may feel your baby kick as early as 20 weeks of pregnancy  You may want to start counting at 28 weeks  Contact your healthcare provider immediately if:   · You feel a change in the number of kicks or movements of your baby  · You feel fewer than 10 kicks within 2 hours  · You have questions or concerns about your baby's movements  Why measure kick counts:  Your baby's movement may provide information about your baby's health  He or she may move less, or not at all, if there are problems  Your baby may move less if he or she is not getting enough oxygen or nutrition from the placenta  Do not smoke while you are pregnant  Smoking decreases the amount of oxygen that gets to your baby  Talk to your healthcare provider if you need help to quit smoking  Tell your healthcare provider as soon as you feel a change in your baby's movements  When to measure kick counts:   · Measure kick counts at the same time every day  · Measure kick counts when your baby is awake and most active  Your baby may be most active in the evening  How to measure kick counts:  Check that your baby is awake before you measure kick counts  You can wake up your baby by lightly pushing on your belly, walking, or drinking something cold  Your healthcare provider may tell you different ways to measure kick counts  You may be told to do the following:  · Use a chart or clock to keep track of the time you start and finish counting  · Sit in a chair or lie on your left side  · Place your hands on the largest part of your belly  · Count until you reach 10 kicks  Write down how much time it takes to count 10 kicks  · It may take 30 minutes to 2 hours to count 10 kicks  It should not take more than 2 hours to count 10 kicks  Follow up with your healthcare provider as directed:  Write down your questions so you remember to ask them during your visits  © Copyright [employer] 2021 Information is for End User's use only and may not be sold, redistributed or otherwise used for commercial purposes  All illustrations and images included in CareNotes® are the copyrighted property of A D A M , Inc  or [facility] Melyssa Jameson   The above information is an  only  It is not intended as medical advice for individual conditions or treatments  Talk to your doctor, nurse or pharmacist before following any medical regimen to see if it is safe and effective for you

## 2021-09-09 ENCOUNTER — ROUTINE PRENATAL (OUTPATIENT)
Dept: PERINATAL CARE | Facility: OTHER | Age: 26
End: 2021-09-09
Payer: COMMERCIAL

## 2021-09-09 ENCOUNTER — HOSPITAL ENCOUNTER (OUTPATIENT)
Facility: HOSPITAL | Age: 26
Discharge: HOME | End: 2021-09-09
Attending: OBSTETRICS & GYNECOLOGY | Admitting: OBSTETRICS & GYNECOLOGY
Payer: COMMERCIAL

## 2021-09-09 ENCOUNTER — TELEPHONE (OUTPATIENT)
Dept: OBGYN CLINIC | Facility: CLINIC | Age: 26
End: 2021-09-09

## 2021-09-09 ENCOUNTER — DOCUMENTATION (OUTPATIENT)
Dept: PERINATAL CARE | Facility: CLINIC | Age: 26
End: 2021-09-09

## 2021-09-09 VITALS
HEART RATE: 104 BPM | TEMPERATURE: 98.8 F | SYSTOLIC BLOOD PRESSURE: 122 MMHG | RESPIRATION RATE: 20 BRPM | DIASTOLIC BLOOD PRESSURE: 73 MMHG

## 2021-09-09 DIAGNOSIS — Z3A.35 35 WEEKS GESTATION OF PREGNANCY: ICD-10-CM

## 2021-09-09 DIAGNOSIS — O24.414 INSULIN CONTROLLED GESTATIONAL DIABETES MELLITUS (GDM) IN THIRD TRIMESTER: ICD-10-CM

## 2021-09-09 PROBLEM — Z3A.32 32 WEEKS GESTATION OF PREGNANCY: Status: RESOLVED | Noted: 2021-06-04 | Resolved: 2021-09-09

## 2021-09-09 PROCEDURE — 99212 OFFICE O/P EST SF 10 MIN: CPT | Performed by: OBSTETRICS & GYNECOLOGY

## 2021-09-09 PROCEDURE — 99213 OFFICE O/P EST LOW 20 MIN: CPT

## 2021-09-09 PROCEDURE — 76815 OB US LIMITED FETUS(S): CPT | Performed by: OBSTETRICS & GYNECOLOGY

## 2021-09-09 NOTE — PROGRESS NOTES
The patient was seen today for an ultrasound  Please see ultrasound report (located under Ob Procedures) for additional details  Thank you very much for allowing us to participate in the care of this very nice patient  Should you have any questions, please do not hesitate to contact me  Rajendra Angeles MD [address] [place]  Attending Physician, Agnieszka

## 2021-09-09 NOTE — PROGRESS NOTES
Date: 09/09/21  Peter Standard  1995  Estimated Date of Delivery: 10/14/21  35w0d  OB[facility]GYN: [facility]  Diagnosis: Insulin controlled GDM Third trimester      Plan:    Lantus -  Increase to 76 units split dose into 2 equal injections of 38 units each  Novolog:  Increase (breakfast and dinner) 20-10-16-0 before meals 1-2-3-0  Hold novolog if not eating   -Patient would prefer to avoid Metformin  Diet: 2200 calorie Gestational diabetes meal plan; 3 meals and 3 snacks     -Diet recall revealed patient is trying to follow meal plan however is sometimes eating high fat convenience foods causing 1 hr pp>140     -Bedtime snack 15 gms CHO and 2-3 ounces of protein  SMBG: Checks blood sugar 4 times per day; fasting and 1 hour start of each meal    Meter: One-Touch Verio glucose meter  Activity: Patient is trying to add 20-30 minute walks on non work days   Support System: Significant Other  Patient Goal: I will walk 20-30 minutes after dinner daily       Labs  3/2/2021 Hbg A1c = 5 6%  06/02/2021 A1c = 5 2%  08/26/2021 CMP completed  07/23/2021 A1c = 5 3%  Next A1c is due by 09/17/2021    Ultrasounds  07/23/2021 Dr Michael Wooten: EFW 64th percentile  08/27/2021 Dr Gato Melvin - increased to 64 units - EFW 74%, GUERDA normal     Further fetal surveillance  NST / GUERDA twice a week     Maria Eugenia Ivey RN

## 2021-09-09 NOTE — TELEPHONE ENCOUNTER
Patient called, she noticed trickling of fluid this AM, she is 35 weeks pregnant  She is having mild cramping, denies any bleeding at this time  Will send to L&D for rule out rupture  L&D and Dr Felisha Khan notified

## 2021-09-10 ENCOUNTER — ROUTINE PRENATAL (OUTPATIENT)
Dept: OBGYN CLINIC | Facility: CLINIC | Age: 26
End: 2021-09-10
Payer: COMMERCIAL

## 2021-09-10 VITALS — DIASTOLIC BLOOD PRESSURE: 74 MMHG | WEIGHT: 262 LBS | BODY MASS INDEX: 42.29 KG/M2 | SYSTOLIC BLOOD PRESSURE: 128 MMHG

## 2021-09-10 DIAGNOSIS — Z3A.35 35 WEEKS GESTATION OF PREGNANCY: ICD-10-CM

## 2021-09-10 DIAGNOSIS — O99.213 MATERNAL MORBID OBESITY IN THIRD TRIMESTER, ANTEPARTUM (HCC): ICD-10-CM

## 2021-09-10 DIAGNOSIS — E66.01 MATERNAL MORBID OBESITY IN THIRD TRIMESTER, ANTEPARTUM (HCC): ICD-10-CM

## 2021-09-10 DIAGNOSIS — O24.414 INSULIN CONTROLLED GESTATIONAL DIABETES MELLITUS (GDM) IN THIRD TRIMESTER: Primary | ICD-10-CM

## 2021-09-10 PROCEDURE — 99215 OFFICE O/P EST HI 40 MIN: CPT | Performed by: OBSTETRICS & GYNECOLOGY

## 2021-09-10 NOTE — PROGRESS NOTES
Patient states that she does have some pelvic pressure but denies any contractions or fluid leakage  Patient would like to discuss an induction since MFM told her she would have one  She would also like to take about meds that are ok to take for constipation

## 2021-09-10 NOTE — PROGRESS NOTES
27 y o    female at 35w1d EGA for PNV  BP : 128/74  TWlbs  Discussed IOL at 39 wks, cosnider 38 if uncontrolled BS  States she is having some struggles with her fasting blood sugars  Will check in with MFM on timing of IOL  Some cramping, not contractions  Denies LOF or bleeding  Good FM  RTO in one week for GBS swab

## 2021-09-14 ENCOUNTER — OFFICE VISIT (OUTPATIENT)
Dept: PERINATAL CARE | Facility: CLINIC | Age: 26
End: 2021-09-14
Payer: COMMERCIAL

## 2021-09-14 ENCOUNTER — ROUTINE PRENATAL (OUTPATIENT)
Dept: PERINATAL CARE | Facility: CLINIC | Age: 26
End: 2021-09-14
Payer: COMMERCIAL

## 2021-09-14 ENCOUNTER — ROUTINE PRENATAL (OUTPATIENT)
Dept: OBGYN CLINIC | Facility: CLINIC | Age: 26
End: 2021-09-14

## 2021-09-14 VITALS — WEIGHT: 269 LBS | BODY MASS INDEX: 43.42 KG/M2 | DIASTOLIC BLOOD PRESSURE: 80 MMHG | SYSTOLIC BLOOD PRESSURE: 120 MMHG

## 2021-09-14 VITALS
SYSTOLIC BLOOD PRESSURE: 118 MMHG | BODY MASS INDEX: 43.49 KG/M2 | HEART RATE: 86 BPM | HEIGHT: 66 IN | DIASTOLIC BLOOD PRESSURE: 64 MMHG | WEIGHT: 270.6 LBS

## 2021-09-14 VITALS
WEIGHT: 270.6 LBS | HEIGHT: 66 IN | DIASTOLIC BLOOD PRESSURE: 64 MMHG | BODY MASS INDEX: 43.49 KG/M2 | SYSTOLIC BLOOD PRESSURE: 118 MMHG

## 2021-09-14 DIAGNOSIS — O24.414 INSULIN CONTROLLED GESTATIONAL DIABETES MELLITUS (GDM) IN THIRD TRIMESTER: Primary | ICD-10-CM

## 2021-09-14 DIAGNOSIS — O99.213 OBESITY AFFECTING PREGNANCY IN THIRD TRIMESTER: ICD-10-CM

## 2021-09-14 DIAGNOSIS — O24.414 INSULIN CONTROLLED GESTATIONAL DIABETES MELLITUS (GDM) IN THIRD TRIMESTER: ICD-10-CM

## 2021-09-14 DIAGNOSIS — Z3A.35 35 WEEKS GESTATION OF PREGNANCY: ICD-10-CM

## 2021-09-14 DIAGNOSIS — O99.213 MATERNAL MORBID OBESITY IN THIRD TRIMESTER, ANTEPARTUM (HCC): ICD-10-CM

## 2021-09-14 DIAGNOSIS — E66.01 MATERNAL MORBID OBESITY IN THIRD TRIMESTER, ANTEPARTUM (HCC): ICD-10-CM

## 2021-09-14 DIAGNOSIS — E55.9 VITAMIN D DEFICIENCY: ICD-10-CM

## 2021-09-14 DIAGNOSIS — Z3A.35 35 WEEKS GESTATION OF PREGNANCY: Primary | ICD-10-CM

## 2021-09-14 DIAGNOSIS — O99.810 HYPERGLYCEMIA IN PREGNANCY: ICD-10-CM

## 2021-09-14 PROCEDURE — 59025 FETAL NON-STRESS TEST: CPT | Performed by: OBSTETRICS & GYNECOLOGY

## 2021-09-14 PROCEDURE — PNV: Performed by: OBSTETRICS & GYNECOLOGY

## 2021-09-14 PROCEDURE — 99214 OFFICE O/P EST MOD 30 MIN: CPT | Performed by: NURSE PRACTITIONER

## 2021-09-14 PROCEDURE — 87150 DNA/RNA AMPLIFIED PROBE: CPT | Performed by: OBSTETRICS & GYNECOLOGY

## 2021-09-14 NOTE — ASSESSMENT & PLAN NOTE
-Due to hyperglycemia, increase Lantus from 76 to 84 units daily split dose into 2 (42 units injected one after another into 2 different sites)  -Increase Novolog 20-12-20-0 before meals 1-2-3   -Continue GDM diet 3 meals and 3 snacks a day  -Continue SMBG and reporting via flowsheet   -Continue walking up to 30 minutes a day  -Always have glucose available to treat hypoglycemia, use 15 by 15 rule  -Continue follow up with OB and MFM as recommended  -NST twice a week and GUERDA weekly    -Fetal growth ultrasound as scheduled   -Continue close contact with diabetes education team    Lab Results   Component Value Date    HGBA1C 5 3 07/23/2021

## 2021-09-14 NOTE — PROGRESS NOTES
This is a 27 y o  Dianna Ko at 35w5d who presents for return OB visit  No complaints  Denies contractions, leakage, bleeding   Endorses fetal movement   BP: 120/80 TWG: 15lb   GDMA2: Continue to follow with [facility] (NSTs scheduled at their office) tentatively planning for IOL at 39 wks unless MFM indicates sooner delivery needed  GBS collected, NKDA  SVE: 1/50/-3  Reviewed s/sx of labor  F/up weekly

## 2021-09-14 NOTE — PROGRESS NOTES
Assessment/Plan:    Maternal morbid obesity in third trimester, antepartum ([facility] [address] )  -Pre-pregnancy weight 254 lbs  -Current weight 270 lbs  -Current BMI 43 68   -Current weight gain 16 lbs  -Recommended weight gain during pregnancy 11 to 20 lbs  -Continue GDM diet  Insulin controlled gestational diabetes mellitus (GDM) in third trimester  -Due to hyperglycemia, increase Lantus from 76 to 84 units daily split dose into 2 (42 units injected one after another into 2 different sites)  -Increase Novolog 20-12-20-0 before meals 1-2-3   -Continue GDM diet 3 meals and 3 snacks a day  -Continue SMBG and reporting via flowsheet   -Continue walking up to 30 minutes a day  -Always have glucose available to treat hypoglycemia, use 15 by 15 rule  -Continue follow up with OB and MFM as recommended  -NST twice a week and GUERDA weekly  -Fetal growth ultrasound as scheduled   -Continue close contact with diabetes education team    Lab Results   Component Value Date    HGBA1C 5 3 2021       Vitamin D deficiency  -On Vitamin D supplements and prenatal vitamin  Diagnoses and all orders for this visit:    Insulin controlled gestational diabetes mellitus (GDM) in third trimester  -     Hemoglobin A1C; Standing    35 weeks gestation of pregnancy  -     Hemoglobin A1C; Standing    BMI 40 0-44 9, adult (HCC)  -     Hemoglobin A1C; Standing    Maternal morbid obesity in third trimester, antepartum ([facility] )  -     Hemoglobin A1C; Standing    Vitamin D deficiency          Subjective:      Patient ID: Carla Chawla is a 27 y o  female  28 5/7 weeks gestation GDM on Lantus 76 units daily split dose and Novolog 18-10-16-0 before meals 1-2-3  Trying to follow GDM diet but last night did have a special treat  Testing 4 times a day and reporting via flowsheet  Walking 30 minutes a day  SO present during visit  Positive fetal movement  Having NST today           The following portions of the patient's history were reviewed and updated as appropriate: allergies, current medications, past family history, past medical history, past social history, past surgical history and problem list     No Known Allergies  Current Outpatient Medications on File Prior to Visit   Medication Sig Dispense Refill    albuterol (PROVENTIL HFA,VENTOLIN HFA) 90 mcg/act inhaler Inhale 2 puffs every 4 (four) hours as needed for wheezing 1 Inhaler 0    aspirin (ECOTRIN LOW STRENGTH) 81 mg EC tablet Take 2 tablets (162 mg total) by mouth daily 60 tablet 0    Blood Glucose Monitoring Suppl (OneTouch Verio Flex System) w/Device KIT Test 4 times per day  Gestational Diabetes  1 kit 0    calcium carbonate (TUMS EX) 750 mg chewable tablet Chew 2 tablets 2 (two) times a day      Cholecalciferol (Vitamin D3) 50 MCG (2000 UT) TABS Take 2,000 Units by mouth daily      fluticasone (FLONASE) 50 mcg/act nasal spray 2 sprays into each nostril daily as needed for rhinitis 16 g 1    insulin aspart (NovoLOG FlexPen) 100 UNIT/ML injection pen 16-10-14-0 before meals 1-2-3  Titrate as needed (Patient taking differently: 18-10-16-0 before meals 1-2-3  Titrate as needed) 15 mL 1    insulin glargine (Lantus SoloStar) 100 units/mL injection pen Inject 60 Units under the skin daily at bedtime At 9 or 10 PM daily  To be titrated  (Patient taking differently: Inject 76 Units under the skin daily at bedtime At 9 or 10 PM daily  To be titrated ) 15 mL 1    Insulin Pen Needle 31G X 5 MM MISC Inject under the skin daily at bedtime Use up to 5 a day or as directed  150 each 1    OneTouch Delica Lancets 80B MISC Test 4 times per day  Gestational Diabetes  100 each 2    OneTouch Verio test strip Use as instructed 100 strip 2    Prenatal Vit-Fe Fumarate-FA (PRENATAL VITAMIN PO) Take 1 capsule by mouth daily       No current facility-administered medications on file prior to visit  Review of Systems   Constitutional: Negative for fatigue and fever     Eyes: Negative for visual disturbance  Respiratory: Negative for cough and shortness of breath  Cardiovascular: Negative for chest pain and palpitations  Gastrointestinal: Negative for nausea and vomiting  Endocrine: Negative for polydipsia, polyphagia and polyuria  Genitourinary: Negative for difficulty urinating and vaginal bleeding  Neurological: Negative for headaches  Psychiatric/Behavioral: Positive for sleep disturbance  Objective:  Refer to glucose flowsheet  Component Value Date/Time    HGBA1C 5 3 07/23/2021 1239    HGBA1C 5 4 07/16/2021 0902      /64 (BP Location: Right arm, Patient Position: Sitting, Cuff Size: Large)   Pulse 86   Ht 5' 6" (1 676 m)   Wt 123 kg (270 lb 9 6 oz)   LMP 01/07/2021 (Exact Date)   BMI 43 68 kg/m²          Physical Exam  Constitutional:       Appearance: She is obese  HENT:      Head: Normocephalic  Eyes:      Conjunctiva/sclera: Conjunctivae normal    Pulmonary:      Effort: Pulmonary effort is normal    Musculoskeletal:         General: Normal range of motion  Cervical back: Normal range of motion  Neurological:      Mental Status: She is alert and oriented to person, place, and time  Psychiatric:         Mood and Affect: Mood normal          Behavior: Behavior normal          Thought Content:  Thought content normal          Judgment: Judgment normal              Time in:2:32 PM  Time out:3:00 PM

## 2021-09-14 NOTE — PATIENT INSTRUCTIONS
-Due to hyperglycemia, increase Lantus from 76 to 84 units daily split dose into 2 (42 units injected one after another into 2 different sites)  -Increase Novolog 20-12-20-0 before meals 1-2-3   -Continue GDM diet 3 meals and 3 snacks a day  -Continue SMBG and reporting via flowsheet   -Continue walking up to 30 minutes a day  -Always have glucose available to treat hypoglycemia, use 15 by 15 rule  -Continue follow up with OB and MFM as recommended  -NST twice a week and GUERDA weekly    -Fetal growth ultrasound as scheduled   -Continue close contact with diabetes education team

## 2021-09-14 NOTE — PROGRESS NOTES
Repeat Non-Stress Testing:    Pt verbalizes +FM  Pt denies ALL:               Leaking of fluid   Contractions   Vaginal bleeding   Decreased fetal movement    Patient has no questions or concerns  DR Carlota Curtis viewed NST prior to completion of appointment

## 2021-09-14 NOTE — PATIENT INSTRUCTIONS
Kick Counts in Pregnancy   AMBULATORY CARE:   Kick counts  measure how much your baby is moving in your womb  A kick from your baby can be felt as a twist, turn, swish, roll, or jab  It is common to feel your baby kicking at 26 to 28 weeks of pregnancy  You may feel your baby kick as early as 20 weeks of pregnancy  You may want to start counting at 28 weeks  Contact your healthcare provider immediately if:   · You feel a change in the number of kicks or movements of your baby  · You feel fewer than 10 kicks within 2 hours  · You have questions or concerns about your baby's movements  Why measure kick counts:  Your baby's movement may provide information about your baby's health  He or she may move less, or not at all, if there are problems  Your baby may move less if he or she is not getting enough oxygen or nutrition from the placenta  Do not smoke while you are pregnant  Smoking decreases the amount of oxygen that gets to your baby  Talk to your healthcare provider if you need help to quit smoking  Tell your healthcare provider as soon as you feel a change in your baby's movements  When to measure kick counts:   · Measure kick counts at the same time every day  · Measure kick counts when your baby is awake and most active  Your baby may be most active in the evening  How to measure kick counts:  Check that your baby is awake before you measure kick counts  You can wake up your baby by lightly pushing on your belly, walking, or drinking something cold  Your healthcare provider may tell you different ways to measure kick counts  You may be told to do the following:  · Use a chart or clock to keep track of the time you start and finish counting  · Sit in a chair or lie on your left side  · Place your hands on the largest part of your belly  · Count until you reach 10 kicks  Write down how much time it takes to count 10 kicks  · It may take 30 minutes to 2 hours to count 10 kicks  It should not take more than 2 hours to count 10 kicks  Follow up with your healthcare provider as directed:  Write down your questions so you remember to ask them during your visits  © Copyright [employer] 2021 Information is for End User's use only and may not be sold, redistributed or otherwise used for commercial purposes  All illustrations and images included in CareNotes® are the copyrighted property of A D A M , Inc  or [facility] Melyssa Jameson   The above information is an  only  It is not intended as medical advice for individual conditions or treatments  Talk to your doctor, nurse or pharmacist before following any medical regimen to see if it is safe and effective for you

## 2021-09-14 NOTE — ASSESSMENT & PLAN NOTE
-Pre-pregnancy weight 254 lbs  -Current weight 270 lbs  -Current BMI 43 68   -Current weight gain 16 lbs  -Recommended weight gain during pregnancy 11 to 20 lbs  -Continue GDM diet

## 2021-09-16 LAB — GP B STREP DNA SPEC QL NAA+PROBE: POSITIVE

## 2021-09-17 ENCOUNTER — ULTRASOUND (OUTPATIENT)
Dept: PERINATAL CARE | Facility: CLINIC | Age: 26
End: 2021-09-17
Payer: COMMERCIAL

## 2021-09-17 VITALS
HEART RATE: 110 BPM | DIASTOLIC BLOOD PRESSURE: 64 MMHG | WEIGHT: 271.8 LBS | BODY MASS INDEX: 43.68 KG/M2 | HEIGHT: 66 IN | SYSTOLIC BLOOD PRESSURE: 134 MMHG

## 2021-09-17 DIAGNOSIS — E66.01 MATERNAL MORBID OBESITY IN THIRD TRIMESTER, ANTEPARTUM (HCC): Primary | ICD-10-CM

## 2021-09-17 DIAGNOSIS — O24.414 INSULIN CONTROLLED GESTATIONAL DIABETES MELLITUS (GDM) IN THIRD TRIMESTER: ICD-10-CM

## 2021-09-17 DIAGNOSIS — Z3A.36 36 WEEKS GESTATION OF PREGNANCY: ICD-10-CM

## 2021-09-17 DIAGNOSIS — O99.213 MATERNAL MORBID OBESITY IN THIRD TRIMESTER, ANTEPARTUM (HCC): Primary | ICD-10-CM

## 2021-09-17 PROCEDURE — 59025 FETAL NON-STRESS TEST: CPT | Performed by: OBSTETRICS & GYNECOLOGY

## 2021-09-17 PROCEDURE — 76815 OB US LIMITED FETUS(S): CPT | Performed by: OBSTETRICS & GYNECOLOGY

## 2021-09-17 RX ORDER — DOCUSATE SODIUM 100 MG/1
100 CAPSULE, LIQUID FILLED ORAL 2 TIMES DAILY
COMMUNITY
End: 2021-11-05

## 2021-09-17 NOTE — PATIENT INSTRUCTIONS
Kick Counts in Pregnancy   AMBULATORY CARE:   Kick counts  measure how much your baby is moving in your womb  A kick from your baby can be felt as a twist, turn, swish, roll, or jab  It is common to feel your baby kicking at 26 to 28 weeks of pregnancy  You may feel your baby kick as early as 20 weeks of pregnancy  You may want to start counting at 28 weeks  Contact your healthcare provider immediately if:   · You feel a change in the number of kicks or movements of your baby  · You feel fewer than 10 kicks within 2 hours  · You have questions or concerns about your baby's movements  Why measure kick counts:  Your baby's movement may provide information about your baby's health  He or she may move less, or not at all, if there are problems  Your baby may move less if he or she is not getting enough oxygen or nutrition from the placenta  Do not smoke while you are pregnant  Smoking decreases the amount of oxygen that gets to your baby  Talk to your healthcare provider if you need help to quit smoking  Tell your healthcare provider as soon as you feel a change in your baby's movements  When to measure kick counts:   · Measure kick counts at the same time every day  · Measure kick counts when your baby is awake and most active  Your baby may be most active in the evening  How to measure kick counts:  Check that your baby is awake before you measure kick counts  You can wake up your baby by lightly pushing on your belly, walking, or drinking something cold  Your healthcare provider may tell you different ways to measure kick counts  You may be told to do the following:  · Use a chart or clock to keep track of the time you start and finish counting  · Sit in a chair or lie on your left side  · Place your hands on the largest part of your belly  · Count until you reach 10 kicks  Write down how much time it takes to count 10 kicks  · It may take 30 minutes to 2 hours to count 10 kicks  It should not take more than 2 hours to count 10 kicks  Follow up with your healthcare provider as directed:  Write down your questions so you remember to ask them during your visits  © Copyright [employer] 2021 Information is for End User's use only and may not be sold, redistributed or otherwise used for commercial purposes  All illustrations and images included in CareNotes® are the copyrighted property of A D A M , Inc  or [facility] Melyssa Jameson   The above information is an  only  It is not intended as medical advice for individual conditions or treatments  Talk to your doctor, nurse or pharmacist before following any medical regimen to see if it is safe and effective for you

## 2021-09-17 NOTE — LETTER
September 17, 2021     DO Babita Lopez 69  [address] [facility]    Patient: Rick Heimlich   YOB: 1995   Date of Visit: 9/17/2021       Dear Dr Edgar Villalta:    Thank you for referring Rick Heimlich to me for evaluation  Below are my notes for this consultation  If you have questions, please do not hesitate to call me  I look forward to following your patient along with you  Sincerely,        Gio Gracia MD        CC: No Recipients  Gio Gracia MD  9/17/2021  7:48 AM  Sign when Signing Visit   Please refer to the [facility] ultrasound report in Ob Procedures for additional information regarding today's visit

## 2021-09-17 NOTE — PROGRESS NOTES
Repeat Non-Stress Testing:    Pt verbalizes +FM  Pt denies ALL:               Leaking of fluid   Contractions   Vaginal bleeding   Decreased fetal movement    Patient has no questions or concerns          Dr Abril Sorenson viewed the NST strip prior to completion of visit

## 2021-09-20 ENCOUNTER — ROUTINE PRENATAL (OUTPATIENT)
Dept: OBGYN CLINIC | Facility: CLINIC | Age: 26
End: 2021-09-20

## 2021-09-20 ENCOUNTER — HOSPITAL ENCOUNTER (OUTPATIENT)
Facility: HOSPITAL | Age: 26
Discharge: HOME | End: 2021-09-20
Attending: OBSTETRICS & GYNECOLOGY | Admitting: OBSTETRICS & GYNECOLOGY
Payer: COMMERCIAL

## 2021-09-20 VITALS
RESPIRATION RATE: 16 BRPM | HEART RATE: 104 BPM | SYSTOLIC BLOOD PRESSURE: 111 MMHG | TEMPERATURE: 98.8 F | DIASTOLIC BLOOD PRESSURE: 63 MMHG

## 2021-09-20 VITALS — DIASTOLIC BLOOD PRESSURE: 70 MMHG | BODY MASS INDEX: 43.87 KG/M2 | WEIGHT: 271.8 LBS | SYSTOLIC BLOOD PRESSURE: 122 MMHG

## 2021-09-20 DIAGNOSIS — E66.01 MATERNAL MORBID OBESITY IN THIRD TRIMESTER, ANTEPARTUM (HCC): ICD-10-CM

## 2021-09-20 DIAGNOSIS — O99.213 MATERNAL MORBID OBESITY IN THIRD TRIMESTER, ANTEPARTUM (HCC): ICD-10-CM

## 2021-09-20 DIAGNOSIS — Z3A.36 36 WEEKS GESTATION OF PREGNANCY: ICD-10-CM

## 2021-09-20 DIAGNOSIS — O24.414 INSULIN CONTROLLED GESTATIONAL DIABETES MELLITUS (GDM) IN THIRD TRIMESTER: Primary | ICD-10-CM

## 2021-09-20 PROCEDURE — 99213 OFFICE O/P EST LOW 20 MIN: CPT

## 2021-09-20 PROCEDURE — PNV: Performed by: OBSTETRICS & GYNECOLOGY

## 2021-09-20 PROCEDURE — NC001 PR NO CHARGE: Performed by: OBSTETRICS & GYNECOLOGY

## 2021-09-20 NOTE — PROGRESS NOTES
27 y o   female at 37w2d (Estimated Date of Delivery: 10/14/21) for PNV  Pre-Cici Vitals      Most Recent Value   Prenatal Assessment   Fetal Heart Rate  170   Movement  Present   Prenatal Vitals   Blood Pressure  122/70   Weight - Scale  123 kg (271 lb 12 8 oz)   Urine Albumin/Glucose   Dilation/Effacement/Station   Vaginal Drainage   Edema   LLE Edema  None   RLE Edema  None   Facial Edema  None         kg (17 lb 13 3 oz)    Patient presents for return OB visit  Feeling well and has no complaints today  Denies regular painful contractions, vaginal bleeding, and leakage  Reports daily fetal movement  Insulin controlled gestational diabetes - currently on 84 units Lantus q h s    - planning for 38 week induction unless clinically indicated to be earlier per MFM      - NSTs performed at [place] [address]  GBS positive  Results reviewed with patient today  SVE 1/thick/-3, soft, posterior  Fetal tachycardia noted on Doppler evaluation  Fetal heart tracing performed  Baseline continues to be 165-170 bpm   Moderate variability and variable deceleration visualized  1 acceleration prior to variable decelerations  Patient recommended to present to labor and delivery triage for extended fetal monitoring  Labor precautions reviewed    Follow-up in 1 week

## 2021-09-20 NOTE — PROGRESS NOTES
L&D Triage Note - OB/GYN  William Barcenas 27 y o  female MRN: [id number]  Unit/Bed#: LD TRIAGE  Encounter: [id number]      Assessment:  27 y o   at 36w4d presenting with fetal tachycardia in the office, which resulted in triage with p o  hydration  Discussed with patient labor precautions and discharged home  Plan:  1  Fetal tachycardia   Patient had fetal tachycardia in the 160s to 165   P o  Hydration was given and baseline resolved to 135-140   Extended monitoring fetal heart tracing was reassuring   Discussed labor precautions and discharged home   Discussed with Dr Clementina Leyden    ______________________________________________________________________      Chief Complaint:  Sent in from office    Subjective:  27 y o   at 36w4d office with documented fetal tachycardia  Patient denies contractions, leakage of fluid and vaginal bleeding  She endorses good fetal movement  Patient denies chest pain, shortness of breath, fever, chills, dysuria, hematuria, abdominal pain, nausea, vomiting, and diarrhea  She does endorse that she has not received her COVID vaccine, as she did not want while she was pregnant  Patient denies any sick contacts  ROS:   Review of Systems   Constitutional: Negative for chills and fever  HENT: Negative for ear pain and sore throat  Eyes: Negative for pain and visual disturbance  Respiratory: Negative for cough and shortness of breath  Cardiovascular: Negative for chest pain and palpitations  Gastrointestinal: Negative for abdominal pain, diarrhea, nausea and vomiting  Genitourinary: Negative for dysuria, hematuria and vaginal bleeding  Musculoskeletal: Negative for arthralgias and back pain  Skin: Negative for color change and rash  Neurological: Negative for seizures and syncope  All other systems reviewed and are negative        Objective:  Vitals:    21 1122   BP: 111/63   Pulse: 104   Resp:    Temp:        Physical Exam  Constitutional:       Appearance: Normal appearance  HENT:      Head: Normocephalic  Eyes:      Extraocular Movements: Extraocular movements intact  Pupils: Pupils are equal, round, and reactive to light  Cardiovascular:      Rate and Rhythm: Normal rate  Pulses: Normal pulses  Pulmonary:      Effort: Pulmonary effort is normal  No respiratory distress  Breath sounds: No wheezing, rhonchi or rales  Abdominal:      Palpations: Abdomen is soft  Tenderness: There is no abdominal tenderness  Musculoskeletal:         General: Normal range of motion  Cervical back: Normal range of motion  Neurological:      Mental Status: She is alert  Skin:     General: Skin is warm and dry     Psychiatric:         Mood and Affect: Mood normal          Behavior: Behavior normal           SVE:  Deferred  SSE:  Deferred   FHT:  135 / Moderate 6 - 25 bpm / accels, no decels  [place]:  Irregular    Wet mount/KOH:  Deferred  Rupture workup:  Deferred    TAUS  Deferred  TVUS:    Deferred      Saurav Vela MD 9/20/2021 11:51 AM

## 2021-09-23 ENCOUNTER — DOCUMENTATION (OUTPATIENT)
Dept: PERINATAL CARE | Facility: CLINIC | Age: 26
End: 2021-09-23

## 2021-09-23 NOTE — PROGRESS NOTES
Date: 09/23/21  Zeinab Lowery  1995  Estimated Date of Delivery: 10/14/21  37w0d  OB/GYN: [facility]  Diagnosis: Insulin controlled GDM Third trimester      Plan:    Lantus -  Increase to 96 units split dose into 3 equal injections of 32 units each  Novolog:  Increase 24-14-24-0 before meals 1-2-3-0  Hold novolog if not eating   -Patient would prefer to avoid Metformin  Diet: 2200 calorie Gestational diabetes meal plan; 3 meals and 3 snacks     -Diet recall revealed patient is trying to follow meal plan however is sometimes eating high fat convenience foods causing 1 hr pp>140     -Bedtime snack 15 gms CHO and 2-3 ounces of protein  SMBG: Checks blood sugar 4 times per day; fasting and 1 hour start of each meal    Meter: One-Touch Verio glucose meter  Activity: Patient is trying to add 20-30 minute walks on non work days   Support System: Significant Other  Patient Goal: I will walk 20-30 minutes after dinner daily       Labs  3/2/2021 Hbg A1c = 5 6%  06/02/2021 A1c = 5 2%  08/26/2021 CMP completed  07/23/2021 A1c = 5 3%  Next A1c is due by 09/17/2021    Ultrasounds  07/23/2021 Dr Doug Bacon: EFW 64th percentile  08/27/2021 Dr Adam Gordon - increased to 64 units - EFW 74%, GUERDA normal     Further fetal surveillance  NST / GUERDA twice a week     Mireya Douglas RN

## 2021-09-23 NOTE — PROGRESS NOTES
Date: 09/23/21  Sujata Butler  1995  Estimated Date of Delivery: 10/14/21  37w0d  OB/GYN: [facility]  Diagnosis: Insulin controlled GDM Third trimester          Plan:    Lantus -  Increase from 84 to 92 units split dose into 2 equal injections of 46 units each at bedtime  Novolog:  Increase (breakfast and dinner) 22-13-24 before meals 1-2-3-0  Hold novolog if not eating   -Patient would prefer to avoid Metformin  Diet: 2200 calorie Gestational diabetes meal plan; 3 meals and 3 snacks     -Diet recall revealed patient is trying to follow meal plan however is sometimes eating high fat convenience foods causing 1 hr pp>140     -Bedtime snack 15 gms CHO and 2-3 ounces of protein  SMBG: Checks blood sugar 4 times per day; fasting and 1 hour start of each meal    Meter: One-Touch Verio glucose meter  Activity: Patient is trying to add 20-30 minute walks on non work days   Support System: Significant Other  Patient Goal: I will walk 20-30 minutes after dinner daily  Labs  07/23/2021 A1c = 5 3%  Next A1c is due by 09/17/2021; was ordered  Ultrasounds  07/23/2021 Dr Rojas Kacy: EFW 64th percentile  08/27/2021 Dr Plascencia Ada - increased to 64 units - EFW 74%, GUERDA normal   Next ultrasound scheduled for 9/24/21      Further fetal surveillance  NST / GUERDA twice a week     Date to report next: weekly    Gregory Bell, MS, RD, CDE  Diabetes Educator  Diabetes [facility] Pregnancy [facility]

## 2021-09-24 ENCOUNTER — ULTRASOUND (OUTPATIENT)
Dept: PERINATAL CARE | Facility: CLINIC | Age: 26
End: 2021-09-24
Payer: COMMERCIAL

## 2021-09-24 ENCOUNTER — ROUTINE PRENATAL (OUTPATIENT)
Dept: PERINATAL CARE | Facility: CLINIC | Age: 26
End: 2021-09-24
Payer: COMMERCIAL

## 2021-09-24 VITALS
SYSTOLIC BLOOD PRESSURE: 126 MMHG | BODY MASS INDEX: 44.65 KG/M2 | WEIGHT: 277.8 LBS | HEIGHT: 66 IN | DIASTOLIC BLOOD PRESSURE: 61 MMHG | HEART RATE: 87 BPM

## 2021-09-24 DIAGNOSIS — Z3A.37 37 WEEKS GESTATION OF PREGNANCY: Primary | ICD-10-CM

## 2021-09-24 DIAGNOSIS — O24.414 INSULIN CONTROLLED GESTATIONAL DIABETES MELLITUS (GDM) IN THIRD TRIMESTER: ICD-10-CM

## 2021-09-24 PROCEDURE — 59025 FETAL NON-STRESS TEST: CPT | Performed by: OBSTETRICS & GYNECOLOGY

## 2021-09-24 PROCEDURE — 76816 OB US FOLLOW-UP PER FETUS: CPT | Performed by: OBSTETRICS & GYNECOLOGY

## 2021-09-24 NOTE — PROGRESS NOTES
A fetal ultrasound and NST were completed  See Ob procedures in Epic for an interpretation and recommendations  Do not hesitate to contact us in [facility] if you have questions  Flor Morrison MD, [address] [facility]  Maternal Fetal Medicine

## 2021-09-24 NOTE — LETTER
September 24, 9593     [facility]  [address]  [address]  [place]    Patient: Carley Pineda   YOB: 1995   Date of Visit: 9/24/2021       Dear Dr Erika Bey:    Thank you for referring Carley Pineda to me for evaluation  Below are my notes for this consultation  If you have questions, please do not hesitate to call me  I look forward to following your patient along with you  Sincerely,        Josefina Srivastava MD        CC: No Recipients  Josefina Srivastava MD  9/24/2021  5:47 PM  Sign when Signing Visit  A fetal ultrasound and NST were completed  See Ob procedures in Epic for an interpretation and recommendations  Do not hesitate to contact us in [facility] if you have questions  Bladimir Mena MD, [address] [place] [address]  Maternal Fetal Medicine

## 2021-09-27 ENCOUNTER — ROUTINE PRENATAL (OUTPATIENT)
Dept: OBGYN CLINIC | Facility: CLINIC | Age: 26
End: 2021-09-27

## 2021-09-27 VITALS — SYSTOLIC BLOOD PRESSURE: 128 MMHG | BODY MASS INDEX: 44.35 KG/M2 | DIASTOLIC BLOOD PRESSURE: 70 MMHG | WEIGHT: 274.8 LBS

## 2021-09-27 DIAGNOSIS — E66.01 MATERNAL MORBID OBESITY IN THIRD TRIMESTER, ANTEPARTUM (HCC): ICD-10-CM

## 2021-09-27 DIAGNOSIS — O24.414 INSULIN CONTROLLED GESTATIONAL DIABETES MELLITUS (GDM) IN THIRD TRIMESTER: ICD-10-CM

## 2021-09-27 DIAGNOSIS — Z3A.37 37 WEEKS GESTATION OF PREGNANCY: ICD-10-CM

## 2021-09-27 DIAGNOSIS — O99.810 HYPERGLYCEMIA IN PREGNANCY: ICD-10-CM

## 2021-09-27 DIAGNOSIS — O99.213 OBESITY AFFECTING PREGNANCY IN THIRD TRIMESTER: ICD-10-CM

## 2021-09-27 DIAGNOSIS — O99.213 MATERNAL MORBID OBESITY IN THIRD TRIMESTER, ANTEPARTUM (HCC): ICD-10-CM

## 2021-09-27 PROCEDURE — PNV: Performed by: OBSTETRICS & GYNECOLOGY

## 2021-09-27 NOTE — PROGRESS NOTES
This is a 27 y o  Medardo Challenge at 37w4d who presents for return OB visit  No complaints  Denies contractions, leakage, bleeding  Endorses fetal movement   BP: 128/70 TWlb    GDMA2: cont NST with PNC twice weekly   IOL 10/7 7am   F/up 1 wk

## 2021-09-28 ENCOUNTER — ROUTINE PRENATAL (OUTPATIENT)
Dept: PERINATAL CARE | Facility: CLINIC | Age: 26
End: 2021-09-28
Payer: COMMERCIAL

## 2021-09-28 ENCOUNTER — DOCUMENTATION (OUTPATIENT)
Dept: PERINATAL CARE | Facility: CLINIC | Age: 26
End: 2021-09-28

## 2021-09-28 VITALS
DIASTOLIC BLOOD PRESSURE: 66 MMHG | SYSTOLIC BLOOD PRESSURE: 124 MMHG | HEART RATE: 94 BPM | HEIGHT: 66 IN | BODY MASS INDEX: 44.45 KG/M2 | WEIGHT: 276.6 LBS

## 2021-09-28 DIAGNOSIS — O24.414 INSULIN CONTROLLED GESTATIONAL DIABETES MELLITUS (GDM) IN THIRD TRIMESTER: Primary | ICD-10-CM

## 2021-09-28 DIAGNOSIS — Z3A.37 37 WEEKS GESTATION OF PREGNANCY: ICD-10-CM

## 2021-09-28 PROCEDURE — 59025 FETAL NON-STRESS TEST: CPT | Performed by: OBSTETRICS & GYNECOLOGY

## 2021-09-28 NOTE — PROGRESS NOTES
Repeat Non-Stress Testing:    Pt verbalizes +FM  Pt denies ALL:               Leaking of fluid   Contractions   Vaginal bleeding   Decreased fetal movement    Patient has no questions or concerns      Dr Sharri Douglas viewed NST prior to completion of visit

## 2021-09-28 NOTE — PATIENT INSTRUCTIONS
Kick Counts in Pregnancy   AMBULATORY CARE:   Kick counts  measure how much your baby is moving in your womb  A kick from your baby can be felt as a twist, turn, swish, roll, or jab  It is common to feel your baby kicking at 26 to 28 weeks of pregnancy  You may feel your baby kick as early as 20 weeks of pregnancy  You may want to start counting at 28 weeks  Contact your healthcare provider immediately if:   · You feel a change in the number of kicks or movements of your baby  · You feel fewer than 10 kicks within 2 hours  · You have questions or concerns about your baby's movements  Why measure kick counts:  Your baby's movement may provide information about your baby's health  He or she may move less, or not at all, if there are problems  Your baby may move less if he or she is not getting enough oxygen or nutrition from the placenta  Do not smoke while you are pregnant  Smoking decreases the amount of oxygen that gets to your baby  Talk to your healthcare provider if you need help to quit smoking  Tell your healthcare provider as soon as you feel a change in your baby's movements  When to measure kick counts:   · Measure kick counts at the same time every day  · Measure kick counts when your baby is awake and most active  Your baby may be most active in the evening  How to measure kick counts:  Check that your baby is awake before you measure kick counts  You can wake up your baby by lightly pushing on your belly, walking, or drinking something cold  Your healthcare provider may tell you different ways to measure kick counts  You may be told to do the following:  · Use a chart or clock to keep track of the time you start and finish counting  · Sit in a chair or lie on your left side  · Place your hands on the largest part of your belly  · Count until you reach 10 kicks  Write down how much time it takes to count 10 kicks  · It may take 30 minutes to 2 hours to count 10 kicks  It should not take more than 2 hours to count 10 kicks  Follow up with your healthcare provider as directed:  Write down your questions so you remember to ask them during your visits  © Copyright [employer] 2021 Information is for End User's use only and may not be sold, redistributed or otherwise used for commercial purposes  All illustrations and images included in CareNotes® are the copyrighted property of A D A M , Inc  or [facility] Melyssa Jameson   The above information is an  only  It is not intended as medical advice for individual conditions or treatments  Talk to your doctor, nurse or pharmacist before following any medical regimen to see if it is safe and effective for you

## 2021-09-28 NOTE — PROGRESS NOTES
Date: 09/28/21  Jigar Sy  1995  Estimated Date of Delivery: 10/14/21  37w5d  OB/GYN: [facility]  Diagnosis: Insulin controlled GDM Third trimester      Plan:     IOL scheduled for 10/07/2021    Lantus -  96 units split dose into 3 equal injections of 32 units each  Novolog:  Increase 26-16-30-0 before meals 1-2-3-0  Hold novolog if not eating   -Patient would prefer to avoid Metformin  Diet: 2200 calorie Gestational diabetes meal plan; 3 meals and 3 snacks     -Diet recall revealed patient is trying to follow meal plan however is sometimes eating high fat convenience foods causing 1 hr pp>140     -Bedtime snack 15 gms CHO and 2-3 ounces of protein  SMBG: Checks blood sugar 4 times per day; fasting and 1 hour start of each meal    Meter: One-Touch Verio glucose meter  Activity: Patient is trying to add 20-30 minute walks on non work days   Support System: Significant Other  Patient Goal: I will walk 20-30 minutes after dinner daily       Labs  3/2/2021 Hbg A1c = 5 6%  06/02/2021 A1c = 5 2%  08/26/2021 CMP completed  07/23/2021 A1c = 5 3%  Next A1c is due by 09/17/2021    Ultrasounds  07/23/2021 Dr Scruggs Friday: EFW 64th percentile  08/27/2021 Dr Bae Em - increased to 64 units - EFW 74%, GUERDA normal     Further fetal surveillance  NST / GUERDA twice a week     Glenda Wyman RN

## 2021-10-01 ENCOUNTER — TELEPHONE (OUTPATIENT)
Dept: OBGYN CLINIC | Facility: CLINIC | Age: 26
End: 2021-10-01

## 2021-10-01 ENCOUNTER — TELEPHONE (OUTPATIENT)
Dept: PERINATAL CARE | Facility: CLINIC | Age: 26
End: 2021-10-01

## 2021-10-02 ENCOUNTER — OFFICE VISIT (OUTPATIENT)
Dept: URGENT CARE | Facility: CLINIC | Age: 26
End: 2021-10-02
Payer: COMMERCIAL

## 2021-10-02 VITALS
TEMPERATURE: 97.9 F | RESPIRATION RATE: 20 BRPM | WEIGHT: 276 LBS | HEIGHT: 66 IN | HEART RATE: 94 BPM | OXYGEN SATURATION: 97 % | BODY MASS INDEX: 44.36 KG/M2

## 2021-10-02 DIAGNOSIS — R09.81 NASAL CONGESTION: ICD-10-CM

## 2021-10-02 DIAGNOSIS — Z11.52 ENCOUNTER FOR SCREENING FOR COVID-19: ICD-10-CM

## 2021-10-02 DIAGNOSIS — B97.89 VIRAL SINUSITIS: Primary | ICD-10-CM

## 2021-10-02 DIAGNOSIS — J32.9 VIRAL SINUSITIS: Primary | ICD-10-CM

## 2021-10-02 PROCEDURE — U0003 INFECTIOUS AGENT DETECTION BY NUCLEIC ACID (DNA OR RNA); SEVERE ACUTE RESPIRATORY SYNDROME CORONAVIRUS 2 (SARS-COV-2) (CORONAVIRUS DISEASE [COVID-19]), AMPLIFIED PROBE TECHNIQUE, MAKING USE OF HIGH THROUGHPUT TECHNOLOGIES AS DESCRIBED BY CMS-2020-01-R: HCPCS | Performed by: NURSE PRACTITIONER

## 2021-10-02 PROCEDURE — U0005 INFEC AGEN DETEC AMPLI PROBE: HCPCS | Performed by: NURSE PRACTITIONER

## 2021-10-02 PROCEDURE — 99213 OFFICE O/P EST LOW 20 MIN: CPT | Performed by: NURSE PRACTITIONER

## 2021-10-03 LAB — SARS-COV-2 RNA RESP QL NAA+PROBE: NEGATIVE

## 2021-10-05 ENCOUNTER — ULTRASOUND (OUTPATIENT)
Dept: PERINATAL CARE | Facility: CLINIC | Age: 26
End: 2021-10-05
Payer: COMMERCIAL

## 2021-10-05 VITALS
BODY MASS INDEX: 45.16 KG/M2 | HEART RATE: 110 BPM | WEIGHT: 281 LBS | SYSTOLIC BLOOD PRESSURE: 110 MMHG | DIASTOLIC BLOOD PRESSURE: 59 MMHG | HEIGHT: 66 IN

## 2021-10-05 DIAGNOSIS — O24.414 INSULIN CONTROLLED GESTATIONAL DIABETES MELLITUS (GDM) IN THIRD TRIMESTER: ICD-10-CM

## 2021-10-05 DIAGNOSIS — O99.213 OBESITY AFFECTING PREGNANCY IN THIRD TRIMESTER: ICD-10-CM

## 2021-10-05 DIAGNOSIS — Z3A.38 38 WEEKS GESTATION OF PREGNANCY: Primary | ICD-10-CM

## 2021-10-05 PROCEDURE — 59025 FETAL NON-STRESS TEST: CPT | Performed by: OBSTETRICS & GYNECOLOGY

## 2021-10-05 PROCEDURE — 76815 OB US LIMITED FETUS(S): CPT | Performed by: OBSTETRICS & GYNECOLOGY

## 2021-10-06 ENCOUNTER — DOCUMENTATION (OUTPATIENT)
Dept: PERINATAL CARE | Facility: CLINIC | Age: 26
End: 2021-10-06

## 2021-10-07 ENCOUNTER — HOSPITAL ENCOUNTER (OUTPATIENT)
Dept: LABOR AND DELIVERY | Facility: HOSPITAL | Age: 26
Discharge: HOME | DRG: 560 | End: 2021-10-07
Payer: COMMERCIAL

## 2021-10-07 ENCOUNTER — HOSPITAL ENCOUNTER (INPATIENT)
Facility: HOSPITAL | Age: 26
LOS: 3 days | Discharge: HOME | DRG: 560 | End: 2021-10-10
Attending: OBSTETRICS & GYNECOLOGY | Admitting: OBSTETRICS & GYNECOLOGY
Payer: COMMERCIAL

## 2021-10-07 ENCOUNTER — ANESTHESIA EVENT (INPATIENT)
Dept: ANESTHESIOLOGY | Facility: HOSPITAL | Age: 26
DRG: 560 | End: 2021-10-07
Payer: COMMERCIAL

## 2021-10-07 ENCOUNTER — ANESTHESIA EVENT (INPATIENT)
Dept: LABOR AND DELIVERY | Facility: HOSPITAL | Age: 26
DRG: 560 | End: 2021-10-07
Payer: COMMERCIAL

## 2021-10-07 ENCOUNTER — ANESTHESIA (INPATIENT)
Dept: ANESTHESIOLOGY | Facility: HOSPITAL | Age: 26
DRG: 560 | End: 2021-10-07
Payer: COMMERCIAL

## 2021-10-07 DIAGNOSIS — Z3A.38 38 WEEKS GESTATION OF PREGNANCY: Primary | ICD-10-CM

## 2021-10-07 LAB
ABO GROUP BLD: NORMAL
BASOPHILS # BLD AUTO: 0.01 THOUSANDS/ΜL (ref 0–0.1)
BASOPHILS NFR BLD AUTO: 0 % (ref 0–1)
BLD GP AB SCN SERPL QL: NEGATIVE
EOSINOPHIL # BLD AUTO: 0.07 THOUSAND/ΜL (ref 0–0.61)
EOSINOPHIL NFR BLD AUTO: 1 % (ref 0–6)
ERYTHROCYTE [DISTWIDTH] IN BLOOD BY AUTOMATED COUNT: 14.6 % (ref 11.6–15.1)
GLUCOSE SERPL-MCNC: 121 MG/DL (ref 65–140)
GLUCOSE SERPL-MCNC: 72 MG/DL (ref 65–140)
GLUCOSE SERPL-MCNC: 74 MG/DL (ref 65–140)
GLUCOSE SERPL-MCNC: 76 MG/DL (ref 65–140)
GLUCOSE SERPL-MCNC: 81 MG/DL (ref 65–140)
GLUCOSE SERPL-MCNC: 84 MG/DL (ref 65–140)
GLUCOSE SERPL-MCNC: 90 MG/DL (ref 65–140)
GLUCOSE SERPL-MCNC: 93 MG/DL (ref 65–140)
HCT VFR BLD AUTO: 36.6 % (ref 34.8–46.1)
HGB BLD-MCNC: 11.9 G/DL (ref 11.5–15.4)
HOLD SPECIMEN: NORMAL
IMM GRANULOCYTES # BLD AUTO: 0.06 THOUSAND/UL (ref 0–0.2)
IMM GRANULOCYTES NFR BLD AUTO: 1 % (ref 0–2)
LYMPHOCYTES # BLD AUTO: 1.97 THOUSANDS/ΜL (ref 0.6–4.47)
LYMPHOCYTES NFR BLD AUTO: 16 % (ref 14–44)
MCH RBC QN AUTO: 28.1 PG (ref 26.8–34.3)
MCHC RBC AUTO-ENTMCNC: 32.5 G/DL (ref 31.4–37.4)
MCV RBC AUTO: 87 FL (ref 82–98)
MONOCYTES # BLD AUTO: 0.63 THOUSAND/ΜL (ref 0.17–1.22)
MONOCYTES NFR BLD AUTO: 5 % (ref 4–12)
NEUTROPHILS # BLD AUTO: 9.7 THOUSANDS/ΜL (ref 1.85–7.62)
NEUTS SEG NFR BLD AUTO: 77 % (ref 43–75)
NRBC BLD AUTO-RTO: 0 /100 WBCS
PLATELET # BLD AUTO: 235 THOUSANDS/UL (ref 149–390)
PMV BLD AUTO: 11.1 FL (ref 8.9–12.7)
RBC # BLD AUTO: 4.23 MILLION/UL (ref 3.81–5.12)
RH BLD: POSITIVE
RPR SER QL: NORMAL
SPECIMEN EXPIRATION DATE: NORMAL
WBC # BLD AUTO: 12.44 THOUSAND/UL (ref 4.31–10.16)

## 2021-10-07 PROCEDURE — NC001 PR NO CHARGE: Performed by: OBSTETRICS & GYNECOLOGY

## 2021-10-07 PROCEDURE — 4A1HXCZ MONITORING OF PRODUCTS OF CONCEPTION, CARDIAC RATE, EXTERNAL APPROACH: ICD-10-PCS | Performed by: OBSTETRICS & GYNECOLOGY

## 2021-10-07 PROCEDURE — 85025 COMPLETE CBC W/AUTO DIFF WBC: CPT

## 2021-10-07 PROCEDURE — 10907ZC DRAINAGE OF AMNIOTIC FLUID, THERAPEUTIC FROM PRODUCTS OF CONCEPTION, VIA NATURAL OR ARTIFICIAL OPENING: ICD-10-PCS | Performed by: OBSTETRICS & GYNECOLOGY

## 2021-10-07 PROCEDURE — 86850 RBC ANTIBODY SCREEN: CPT

## 2021-10-07 PROCEDURE — 3E033VJ INTRODUCTION OF OTHER HORMONE INTO PERIPHERAL VEIN, PERCUTANEOUS APPROACH: ICD-10-PCS | Performed by: OBSTETRICS & GYNECOLOGY

## 2021-10-07 PROCEDURE — 86592 SYPHILIS TEST NON-TREP QUAL: CPT

## 2021-10-07 PROCEDURE — 86901 BLOOD TYPING SEROLOGIC RH(D): CPT

## 2021-10-07 PROCEDURE — 86900 BLOOD TYPING SEROLOGIC ABO: CPT

## 2021-10-07 PROCEDURE — 82948 REAGENT STRIP/BLOOD GLUCOSE: CPT

## 2021-10-07 RX ORDER — ACETAMINOPHEN 325 MG/1
650 TABLET ORAL EVERY 4 HOURS PRN
Status: DISCONTINUED | OUTPATIENT
Start: 2021-10-07 | End: 2021-10-08

## 2021-10-07 RX ORDER — PHENYLEPHRINE HCL IN 0.9% NACL 1 MG/10 ML
100 SYRINGE (ML) INTRAVENOUS ONCE AS NEEDED
Status: DISCONTINUED | OUTPATIENT
Start: 2021-10-07 | End: 2021-10-08

## 2021-10-07 RX ORDER — SODIUM CHLORIDE 9 MG/ML
125 INJECTION, SOLUTION INTRAVENOUS CONTINUOUS
Status: DISCONTINUED | OUTPATIENT
Start: 2021-10-07 | End: 2021-10-08

## 2021-10-07 RX ORDER — ONDANSETRON 2 MG/ML
4 INJECTION INTRAMUSCULAR; INTRAVENOUS EVERY 8 HOURS PRN
Status: DISCONTINUED | OUTPATIENT
Start: 2021-10-07 | End: 2021-10-08

## 2021-10-07 RX ORDER — ROPIVACAINE HYDROCHLORIDE 2 MG/ML
INJECTION, SOLUTION EPIDURAL; INFILTRATION; PERINEURAL AS NEEDED
Status: DISCONTINUED | OUTPATIENT
Start: 2021-10-07 | End: 2021-10-11 | Stop reason: HOSPADM

## 2021-10-07 RX ORDER — CALCIUM CARBONATE 200(500)MG
1000 TABLET,CHEWABLE ORAL DAILY PRN
Status: DISCONTINUED | OUTPATIENT
Start: 2021-10-07 | End: 2021-10-07

## 2021-10-07 RX ORDER — CALCIUM CARBONATE 200(500)MG
1000 TABLET,CHEWABLE ORAL 3 TIMES DAILY PRN
Status: DISCONTINUED | OUTPATIENT
Start: 2021-10-07 | End: 2021-10-08

## 2021-10-07 RX ORDER — OXYTOCIN/RINGER'S LACTATE 30/500 ML
1-30 PLASTIC BAG, INJECTION (ML) INTRAVENOUS
Status: DISCONTINUED | OUTPATIENT
Start: 2021-10-07 | End: 2021-10-08

## 2021-10-07 RX ADMIN — SODIUM CHLORIDE 125 ML/HR: 0.9 INJECTION, SOLUTION INTRAVENOUS at 12:22

## 2021-10-07 RX ADMIN — SODIUM CHLORIDE 2.5 MILLION UNITS: 9 INJECTION, SOLUTION INTRAVENOUS at 20:22

## 2021-10-07 RX ADMIN — ANTACID TABLETS 1000 MG: 500 TABLET, CHEWABLE ORAL at 08:51

## 2021-10-07 RX ADMIN — CALCIUM CARBONATE (ANTACID) CHEW TAB 500 MG 1000 MG: 500 CHEW TAB at 15:37

## 2021-10-07 RX ADMIN — Medication 2 MILLI-UNITS/MIN: at 15:07

## 2021-10-07 RX ADMIN — ROPIVACAINE HYDROCHLORIDE: 2 INJECTION, SOLUTION EPIDURAL; INFILTRATION at 23:08

## 2021-10-07 RX ADMIN — CALCIUM CARBONATE (ANTACID) CHEW TAB 500 MG 1000 MG: 500 CHEW TAB at 20:55

## 2021-10-07 RX ADMIN — SODIUM CHLORIDE 5 MILLION UNITS: 0.9 INJECTION, SOLUTION INTRAVENOUS at 12:30

## 2021-10-07 RX ADMIN — SODIUM CHLORIDE 990 ML/HR: 0.9 INJECTION, SOLUTION INTRAVENOUS at 23:10

## 2021-10-07 RX ADMIN — SODIUM CHLORIDE 2.5 MILLION UNITS: 9 INJECTION, SOLUTION INTRAVENOUS at 16:11

## 2021-10-07 RX ADMIN — ROPIVACAINE HYDROCHLORIDE 4 MG: 2 INJECTION, SOLUTION EPIDURAL; INFILTRATION at 22:20

## 2021-10-07 NOTE — PROGRESS NOTES
[address] [facility] 27 y o  female MRN: [id number]  Unit/Bed#: LD TRIAGE  Encounter: [id number]      Assessment: 27 y o   at 39w0d admitted for IOL for A2GDM  SVE: 3/50/-3  FHT: cat 1  Clinical EFW: 8 lb  Vertex confirmed by US  GBS status: positive      Plan:   · Admit  · CBC, RPR, Type & Screen  · Analgesia at maternal request  · IOL starting with pitocin  · PCN in labor for GBS positive status  · POCT glucose q1h x4 occurrences    Dr Sam Victor aware        SUBJECTIVE:    Chief Complaint: IOL for A2GDM    HPI: Irasema Astorga is a 27 y o   with an ROCIO of 10/14/2021, by Last Menstrual Period at 39w0d who is being admitted for IOL for A2GDM  She denies having uterine contractions, has no LOF, and reports no VB  She states she has felt good FM  Pregnancy complications: [id number], obesity, asthma, elevated BP without diagnosis of HTN    Patient Active Problem List   Diagnosis    Mild intermittent asthma    Anxiety    Depression    Vitamin D deficiency    Hyperlipidemia    Insulin controlled gestational diabetes mellitus (GDM) in third trimester    Maternal morbid obesity in third trimester, antepartum ([address] )    Allergic rhinitis    Uterine fibroid in pregnancy    Family history of autism    Hyperglycemia in pregnancy    Elevated blood pressure reading in office without diagnosis of hypertension    Obesity affecting pregnancy in third trimester    Shortness of breath    38 weeks gestation of pregnancy    Chest pain       Baby complications/comments: none    Review of Systems   Constitutional: Negative for chills and fever  Eyes: Negative for visual disturbance  Respiratory: Negative for cough and shortness of breath  Cardiovascular: Negative for chest pain  Gastrointestinal: Negative for abdominal pain, diarrhea, nausea and vomiting  Genitourinary: Negative for dysuria  Neurological: Negative for dizziness, light-headedness and headaches     All other systems reviewed and are negative  OB History    Para Term  AB Living   1 0 0 0 0 0   SAB TAB Ectopic Multiple Live Births   0 0 0 0 0      # Outcome Date GA Lbr Sam/2nd Weight Sex Delivery Anes PTL Lv   1 Current               Obstetric Comments   Menarche 17       Past Medical History:   Diagnosis Date    Anxiety     Asthma     BMI 40 0-44 9, [facility] 3/11/2021    Depression     History of ovarian cyst 2018    Hyperlipidemia     IFG (impaired fasting glucose)     Migraine     Mild intermittent asthma     Morbid obesity [facility]     PID (pelvic inflammatory disease) 2018    Polycystic ovary syndrome     Pre-diabetes     Varicella     had vaccine    Vitamin D deficiency        Past Surgical History:   Procedure Laterality Date    WISDOM TOOTH EXTRACTION         Social History     Tobacco Use    Smoking status: Former Smoker     Packs/day: 0 10     Years: 0 20     Pack years: 0 02     Types: Cigarettes     Start date: 2017     Quit date: 10/1/2017     Years since quittin 0    Smokeless tobacco: Never Used    Tobacco comment: Smoked socially    Substance Use Topics    Alcohol use:  Yes     Alcohol/week: 1 0 standard drinks     Types: 1 Glasses of wine per week     Comment: socially       No Known Allergies    Medications Prior to Admission   Medication    albuterol (PROVENTIL HFA,VENTOLIN HFA) 90 mcg/act inhaler    Blood Glucose Monitoring Suppl (OneTouch Verio Flex System) w/Device KIT    calcium carbonate (TUMS EX) 750 mg chewable tablet    Cholecalciferol (Vitamin D3) 50 MCG (2000) TABS    docusate sodium (COLACE) 100 mg capsule    fluticasone (FLONASE) 50 mcg/act nasal spray    insulin aspart (NovoLOG FlexPen) 100 UNIT/ML injection pen    insulin glargine (Lantus SoloStar) 100 units/mL injection pen    Insulin Pen Needle 31G X 5 MM MISC    OneTouch Delica Lancets 59M MISC    OneTouch Verio test strip    Prenatal Vit-Fe Fumarate-FA (PRENATAL VITAMIN PO) OBJECTIVE:  Vitals:  Temp:  [96 6 °F (35 9 °C)] 96 6 °F (35 9 °C)  HR:  [109] 109  Resp:  [18] 18  BP: (135)/(86) 135/86  Body mass index is 45 35 kg/m²  Physical Exam:  Physical Exam  Constitutional:       General: She is not in acute distress  Appearance: Normal appearance  She is obese  She is not ill-appearing  HENT:      Mouth/Throat:      Mouth: Mucous membranes are moist    Eyes:      Extraocular Movements: Extraocular movements intact  Cardiovascular:      Rate and Rhythm: Normal rate  Pulmonary:      Effort: Pulmonary effort is normal    Musculoskeletal:         General: No swelling  Right lower leg: No edema  Left lower leg: No edema  Neurological:      General: No focal deficit present  Mental Status: She is alert  Skin:     General: Skin is warm and dry  Coloration: Skin is not jaundiced or pale     Psychiatric:         Mood and Affect: Mood normal           SVE:  3/50/-3    FHT:  Baseline Rate: 150 bpm  Variability: Moderate 6-25 bpm  Accelerations: 15 x 15 or greater, At variable times  Decelerations: None    TOCO:   Contraction Frequency (minutes): irregular    Lab Results   Component Value Date    WBC 11 64 (H) 08/26/2021    HGB 12 0 08/26/2021    HCT 37 1 08/26/2021     08/26/2021     Lab Results   Component Value Date    K 3 8 08/26/2021     08/26/2021    CO2 20 (L) 08/26/2021    BUN 7 08/26/2021    CREATININE 0 60 08/26/2021    AST 10 08/26/2021    ALT 16 08/26/2021       Prenatal Labs: Reviewed      Blood type: A+  Antibody: negative  Group B strep: positive  HIV: negative  Hepatitis B: negative  RPR: non-reactive  Rubella: Immune  Varicella: Unknown  1 hour Glucose: 159  3 hour glucose: fasting 104, discontinued after  Platelets: 780  Hgb: 13 3    >2 Midnights  INPATIENT       Irma Yee MD  OB/GYN PGY-1  10/7/2021  8:19 AM

## 2021-10-07 NOTE — OB LABOR/OXYTOCIN SAFETY PROGRESS
Oxytocin Safety Progress Check Note - Nino Emery 27 y o  female MRN: [id number]    Unit/Bed#: -01 Encounter: [id number]    Dose (ashok-units/min) Oxytocin: 4 ashok-units/min  Contraction Frequency (minutes): 2-5  Contraction Quality: Mild  Tachysystole: No   Cervical Dilation: 3-4        Cervical Effacement: 100  Fetal Station: -1  Baseline Rate: 135 bpm  Fetal Heart Rate: 138 BPM  FHR Category: Category I           Vital Signs:  Vitals:    10/07/21 1524   BP: 127/70   Pulse: 90   Resp:    Temp:    SpO2:      Notes/comments:   Pt is comfortable in bed with epidural  SVE as above  FHT cat 1 with contractions q6 mins  95 MVUs  Will restart pitocin at 6 now and titrate  Will recheck in 2h or as clinically indicated      D/w Dr Richard Merritt MD 10/7/2021 3:43 PM

## 2021-10-07 NOTE — PLAN OF CARE
Problem: PAIN - ADULT  Goal: Verbalizes/displays adequate comfort level or baseline comfort level  Description: Interventions:  - Encourage patient to monitor pain and request assistance  - Assess pain using appropriate pain scale  - Administer analgesics based on type and severity of pain and evaluate response  - Implement non-pharmacological measures as appropriate and evaluate response  - Consider cultural and social influences on pain and pain management  - Notify physician/advanced practitioner if interventions unsuccessful or patient reports new pain  Outcome: Progressing     Problem: INFECTION - ADULT  Goal: Absence or prevention of progression during hospitalization  Description: INTERVENTIONS:  - Assess and monitor for signs and symptoms of infection  - Monitor lab/diagnostic results  - Monitor all insertion sites, i e  indwelling lines, tubes, and drains  - Monitor endotracheal if appropriate and nasal secretions for changes in amount and color  - [place] appropriate cooling/warming therapies per order  - Administer medications as ordered  - Instruct and encourage patient and family to use good hand hygiene technique  - Identify and instruct in appropriate isolation precautions for identified infection/condition  Outcome: Progressing  Goal: Absence of fever/infection during neutropenic period  Description: INTERVENTIONS:  - Monitor WBC    Outcome: Progressing     Problem: SAFETY ADULT  Goal: Patient will remain free of falls  Description: INTERVENTIONS:  - Educate patient/family on patient safety including physical limitations  - Instruct patient to call for assistance with activity   - Consult OT/PT to assist with strengthening/mobility   - Keep Call bell within reach  - Keep bed low and locked with side rails adjusted as appropriate  - Keep care items and personal belongings within reach  - Initiate and maintain comfort rounds  - Make Fall Risk Sign visible to staff  - Offer Toileting every 2 Hours, in advance of need  - Obtain necessary fall risk management equipment: no-slip socks  - Apply yellow socks and bracelet for high fall risk patients  - Consider moving patient to room near nurses station  Outcome: Progressing  Goal: Maintain or return to baseline ADL function  Description: INTERVENTIONS:  -  Assess patient's ability to carry out ADLs; assess patient's baseline for ADL function and identify physical deficits which impact ability to perform ADLs (bathing, care of mouth/teeth, toileting, grooming, dressing, etc )  - Assess/evaluate cause of self-care deficits   - Assess range of motion  - Assess patient's mobility; develop plan if impaired  - Assess patient's need for assistive devices and provide as appropriate  - Encourage maximum independence but intervene and supervise when necessary  - Involve family in performance of ADLs  - Assess for home care needs following discharge   - Consider OT consult to assist with ADL evaluation and planning for discharge  - Provide patient education as appropriate  Outcome: Progressing  Goal: Maintains/Returns to pre admission functional level  Description: INTERVENTIONS:  - Perform BMAT or MOVE assessment daily    - Set and communicate daily mobility goal to care team and patient/family/caregiver  - Collaborate with rehabilitation services on mobility goals if consulted  - Out of bed for toileting  - Record patient progress and toleration of activity level   Outcome: Progressing     Problem: Knowledge Deficit  Goal: Patient/family/caregiver demonstrates understanding of disease process, treatment plan, medications, and discharge instructions  Description: Complete learning assessment and assess knowledge base    Interventions:  - Provide teaching at level of understanding  - Provide teaching via preferred learning methods  Outcome: Progressing  Goal: Verbalizes understanding of labor plan  Description: Assess patient/family/caregiver's baseline knowledge level and ability to understand information  Provide education via patient/family/caregiver's preferred learning method at appropriate level of understanding  1  Provide teaching at level of understanding  2  Provide teaching via preferred learning method(s)  Outcome: Progressing     Problem: DISCHARGE PLANNING  Goal: Discharge to home or other facility with appropriate resources  Description: INTERVENTIONS:  - Identify barriers to discharge w/patient and caregiver  - Arrange for needed discharge resources and transportation as appropriate  - Identify discharge learning needs (meds, wound care, etc )  - Arrange for interpretive services to assist at discharge as needed  - Refer to Case Management Department for coordinating discharge planning if the patient needs post-hospital services based on physician/advanced practitioner order or complex needs related to functional status, cognitive ability, or social support system  Outcome: Progressing     Problem: Labor & Delivery  Goal: Manages discomfort  Description: Assess and monitor for signs and symptoms of discomfort  Assess patient's pain level regularly and per hospital policy  Administer medications as ordered  Support use of nonpharmacological methods to help control pain such as distraction, imagery, relaxation, and application of heat and cold  Collaborate with interdisciplinary team and patient to determine appropriate pain management plan  1  Include patient in decisions related to comfort  2  Offer non-pharmacological pain management interventions  3  Report ineffective pain management to physician  Outcome: Progressing  Goal: Patient vital signs are stable  Description: 1  Assess vital signs - vaginal delivery    Outcome: Progressing     Problem: BIRTH - VAGINAL/ SECTION  Goal: Fetal and maternal status remain reassuring during the birth process  Description: INTERVENTIONS:  - Monitor vital signs  - Monitor fetal heart rate  - Monitor uterine activity  - Monitor labor progression (vaginal delivery)  - DVT prophylaxis  - Antibiotic prophylaxis  Outcome: Progressing  Goal: Emotionally satisfying birthing experience for mother/fetus  Description: Interventions:  - Assess, plan, implement and evaluate the nursing care given to the patient in labor  - Advocate the philosophy that each childbirth experience is a unique experience and support the family's chosen level of involvement and control during the labor process   - Actively participate in both the patient's and family's teaching of the birth process  - Consider cultural, Latter day and age-specific factors and plan care for the patient in labor  Outcome: Progressing

## 2021-10-07 NOTE — OB LABOR/OXYTOCIN SAFETY PROGRESS
Oxytocin Safety Progress Check Note - Becca Blankenship 27 y o  female MRN: [id number]    Unit/Bed#: -01 Encounter: [id number]    Dose (ashok-units/min) Oxytocin: 16 ashok-units/min  Contraction Frequency (minutes): 2-4  Contraction Quality: Mild  Tachysystole: No   Cervical Dilation: 4        Cervical Effacement: 70  Fetal Station: -3  Baseline Rate: 145 bpm  Fetal Heart Rate: 140 BPM  FHR Category: Category I               Vital Signs:   Vitals:    10/07/21 1759   BP: 131/78   Pulse: 98   Resp:    Temp:    SpO2:            Notes/comments:    Continue pitocin titration, plan for AROM at next check  Cat 1 tracing  Pt does not plan on epidural  Continue FSBG - currently at goal    Geovanny Jacques DO 10/7/2021 6:36 PM

## 2021-10-08 LAB
BASE EXCESS BLDCOV CALC-SCNC: -2.5 MMOL/L (ref 1–9)
GLUCOSE SERPL-MCNC: 78 MG/DL (ref 65–140)
HCO3 BLDCOV-SCNC: 23 MMOL/L (ref 12.2–28.6)
OXYHGB MFR BLDCOV: 59.6 %
PCO2 BLDCOV: 42.1 MM HG (ref 27–43)
PH BLDCOV: 7.36 [PH] (ref 7.19–7.49)
PO2 BLDCOV: 23.8 MM HG (ref 15–45)
SAO2 % BLDCOV: 16.8 ML/DL

## 2021-10-08 PROCEDURE — 59409 OBSTETRICAL CARE: CPT | Performed by: OBSTETRICS & GYNECOLOGY

## 2021-10-08 PROCEDURE — 82948 REAGENT STRIP/BLOOD GLUCOSE: CPT

## 2021-10-08 PROCEDURE — 82805 BLOOD GASES W/O2 SATURATION: CPT | Performed by: OBSTETRICS & GYNECOLOGY

## 2021-10-08 PROCEDURE — 0HQ9XZZ REPAIR PERINEUM SKIN, EXTERNAL APPROACH: ICD-10-PCS | Performed by: OBSTETRICS & GYNECOLOGY

## 2021-10-08 RX ORDER — ONDANSETRON 2 MG/ML
4 INJECTION INTRAMUSCULAR; INTRAVENOUS EVERY 8 HOURS PRN
Status: DISCONTINUED | OUTPATIENT
Start: 2021-10-08 | End: 2021-10-10 | Stop reason: HOSPADM

## 2021-10-08 RX ORDER — CALCIUM CARBONATE 200(500)MG
1000 TABLET,CHEWABLE ORAL DAILY PRN
Status: DISCONTINUED | OUTPATIENT
Start: 2021-10-08 | End: 2021-10-10 | Stop reason: HOSPADM

## 2021-10-08 RX ORDER — DOCUSATE SODIUM 100 MG/1
100 CAPSULE, LIQUID FILLED ORAL 2 TIMES DAILY
Status: DISCONTINUED | OUTPATIENT
Start: 2021-10-08 | End: 2021-10-10 | Stop reason: HOSPADM

## 2021-10-08 RX ORDER — DIAPER,BRIEF,INFANT-TODD,DISP
1 EACH MISCELLANEOUS 4 TIMES DAILY PRN
Status: DISCONTINUED | OUTPATIENT
Start: 2021-10-08 | End: 2021-10-10 | Stop reason: HOSPADM

## 2021-10-08 RX ORDER — ACETAMINOPHEN 325 MG/1
650 TABLET ORAL EVERY 6 HOURS PRN
Status: DISCONTINUED | OUTPATIENT
Start: 2021-10-08 | End: 2021-10-10 | Stop reason: HOSPADM

## 2021-10-08 RX ORDER — DIPHENHYDRAMINE HCL 25 MG
25 TABLET ORAL EVERY 6 HOURS PRN
Status: DISCONTINUED | OUTPATIENT
Start: 2021-10-08 | End: 2021-10-10 | Stop reason: HOSPADM

## 2021-10-08 RX ORDER — IBUPROFEN 600 MG/1
600 TABLET ORAL EVERY 6 HOURS PRN
Status: DISCONTINUED | OUTPATIENT
Start: 2021-10-08 | End: 2021-10-10 | Stop reason: HOSPADM

## 2021-10-08 RX ORDER — OXYTOCIN/RINGER'S LACTATE 30/500 ML
250 PLASTIC BAG, INJECTION (ML) INTRAVENOUS CONTINUOUS
Status: ACTIVE | OUTPATIENT
Start: 2021-10-08 | End: 2021-10-08

## 2021-10-08 RX ADMIN — SODIUM CHLORIDE 2.5 MILLION UNITS: 9 INJECTION, SOLUTION INTRAVENOUS at 00:44

## 2021-10-08 RX ADMIN — DOCUSATE SODIUM 100 MG: 100 CAPSULE ORAL at 09:45

## 2021-10-08 RX ADMIN — Medication: at 04:47

## 2021-10-08 RX ADMIN — IBUPROFEN 600 MG: 600 TABLET ORAL at 20:40

## 2021-10-08 RX ADMIN — HYDROCORTISONE 1 APPLICATION: 1 CREAM TOPICAL at 04:47

## 2021-10-08 RX ADMIN — DOCUSATE SODIUM 100 MG: 100 CAPSULE ORAL at 19:36

## 2021-10-08 RX ADMIN — IBUPROFEN 600 MG: 600 TABLET ORAL at 05:49

## 2021-10-08 NOTE — OB LABOR/OXYTOCIN SAFETY PROGRESS
Oxytocin Safety Progress Check Note - Franci Silvestre 27 y o  female MRN: [id number]    Unit/Bed#: -01 Encounter: [id number]    Dose (ashok-units/min) Oxytocin: 18 ashok-units/min  Contraction Frequency (minutes): 3-3 5  Contraction Quality: Mild  Tachysystole: No   Cervical Dilation: 5        Cervical Effacement: 70  Fetal Station: -2  Baseline Rate: 135 bpm  Fetal Heart Rate: 138 BPM  FHR Category: Category I               Vital Signs:   Vitals:    10/07/21 1900   BP: 132/78   Pulse: 86   Resp: 18   Temp:    SpO2:            Notes/comments:   AROM at this check - clear fluid  Continue pitocin titration   Cat 1 tracing  FSBG within goal    Brian Pérez DO 10/7/2021 8:33 PM

## 2021-10-08 NOTE — LACTATION NOTE
This note was copied from a baby's chart  CONSULT - LACTATION  Baby Boy (Cristin) Dorothy 0 days male MRN: [id number]    [address] Room / Bed: (N)/ 309(N) Encounter: [id number]    Maternal Information     MOTHER:  Cristin De La Cruz  Maternal Age: 27 y o    OB History: # 1 - Date: 10/08/21, Sex: Male, Weight: 3660 g (8 lb 1 1 oz), GA: 39w1d, Delivery: Vaginal, Spontaneous, Apgar1: 9, Apgar5: 9, Living: Living, Birth Comments: None   Previouse breast reduction surgery? No    Lactation history:   Has patient previously breast fed: No   How long had patient previously breast fed:     Previous breast feeding complications:       Past Surgical History:   Procedure Laterality Date    WISDOM TOOTH EXTRACTION          Birth information:  YOB: 2019   Time of birth: 4:33 AM   Sex: male   Delivery type: Vaginal, Spontaneous   Birth Weight: 3660 g (8 lb 1 1 oz)   Percent of Weight Change: 0%     Gestational Age: 36w3d   [unfilled]    Assessment     Breast and nipple assessment: wide space between breasts, symmetrical, tubular in shape with larger, darker aroelas and nipples that claudia wtih stimulation  Lower quadrant glandular tissue is scant  right nipple everts less than left  [place] Assessment: no clinical assessment    Feeding assessment: latch difficulty (due to inexperienced mom )  LATCH:  Latch: Grasps breast, tongue down, lips flanged, rhythmic sucking   Audible Swallowing: A few with stimulation   Type of Nipple: Everted (After stimulation)   Comfort (Breast/Nipple): Soft/non-tender   Hold (Positioning): Partial assist, teach one side, mother does other, staff holds   [facility] Score: 8          Feeding recommendations:  breast feed on demand  Geri Correa was born early this am  Mom' s nipples claudia with stimulation  Hand expression demonstrated with teach back  S2S is encouraged for feedings  Information on hand expression given   Discussed benefits of knowing how to manually express breast including stimulating milk supply, softening nipple for latch and evacuating breast in the event of engorgement  Mom is encouraged to place baby skin to skin for feedings  Skin to skin education provided for baby placement on mother's chest, baby only in diaper, blankets below shoulders on baby's back  Skin to skin is encouraged to continue at home for feedings and between feedings  Placed baby on left breast in cross cradle  Mom is very cautious and does not feel comfortable holding merline close to the breast  After education and demonstration, mom attempts to have alignment of ear, shoulder and hip  Continues to place left hand on back of head and push Merline into the breast  Provided education on alignment of nose to breast; bring baby to breast and not breast to baby; support head with opp  Hand in cross cradle; use pillows to lift baby to be belly to belly; ear, shoulder, hip alignment; Support mother's back and place self in comfortable position to support bringing baby to the breast  Shoulders should be down and away from ears  Second attempt at feeding on right breast, laid back position  Mom does not want to move to assist with positioning  Merline was latched to the right breast  Active suckling, coordinated for approx  10 min  Mom unlatched Merline when she repositioned  Requested Merline be placed s2s so she can have breakfast Assistance was provided in bringing the baby to the breast with the nipple aligned to the infant's nose  The baby was encouraged to merline the nipple to his/her chin to achieve a wide, open mouth  Pumping was initiated due to assessment of breast tissue and assisting with milk transfer  Encouraged mom to pump every 2-3 hours after a feeding at the breast or to assist with everting right nipple  Pumping:   - When pumping, begin in stimulation mode (high cycle, low vacuum) until milk begins to express   Change pump to expression mode (low cycle, high vacuum)  Use hands on pumping techniques to assist with milk transfer  When milk stops expressing, change back to stimulation mode  When milk begins to flow, change to expression mode  You make cycle pump up to three times in a pumping session  Feeding times and log reviewed  - Start feedings on breast that last feeding ended   - allow no more than 3 hours between breast feeding sessions   - time between feedings is counted from the beginning of the first feed to the beginning of the next feeding session    Discussed with MOB how to utilize feeding log & monitor baby's output  Education on signs of satiation during a feeding, size of baby's belly, and offering both breasts at every feeding session provided  Mom has lansinoh pump at home    Met with mother  Provided mother with Ready, Set, Baby booklet  Discussed Skin to Skin contact an benefits to mom and baby  Talked about the delay of the first bath until baby has adjusted  Spoke about the benefits of rooming in  Feeding on cue and what that means for recognizing infant's hunger  Avoidance of pacifiers for the first month discussed  Talked about exclusive breastfeeding for the first 6 months  Positioning and latch reviewed as well as showing images of other feeding positions  Discussed the properties of a good latch in any position  Reviewed hand/manual expression  Discussed s/s that baby is getting enough milk and some s/s that breastfeeding dyad may need further help  Gave information on common concerns, what to expect the first few weeks after delivery, preparing for other caregivers, and how partners can help  Resources for support also provided      Encouraged to call    Mirna Young 10/8/2021 9:30 AM

## 2021-10-08 NOTE — OB LABOR/OXYTOCIN SAFETY PROGRESS
Oxytocin Safety Progress Check Note - Thierry Wooten 27 y o  female MRN: [id number]    Unit/Bed#: -01 Encounter: [id number]    Dose (ashok-units/min) Oxytocin: 18 ashok-units/min  Contraction Frequency (minutes): 2-4 5  Contraction Quality: Mild  Tachysystole: No   Cervical Dilation: 5-6        Cervical Effacement: 70  Fetal Station: -1  Baseline Rate: 135 bpm  Fetal Heart Rate: 158 BPM  FHR Category: Category I               Vital Signs:   Vitals:    10/07/21 2100   BP: 146/81   Pulse: 96   Resp: 18   Temp: 98 °F (36 7 °C)   SpO2:            Notes/comments:    Patient called out c/o 10/10 pain and constant rectal pressure  Exam as above  Patient tearful, requesting epidural  Will alert anesthesia     Rahel Whittaker DO 10/7/2021 9:50 PM

## 2021-10-08 NOTE — OB LABOR/OXYTOCIN SAFETY PROGRESS
Oxytocin Safety Progress Check Note - Becca Blankenship 27 y o  female MRN: [id number]    Unit/Bed#: -01 Encounter: [id number]    Dose (ashok-units/min) Oxytocin: 18 ashok-units/min  Contraction Frequency (minutes): 1-3  Contraction Quality: Mild  Tachysystole: No   Cervical Dilation: Lip/rim (Comment)        Cervical Effacement: 100  Fetal Station: 1  Baseline Rate: 135 bpm  Fetal Heart Rate: 148 BPM  FHR Category: Category II               Vital Signs:   Vitals:    10/08/21 0000   BP: 114/59   Pulse: 86   Resp:    Temp:    SpO2:            Notes/comments:    SVE 9 5/100/+1  FHT category 1  Pit at 18 mU/min, toco q1-2m  Continue to monitor until complete dilation  Plan for delivery  Dr Nathaniel Leslie aware      Carlos Arnold MD 10/8/2021 12:30 AM

## 2021-10-08 NOTE — DISCHARGE INSTRUCTIONS
Vaginal Delivery   WHAT YOU SHOULD KNOW:   A vaginal delivery is the birth of your baby through your vagina (birth canal)  AFTER YOU LEAVE:   Medicines:  · NSAIDs  help decrease swelling and pain or fever  This medicine is available with or without a doctor's order  NSAIDs can cause stomach bleeding or kidney problems in certain people  If you take blood thinner medicine, always ask your healthcare provider if NSAIDs are safe for you  Always read the medicine label and follow directions  · Take your medicine as directed  Call your healthcare provider if you think your medicine is not helping or if you have side effects  Tell him if you are allergic to any medicine  Keep a list of the medicines, vitamins, and herbs you take  Include the amounts, and when and why you take them  Bring the list or the pill bottles to follow-up visits  Carry your medicine list with you in case of an emergency  Follow up with your primary healthcare provider:  Most women need to return 6 weeks after a vaginal delivery  Ask about how to care for your wounds or stitches  Write down your questions so you remember to ask them during your visits  Activity:  Rest as much as possible  Try to keep all activities short  You may be able to do some exercise soon after you have your baby  Talk with your primary healthcare provider before you start exercising  If you work outside the home, ask when you can return to your job  Kegel exercises:  Kegel exercises may help your vaginal and rectal muscles heal faster  You can do Kegel exercises by tightening and relaxing the muscles around your vagina  Kegel exercises help make the muscles stronger  Breast care:  When your milk comes in, your breasts may feel full and hard  Ask how to care for your breasts, even if you are not breastfeeding  Constipation:  Do not try to push the bowel movement out if it is too hard   High-fiber foods, extra liquids, and regular exercise can help you prevent constipation  Examples of high-fiber foods are fruit and bran  Prune juice and water are good liquids to drink  Regular exercise helps your digestive system work  You may also be told to take over-the-counter fiber and stool softener medicines  Take these items as directed  Hemorrhoids:  Pregnancy can cause severe hemorrhoids  You may have rectal pain because of the hemorrhoids  Ask how to prevent or treat hemorrhoids  Perineum care: Your perineum is the area between your vagina and anus  Keep the area clean and dry to help it heal and to prevent infection  Wash the area gently with soap and water when you bathe or shower  Rinse your perineum with warm water when you use the toilet  Your primary healthcare provider may suggest you use a warm sitz bath to help decrease pain  A sitz bath is a bathtub or basin filled to hip level  Stay in the sitz bath for 20 to 30 minutes, or as directed  Vaginal discharge: You will have vaginal discharge, called lochia, after your delivery  The lochia is bright red the first day or two after the birth  By the fourth day, the amount decreases, and it turns red-brown  Use a sanitary pad rather than a tampon to prevent a vaginal infection  It is normal to have lochia up to 8 weeks after your baby is born  Monthly periods: Your period may start again within 7 to 12 weeks after your baby is born  If you are breastfeeding, it may take longer for your period to start again  You can still get pregnant again even though you do not have your monthly period  Talk with your primary healthcare provider about a birth control method that will be good for you if you do not want to get pregnant  Mood changes: Many new mothers have some kind of mood changes after delivery  Some of these changes occur because of lack of sleep, hormone changes, and caring for a new baby  Some mood changes can be more serious, such as postpartum depression   Talk with your primary healthcare provider if you feel unable to care for yourself or your baby  Sexual activity:  You may need to avoid sex for 6 to 7 weeks after you have your baby  You may notice you have a decreased desire for sex, or sex may be painful  You may need to use a vaginal lubricant (gel) to help make sex more comfortable  Contact your primary healthcare provider if:   · You have heavy vaginal bleeding that fills 1 or more sanitary pads in 1 hour  · You have a fever  · Your pain does not go away, or gets worse  · The skin between your vagina and rectum is swollen, warm, or red  · You have swollen, hard, or painful breasts  · You feel very sad or depressed  · You feel more tired than usual      · You have questions or concerns about your condition or care  Seek care immediately or call 911 if:   · You have pus or yellow drainage coming from your vagina or wound  · You are urinating very little, or not at all  · Your arm or leg feels warm, tender, and painful  It may look swollen and red  · You feel lightheaded, have sudden and worsening chest pain, or trouble breathing  You may have more pain when you take deep breaths or cough, or you may cough up blood  © 2014 [address] is for End User's use only and may not be sold, redistributed or otherwise used for commercial purposes  All illustrations and images included in CareNotes® are the copyrighted property of [employer]  or Rafael Duenas  The above information is an  only  It is not intended as medical advice for individual conditions or treatments  Talk to your doctor, nurse or pharmacist before following any medical regimen to see if it is safe and effective for you  Postpartum Perineal Care   WHAT YOU NEED TO KNOW:   Postpartum perineal care is care for your perineum after you have a baby  The perineum is your vagina and anus     DISCHARGE INSTRUCTIONS:   Care for your perineum:  Healthcare providers will give you a small squirt bottle and show you how to use it  Do the following after you use the toilet and before you put on a new pad:  · Remove the soiled pad    · Use the squirt bottle to rinse your perineum from front to back while you sit on the toilet     · Pat the area dry from front to back with toilet paper or a cotton cloth     · Put on a fresh pad    · Wash your hands  Decrease pain:  Ask your healthcare provider about these and other ways to decrease perineal pain:  · Sitz baths:  Healthcare providers may give you a portable sitz bath  This is a small tub that fits in the toilet  Fill the sitz bath or bathtub with 4 to 6 inches of warm water  Sit in the warm water for 20 minutes 2 to 3 times a day  · Ice:  Ice helps decrease swelling and pain  Ice may also help prevent tissue damage  Use an ice pack, or put crushed ice in a plastic bag  Cover it with a towel and place it on your perineum for 15 to 20 minutes every hour, or as directed  · Medicine spray, wipes, or pads:  Healthcare providers may give you a medicine spray or wipes soaked with numbing medicine to decrease the pain  Pads that contain an herb called witch hazel may also help reduce pain  Use these after perineal care or a sitz bath  Follow up with your healthcare provider as directed:  Write down your questions so you remember to ask them during your visits  Contact your healthcare provider if:   · You have heavy vaginal bleeding that fills 1 or more sanitary pads in 1 hour  · You have foul-smelling vaginal discharge  · You feel weak or lightheaded  · You have questions or concerns about your condition or care  Seek care immediately or call 911 if:   · You have large blood clots or bright red blood coming from your vagina  · You have abdominal pain, vomiting, and a fever    © 2017 [address] Information is for End User's use only and may not be sold, redistributed or otherwise used for commercial purposes  All illustrations and images included in CareNotes® are the copyrighted property of [employer]  or Rafael Duenas  The above information is an  only  It is not intended as medical advice for individual conditions or treatments  Talk to your doctor, nurse or pharmacist before following any medical regimen to see if it is safe and effective for you  Postpartum Depression   WHAT YOU NEED TO KNOW:   What is postpartum depression? Postpartum depression is a mood disorder that occurs after giving birth  A mood is an emotion or a feeling  Moods affect your behavior and how you feel about yourself and life in general  Depression is a sad mood that you cannot control  Women often feel sad, afraid, or nervous after their baby is born  These feelings are called postpartum blues or baby blues, and they usually go away in 1 to 2 weeks  With postpartum depression, these symptoms get worse and continue for more than 2 weeks  Postpartum depression is a serious condition that affects your daily activities and relationships  What causes postpartum depression? Healthcare providers do not know exactly what causes postpartum depression  It may be caused by a sudden drop in hormone levels after childbirth  A previous episode of postpartum depression or a family history of depression may increase your risk  Several things may trigger postpartum depression:  · Lack of support from the baby's father or other family members    · Feeling more tired than usual    · Stress, a poor diet, or lack of sleep    · Pain after childbirth or pain during breastfeeding    · Sudden change in lifestyle  How is postpartum depression diagnosed? Postpartum depression affects your daily activities and your relationships with other people  Healthcare providers will ask you questions about your signs and symptoms and how they are affecting your life   The symptoms of postpartum depression usually begin within 1 month after childbirth  You feel depressed or lose interest in activities you enjoy nearly every day for at least 2 weeks  You also have 4 or more of the following symptoms:  · You feel tired or have less energy than usual      · You feel unimportant or guilty most of the time  · You think about hurting or killing yourself  · Your appetite changes  You may lose your appetite and lose weight without trying  Your appetite may also increase and you may gain weight  · You are restless, irritable, or withdrawn  · You have trouble concentrating and remembering things  You have trouble doing daily tasks or making decisions  · You have trouble sleeping, even after the baby is asleep  How is postpartum depression treated? · Psychotherapy:  During therapy, you will talk with healthcare providers about how to cope with your feelings and moods  This can be done alone or in a group  It may also be done with family members or your partner  · Antidepressants: This medicine is given to decrease or stop the symptoms of depression  You usually need to take antidepressants for several weeks before you begin to feel better  Do not stop taking antidepressants unless your healthcare provider tells you to  Healthcare providers may try a different antidepressant if one type does not work  What can I do to feel better? · Rest:  Do not try to do everything all at the same time  Do only what is needed and let other things wait until later  Ask your family or friends for help, especially if you have other children  Ask your partner to help with night feedings or other baby care  Try to sleep when the baby naps  · Get emotional support:  Share your feelings with your partner, a friend, or another mother  · Take care of yourself:  Shower and dress each day  Do not skip meals  Try to get out of the house a little each day  Get regular exercise  Eat a healthy diet   Avoid alcohol because it can make your depression worse  Do not isolate yourself  Go for a walk or meet with a friend  It is also important that you have some time by yourself each day  How do I find support and more information? · [address] [facility], Public Information & Communication Branch  [address], [address], [address]  [place] [address]   Phone: [phone]  Phone: [phone]  Web Address: [facility]  When should I contact my healthcare provider? · You cannot make it to your next visit  · Your depression does not get better with treatment or it gets worse  · You have questions or concerns about your condition or care  When should I seek immediate care or call 91? · You think about hurting or killing yourself, your baby, or someone else  · You feel like other people want to hurt you  · You hear voices telling you to hurt yourself or your baby  CARE AGREEMENT:   You have the right to help plan your care  Learn about your health condition and how it may be treated  Discuss treatment options with your caregivers to decide what care you want to receive  You always have the right to refuse treatment  The above information is an  only  It is not intended as medical advice for individual conditions or treatments  Talk to your doctor, nurse or pharmacist before following any medical regimen to see if it is safe and effective for you  © 2017 [facility] Information is for End User's use only and may not be sold, redistributed or otherwise used for commercial purposes  All illustrations and images included in CareNotes® are the copyrighted property of [employer] M [employer] Inc  or Rafael Duenas  Postpartum Bleeding   WHAT YOU NEED TO KNOW:   Postpartum bleeding is vaginal bleeding after childbirth  This bleeding is normal, whether your baby was born vaginally or by    It contains blood and the tissue that lined the inside of your uterus when you were pregnant  DISCHARGE INSTRUCTIONS:   What to expect with postpartum bleeding:  Postpartum bleeding usually lasts at least 10 days, and may last longer than 6 weeks  Your bleeding may range from light (barely staining a pad) to heavy (soaking a pad in 1 hour)  Usually, you have heavier bleeding right after childbirth, which slows over the next few weeks until it stops  The bleeding is red or dark brown with clots for the first 1 to 3 days  It then turns pink for several days, and then becomes a white or yellow discharge until it ends  Follow up with your obstetrician as directed:  Do not have sex until your obstetrician says it is okay  Write down your questions so you remember to ask them during your visits  Contact your healthcare provider or obstetrician if:   · Your bleeding increases, or you have heavy bleeding that soaks a pad in 1 hour for 2 hours in a row  · You pass large blood clots  · You are breathing faster than normal, or your heart is beating faster than normal     · You are urinating less than usual, or not at all  · You feel dizzy  · You have questions or concerns about your condition or care  Seek immediate care or call 911 if:   · You are suddenly short of breath and feel lightheaded  · You have sudden chest pain  © 2017 [address] Information is for End User's use only and may not be sold, redistributed or otherwise used for commercial purposes  All illustrations and images included in CareNotes® are the copyrighted property of [employer]  or Rafael Duenas  The above information is an  only  It is not intended as medical advice for individual conditions or treatments  Talk to your doctor, nurse or pharmacist before following any medical regimen to see if it is safe and effective for you        Breast Care for the Breast Feeding Mother   WHAT YOU SHOULD KNOW:   Your breasts will go through normal changes while you are breastfeeding  Sometimes breast and nipple problems can develop while you are breastfeeding  Learn about changes that are normal and those that may be a problem  Breast care can help you prevent and manage problems so you and your baby can enjoy the benefits of breastfeeding  AFTER YOU LEAVE:   Breast changes while you are breastfeeding:   · For the first few days after your baby is born, your body makes a small amount of breast milk (colostrum)  Within about 2 to 5 days, your body will begin making mature milk  It may take up to 10 days or longer for mature milk to come in  When your mature milk comes in, your breasts will become full and firm  They may feel tender  · Breastfeeding your baby will decrease the full feeling in your breasts  You may feel a tingly sensation during feedings as milk is released from your breasts  This is called the milk let-down reflex  After 7 or more days, the fullness may feel like it has decreased  Your nipples should look the same as they did before you started breastfeeding  Breasts that feel full before and empty after breastfeeding are signs that breastfeeding is going well  Breast problems that can occur while you are breastfeeding:   · Nipple soreness  may occur when you begin to breastfeed your baby  You may also have nipple soreness if your baby is not latched on to your breast correctly  Correct positioning and latch-on may decrease or stop the pain in your nipples  Work with your caregivers to help your baby latch on correctly  It may also be helpful to place warm, wet compresses on your nipples to help decrease pain  · Plugged milk ducts  may cause painful breast lumps  Plugged ducts may be caused by not emptying your breasts completely during feedings  When your baby pauses during breastfeeding, massage and gently squeeze your breast  Gentle massage may unplug a blocked milk duct  Pump out any milk left in your breasts after your baby is done breastfeeding  Avoid wearing tight tops, tight bras, or under-wire bras, because they may put pressure on your breasts  · Engorgement  may occur as your milk comes in soon after you begin breastfeeding  Engorgement may cause your breasts to become swollen and painful  Your breasts may also become engorged if you miss a feeding or you do not breastfeed on demand  The best way to decrease engorgement symptoms is to empty your breasts by feeding your baby often  Engorgement can make it hard for your baby to latch on to your breast  If this happens, express a small amount of milk and then have your baby latch on  Cold compresses, gel packs, or ice packs on your breasts can help decrease pain and swelling  Ask your caregiver how often and how long you should use cold, or ice packs  · A breast infection called mastitis  can develop if you have plugged milk ducts or engorgement  Mastitis causes your breasts to become red, swollen, and painful  You may also have flu-like symptoms, such as chills and a fever  Place heat on your breasts to help decrease the pain  You may want to place a moist, warm cloth on the painful breast or both of your breasts  Ask how often to do this  Your primary healthcare provider [facility]) may suggest that you take an NSAID, such as ibuprofen, to decrease pain and swelling  He may also order antibiotics to treat mastitis  Ask about feeding your baby when you have a breast infection  How to help prevent or manage breast problems while you are breastfeeding:   · Learn how to position your baby and latch him on correctly  To latch your baby correctly to your breast, make sure that his mouth covers most of your areola (dark area around your nipple)  He should not be attached only to the nipple  Your baby is latched on well if you feel comfortable and do not feel pain  A correct latch helps him get enough milk and can help to prevent sore nipples and other breast problems   There are several breastfeeding positions that you can try  Find the position that works best for you and your baby  Ask your caregiver for more information about how to hold and breastfeed your baby  · Prevent biting  Your baby may get teeth at about 1to 3months of age  To help prevent biting, break his suction once he is finished or if he has fallen asleep  To break his suction, slip a finger into the side of his mouth  If your baby bites you, respond with surprise or unhappiness  Offer praise when he does not bite you  · Breastfeed your baby regularly  Feed your baby 8 to 12 times a day  You may need to wake up your baby at night to feed him  It is okay to feed from 1 or both breasts at each feeding  Your baby should breastfeed from both breasts equally over the course of a day  If your baby only feeds from 1 side during a feeding, offer your other breast to him first for the next feeding  · Schedule and keep follow-up visits  Talk to your baby's pediatrician or your PHP during follow-up visits if you have breast problems  Caregivers may suggest that you, or you and your partner, attend classes on breastfeeding  You also may want to join a breastfeeding support group  Caregivers may suggest that you see a lactation consultant  This is a caregiver who can help you with breastfeeding  Contact your PHP if:   · You have a fever and chills  · You have body aches and you feel like you do not have any energy  · One or both of your breasts is red, swollen or hard, painful, and feels warm or hot  · You have breast engorgement that does not get better within 24 hours  · You see or feel a lump in your breast that hurts when you touch it  · You have nipple pain during breastfeeding or between feedings  · Your nipples are red, dry, cracked, or bleeding, or they have scabs on them  · You have questions or concerns about your condition or care    © 2014 [address] is for End User's use only and may not be sold, redistributed or otherwise used for commercial purposes  All illustrations and images included in CareNotes® are the copyrighted property of [employer]  or Rafael Duenas  The above information is an  only  It is not intended as medical advice for individual conditions or treatments  Talk to your doctor, nurse or pharmacist before following any medical regimen to see if it is safe and effective for you

## 2021-10-08 NOTE — LACTATION NOTE
This note was copied from a baby's chart  Latch assist: RN called to assist with latch to the breast      Positioning guidance was provided on right breast with pillows up to breast level and alignment of nipple to nose  Worked on positioning infant up at chest level and starting to feed infant with nose arriving at the nipple  Then, using areolar compression to achieve a deep latch that is comfortable and exchanges optimum amounts of milk  Provided education on alignment of nose to breast; bring baby to breast and not breast to baby; support head with opp  Hand in cross cradle; use pillows to lift baby to be belly to belly; ear, shoulder, hip alignment; Support mother's back and place self in comfortable position to support bringing baby to the breast  Shoulders should be down and away from ears  Mom placed baby on right breast in football hold  Latch is shallow  Repositioned into football  Deeper latch  No active suckling but mom was demonstrating great hand expression into baby's mouth  Some slight suckling occurs  Demonstrated with teach back breast compressions during a feeding to increase milk transfer and stimulate suckling after a breathing/muscle break  Encouraged to continue  Breat compressions until baby unlatches  Bring baby up to right breast  Once unlatches, pump to stimulate and feed any expressed colostrum  Follow up with S2S and respond to early feeding cues  Reviewed paced bottle feeding  Feed expressed milk or formula as needed/desired  Paced bottle feeding technique is less stressful for your baby, prevents overfeeding and protects the breastfeeding relationship  You can find a video about paced bottle feeding at www [employer]  org

## 2021-10-08 NOTE — PLAN OF CARE
Problem: PAIN - ADULT  Goal: Verbalizes/displays adequate comfort level or baseline comfort level  Description: Interventions:  - Encourage patient to monitor pain and request assistance  - Assess pain using appropriate pain scale  - Administer analgesics based on type and severity of pain and evaluate response  - Implement non-pharmacological measures as appropriate and evaluate response  - Consider cultural and social influences on pain and pain management  - Notify physician/advanced practitioner if interventions unsuccessful or patient reports new pain  10/8/2021 0557 by Cindy Drake RN  Outcome: Progressing  10/7/2021 1904 by Cindy Drake RN  Outcome: Progressing

## 2021-10-08 NOTE — DISCHARGE SUMMARY
Discharge Summary - OB/GYN   Deysi Sandoval 27 y o  female MRN: [id number]  Unit/Bed#: -01 Encounter: [id number]      Admission Date: 10/7/2021     Discharge Date: 10/10/21      Admitting Diagnosis:   1  Pregnancy at 39w1d  2  A2GDM  3  Mild asthma    Discharge Diagnosis:   Same, delivered    Procedures: spontaneous vaginal delivery    Delivering Attending: Joseph Fried, DO  Discharge Attending: Delbert Kim MD    Hospital Course:     Ms Deysi Sandoval is a 27 y o   at 39w0d  She presented to labor and delivery for induction  Her pregnancy was complicated by H6CQB   On exam she was noted to be 3/50/-3  She was admitted for induction of labor  Induction was started with pitocin and patient underwent AROM  Patient made steady cervical change and was found to be complete at 0204  Patient began pushing at [address]  She delivered a viable male  on 10/8/21 at 0226  Weight 8lbs 1 1oz via spontaneous vaginal delivery  Apgars were 9 (1 min) and 9 (5 min)   was transferred to  nursery  Patient tolerated the procedure well and was transferred to recovery in stable condition  Her post-partum course was uncomplicated  Her post-partum pain was well controlled with oral analgesics  On day of discharge, she was ambulating and able to reasonably perform all ADLs  She was voiding and had appropriate bowel function  Pain was well controlled  She was discharged home on post-partum day #2 without complications  Patient was instructed to follow up with her OB as an outpatient and was given appropriate warnings to call provider if she develops signs of infection or uncontrolled pain  Complications: none apparent    Condition at discharge: good     Discharge instructions/Information to patient and family:   See after visit summary for information provided to patient and family        Provisions for Follow-Up Care:  See after visit summary for information related to follow-up care and any pertinent home health orders  Disposition: See After Visit Summary for discharge disposition information  Planned Readmission: No    Discharge Medications: For a complete list of the patient's medications, please refer to her med rec

## 2021-10-08 NOTE — L&D DELIVERY NOTE
Vaginal Delivery Summary - OB/GYN   Bhavna Campos 27 y o  female MRN: [id number]  Unit/Bed#: -01 Encounter: [id number]      Pre-delivery Diagnosis:   1  Pregnancy at 39w0d   2  A2GDM  3  Mild asthma    Post-delivery Diagnosis: same, delivered    Procedure: Spontaneous Vaginal Delivery    Attending: Dr Nina García    Assistant(s): Dr Shai Sanders    Anesthesia: Epidural    QBL: 7cc    Complications: none apparent    Specimens:   1  Arterial and venous cord gases  2  Cord blood  3  Segment of umbilical cord  4  Placenta to storage     Findings:  1  Viable male on 10/8/2021 at 0226, with APGARS of 9 and 9 at 1 and 5 minutes respectively,  2  Spontaneous delivery of intact placenta at 0231  3  1 degree laceration repaired with 3-0 Vicryl rapide  4  Blood gases:   Arterial: insufficient   Venous pH: 7 355   Venous base excess: -2 5    Disposition:  Patient tolerated the procedure well and was recovering in labor and delivery room       Brief history and labor course:  Ms Bhavna Campos is a 27 y o   at 39w0d  She presented to labor and delivery for induction  Her pregnancy was complicated by O7QGO   On exam she was noted to be 3/50/-3  She was admitted for induction of labor  Induction was started with pitocin and patient underwent AROM  Patient made steady cervical change and was found to be complete at 0204  Patient began pushing at [address]  Description of procedure:  After pushing for 16 minutes, at 0226 patient delivered a viable male , wt 8lb 1 1oz, apgars of 9 (1 min) and 9 (5 min)  The fetal vertex delivered spontaneously  There was a single, loose nuchal cord wrapped around the fetal left lower extremity that was reduced during delivery  The left anterior shoulder delivered atraumatically with maternal expulsive forces and the assistance of gentle downward traction  The right posterior shoulder delivered with maternal expulsive forces and the assistance of gentle upward traction   The remainder of the fetus delivered spontaneously  Upon delivery, the infant was placed on the mothers abdomen and the cord was doubly clamped and cut after >30 seconds  The infant was noted to cry spontaneously and was moving all extremities appropriately  There was no evidence for injury  Awaiting nurse resuscitators evaluated the   Arterial and venous cord blood gases and cord blood was collected for analysis  These were promptly sent to the lab  In the immediate post-partum, 30 units of IV pitocin was administered, and the uterus was noted to contract down well with massage and pitocin  The placenta delivered spontaneously at 0231 and was noted to have a centrally inserted 3 vessel cord  The vagina, cervix, perineum, and rectum were inspected and there was noted to be a 1st degree laceration  Repair was completed with 3-0 Vicryl rapide in a figure of eight fashion  Excellent hemostasis was appreciated at the completion of repair  At the conclusion of the procedure, all needle, sponge, and instrument counts were noted to be correct  Patient tolerated the procedure well and was allowed to recover in labor and delivery room with family and  before being transferred to the post-partum floor  Dr Freed was present and participated in all key portions of the case        Ananth Pan MD  OB/GYN PGY-1  10/8/2021  4:01 AM

## 2021-10-09 PROCEDURE — 99024 POSTOP FOLLOW-UP VISIT: CPT | Performed by: OBSTETRICS & GYNECOLOGY

## 2021-10-09 RX ORDER — ACETAMINOPHEN 325 MG/1
650 TABLET ORAL EVERY 6 HOURS PRN
Start: 2021-10-09 | End: 2021-11-05

## 2021-10-09 RX ORDER — ECHINACEA PURPUREA EXTRACT 125 MG
2 TABLET ORAL EVERY 2 HOUR PRN
Status: DISCONTINUED | OUTPATIENT
Start: 2021-10-09 | End: 2021-10-10 | Stop reason: HOSPADM

## 2021-10-09 RX ORDER — LORATADINE 10 MG/1
10 TABLET ORAL DAILY
Status: DISCONTINUED | OUTPATIENT
Start: 2021-10-09 | End: 2021-10-10 | Stop reason: HOSPADM

## 2021-10-09 RX ORDER — IBUPROFEN 200 MG
600 TABLET ORAL EVERY 6 HOURS PRN
Start: 2021-10-09 | End: 2021-11-05

## 2021-10-09 RX ORDER — ONDANSETRON 4 MG/1
4 TABLET, ORALLY DISINTEGRATING ORAL EVERY 6 HOURS PRN
Status: DISCONTINUED | OUTPATIENT
Start: 2021-10-09 | End: 2021-10-10 | Stop reason: HOSPADM

## 2021-10-09 RX ORDER — ONDANSETRON 4 MG/1
4 TABLET, ORALLY DISINTEGRATING ORAL EVERY 6 HOURS PRN
Status: DISCONTINUED | OUTPATIENT
Start: 2021-10-09 | End: 2021-10-09

## 2021-10-09 RX ADMIN — SALINE NASAL SPRAY 2 SPRAY: 1.5 SOLUTION NASAL at 11:51

## 2021-10-09 RX ADMIN — IBUPROFEN 600 MG: 600 TABLET ORAL at 16:08

## 2021-10-09 RX ADMIN — IBUPROFEN 600 MG: 600 TABLET ORAL at 08:08

## 2021-10-09 RX ADMIN — DOCUSATE SODIUM 100 MG: 100 CAPSULE ORAL at 08:08

## 2021-10-09 RX ADMIN — IBUPROFEN 600 MG: 600 TABLET ORAL at 23:21

## 2021-10-09 RX ADMIN — ONDANSETRON 4 MG: 4 TABLET, ORALLY DISINTEGRATING ORAL at 00:45

## 2021-10-09 RX ADMIN — LORATADINE 10 MG: 10 TABLET ORAL at 11:51

## 2021-10-09 RX ADMIN — DOCUSATE SODIUM 100 MG: 100 CAPSULE ORAL at 18:48

## 2021-10-09 NOTE — PROGRESS NOTES
Progress Note - OB/GYN   Nino Youngr 27 y o  female MRN: [id number]  Unit/Bed#: -01 Encounter: [id number]    Assessment:  PPD#1 s/p Spontaneous Vaginal Delivery    Plan:    1) Postpartum care  - Encourage ambulation  - Encourage breastfeeding  - Continue current meds     2) A2GDM  - f/u 6 weeks PP for 2 hr GTT    3) Contraception  - Desires outpatient IUD    4) Disposition  - Anticipate discharge home tomorrow  - Baby with ongoing bilirubin care    Subjective/Objective     Subjective:     Pain: no  Tolerating PO: yes  Voiding: yes  Flatus: yes  BM: yes  Ambulating: yes  Breastfeeding: Bottle feeding  Chest pain: no  Shortness of breath: no  Leg pain: no  Lochia: minimal    Objective:     Vitals:  Vitals:    10/08/21 1300 10/08/21 1617 10/08/21 1957 10/08/21 2359   BP: 119/71 95/50 114/68 97/53   BP Location: Right arm Left arm Right arm Right arm   Pulse: 99 91 98 93   Resp: 18 20 18 18   Temp: 98 °F (36 7 °C) 97 6 °F (36 4 °C) 98 3 °F (36 8 °C) 98 5 °F (36 9 °C)   TempSrc: Oral Oral Oral Oral   SpO2:  98% 98% 98%   Weight:       Height:           Physical Exam:   GEN: appears well, alert and oriented x 3, pleasant and cooperative   CV: Regular rate  RESP: non labored breathing  ABDOMEN: soft, no tenderness, no distention, Uterine fundus firm and non-tender, -1 cm below the umbilicus   EXTREMITIES: non-tender  NEURO Alert and oriented to person, place, and time         Lab Results   Component Value Date    WBC 12 44 (H) 10/07/2021    HGB 11 9 10/07/2021    HCT 36 6 10/07/2021    MCV 87 10/07/2021     10/07/2021         Debbie Man DO, PGY-2  Obstetrics & Gynecology  10/09/21

## 2021-10-09 NOTE — PLAN OF CARE
Problem: PAIN - ADULT  Goal: Verbalizes/displays adequate comfort level or baseline comfort level  Description: Interventions:  - Encourage patient to monitor pain and request assistance  - Assess pain using appropriate pain scale  - Administer analgesics based on type and severity of pain and evaluate response  - Implement non-pharmacological measures as appropriate and evaluate response  - Consider cultural and social influences on pain and pain management  - Notify physician/advanced practitioner if interventions unsuccessful or patient reports new pain  Outcome: Progressing     Problem: INFECTION - ADULT  Goal: Absence or prevention of progression during hospitalization  Description: INTERVENTIONS:  - Assess and monitor for signs and symptoms of infection  - Monitor lab/diagnostic results  - Monitor all insertion sites, i e  indwelling lines, tubes, and drains  - Monitor endotracheal if appropriate and nasal secretions for changes in amount and color  - Patrick appropriate cooling/warming therapies per order  - Administer medications as ordered  - Instruct and encourage patient and family to use good hand hygiene technique  - Identify and instruct in appropriate isolation precautions for identified infection/condition  Outcome: Progressing  Goal: Absence of fever/infection during neutropenic period  Description: INTERVENTIONS:  - Monitor WBC    Outcome: Progressing     Problem: SAFETY ADULT  Goal: Patient will remain free of falls  Description: INTERVENTIONS:  - Educate patient/family on patient safety including physical limitations  - Instruct patient to call for assistance with activity   - Consult OT/PT to assist with strengthening/mobility   - Keep Call bell within reach  - Keep bed low and locked with side rails adjusted as appropriate  - Keep care items and personal belongings within reach  - Initiate and maintain comfort rounds  - Make Fall Risk Sign visible to staff  - Offer Toileting every  Hours, in advance of need  - Initiate/Maintain alarm  - Obtain necessary fall risk management equipment:   - Apply yellow socks and bracelet for high fall risk patients  - Consider moving patient to room near nurses station  Outcome: Progressing  Goal: Maintain or return to baseline ADL function  Description: INTERVENTIONS:  -  Assess patient's ability to carry out ADLs; assess patient's baseline for ADL function and identify physical deficits which impact ability to perform ADLs (bathing, care of mouth/teeth, toileting, grooming, dressing, etc )  - Assess/evaluate cause of self-care deficits   - Assess range of motion  - Assess patient's mobility; develop plan if impaired  - Assess patient's need for assistive devices and provide as appropriate  - Encourage maximum independence but intervene and supervise when necessary  - Involve family in performance of ADLs  - Assess for home care needs following discharge   - Consider OT consult to assist with ADL evaluation and planning for discharge  - Provide patient education as appropriate  Outcome: Progressing  Goal: Maintains/Returns to pre admission functional level  Description: INTERVENTIONS:  - Perform BMAT or MOVE assessment daily    - Set and communicate daily mobility goal to care team and patient/family/caregiver  - Collaborate with rehabilitation services on mobility goals if consulted  - Perform Range of Motion  times a day  - Reposition patient every  hours    - Dangle patient  times a day  - Stand patient  times a day  - Ambulate patient  times a day  - Out of bed to chair  times a day   - Out of bed for meals times a day  - Out of bed for toileting  - Record patient progress and toleration of activity level   Outcome: Progressing     Problem: DISCHARGE PLANNING  Goal: Discharge to home or other facility with appropriate resources  Description: INTERVENTIONS:  - Identify barriers to discharge w/patient and caregiver  - Arrange for needed discharge resources and transportation as appropriate  - Identify discharge learning needs (meds, wound care, etc )  - Arrange for interpretive services to assist at discharge as needed  - Refer to Case Management Department for coordinating discharge planning if the patient needs post-hospital services based on physician/advanced practitioner order or complex needs related to functional status, cognitive ability, or social support system  Outcome: Progressing     Problem: POSTPARTUM  Goal: Experiences normal postpartum course  Description: INTERVENTIONS:  - Monitor maternal vital signs  - Assess uterine involution and lochia  Outcome: Progressing  Goal: Appropriate maternal -  bonding  Description: INTERVENTIONS:  - Identify family support  - Assess for appropriate maternal/infant bonding   -Encourage maternal/infant bonding opportunities  - Referral to  or  as needed  Outcome: Progressing  Goal: Establishment of infant feeding pattern  Description: INTERVENTIONS:  - Assess breast/bottle feeding  - Refer to lactation as needed  Outcome: Progressing  Goal: Incision(s), wounds(s) or drain site(s) healing without S/S of infection  Description: INTERVENTIONS  - Assess and document dressing, incision, wound bed, drain sites and surrounding tissue  - Provide patient and family education  - Perform skin care/dressing changes every   Outcome: Progressing

## 2021-10-10 VITALS
DIASTOLIC BLOOD PRESSURE: 65 MMHG | WEIGHT: 281 LBS | BODY MASS INDEX: 45.16 KG/M2 | RESPIRATION RATE: 20 BRPM | HEART RATE: 88 BPM | TEMPERATURE: 98.1 F | OXYGEN SATURATION: 98 % | HEIGHT: 66 IN | SYSTOLIC BLOOD PRESSURE: 101 MMHG

## 2021-10-10 PROCEDURE — 99024 POSTOP FOLLOW-UP VISIT: CPT | Performed by: OBSTETRICS & GYNECOLOGY

## 2021-10-10 RX ADMIN — DOCUSATE SODIUM 100 MG: 100 CAPSULE ORAL at 08:11

## 2021-10-10 RX ADMIN — DOCUSATE SODIUM 100 MG: 100 CAPSULE ORAL at 18:24

## 2021-10-10 RX ADMIN — LORATADINE 10 MG: 10 TABLET ORAL at 08:12

## 2021-10-10 RX ADMIN — WITCH HAZEL 1 PAD: 500 SOLUTION RECTAL; TOPICAL at 18:24

## 2021-10-10 RX ADMIN — IBUPROFEN 600 MG: 600 TABLET ORAL at 06:28

## 2021-10-10 RX ADMIN — Medication: at 18:24

## 2021-10-10 NOTE — PROGRESS NOTES
Progress Note - OB/GYN   Thierry Wooten 27 y o  female MRN: [id number]  Unit/Bed#: -01 Encounter: [id number]    Assessment:  PPD#2 s/p Spontaneous Vaginal Delivery  Stayed overnight for bilirubin of baby    Plan:    1) Postpartum care  - Encourage ambulation  - Encourage breastfeeding  - Continue current meds     2) A2GDM  - f/u 6 weeks PP for 2 hr GTT    3) Contraception  - Desires outpatient IUD    4) Disposition  - Anticipate discharge home today    Subjective/Objective     Subjective:     Pain: no  Tolerating PO: yes  Voiding: yes  Flatus: yes  BM: yes  Ambulating: yes  Breastfeeding: Bottle feeding  Chest pain: no  Shortness of breath: no  Leg pain: no  Lochia: minimal    Objective:     Vitals:  Vitals:    10/08/21 2359 10/09/21 0830 10/09/21 1648 10/09/21 2321   BP: 97/53 106/52 107/71 105/52   BP Location: Right arm Right arm Right arm Right arm   Pulse: 93 79 94 85   Resp: 18 20 20 20   Temp: 98 5 °F (36 9 °C) 97 6 °F (36 4 °C) 98 °F (36 7 °C) 98 4 °F (36 9 °C)   TempSrc: Oral Oral Oral Oral   SpO2: 98%   98%   Weight:       Height:           Physical Exam:   GEN: appears well, alert and oriented x 3, pleasant and cooperative   CV: Regular rate  RESP: non labored breathing  ABDOMEN: soft, no tenderness, no distention, Uterine fundus firm and non-tender, -1 cm below the umbilicus   EXTREMITIES: non-tender  NEURO Alert and oriented to person, place, and time         Lab Results   Component Value Date    WBC 12 44 (H) 10/07/2021    HGB 11 9 10/07/2021    HCT 36 6 10/07/2021    MCV 87 10/07/2021     10/07/2021         Angel Mccrary DO, PGY-2  Obstetrics & Gynecology  10/10/21

## 2021-10-10 NOTE — LACTATION NOTE
This note was copied from a baby's chart  Met with Angel Arevalo this morning who has decided to exclusively formula feed  Breast care reviewed  Encouraged her to call if she has any additional questions or concerns

## 2021-10-10 NOTE — PLAN OF CARE
Problem: PAIN - ADULT  Goal: Verbalizes/displays adequate comfort level or baseline comfort level  Description: Interventions:  - Encourage patient to monitor pain and request assistance  - Assess pain using appropriate pain scale  - Administer analgesics based on type and severity of pain and evaluate response  - Implement non-pharmacological measures as appropriate and evaluate response  - Consider cultural and social influences on pain and pain management  - Notify physician/advanced practitioner if interventions unsuccessful or patient reports new pain  Outcome: Progressing     Problem: INFECTION - ADULT  Goal: Absence or prevention of progression during hospitalization  Description: INTERVENTIONS:  - Assess and monitor for signs and symptoms of infection  - Monitor lab/diagnostic results  - Monitor all insertion sites, i e  indwelling lines, tubes, and drains  - Monitor endotracheal if appropriate and nasal secretions for changes in amount and color  - Bennet appropriate cooling/warming therapies per order  - Administer medications as ordered  - Instruct and encourage patient and family to use good hand hygiene technique  - Identify and instruct in appropriate isolation precautions for identified infection/condition  Outcome: Progressing  Goal: Absence of fever/infection during neutropenic period  Description: INTERVENTIONS:  - Monitor WBC    Outcome: Progressing     Problem: SAFETY ADULT  Goal: Patient will remain free of falls  Description: INTERVENTIONS:  - Educate patient/family on patient safety including physical limitations  - Instruct patient to call for assistance with activity   - Consult OT/PT to assist with strengthening/mobility   - Keep Call bell within reach  - Keep bed low and locked with side rails adjusted as appropriate  - Keep care items and personal belongings within reach  - Initiate and maintain comfort rounds  - Make Fall Risk Sign visible to staff  - Offer Toileting every  Hours, in advance of need  - Initiate/Maintain alarm  - Obtain necessary fall risk management equipment:   - Apply yellow socks and bracelet for high fall risk patients  - Consider moving patient to room near nurses station  Outcome: Progressing  Goal: Maintain or return to baseline ADL function  Description: INTERVENTIONS:  -  Assess patient's ability to carry out ADLs; assess patient's baseline for ADL function and identify physical deficits which impact ability to perform ADLs (bathing, care of mouth/teeth, toileting, grooming, dressing, etc )  - Assess/evaluate cause of self-care deficits   - Assess range of motion  - Assess patient's mobility; develop plan if impaired  - Assess patient's need for assistive devices and provide as appropriate  - Encourage maximum independence but intervene and supervise when necessary  - Involve family in performance of ADLs  - Assess for home care needs following discharge   - Consider OT consult to assist with ADL evaluation and planning for discharge  - Provide patient education as appropriate  Outcome: Progressing  Goal: Maintains/Returns to pre admission functional level  Description: INTERVENTIONS:  - Perform BMAT or MOVE assessment daily    - Set and communicate daily mobility goal to care team and patient/family/caregiver  - Collaborate with rehabilitation services on mobility goals if consulted  - Perform Range of Motion  times a day  - Reposition patient every  hours    - Dangle patient  times a day  - Stand patient  times a day  - Ambulate patient  times a day  - Out of bed to chair  times a day   - Out of bed for meals  times a day  - Out of bed for toileting  - Record patient progress and toleration of activity level   Outcome: Progressing     Problem: DISCHARGE PLANNING  Goal: Discharge to home or other facility with appropriate resources  Description: INTERVENTIONS:  - Identify barriers to discharge w/patient and caregiver  - Arrange for needed discharge resources and transportation as appropriate  - Identify discharge learning needs (meds, wound care, etc )  - Arrange for interpretive services to assist at discharge as needed  - Refer to Case Management Department for coordinating discharge planning if the patient needs post-hospital services based on physician/advanced practitioner order or complex needs related to functional status, cognitive ability, or social support system  Outcome: Progressing     Problem: POSTPARTUM  Goal: Experiences normal postpartum course  Description: INTERVENTIONS:  - Monitor maternal vital signs  - Assess uterine involution and lochia  Outcome: Progressing  Goal: Appropriate maternal -  bonding  Description: INTERVENTIONS:  - Identify family support  - Assess for appropriate maternal/infant bonding   -Encourage maternal/infant bonding opportunities  - Referral to  or  as needed  Outcome: Progressing  Goal: Establishment of infant feeding pattern  Description: INTERVENTIONS:  - Assess breast/bottle feeding  - Refer to lactation as needed  Outcome: Progressing  Goal: Incision(s), wounds(s) or drain site(s) healing without S/S of infection  Description: INTERVENTIONS  - Assess and document dressing, incision, wound bed, drain sites and surrounding tissue  - Provide patient and family education  - Perform skin care/dressing changes every   Outcome: Progressing

## 2021-10-11 NOTE — UTILIZATION REVIEW
Inpatient Admission Authorization Request   Notification of Maternity/Delivery &  Birth Information for Admission   SERVICING FACILITY:   [address]  Tax ID: [id number]  NPI: [id number]  Place of Service: Inpatient [facility]  [address]  Place of Service Code: 24     ATTENDING PROVIDER:  Attending Name and NPI#: Obie Loera [[id number]]  Address: Elizabeth Torres  [address]  Phone: [phone]     UTILIZATION REVIEW CONTACT:  Adri Foley Utilization   Network Utilization Review Department  Phone: [phone]  Fax [phone]  Email: [email or web address]     PHYSICIAN ADVISORY SERVICES:  FOR [id number] REVIEW - MEDICAL NECESSITY DENIAL  Phone: [phone]  Fax: [phone]  Email: [email or web address]     TYPE OF REQUEST:  Inpatient Status     ADMISSION INFORMATION:  ADMISSION DATE/TIME: 10/7/21  6:47 AM  PATIENT DIAGNOSIS CODE/DESCRIPTION:  39 weeks gestation of pregnancy [Z3A 39]  Encounter for full-term uncomplicated delivery [R07] The primary encounter diagnosis was 38 weeks gestation of pregnancy  A diagnosis of  (spontaneous vaginal delivery) was also pertinent to this visit  1  38 weeks gestation of pregnancy    2    (spontaneous vaginal delivery)      DISCHARGE DATE/TIME: 10/10/2021  6:50 PM  DISCHARGE DISPOSITION (IF DISCHARGED): Home/Self Care     MOTHER AND  INFORMATION:  Mother: Sandro Metcalf 1997   Delivering clinician: Desirae Turcios   OB History        1    Para   1    Term   1       0    AB   0    Living   1       SAB   0    TAB   0    Ectopic   0    Multiple   0    Live Births   1           Obstetric Comments   Menarche 17              Name & MRN:   Information for the patient's :  Carson Tramaine [[id number]]      Delivery Information:  Sex: male  Delivered 10/8/2021 2:26 AM by Vaginal, Spontaneous; Gestational Age: 36w3d    [place] Measurements:  Weight: 8 lb 1 1 oz (3660 g); Height: 19 5"    APGAR 1 minute 5 minutes 10 minutes   Totals: 9 9      [place] Birth Information: 27 y o  female MRN: [id number] Unit/Bed#: -01 Estimated Date of Delivery: 10/14/21  Birthweight: No birth weight on file  Gestational Age: <None> Delivery Type: Vaginal, Spontaneous          APGARS  One minute Five minutes Ten minutes   Totals:               IMPORTANT INFORMATION:  Please contact the Michelle [facility] directly with any questions or concerns regarding this request  Department voicemails are confidential     Send requests for admission clinical reviews, concurrent reviews, approvals, and administrative denials due to lack of clinical to fax [phone]

## 2021-10-12 ENCOUNTER — TELEPHONE (OUTPATIENT)
Dept: OBGYN CLINIC | Facility: CLINIC | Age: 26
End: 2021-10-12

## 2021-10-14 NOTE — H&P
Radha [address] 27 y o  female MRN: [id number]  Unit/Bed#: LD TRIAGE 4-01 Encounter: [id number]        Assessment: 27 y o  Foxmikaylen Fangy at 39w0d admitted for IOL for A2GDM  SVE: 3/50/-3  FHT: cat 1  Clinical EFW: 8 lb  Vertex confirmed by US  GBS status: positive        Plan:   · Admit  · CBC, RPR, Type & Screen  · Analgesia at maternal request  · IOL starting with pitocin  · PCN in labor for GBS positive status  · POCT glucose q1h x4 occurrences     Dr Aileen Resendiz aware           SUBJECTIVE:     Chief Complaint: IOL for A2GDM     HPI: Ed Lavelle is a 27 y o  Simmie Fey with an ROCIO of 10/14/2021, by Last Menstrual Period at 39w0d who is being admitted for IOL for A2GDM  She denies having uterine contractions, has no LOF, and reports no VB  She states she has felt good FM     Pregnancy complications: [id number], obesity, asthma, elevated BP without diagnosis of HTN         Patient Active Problem List   Diagnosis    Mild intermittent asthma    Anxiety    Depression    Vitamin D deficiency    Hyperlipidemia    Insulin controlled gestational diabetes mellitus (GDM) in third trimester    Maternal morbid obesity in third trimester, antepartum ([facility] [address] [facility]    Allergic rhinitis    Uterine fibroid in pregnancy    Family history of autism    Hyperglycemia in pregnancy    Elevated blood pressure reading in office without diagnosis of hypertension    Obesity affecting pregnancy in third trimester    Shortness of breath    38 weeks gestation of pregnancy    Chest pain         Baby complications/comments: none     Review of Systems   Constitutional: Negative for chills and fever  Eyes: Negative for visual disturbance  Respiratory: Negative for cough and shortness of breath  Cardiovascular: Negative for chest pain  Gastrointestinal: Negative for abdominal pain, diarrhea, nausea and vomiting  Genitourinary: Negative for dysuria  Neurological: Negative for dizziness, light-headedness and headaches  All other systems reviewed and are negative                          OB History    Para Term  AB Living   1 0 0 0 0 0   SAB TAB Ectopic Multiple Live Births      0 0 0 0 0          # Outcome Date GA Lbr Sam/2nd Weight Sex Delivery Anes PTL Lv   1 Current                         Obstetric Comments   Menarche 17         Medical History        Past Medical History:   Diagnosis Date    Anxiety      Asthma     BMI 40 0-44 9, adult ([facility] [address] [facility] 3/11/2021    Depression      History of ovarian cyst     Hyperlipidemia      IFG (impaired fasting glucose)      Migraine      Mild intermittent asthma      Morbid obesity (HCC)      PID (pelvic inflammatory disease) 2018    Polycystic ovary syndrome      Pre-diabetes      Varicella       had vaccine    Vitamin D deficiency              Surgical History         Past Surgical History:   Procedure Laterality Date    WISDOM TOOTH EXTRACTION                Social History            Tobacco Use    Smoking status: Former Smoker       Packs/day: 0 10       Years: 0 20       Pack years: 0 02       Types: Cigarettes       Start date: 2017       Quit date: 10/1/2017       Years since quittin 0    Smokeless tobacco: Never Used    Tobacco comment: Smoked socially    Substance Use Topics    Alcohol use:  Yes       Alcohol/week: 1 0 standard drinks       Types: 1 Glasses of wine per week       Comment: socially         No Known Allergies     Prescriptions Prior to Admission       Medications Prior to Admission   Medication    albuterol (PROVENTIL HFA,VENTOLIN HFA) 90 mcg/act inhaler    Blood Glucose Monitoring Suppl (OneTouch Verio Flex System) w/Device KIT    calcium carbonate (TUMS EX) 750 mg chewable tablet    Cholecalciferol (Vitamin D3) 50 MCG (2000) TABS    docusate sodium (COLACE) 100 mg capsule    fluticasone (FLONASE) 50 mcg/act nasal spray    insulin aspart (NovoLOG FlexPen) 100 UNIT/ML injection pen    insulin glargine (Lantus SoloStar) 100 units/mL injection pen    Insulin Pen Needle 31G X 5 MM MISC    OneTouch Delica Lancets 45R MISC    OneTouch Verio test strip    Prenatal Vit-Fe Fumarate-FA (PRENATAL VITAMIN PO)                  OBJECTIVE:  Vitals:  Temp:  [96 6 °F (35 9 °C)] 96 6 °F (35 9 °C)  HR:  [109] 109  Resp:  [18] 18  BP: (135)/(86) 135/86  Body mass index is 45 35 kg/m²       Physical Exam:  Physical Exam  Constitutional:       General: She is not in acute distress  Appearance: Normal appearance  She is obese  She is not ill-appearing  HENT:      Mouth/Throat:      Mouth: Mucous membranes are moist    Eyes:      Extraocular Movements: Extraocular movements intact  Cardiovascular:      Rate and Rhythm: Normal rate  Pulmonary:      Effort: Pulmonary effort is normal    Musculoskeletal:         General: No swelling  Right lower leg: No edema  Left lower leg: No edema  Neurological:      General: No focal deficit present  Mental Status: She is alert  Skin:     General: Skin is warm and dry  Coloration: Skin is not jaundiced or pale     Psychiatric:         Mood and Affect: Mood normal             SVE:  3/50/-3     FHT:  Baseline Rate: 150 bpm  Variability: Moderate 6-25 bpm  Accelerations: 15 x 15 or greater, At variable times  Decelerations: None     TOCO:   Contraction Frequency (minutes): irregular           Lab Results   Component Value Date     WBC 11 64 (H) 08/26/2021     HGB 12 0 08/26/2021     HCT 37 1 08/26/2021      08/26/2021            Lab Results   Component Value Date     K 3 8 08/26/2021      08/26/2021     CO2 20 (L) 08/26/2021     BUN 7 08/26/2021     CREATININE 0 60 08/26/2021     AST 10 08/26/2021     ALT 16 08/26/2021         Prenatal Labs: Reviewed      Blood type: A+  Antibody: negative  Group B strep: positive  HIV: negative  Hepatitis B: negative  RPR: non-reactive  Rubella: Immune  Varicella: Unknown  1 hour Glucose: 159  3 hour glucose: fasting 104, discontinued after  Platelets: 741  Hgb: 13 3     >2 Midnights  INPATIENT         Kwan Partida MD  OB/GYN PGY-1  10/7/2021  8:19 AM

## 2021-10-15 ENCOUNTER — HOSPITAL ENCOUNTER (EMERGENCY)
Facility: HOSPITAL | Age: 26
Discharge: HOME | End: 2021-10-16
Attending: EMERGENCY MEDICINE | Admitting: EMERGENCY MEDICINE
Payer: COMMERCIAL

## 2021-10-15 ENCOUNTER — NURSE TRIAGE (OUTPATIENT)
Dept: OTHER | Facility: OTHER | Age: 26
End: 2021-10-15

## 2021-10-15 ENCOUNTER — HOSPITAL ENCOUNTER (EMERGENCY)
Facility: HOSPITAL | Age: 26
End: 2021-10-15
Payer: COMMERCIAL

## 2021-10-15 VITALS
HEART RATE: 88 BPM | RESPIRATION RATE: 18 BRPM | DIASTOLIC BLOOD PRESSURE: 79 MMHG | BODY MASS INDEX: 42.59 KG/M2 | TEMPERATURE: 98.5 F | WEIGHT: 265 LBS | HEIGHT: 66 IN | OXYGEN SATURATION: 97 % | SYSTOLIC BLOOD PRESSURE: 122 MMHG

## 2021-10-15 DIAGNOSIS — N39.0 UTI (URINARY TRACT INFECTION): ICD-10-CM

## 2021-10-15 DIAGNOSIS — N93.9 VAGINAL BLEEDING: Primary | ICD-10-CM

## 2021-10-15 LAB
ALBUMIN SERPL BCP-MCNC: 3.2 G/DL (ref 3.5–5)
ALP SERPL-CCNC: 106 U/L (ref 46–116)
ALT SERPL W P-5'-P-CCNC: 23 U/L (ref 12–78)
ANION GAP SERPL CALCULATED.3IONS-SCNC: 7 MMOL/L (ref 4–13)
APTT PPP: 25 SECONDS (ref 23–37)
AST SERPL W P-5'-P-CCNC: 12 U/L (ref 5–45)
BACTERIA UR QL AUTO: ABNORMAL /HPF
BASOPHILS # BLD AUTO: 0.04 THOUSANDS/ΜL (ref 0–0.1)
BASOPHILS NFR BLD AUTO: 1 % (ref 0–1)
BILIRUB SERPL-MCNC: 0.48 MG/DL (ref 0.2–1)
BILIRUB UR QL STRIP: NEGATIVE
BUN SERPL-MCNC: 9 MG/DL (ref 5–25)
CALCIUM ALBUM COR SERPL-MCNC: 9.9 MG/DL (ref 8.3–10.1)
CALCIUM SERPL-MCNC: 9.3 MG/DL (ref 8.3–10.1)
CHLORIDE SERPL-SCNC: 108 MMOL/L (ref 100–108)
CLARITY UR: ABNORMAL
CO2 SERPL-SCNC: 23 MMOL/L (ref 21–32)
COLOR UR: ABNORMAL
CREAT SERPL-MCNC: 0.65 MG/DL (ref 0.6–1.3)
EOSINOPHIL # BLD AUTO: 0.16 THOUSAND/ΜL (ref 0–0.61)
EOSINOPHIL NFR BLD AUTO: 2 % (ref 0–6)
ERYTHROCYTE [DISTWIDTH] IN BLOOD BY AUTOMATED COUNT: 14.6 % (ref 11.6–15.1)
GFR SERPL CREATININE-BSD FRML MDRD: 125 ML/MIN/1.73SQ M
GLUCOSE SERPL-MCNC: 84 MG/DL (ref 65–140)
GLUCOSE UR STRIP-MCNC: NEGATIVE MG/DL
HCT VFR BLD AUTO: 37.3 % (ref 34.8–46.1)
HGB BLD-MCNC: 12.1 G/DL (ref 11.5–15.4)
HGB UR QL STRIP.AUTO: ABNORMAL
IMM GRANULOCYTES # BLD AUTO: 0.02 THOUSAND/UL (ref 0–0.2)
IMM GRANULOCYTES NFR BLD AUTO: 0 % (ref 0–2)
INR PPP: 1.11 (ref 0.84–1.19)
KETONES UR STRIP-MCNC: NEGATIVE MG/DL
LEUKOCYTE ESTERASE UR QL STRIP: ABNORMAL
LIPASE SERPL-CCNC: 73 U/L (ref 73–393)
LYMPHOCYTES # BLD AUTO: 2.33 THOUSANDS/ΜL (ref 0.6–4.47)
LYMPHOCYTES NFR BLD AUTO: 27 % (ref 14–44)
MCH RBC QN AUTO: 28.3 PG (ref 26.8–34.3)
MCHC RBC AUTO-ENTMCNC: 32.4 G/DL (ref 31.4–37.4)
MCV RBC AUTO: 87 FL (ref 82–98)
MONOCYTES # BLD AUTO: 0.63 THOUSAND/ΜL (ref 0.17–1.22)
MONOCYTES NFR BLD AUTO: 7 % (ref 4–12)
MUCOUS THREADS UR QL AUTO: ABNORMAL
NEUTROPHILS # BLD AUTO: 5.58 THOUSANDS/ΜL (ref 1.85–7.62)
NEUTS SEG NFR BLD AUTO: 63 % (ref 43–75)
NITRITE UR QL STRIP: NEGATIVE
NON-SQ EPI CELLS URNS QL MICRO: ABNORMAL /HPF
NRBC BLD AUTO-RTO: 0 /100 WBCS
PH UR STRIP.AUTO: 5.5 [PH]
PLACENTA IN STORAGE: NORMAL
PLATELET # BLD AUTO: 297 THOUSANDS/UL (ref 149–390)
PMV BLD AUTO: 10.5 FL (ref 8.9–12.7)
POTASSIUM SERPL-SCNC: 3.5 MMOL/L (ref 3.5–5.3)
PROT SERPL-MCNC: 7.2 G/DL (ref 6.4–8.2)
PROT UR STRIP-MCNC: ABNORMAL MG/DL
PROTHROMBIN TIME: 14.2 SECONDS (ref 11.6–14.5)
RBC # BLD AUTO: 4.27 MILLION/UL (ref 3.81–5.12)
RBC #/AREA URNS AUTO: ABNORMAL /HPF
SODIUM SERPL-SCNC: 138 MMOL/L (ref 136–145)
SP GR UR STRIP.AUTO: <=1.005 (ref 1–1.03)
UROBILINOGEN UR QL STRIP.AUTO: 0.2 E.U./DL
WBC # BLD AUTO: 8.76 THOUSAND/UL (ref 4.31–10.16)
WBC #/AREA URNS AUTO: ABNORMAL /HPF

## 2021-10-15 PROCEDURE — 85730 THROMBOPLASTIN TIME PARTIAL: CPT | Performed by: EMERGENCY MEDICINE

## 2021-10-15 PROCEDURE — 83690 ASSAY OF LIPASE: CPT

## 2021-10-15 PROCEDURE — 99284 EMERGENCY DEPT VISIT MOD MDM: CPT

## 2021-10-15 PROCEDURE — 36415 COLL VENOUS BLD VENIPUNCTURE: CPT

## 2021-10-15 PROCEDURE — 86850 RBC ANTIBODY SCREEN: CPT | Performed by: EMERGENCY MEDICINE

## 2021-10-15 PROCEDURE — 85025 COMPLETE CBC W/AUTO DIFF WBC: CPT

## 2021-10-15 PROCEDURE — 80053 COMPREHEN METABOLIC PANEL: CPT

## 2021-10-15 PROCEDURE — 96374 THER/PROPH/DIAG INJ IV PUSH: CPT

## 2021-10-15 PROCEDURE — 96361 HYDRATE IV INFUSION ADD-ON: CPT

## 2021-10-15 PROCEDURE — 86900 BLOOD TYPING SEROLOGIC ABO: CPT | Performed by: EMERGENCY MEDICINE

## 2021-10-15 PROCEDURE — 85610 PROTHROMBIN TIME: CPT | Performed by: EMERGENCY MEDICINE

## 2021-10-15 PROCEDURE — 81001 URINALYSIS AUTO W/SCOPE: CPT | Performed by: EMERGENCY MEDICINE

## 2021-10-15 PROCEDURE — 99284 EMERGENCY DEPT VISIT MOD MDM: CPT | Performed by: EMERGENCY MEDICINE

## 2021-10-15 PROCEDURE — 86901 BLOOD TYPING SEROLOGIC RH(D): CPT | Performed by: EMERGENCY MEDICINE

## 2021-10-15 PROCEDURE — 87086 URINE CULTURE/COLONY COUNT: CPT | Performed by: EMERGENCY MEDICINE

## 2021-10-15 RX ORDER — ONDANSETRON 2 MG/ML
4 INJECTION INTRAMUSCULAR; INTRAVENOUS ONCE
Status: COMPLETED | OUTPATIENT
Start: 2021-10-15 | End: 2021-10-15

## 2021-10-15 RX ORDER — CEPHALEXIN 500 MG/1
500 CAPSULE ORAL EVERY 12 HOURS SCHEDULED
Qty: 14 CAPSULE | Refills: 0 | Status: SHIPPED | OUTPATIENT
Start: 2021-10-15 | End: 2021-10-22

## 2021-10-15 RX ORDER — ONDANSETRON 4 MG/1
4 TABLET, ORALLY DISINTEGRATING ORAL EVERY 6 HOURS PRN
Qty: 20 TABLET | Refills: 0 | Status: SHIPPED | OUTPATIENT
Start: 2021-10-15 | End: 2021-11-05

## 2021-10-15 RX ORDER — CEPHALEXIN 250 MG/1
500 CAPSULE ORAL ONCE
Status: COMPLETED | OUTPATIENT
Start: 2021-10-15 | End: 2021-10-16

## 2021-10-15 RX ADMIN — ONDANSETRON 4 MG: 2 INJECTION INTRAMUSCULAR; INTRAVENOUS at 23:11

## 2021-10-15 RX ADMIN — SODIUM CHLORIDE 1000 ML: 0.9 INJECTION, SOLUTION INTRAVENOUS at 23:12

## 2021-10-16 ENCOUNTER — NURSE TRIAGE (OUTPATIENT)
Dept: OTHER | Facility: OTHER | Age: 26
End: 2021-10-16

## 2021-10-16 VITALS
WEIGHT: 265 LBS | SYSTOLIC BLOOD PRESSURE: 129 MMHG | HEART RATE: 74 BPM | DIASTOLIC BLOOD PRESSURE: 74 MMHG | BODY MASS INDEX: 42.77 KG/M2 | RESPIRATION RATE: 18 BRPM | TEMPERATURE: 98.6 F | OXYGEN SATURATION: 97 %

## 2021-10-16 LAB
ABO GROUP BLD: NORMAL
BLD GP AB SCN SERPL QL: NEGATIVE
RH BLD: POSITIVE
SPECIMEN EXPIRATION DATE: NORMAL

## 2021-10-16 RX ADMIN — CEPHALEXIN 500 MG: 250 CAPSULE ORAL at 00:21

## 2021-10-17 LAB — BACTERIA UR CULT: NORMAL

## 2021-10-18 ENCOUNTER — TELEPHONE (OUTPATIENT)
Dept: OBGYN CLINIC | Facility: CLINIC | Age: 26
End: 2021-10-18

## 2021-10-20 ENCOUNTER — HOSPITAL ENCOUNTER (OUTPATIENT)
Dept: RADIOLOGY | Facility: HOSPITAL | Age: 26
Discharge: HOME | End: 2021-10-20
Attending: EMERGENCY MEDICINE
Payer: COMMERCIAL

## 2021-10-20 DIAGNOSIS — N93.9 VAGINAL BLEEDING: ICD-10-CM

## 2021-10-20 PROCEDURE — 76830 TRANSVAGINAL US NON-OB: CPT

## 2021-10-20 PROCEDURE — 76856 US EXAM PELVIC COMPLETE: CPT

## 2021-10-21 ENCOUNTER — TELEPHONE (OUTPATIENT)
Dept: OBGYN CLINIC | Facility: CLINIC | Age: 26
End: 2021-10-21

## 2021-10-22 ENCOUNTER — SOCIAL WORK (OUTPATIENT)
Dept: BEHAVIORAL/MENTAL HEALTH CLINIC | Facility: CLINIC | Age: 26
End: 2021-10-22
Payer: COMMERCIAL

## 2021-10-22 DIAGNOSIS — F32.A DEPRESSION, UNSPECIFIED DEPRESSION TYPE: Primary | ICD-10-CM

## 2021-10-22 DIAGNOSIS — F41.9 ANXIETY: ICD-10-CM

## 2021-10-22 PROCEDURE — 90834 PSYTX W PT 45 MINUTES: CPT | Performed by: SOCIAL WORKER

## 2021-11-01 ENCOUNTER — TELEMEDICINE (OUTPATIENT)
Dept: BEHAVIORAL/MENTAL HEALTH CLINIC | Facility: CLINIC | Age: 26
End: 2021-11-01
Payer: COMMERCIAL

## 2021-11-01 DIAGNOSIS — F41.9 ANXIETY: ICD-10-CM

## 2021-11-01 DIAGNOSIS — F32.A DEPRESSION, UNSPECIFIED DEPRESSION TYPE: Primary | ICD-10-CM

## 2021-11-01 PROCEDURE — 90834 PSYTX W PT 45 MINUTES: CPT | Performed by: SOCIAL WORKER

## 2021-11-05 ENCOUNTER — POSTPARTUM VISIT (OUTPATIENT)
Dept: OBGYN CLINIC | Facility: CLINIC | Age: 26
End: 2021-11-05

## 2021-11-05 VITALS — BODY MASS INDEX: 41.16 KG/M2 | WEIGHT: 255 LBS | SYSTOLIC BLOOD PRESSURE: 122 MMHG | DIASTOLIC BLOOD PRESSURE: 72 MMHG

## 2021-11-05 DIAGNOSIS — O24.414 INSULIN CONTROLLED GESTATIONAL DIABETES MELLITUS (GDM) IN THIRD TRIMESTER: ICD-10-CM

## 2021-11-05 PROBLEM — O99.213 OBESITY AFFECTING PREGNANCY IN THIRD TRIMESTER: Status: RESOLVED | Noted: 2021-08-24 | Resolved: 2021-11-05

## 2021-11-05 PROBLEM — R03.0 ELEVATED BLOOD PRESSURE READING IN OFFICE WITHOUT DIAGNOSIS OF HYPERTENSION: Status: RESOLVED | Noted: 2021-07-23 | Resolved: 2021-11-05

## 2021-11-05 PROBLEM — E66.01 MATERNAL MORBID OBESITY IN THIRD TRIMESTER, ANTEPARTUM (HCC): Status: RESOLVED | Noted: 2021-03-11 | Resolved: 2021-11-05

## 2021-11-05 PROBLEM — D25.9 UTERINE FIBROID IN PREGNANCY: Status: RESOLVED | Noted: 2021-04-05 | Resolved: 2021-11-05

## 2021-11-05 PROBLEM — O34.10 UTERINE FIBROID IN PREGNANCY: Status: RESOLVED | Noted: 2021-04-05 | Resolved: 2021-11-05

## 2021-11-05 PROBLEM — O99.213 MATERNAL MORBID OBESITY IN THIRD TRIMESTER, ANTEPARTUM (HCC): Status: RESOLVED | Noted: 2021-03-11 | Resolved: 2021-11-05

## 2021-11-05 PROBLEM — O99.810 HYPERGLYCEMIA IN PREGNANCY: Status: RESOLVED | Noted: 2021-05-12 | Resolved: 2021-11-05

## 2021-11-05 PROCEDURE — 99024 POSTOP FOLLOW-UP VISIT: CPT | Performed by: OBSTETRICS & GYNECOLOGY

## 2021-11-05 RX ORDER — LORATADINE 10 MG/1
10 TABLET ORAL DAILY
COMMUNITY

## 2021-11-16 ENCOUNTER — OFFICE VISIT (OUTPATIENT)
Dept: FAMILY MEDICINE CLINIC | Facility: CLINIC | Age: 26
End: 2021-11-16
Payer: COMMERCIAL

## 2021-11-16 VITALS
WEIGHT: 253.6 LBS | TEMPERATURE: 97.5 F | DIASTOLIC BLOOD PRESSURE: 64 MMHG | HEART RATE: 89 BPM | SYSTOLIC BLOOD PRESSURE: 100 MMHG | RESPIRATION RATE: 16 BRPM | HEIGHT: 65 IN | BODY MASS INDEX: 42.25 KG/M2 | OXYGEN SATURATION: 99 %

## 2021-11-16 DIAGNOSIS — F41.9 ANXIETY: ICD-10-CM

## 2021-11-16 DIAGNOSIS — E66.01 MORBID OBESITY WITH BMI OF 40.0-44.9, ADULT (HCC): ICD-10-CM

## 2021-11-16 DIAGNOSIS — R73.01 IFG (IMPAIRED FASTING GLUCOSE): ICD-10-CM

## 2021-11-16 DIAGNOSIS — Z00.00 ANNUAL PHYSICAL EXAM: Primary | ICD-10-CM

## 2021-11-16 DIAGNOSIS — G44.89 OTHER HEADACHE SYNDROME: ICD-10-CM

## 2021-11-16 DIAGNOSIS — E78.5 HYPERLIPIDEMIA, UNSPECIFIED HYPERLIPIDEMIA TYPE: ICD-10-CM

## 2021-11-16 DIAGNOSIS — E66.01 MORBID OBESITY (HCC): ICD-10-CM

## 2021-11-16 DIAGNOSIS — F32.A DEPRESSION, UNSPECIFIED DEPRESSION TYPE: ICD-10-CM

## 2021-11-16 DIAGNOSIS — E55.9 VITAMIN D DEFICIENCY: ICD-10-CM

## 2021-11-16 DIAGNOSIS — L60.0 INGROWN TOENAIL OF BOTH FEET: ICD-10-CM

## 2021-11-16 DIAGNOSIS — J30.9 ALLERGIC RHINITIS, UNSPECIFIED SEASONALITY, UNSPECIFIED TRIGGER: ICD-10-CM

## 2021-11-16 DIAGNOSIS — J45.20 MILD INTERMITTENT ASTHMA WITHOUT COMPLICATION: ICD-10-CM

## 2021-11-16 PROBLEM — R06.02 SHORTNESS OF BREATH: Status: RESOLVED | Noted: 2021-08-27 | Resolved: 2021-11-16

## 2021-11-16 PROBLEM — R07.9 CHEST PAIN: Status: RESOLVED | Noted: 2021-08-27 | Resolved: 2021-11-16

## 2021-11-16 PROCEDURE — 99213 OFFICE O/P EST LOW 20 MIN: CPT | Performed by: FAMILY MEDICINE

## 2021-11-16 PROCEDURE — 99395 PREV VISIT EST AGE 18-39: CPT | Performed by: FAMILY MEDICINE

## 2021-11-16 PROCEDURE — 3725F SCREEN DEPRESSION PERFORMED: CPT | Performed by: FAMILY MEDICINE

## 2021-11-16 RX ORDER — FLUTICASONE PROPIONATE 50 MCG
1 SPRAY, SUSPENSION (ML) NASAL DAILY
COMMUNITY
End: 2022-06-27 | Stop reason: SDUPTHER

## 2021-11-17 ENCOUNTER — TELEPHONE (OUTPATIENT)
Dept: OBGYN CLINIC | Facility: MEDICAL CENTER | Age: 26
End: 2021-11-17

## 2021-11-19 ENCOUNTER — OFFICE VISIT (OUTPATIENT)
Dept: PODIATRY | Facility: CLINIC | Age: 26
End: 2021-11-19
Payer: COMMERCIAL

## 2021-11-19 VITALS
WEIGHT: 253 LBS | HEART RATE: 82 BPM | DIASTOLIC BLOOD PRESSURE: 69 MMHG | SYSTOLIC BLOOD PRESSURE: 101 MMHG | HEIGHT: 66 IN | BODY MASS INDEX: 40.66 KG/M2

## 2021-11-19 DIAGNOSIS — M79.675 PAIN OF LEFT GREAT TOE: ICD-10-CM

## 2021-11-19 DIAGNOSIS — L60.0 INGROWN TOENAIL OF BOTH FEET: Primary | ICD-10-CM

## 2021-11-19 PROCEDURE — 99242 OFF/OP CONSLTJ NEW/EST SF 20: CPT | Performed by: PODIATRIST

## 2021-11-19 PROCEDURE — 11730 AVULSION NAIL PLATE SIMPLE 1: CPT | Performed by: PODIATRIST

## 2021-11-19 RX ORDER — DIPHENOXYLATE HYDROCHLORIDE AND ATROPINE SULFATE 2.5; .025 MG/1; MG/1
1 TABLET ORAL DAILY
COMMUNITY

## 2021-11-24 ENCOUNTER — PATIENT MESSAGE (OUTPATIENT)
Dept: FAMILY MEDICINE CLINIC | Facility: CLINIC | Age: 26
End: 2021-11-24

## 2021-11-24 ENCOUNTER — LAB (OUTPATIENT)
Dept: LAB | Age: 26
End: 2021-11-24
Payer: COMMERCIAL

## 2021-11-24 DIAGNOSIS — E66.01 MATERNAL MORBID OBESITY IN THIRD TRIMESTER, ANTEPARTUM (HCC): ICD-10-CM

## 2021-11-24 DIAGNOSIS — O24.414 INSULIN CONTROLLED GESTATIONAL DIABETES MELLITUS (GDM) IN SECOND TRIMESTER: ICD-10-CM

## 2021-11-24 DIAGNOSIS — E78.5 HYPERLIPIDEMIA, UNSPECIFIED HYPERLIPIDEMIA TYPE: ICD-10-CM

## 2021-11-24 DIAGNOSIS — G44.89 OTHER HEADACHE SYNDROME: ICD-10-CM

## 2021-11-24 DIAGNOSIS — Z3A.35 35 WEEKS GESTATION OF PREGNANCY: ICD-10-CM

## 2021-11-24 DIAGNOSIS — F41.9 ANXIETY: ICD-10-CM

## 2021-11-24 DIAGNOSIS — O99.213 MATERNAL MORBID OBESITY IN THIRD TRIMESTER, ANTEPARTUM (HCC): ICD-10-CM

## 2021-11-24 DIAGNOSIS — E55.9 VITAMIN D DEFICIENCY: ICD-10-CM

## 2021-11-24 DIAGNOSIS — Z13.6 SCREENING FOR CARDIOVASCULAR CONDITION: ICD-10-CM

## 2021-11-24 DIAGNOSIS — F32.A DEPRESSION, UNSPECIFIED DEPRESSION TYPE: ICD-10-CM

## 2021-11-24 DIAGNOSIS — O24.414 INSULIN CONTROLLED GESTATIONAL DIABETES MELLITUS (GDM) IN THIRD TRIMESTER: ICD-10-CM

## 2021-11-24 DIAGNOSIS — E66.01 MATERNAL MORBID OBESITY IN SECOND TRIMESTER, ANTEPARTUM (HCC): ICD-10-CM

## 2021-11-24 DIAGNOSIS — O99.212 MATERNAL MORBID OBESITY IN SECOND TRIMESTER, ANTEPARTUM (HCC): ICD-10-CM

## 2021-11-24 LAB
25(OH)D3 SERPL-MCNC: 26.8 NG/ML (ref 30–100)
ALBUMIN SERPL BCP-MCNC: 4.1 G/DL (ref 3.5–5)
ALP SERPL-CCNC: 78 U/L (ref 46–116)
ALT SERPL W P-5'-P-CCNC: 39 U/L (ref 12–78)
ANION GAP SERPL CALCULATED.3IONS-SCNC: 6 MMOL/L (ref 4–13)
AST SERPL W P-5'-P-CCNC: 19 U/L (ref 5–45)
BASOPHILS # BLD AUTO: 0.02 THOUSANDS/ΜL (ref 0–0.1)
BASOPHILS NFR BLD AUTO: 0 % (ref 0–1)
BILIRUB SERPL-MCNC: 0.88 MG/DL (ref 0.2–1)
BUN SERPL-MCNC: 10 MG/DL (ref 5–25)
CALCIUM SERPL-MCNC: 9.4 MG/DL (ref 8.3–10.1)
CHLORIDE SERPL-SCNC: 104 MMOL/L (ref 100–108)
CHOLEST SERPL-MCNC: 212 MG/DL
CO2 SERPL-SCNC: 26 MMOL/L (ref 21–32)
CREAT SERPL-MCNC: 0.79 MG/DL (ref 0.6–1.3)
EOSINOPHIL # BLD AUTO: 0.08 THOUSAND/ΜL (ref 0–0.61)
EOSINOPHIL NFR BLD AUTO: 1 % (ref 0–6)
ERYTHROCYTE [DISTWIDTH] IN BLOOD BY AUTOMATED COUNT: 15 % (ref 11.6–15.1)
EST. AVERAGE GLUCOSE BLD GHB EST-MCNC: 114 MG/DL
GFR SERPL CREATININE-BSD FRML MDRD: 105 ML/MIN/1.73SQ M
GLUCOSE P FAST SERPL-MCNC: 90 MG/DL (ref 65–99)
HBA1C MFR BLD: 5.6 %
HCT VFR BLD AUTO: 39.4 % (ref 34.8–46.1)
HDLC SERPL-MCNC: 48 MG/DL
HGB BLD-MCNC: 12.5 G/DL (ref 11.5–15.4)
IMM GRANULOCYTES # BLD AUTO: 0.02 THOUSAND/UL (ref 0–0.2)
IMM GRANULOCYTES NFR BLD AUTO: 0 % (ref 0–2)
LDLC SERPL CALC-MCNC: 119 MG/DL (ref 0–100)
LDLC SERPL DIRECT ASSAY-MCNC: 140 MG/DL (ref 0–100)
LYMPHOCYTES # BLD AUTO: 2.38 THOUSANDS/ΜL (ref 0.6–4.47)
LYMPHOCYTES NFR BLD AUTO: 34 % (ref 14–44)
MCH RBC QN AUTO: 27.9 PG (ref 26.8–34.3)
MCHC RBC AUTO-ENTMCNC: 31.7 G/DL (ref 31.4–37.4)
MCV RBC AUTO: 88 FL (ref 82–98)
MONOCYTES # BLD AUTO: 0.41 THOUSAND/ΜL (ref 0.17–1.22)
MONOCYTES NFR BLD AUTO: 6 % (ref 4–12)
NEUTROPHILS # BLD AUTO: 4.06 THOUSANDS/ΜL (ref 1.85–7.62)
NEUTS SEG NFR BLD AUTO: 59 % (ref 43–75)
NONHDLC SERPL-MCNC: 164 MG/DL
NRBC BLD AUTO-RTO: 0 /100 WBCS
PLATELET # BLD AUTO: 276 THOUSANDS/UL (ref 149–390)
PMV BLD AUTO: 10.5 FL (ref 8.9–12.7)
POTASSIUM SERPL-SCNC: 4 MMOL/L (ref 3.5–5.3)
PROT SERPL-MCNC: 7.7 G/DL (ref 6.4–8.2)
RBC # BLD AUTO: 4.48 MILLION/UL (ref 3.81–5.12)
SODIUM SERPL-SCNC: 136 MMOL/L (ref 136–145)
TRIGL SERPL-MCNC: 225 MG/DL
TSH SERPL DL<=0.05 MIU/L-ACNC: 2.05 UIU/ML (ref 0.36–3.74)
WBC # BLD AUTO: 6.97 THOUSAND/UL (ref 4.31–10.16)

## 2021-11-24 PROCEDURE — 80053 COMPREHEN METABOLIC PANEL: CPT

## 2021-11-24 PROCEDURE — 80061 LIPID PANEL: CPT

## 2021-11-24 PROCEDURE — 83721 ASSAY OF BLOOD LIPOPROTEIN: CPT

## 2021-11-24 PROCEDURE — 85025 COMPLETE CBC W/AUTO DIFF WBC: CPT

## 2021-11-24 PROCEDURE — 84443 ASSAY THYROID STIM HORMONE: CPT

## 2021-11-24 PROCEDURE — 83036 HEMOGLOBIN GLYCOSYLATED A1C: CPT

## 2021-11-24 PROCEDURE — 82306 VITAMIN D 25 HYDROXY: CPT

## 2021-11-24 PROCEDURE — 36415 COLL VENOUS BLD VENIPUNCTURE: CPT

## 2021-12-16 ENCOUNTER — PROCEDURE VISIT (OUTPATIENT)
Dept: OBGYN CLINIC | Facility: CLINIC | Age: 26
End: 2021-12-16
Payer: COMMERCIAL

## 2021-12-16 VITALS
BODY MASS INDEX: 41.59 KG/M2 | SYSTOLIC BLOOD PRESSURE: 128 MMHG | DIASTOLIC BLOOD PRESSURE: 76 MMHG | HEIGHT: 66 IN | WEIGHT: 258.8 LBS

## 2021-12-16 DIAGNOSIS — Z30.430 ENCOUNTER FOR IUD INSERTION: Primary | ICD-10-CM

## 2021-12-16 LAB — SL AMB POCT URINE HCG: NEGATIVE

## 2021-12-16 PROCEDURE — 81025 URINE PREGNANCY TEST: CPT | Performed by: OBSTETRICS & GYNECOLOGY

## 2021-12-16 PROCEDURE — 58300 INSERT INTRAUTERINE DEVICE: CPT | Performed by: OBSTETRICS & GYNECOLOGY

## 2022-01-17 ENCOUNTER — ANNUAL EXAM (OUTPATIENT)
Dept: OBGYN CLINIC | Facility: CLINIC | Age: 27
End: 2022-01-17
Payer: COMMERCIAL

## 2022-01-17 VITALS
SYSTOLIC BLOOD PRESSURE: 126 MMHG | HEIGHT: 66 IN | BODY MASS INDEX: 42.04 KG/M2 | DIASTOLIC BLOOD PRESSURE: 78 MMHG | WEIGHT: 261.6 LBS

## 2022-01-17 DIAGNOSIS — Z01.419 WELL WOMAN EXAM WITH ROUTINE GYNECOLOGICAL EXAM: Primary | ICD-10-CM

## 2022-01-17 PROCEDURE — 99395 PREV VISIT EST AGE 18-39: CPT | Performed by: OBSTETRICS & GYNECOLOGY

## 2022-01-17 PROCEDURE — 0503F POSTPARTUM CARE VISIT: CPT | Performed by: OBSTETRICS & GYNECOLOGY

## 2022-01-17 NOTE — PROGRESS NOTES
ASSESSMENT & PLAN: Gogo Haider is a 27 y o  Luke Deluna with normal gynecologic exam     1   Routine well woman exam done today  2    Pap and HPV: Pap with reflex HPV was not done today  Current ASCCP Guidelines reviewed  3   The patient declined STD testing  Safe sex practices have been discussed  4   Gardasil is recommended for patients from 544 years of age  The patient has had the Gardasil vaccine series  Patient is not interested in vaccination, information given  Covid vaccine: discussed and recommended  5  The patient is sexually active  She relies on a Mirena IUD for contraception  6  The following were reviewed in today's visit: breast self exam, family planning choices, exercise and healthy diet  7  Patient to return to office in 12 months for annual exam      All questions have been answered to her satisfaction  CC:  Annual Gynecologic Examination    HPI: Gogo Haider is a 27 y o  Luke Deluna who presents for annual gynecologic examination  She has the following concerns:  Postpartum hair loss  Health Maintenance:    She exercises 0 days per week  She does perform regular monthly self breast exams  She feels safe at home  She does follow a well balanced diet  She does not use tobacco    Past Medical History:   Diagnosis Date    Anxiety     BMI 40 0-44 9, adult ([facility] [address] ) 03/11/2021    Depression     History of ovarian cyst 2018    Hyperlipidemia     Migraine     Mild intermittent asthma     Morbid obesity ([facility] [address] )     Other headache syndrome 11/16/2021    PID (pelvic inflammatory disease) 2018    Polycystic ovary syndrome     Pre-diabetes     Varicella     had vaccine    Vitamin D deficiency        Past Surgical History:   Procedure Laterality Date    WISDOM TOOTH EXTRACTION         Past OB/Gyn History:       No LMP recorded  History of sexually transmitted infection No  Patient is currently sexually active    heterosexual Birth control: Mirena IUD since 2021  Last Pap 2021 :  no abnormalities  Family History:  Family History   Problem Relation Age of Onset    JOSELINE disease Mother     Diabetes unspecified Mother     Asthma Mother     Seizures Father 15    Asperger's syndrome Sister     Autism Brother     No Known Problems Son     Colon cancer Maternal Grandmother     Heart attack Maternal Grandfather     No Known Problems Paternal Grandmother     No Known Problems Paternal Grandfather        Social History:  Social History     Socioeconomic History    Marital status: Single     Spouse name: Not on file    Number of children: 0    Years of education: Not on file    Highest education level: Not on file   Occupational History    Occupation: Dietary Aide     Employer: [employer]   Tobacco Use    Smoking status: Former Smoker     Packs/day: 0 10     Years: 0 20     Pack years: 0 02     Types: Cigarettes     Start date: 2017     Quit date: 10/1/2017     Years since quittin 2    Smokeless tobacco: Never Used    Tobacco comment: Smoked socially    Vaping Use    Vaping Use: Never used   Substance and Sexual Activity    Alcohol use: Yes     Alcohol/week: 1 0 standard drink     Types: 1 Glasses of wine per week     Comment: socially    Drug use: Never    Sexual activity: Not Currently     Partners: Male     Birth control/protection: I U D  Other Topics Concern    Not on file   Social History Narrative    Single    Lives with boyfriend, son    1 Child - 3 Son    Unemployed     Social Determinants of Health     Financial Resource Strain: Not on file   Food Insecurity: Not on file   Transportation Needs: Not on file   Physical Activity: Not on file   Stress: Not on file   Social Connections: Not on file   Intimate Partner Violence: Not on file   Housing Stability: Not on file     Presently lives with her partner and son       No Known Allergies    Current Outpatient Medications:     albuterol (PROVENTIL HFA,VENTOLIN HFA) 90 mcg/act inhaler, Inhale 2 puffs every 4 (four) hours as needed for wheezing, Disp: 1 Inhaler, Rfl: 0    Cholecalciferol (Vitamin D3) 50 MCG (2000 UT) TABS, Take 2,000 Units by mouth daily 4,000 IU , Disp: , Rfl:     fluticasone (FLONASE) 50 mcg/act nasal spray, 1 spray into each nostril daily, Disp: , Rfl:     levonorgestrel (MIRENA) 20 MCG/24HR IUD, 1 each by Intrauterine route once, Disp: , Rfl:     loratadine (CLARITIN) 10 mg tablet, Take 10 mg by mouth daily, Disp: , Rfl:     multivitamin (THERAGRAN) TABS, Take 1 tablet by mouth daily, Disp: , Rfl:     Review of Systems:  Denies fevers, chills, unintentional weight loss, excessive fatigue, chest pain, shortness of breath, abdominal pain, nausea, vomiting, urinary incontinence, urinary frequency, vaginal bleeding, vaginal discharge  All other systems negative unless otherwise stated  Physical Exam:  /78   Ht 5' 6" (1 676 m)   Wt 119 kg (261 lb 9 6 oz)   BMI 42 22 kg/m²  Body mass index is 42 22 kg/m²  GEN: The patient was alert and oriented x3, pleasant well-appearing female in no acute distress  HEENT:  Unremarkable, no anterior or posterior lymphadenopathy, no thyromegaly  CV:  Regular rate and rhythm, normal S1 and S2, no murmurs  RESP:  Clear to auscultation bilaterally, no wheezes, rales or rhonchi  BREAST:  Symmetric breasts with no palpable breast masses or obvious breast lesions  She has no retractions or nipple discharge  She has no axillary abnormalities or palpable masses  GI:  Soft, nontender, non-distended  MSK: bilateral lower extremities are nontender, no edema  : Normal appearing external female genitalia, normal appearing urethral meatus  On sterile speculum exam, normal appearing vaginal epithelium, no vaginal discharge, no bleeding, grossly normal appearing cervix  IUD threads visible  On bimanual exam, no cervical motion tenderness; uterus is smooth, mobile, nontender 6 weeks size   No tenderness or fullness in the bilateral adnexa  Exam limited by body habitus

## 2022-03-06 ENCOUNTER — OFFICE VISIT (OUTPATIENT)
Dept: URGENT CARE | Facility: CLINIC | Age: 27
End: 2022-03-06
Payer: COMMERCIAL

## 2022-03-06 VITALS
HEART RATE: 94 BPM | HEIGHT: 66 IN | WEIGHT: 261 LBS | OXYGEN SATURATION: 100 % | BODY MASS INDEX: 41.95 KG/M2 | RESPIRATION RATE: 22 BRPM | TEMPERATURE: 98.6 F

## 2022-03-06 DIAGNOSIS — Z32.00 POSSIBLE PREGNANCY: ICD-10-CM

## 2022-03-06 DIAGNOSIS — R53.83 FATIGUE, UNSPECIFIED TYPE: Primary | ICD-10-CM

## 2022-03-06 LAB — SL AMB POCT URINE HCG: NORMAL

## 2022-03-06 PROCEDURE — 81025 URINE PREGNANCY TEST: CPT | Performed by: PREVENTIVE MEDICINE

## 2022-03-06 PROCEDURE — 99213 OFFICE O/P EST LOW 20 MIN: CPT | Performed by: PREVENTIVE MEDICINE

## 2022-03-06 NOTE — PROGRESS NOTES
[facility]        NAME: [place] is a 27 y o  female  : 1995    MRN: [id number]  DATE: 2022  TIME: 3:23 PM    Assessment and Plan   Fatigue, unspecified type [R53 83]  1  Fatigue, unspecified type     2  Possible pregnancy  POCT urine HCG         Patient Instructions       Follow up with PCP in 3-5 days  Proceed to  ER if symptoms worsen  Chief Complaint     Chief Complaint   Patient presents with    Cough     12-13 days ago cough,muscle aches, congestion and ear pain (both)  pt did otc covid testing 2 days ago and was negative  pt was not vacinated for covid  History of Present Illness       2 week history of fatigue, body aches, no fever  No cough no shortness of breath  Negative home COVID test couple days ago  Last few days some nausea  Review of Systems   Review of Systems   Constitutional: Positive for fatigue  Negative for fever  HENT: Negative for congestion and sore throat  Respiratory: Negative for cough, chest tightness and shortness of breath  Gastrointestinal: Positive for nausea  Negative for abdominal pain, constipation and diarrhea           Current Medications       Current Outpatient Medications:     albuterol (PROVENTIL HFA,VENTOLIN HFA) 90 mcg/act inhaler, Inhale 2 puffs every 4 (four) hours as needed for wheezing, Disp: 18 g, Rfl: 1    Cholecalciferol (Vitamin D3) 50 MCG (2000 UT) TABS, Take 2,000 Units by mouth daily 4,000 IU , Disp: , Rfl:     fluticasone (FLONASE) 50 mcg/act nasal spray, 1 spray into each nostril daily, Disp: , Rfl:     levonorgestrel (MIRENA) 20 MCG/24HR IUD, 1 each by Intrauterine route once, Disp: , Rfl:     loratadine (CLARITIN) 10 mg tablet, Take 10 mg by mouth daily, Disp: , Rfl:     multivitamin (THERAGRAN) TABS, Take 1 tablet by mouth daily, Disp: , Rfl:     Current Allergies     Allergies as of 2022    (No Known Allergies)            The following portions of the patient's history were reviewed and updated as appropriate: allergies, current medications, past family history, past medical history, past social history, past surgical history and problem list      Past Medical History:   Diagnosis Date    Anxiety     [facility] 40 0-44 9, [facility] 03/11/2021    Depression     History of ovarian cyst 2018    Hyperlipidemia     Migraine     Mild intermittent asthma     Morbid obesity ([facility] )     Other headache syndrome 11/16/2021    PID (pelvic inflammatory disease) 2018    Polycystic ovary syndrome     Pre-diabetes     Varicella     had vaccine    Vitamin D deficiency        Past Surgical History:   Procedure Laterality Date    WISDOM TOOTH EXTRACTION         Family History   Problem Relation Age of Onset    JOSELINE disease Mother     Diabetes unspecified Mother     Asthma Mother     Seizures Father 15    Asperger's syndrome Sister     Autism Brother     No Known Problems Son     Colon cancer Maternal Grandmother     Heart attack Maternal Grandfather     No Known Problems Paternal Grandmother     No Known Problems Paternal Grandfather          Medications have been verified  Objective   Pulse 94   Temp 98 6 °F (37 °C)   Resp 22   Ht 5' 6" (1 676 m)   Wt 118 kg (261 lb)   SpO2 100%   BMI 42 13 kg/m²   No LMP recorded  Physical Exam     Physical Exam  HENT:      Right Ear: Tympanic membrane normal       Left Ear: Tympanic membrane normal       Mouth/Throat:      Mouth: Mucous membranes are moist       Pharynx: Oropharynx is clear  Cardiovascular:      Heart sounds: Normal heart sounds  Pulmonary:      Breath sounds: Normal breath sounds  No wheezing, rhonchi or rales  Musculoskeletal:      Cervical back: No rigidity or tenderness  Lymphadenopathy:      Cervical: No cervical adenopathy         Pregnancy test negative

## 2022-03-06 NOTE — PATIENT INSTRUCTIONS
I cannot explain your fatigue and body aches    If you are not improving over the next several days you need to see her family doctor for more complete workup

## 2022-03-23 ENCOUNTER — OFFICE VISIT (OUTPATIENT)
Dept: URGENT CARE | Facility: CLINIC | Age: 27
End: 2022-03-23
Payer: COMMERCIAL

## 2022-03-23 VITALS
HEART RATE: 75 BPM | TEMPERATURE: 98.1 F | BODY MASS INDEX: 40.5 KG/M2 | OXYGEN SATURATION: 99 % | WEIGHT: 252 LBS | HEIGHT: 66 IN | RESPIRATION RATE: 20 BRPM

## 2022-03-23 DIAGNOSIS — J32.9 BACTERIAL SINUSITIS: ICD-10-CM

## 2022-03-23 DIAGNOSIS — B96.89 BACTERIAL SINUSITIS: ICD-10-CM

## 2022-03-23 DIAGNOSIS — R05.1 ACUTE COUGH: Primary | ICD-10-CM

## 2022-03-23 PROCEDURE — 87636 SARSCOV2 & INF A&B AMP PRB: CPT | Performed by: PHYSICIAN ASSISTANT

## 2022-03-23 PROCEDURE — 99213 OFFICE O/P EST LOW 20 MIN: CPT | Performed by: PHYSICIAN ASSISTANT

## 2022-03-23 RX ORDER — AMOXICILLIN AND CLAVULANATE POTASSIUM 875; 125 MG/1; MG/1
1 TABLET, FILM COATED ORAL EVERY 12 HOURS SCHEDULED
Qty: 14 TABLET | Refills: 0 | Status: SHIPPED | OUTPATIENT
Start: 2022-03-23 | End: 2022-03-30

## 2022-03-23 RX ORDER — AZITHROMYCIN 250 MG/1
TABLET, FILM COATED ORAL
Qty: 6 TABLET | Refills: 0 | Status: SHIPPED | OUTPATIENT
Start: 2022-03-23 | End: 2022-03-23 | Stop reason: ALTCHOICE

## 2022-03-23 NOTE — LETTER
[facility] [phone] [id number]  Dept: [phone]    March 23, 2022    Patient: iNno Emery  YOB: 1995    Nino Emery was seen and evaluated at our [facility]  Please note if Covid and Flu tests are negative, they may return to work when fever free for 24 hours without the use of a fever reducing agent  If Covid or Flu test is positive, they may return to work on 3/25/2022, as this is 5 days from the onset of symptoms  Upon return, they must then adhere to strict masking for an additional 5 days      Sincerely,        [place] PA-C

## 2022-03-23 NOTE — PROGRESS NOTES
[facility]        NAME: Juliana Rinaldi is a 24 y o  female  : 1995    MRN: [id number]  DATE: 2022  TIME: 11:37 AM    Assessment and Plan   Acute cough [R05 1]  1  Acute cough  Cov/Flu-Collected at [facility] or [facility]   2  Bacterial sinusitis  amoxicillin-clavulanate (AUGMENTIN) 875-125 mg per tablet    DISCONTINUED: azithromycin (ZITHROMAX) 250 mg tablet         Patient Instructions     Discussed possible allergic etiology, c/w allegra & albuterol PRN   Will trial abx d/t 3 weeks of symptoms  Take antibiotic as directed with food  Recommend probiotics for side effects  Continue with over-the-counter symptom relief  Monitor for worsening symptoms    Follow up with PCP in 3-5 days  Proceed to  ER if symptoms worsen  Chief Complaint     Chief Complaint   Patient presents with    Cough     started with cough and nasal congestion on 3 weeks ago  no fevers, otc cough and cold medication and allergy medication, cough up thick brown/green mucous  pt is NOT in distress, using haler last night, no sick contacts, home covid testing done 3 days ago (-), pt wants to be swabbed, pt is NOT vaccinated for covid, but did recieve flu vaccine          History of Present Illness       Patient presents with complaint of cough for the past 3 weeks  She reports associated nasal congestion, sinus pressure, postnasal drip, and chest soreness due to all the coughing  She reports that she has been using her albuterol inhaler about once a day and reports that she is feeling more short of breath than at baseline with activity  She reports recent sick contacts but denies any known COVID exposure  She states that she has started Allegra D in the past week and taking over-the-counter cough medication without much improvement  She denies fever, chills, sweats, body aches, sore throat, and abdominal symptoms  Review of Systems   Review of Systems   Constitutional: Negative for chills and fever  HENT: Positive for congestion, postnasal drip and rhinorrhea  Negative for ear pain and sore throat  Eyes: Negative for pain and visual disturbance  Respiratory: Positive for cough  Negative for chest tightness and shortness of breath  Cardiovascular: Negative for chest pain and palpitations  Gastrointestinal: Negative for abdominal pain, diarrhea, nausea and vomiting  Genitourinary: Negative for dysuria and hematuria  Musculoskeletal: Negative for arthralgias and back pain  Skin: Negative for color change and rash  Neurological: Positive for headaches  Negative for seizures and syncope  All other systems reviewed and are negative          Current Medications       Current Outpatient Medications:     albuterol (PROVENTIL HFA,VENTOLIN HFA) 90 mcg/act inhaler, Inhale 2 puffs every 4 (four) hours as needed for wheezing, Disp: 18 g, Rfl: 1    Cholecalciferol (Vitamin D3) 50 MCG (2000 UT) TABS, Take 2,000 Units by mouth daily 4,000 IU , Disp: , Rfl:     fluticasone (FLONASE) 50 mcg/act nasal spray, 1 spray into each nostril daily, Disp: , Rfl:     levonorgestrel (MIRENA) 20 MCG/24HR IUD, 1 each by Intrauterine route once, Disp: , Rfl:     loratadine (CLARITIN) 10 mg tablet, Take 10 mg by mouth daily, Disp: , Rfl:     multivitamin (THERAGRAN) TABS, Take 1 tablet by mouth daily, Disp: , Rfl:     amoxicillin-clavulanate (AUGMENTIN) 875-125 mg per tablet, Take 1 tablet by mouth every 12 (twelve) hours for 7 days, Disp: 14 tablet, Rfl: 0    Current Allergies     Allergies as of 03/23/2022    (No Known Allergies)            The following portions of the patient's history were reviewed and updated as appropriate: allergies, current medications, past family history, past medical history, past social history, past surgical history and problem list      Past Medical History:   Diagnosis Date    Anxiety     BMI 40 0-44 9, adult ([facility] [address] ) 03/11/2021    Depression     History of ovarian cyst 2018    Hyperlipidemia     Migraine     Mild intermittent asthma     Morbid obesity ([facility] [address] [facility]     Other headache syndrome 11/16/2021    PID (pelvic inflammatory disease) 2018    Polycystic ovary syndrome     Pre-diabetes     Varicella     had vaccine    Vitamin D deficiency        Past Surgical History:   Procedure Laterality Date    WISDOM TOOTH EXTRACTION         Family History   Problem Relation Age of Onset    JOSELINE disease Mother     Diabetes unspecified Mother     Asthma Mother     Seizures Father 15    Asperger's syndrome Sister     Autism Brother     No Known Problems Son     Colon cancer Maternal Grandmother     Heart attack Maternal Grandfather     No Known Problems Paternal Grandmother     No Known Problems Paternal Grandfather          Medications have been verified  Objective   Pulse 75   Temp 98 1 °F (36 7 °C) (Tympanic)   Resp 20   Ht 5' 6" (1 676 m)   Wt 114 kg (252 lb)   SpO2 99%   BMI 40 67 kg/m²   No LMP recorded  Physical Exam     Physical Exam  Vitals and nursing note reviewed  Constitutional:       General: She is not in acute distress  Appearance: Normal appearance  She is well-developed  She is not ill-appearing or diaphoretic  HENT:      Head: Normocephalic and atraumatic  Right Ear: Tympanic membrane, ear canal and external ear normal       Left Ear: Tympanic membrane, ear canal and external ear normal       Nose: Congestion present  Comments: Mildly TTP over maxillary sinuses     Mouth/Throat:      Mouth: Mucous membranes are moist       Pharynx: Oropharynx is clear  No oropharyngeal exudate or posterior oropharyngeal erythema  Eyes:      Conjunctiva/sclera: Conjunctivae normal       Pupils: Pupils are equal, round, and reactive to light  Cardiovascular:      Rate and Rhythm: Normal rate and regular rhythm  Heart sounds: Normal heart sounds  Pulmonary:      Effort: Pulmonary effort is normal  No respiratory distress        Breath sounds: Normal breath sounds  No stridor  No wheezing, rhonchi or rales  Musculoskeletal:      Cervical back: Normal range of motion and neck supple  Lymphadenopathy:      Cervical: No cervical adenopathy  Skin:     General: Skin is warm and dry  Capillary Refill: Capillary refill takes less than 2 seconds  Findings: No rash  Neurological:      Mental Status: She is alert and oriented to person, place, and time  Cranial Nerves: No cranial nerve deficit  Sensory: No sensory deficit  Psychiatric:         Behavior: Behavior normal          Thought Content:  Thought content normal

## 2022-03-23 NOTE — PATIENT INSTRUCTIONS
Swabbed for COVID-19/flu, discussed self quarantine until contacted with results  Monitor for worsening symptoms  C/w OTC symptom relief including tylenol, fluids, rest  Recommend supplementing with vitamins D & C as well as a multivitamin

## 2022-03-24 LAB
FLUAV RNA RESP QL NAA+PROBE: NEGATIVE
FLUBV RNA RESP QL NAA+PROBE: NEGATIVE
SARS-COV-2 RNA RESP QL NAA+PROBE: NEGATIVE

## 2022-03-27 ENCOUNTER — LAB (OUTPATIENT)
Dept: LAB | Facility: HOSPITAL | Age: 27
End: 2022-03-27
Attending: OBSTETRICS & GYNECOLOGY
Payer: COMMERCIAL

## 2022-03-27 DIAGNOSIS — E55.9 VITAMIN D DEFICIENCY: ICD-10-CM

## 2022-03-27 DIAGNOSIS — E78.5 HYPERLIPIDEMIA, UNSPECIFIED HYPERLIPIDEMIA TYPE: ICD-10-CM

## 2022-03-27 DIAGNOSIS — O24.414 INSULIN CONTROLLED GESTATIONAL DIABETES MELLITUS (GDM) IN THIRD TRIMESTER: ICD-10-CM

## 2022-03-27 DIAGNOSIS — F41.9 ANXIETY: ICD-10-CM

## 2022-03-27 DIAGNOSIS — E66.01 MORBID OBESITY WITH BMI OF 40.0-44.9, ADULT (HCC): ICD-10-CM

## 2022-03-27 DIAGNOSIS — F32.A DEPRESSION, UNSPECIFIED DEPRESSION TYPE: ICD-10-CM

## 2022-03-27 LAB
25(OH)D3 SERPL-MCNC: 30.6 NG/ML (ref 30–100)
ALBUMIN SERPL BCP-MCNC: 4.1 G/DL (ref 3.5–5)
ALP SERPL-CCNC: 75 U/L (ref 46–116)
ALT SERPL W P-5'-P-CCNC: 28 U/L (ref 12–78)
ANION GAP SERPL CALCULATED.3IONS-SCNC: 5 MMOL/L (ref 4–13)
AST SERPL W P-5'-P-CCNC: 13 U/L (ref 5–45)
BILIRUB SERPL-MCNC: 0.78 MG/DL (ref 0.2–1)
BUN SERPL-MCNC: 12 MG/DL (ref 5–25)
CALCIUM SERPL-MCNC: 9.3 MG/DL (ref 8.3–10.1)
CHLORIDE SERPL-SCNC: 108 MMOL/L (ref 100–108)
CHOLEST SERPL-MCNC: 169 MG/DL
CO2 SERPL-SCNC: 22 MMOL/L (ref 21–32)
CREAT SERPL-MCNC: 0.74 MG/DL (ref 0.6–1.3)
EST. AVERAGE GLUCOSE BLD GHB EST-MCNC: 128 MG/DL
GFR SERPL CREATININE-BSD FRML MDRD: 112 ML/MIN/1.73SQ M
GLUCOSE P FAST SERPL-MCNC: 93 MG/DL (ref 65–99)
HBA1C MFR BLD: 6.1 %
HDLC SERPL-MCNC: 48 MG/DL
LDLC SERPL CALC-MCNC: 95 MG/DL (ref 0–100)
LDLC SERPL DIRECT ASSAY-MCNC: 103 MG/DL (ref 0–100)
MAGNESIUM SERPL-MCNC: 2.1 MG/DL (ref 1.6–2.6)
NONHDLC SERPL-MCNC: 121 MG/DL
POTASSIUM SERPL-SCNC: 4.1 MMOL/L (ref 3.5–5.3)
PROT SERPL-MCNC: 8 G/DL (ref 6.4–8.2)
SODIUM SERPL-SCNC: 135 MMOL/L (ref 136–145)
TRIGL SERPL-MCNC: 132 MG/DL
TSH SERPL DL<=0.05 MIU/L-ACNC: 1.32 UIU/ML (ref 0.36–3.74)

## 2022-03-27 PROCEDURE — 80061 LIPID PANEL: CPT

## 2022-03-27 PROCEDURE — 82306 VITAMIN D 25 HYDROXY: CPT

## 2022-03-27 PROCEDURE — 83036 HEMOGLOBIN GLYCOSYLATED A1C: CPT

## 2022-03-27 PROCEDURE — 80053 COMPREHEN METABOLIC PANEL: CPT

## 2022-03-27 PROCEDURE — 83735 ASSAY OF MAGNESIUM: CPT

## 2022-03-27 PROCEDURE — 83721 ASSAY OF BLOOD LIPOPROTEIN: CPT

## 2022-03-27 PROCEDURE — 36415 COLL VENOUS BLD VENIPUNCTURE: CPT

## 2022-03-27 PROCEDURE — 84443 ASSAY THYROID STIM HORMONE: CPT

## 2022-03-29 ENCOUNTER — OFFICE VISIT (OUTPATIENT)
Dept: FAMILY MEDICINE CLINIC | Facility: CLINIC | Age: 27
End: 2022-03-29
Payer: COMMERCIAL

## 2022-03-29 VITALS
DIASTOLIC BLOOD PRESSURE: 72 MMHG | RESPIRATION RATE: 18 BRPM | BODY MASS INDEX: 41.08 KG/M2 | OXYGEN SATURATION: 99 % | SYSTOLIC BLOOD PRESSURE: 106 MMHG | TEMPERATURE: 97.2 F | HEART RATE: 86 BPM | WEIGHT: 255.6 LBS | HEIGHT: 66 IN

## 2022-03-29 DIAGNOSIS — E55.9 VITAMIN D DEFICIENCY: ICD-10-CM

## 2022-03-29 DIAGNOSIS — M54.6 ACUTE BILATERAL THORACIC BACK PAIN: ICD-10-CM

## 2022-03-29 DIAGNOSIS — F32.A DEPRESSION, UNSPECIFIED DEPRESSION TYPE: ICD-10-CM

## 2022-03-29 DIAGNOSIS — G44.89 OTHER HEADACHE SYNDROME: ICD-10-CM

## 2022-03-29 DIAGNOSIS — E78.5 HYPERLIPIDEMIA, UNSPECIFIED HYPERLIPIDEMIA TYPE: ICD-10-CM

## 2022-03-29 DIAGNOSIS — J45.20 MILD INTERMITTENT ASTHMA WITHOUT COMPLICATION: ICD-10-CM

## 2022-03-29 DIAGNOSIS — J30.9 ALLERGIC RHINITIS, UNSPECIFIED SEASONALITY, UNSPECIFIED TRIGGER: ICD-10-CM

## 2022-03-29 DIAGNOSIS — F41.9 ANXIETY: ICD-10-CM

## 2022-03-29 DIAGNOSIS — Z23 ENCOUNTER FOR IMMUNIZATION: ICD-10-CM

## 2022-03-29 DIAGNOSIS — M54.50 LUMBAR BACK PAIN: ICD-10-CM

## 2022-03-29 DIAGNOSIS — R73.01 IFG (IMPAIRED FASTING GLUCOSE): Primary | ICD-10-CM

## 2022-03-29 DIAGNOSIS — E66.01 MORBID OBESITY (HCC): ICD-10-CM

## 2022-03-29 PROCEDURE — 90651 9VHPV VACCINE 2/3 DOSE IM: CPT

## 2022-03-29 PROCEDURE — 90471 IMMUNIZATION ADMIN: CPT

## 2022-03-29 PROCEDURE — 99214 OFFICE O/P EST MOD 30 MIN: CPT | Performed by: FAMILY MEDICINE

## 2022-03-29 RX ORDER — METFORMIN HYDROCHLORIDE 500 MG/1
1500 TABLET, EXTENDED RELEASE ORAL
Qty: 90 TABLET | Refills: 1 | Status: SHIPPED | OUTPATIENT
Start: 2022-03-29 | End: 2022-05-10 | Stop reason: SDUPTHER

## 2022-03-29 RX ORDER — DULOXETIN HYDROCHLORIDE 30 MG/1
30 CAPSULE, DELAYED RELEASE ORAL DAILY
Qty: 7 CAPSULE | Refills: 0 | Status: SHIPPED | OUTPATIENT
Start: 2022-03-29 | End: 2022-05-10

## 2022-03-29 RX ORDER — DULOXETIN HYDROCHLORIDE 60 MG/1
60 CAPSULE, DELAYED RELEASE ORAL DAILY
Qty: 30 CAPSULE | Refills: 1 | Status: SHIPPED | OUTPATIENT
Start: 2022-03-29 | End: 2022-05-10
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
## 2022-03-30 PROBLEM — M54.50 LUMBAR BACK PAIN: Status: ACTIVE | Noted: 2022-03-30

## 2022-03-31 NOTE — ASSESSMENT & PLAN NOTE
H/O Gestational diabetes  Worsening  Start Metformin XR 1500mg QD  Recommend lifestyle modifications

## 2022-03-31 NOTE — ASSESSMENT & PLAN NOTE
Restart Cymbalta 60 mg daily  For headache and migraine prevention I would suggest a combination vitamin supplement which may include 1 or more of the following ingredients:  Magnesium 400 mg , Riboflavin (vitamin B2) 400 - 600 mg,  Butterbur 150 mg (PA free)  Other ingredients that may be helpful are feverfew, coenzyme Q10 100 mg three times a day,  melatonin and kristin  Some example brands that combine some of these ingredients are Migravent or Dolovent

## 2022-05-10 ENCOUNTER — OFFICE VISIT (OUTPATIENT)
Dept: FAMILY MEDICINE CLINIC | Facility: CLINIC | Age: 27
End: 2022-05-10
Payer: COMMERCIAL

## 2022-05-10 ENCOUNTER — LAB (OUTPATIENT)
Dept: LAB | Facility: HOSPITAL | Age: 27
End: 2022-05-10
Payer: COMMERCIAL

## 2022-05-10 VITALS
HEIGHT: 66 IN | SYSTOLIC BLOOD PRESSURE: 110 MMHG | DIASTOLIC BLOOD PRESSURE: 70 MMHG | WEIGHT: 253 LBS | BODY MASS INDEX: 40.66 KG/M2 | HEART RATE: 83 BPM | OXYGEN SATURATION: 97 % | TEMPERATURE: 97.1 F | RESPIRATION RATE: 16 BRPM

## 2022-05-10 DIAGNOSIS — R73.01 IFG (IMPAIRED FASTING GLUCOSE): ICD-10-CM

## 2022-05-10 DIAGNOSIS — G44.89 OTHER HEADACHE SYNDROME: ICD-10-CM

## 2022-05-10 DIAGNOSIS — F32.A DEPRESSION, UNSPECIFIED DEPRESSION TYPE: ICD-10-CM

## 2022-05-10 DIAGNOSIS — E66.01 MORBID OBESITY (HCC): ICD-10-CM

## 2022-05-10 DIAGNOSIS — M54.50 LUMBAR BACK PAIN: ICD-10-CM

## 2022-05-10 DIAGNOSIS — J45.20 MILD INTERMITTENT ASTHMA WITHOUT COMPLICATION: ICD-10-CM

## 2022-05-10 DIAGNOSIS — F41.9 ANXIETY: ICD-10-CM

## 2022-05-10 DIAGNOSIS — R73.01 IFG (IMPAIRED FASTING GLUCOSE): Primary | ICD-10-CM

## 2022-05-10 LAB
ALBUMIN SERPL BCP-MCNC: 3.7 G/DL (ref 3.5–5)
ALP SERPL-CCNC: 77 U/L (ref 46–116)
ALT SERPL W P-5'-P-CCNC: 38 U/L (ref 12–78)
ANION GAP SERPL CALCULATED.3IONS-SCNC: 4 MMOL/L (ref 4–13)
AST SERPL W P-5'-P-CCNC: 19 U/L (ref 5–45)
BILIRUB SERPL-MCNC: 0.41 MG/DL (ref 0.2–1)
BUN SERPL-MCNC: 15 MG/DL (ref 5–25)
CALCIUM SERPL-MCNC: 9.5 MG/DL (ref 8.3–10.1)
CHLORIDE SERPL-SCNC: 105 MMOL/L (ref 100–108)
CO2 SERPL-SCNC: 26 MMOL/L (ref 21–32)
CREAT SERPL-MCNC: 0.82 MG/DL (ref 0.6–1.3)
GFR SERPL CREATININE-BSD FRML MDRD: 99 ML/MIN/1.73SQ M
GLUCOSE P FAST SERPL-MCNC: 104 MG/DL (ref 65–99)
POTASSIUM SERPL-SCNC: 4.3 MMOL/L (ref 3.5–5.3)
PROT SERPL-MCNC: 7.7 G/DL (ref 6.4–8.2)
SODIUM SERPL-SCNC: 135 MMOL/L (ref 136–145)

## 2022-05-10 PROCEDURE — 36415 COLL VENOUS BLD VENIPUNCTURE: CPT

## 2022-05-10 PROCEDURE — 3725F SCREEN DEPRESSION PERFORMED: CPT | Performed by: FAMILY MEDICINE

## 2022-05-10 PROCEDURE — 80053 COMPREHEN METABOLIC PANEL: CPT

## 2022-05-10 PROCEDURE — 99214 OFFICE O/P EST MOD 30 MIN: CPT | Performed by: FAMILY MEDICINE

## 2022-05-10 RX ORDER — METFORMIN HYDROCHLORIDE 500 MG/1
1500 TABLET, EXTENDED RELEASE ORAL
Qty: 270 TABLET | Refills: 2 | Status: SHIPPED | OUTPATIENT
Start: 2022-05-10

## 2022-05-10 RX ORDER — VENLAFAXINE HYDROCHLORIDE 37.5 MG/1
CAPSULE, EXTENDED RELEASE ORAL
Qty: 60 CAPSULE | Refills: 1 | Status: SHIPPED | OUTPATIENT
Start: 2022-05-10 | End: 2022-05-11 | Stop reason: SDUPTHER

## 2022-05-10 RX ORDER — VENLAFAXINE HYDROCHLORIDE 37.5 MG/1
CAPSULE, EXTENDED RELEASE ORAL
Qty: 60 CAPSULE | Refills: 1 | OUTPATIENT
Start: 2022-05-10

## 2022-05-10 NOTE — PATIENT INSTRUCTIONS
Advise Elda Briscoe med sinus rinse kit, Mucinex, Claritin/Zyrtec/Allegra, Flonase  Avoid decongestants if you have high blood pressure
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
## 2022-05-11 ENCOUNTER — TELEPHONE (OUTPATIENT)
Dept: FAMILY MEDICINE CLINIC | Facility: CLINIC | Age: 27
End: 2022-05-11

## 2022-05-11 DIAGNOSIS — F32.A DEPRESSION, UNSPECIFIED DEPRESSION TYPE: ICD-10-CM

## 2022-05-11 DIAGNOSIS — M54.50 LUMBAR BACK PAIN: ICD-10-CM

## 2022-05-11 DIAGNOSIS — F41.9 ANXIETY: ICD-10-CM

## 2022-05-11 RX ORDER — VENLAFAXINE HYDROCHLORIDE 75 MG/1
75 CAPSULE, EXTENDED RELEASE ORAL
Qty: 30 CAPSULE | Refills: 1 | Status: SHIPPED | OUTPATIENT
Start: 2022-05-11 | End: 2022-06-27 | Stop reason: SDUPTHER

## 2022-05-11 RX ORDER — VENLAFAXINE HYDROCHLORIDE 37.5 MG/1
CAPSULE, EXTENDED RELEASE ORAL
Qty: 7 CAPSULE | Refills: 0 | Status: SHIPPED | OUTPATIENT
Start: 2022-05-11 | End: 2022-06-27

## 2022-05-11 NOTE — ASSESSMENT & PLAN NOTE
Improved, but D/C Cymbalta 60 mg daily due to diarphoresis  Start Effexor XR 75mg QD  Advise counseling

## 2022-05-11 NOTE — ASSESSMENT & PLAN NOTE
Improved, but D/C Cymbalta 60 mg daily due to diarphoresis  Start Effexor XR 75mg QD  Advise counseling  For headache and migraine prevention I would suggest a combination vitamin supplement which may include 1 or more of the following ingredients:  Magnesium 400 mg , Riboflavin (vitamin B2) 400 - 600 mg,  Butterbur 150 mg (PA free)  Other ingredients that may be helpful are feverfew, coenzyme Q10 100 mg three times a day,  melatonin and kristin  Some example brands that combine some of these ingredients are Migravent or Dolovent

## 2022-05-11 NOTE — TELEPHONE ENCOUNTER
Pharmacy called regarding venlafaxine 37 5mg sig 37 5mg daily for one week increase to 2 cap daily second week   For insurance to cover, would like providers permission to change medication to 30 supply 1 week 37 5mg caps then second week give patient one 75mg cap daily 30 day supply

## 2022-06-13 ENCOUNTER — OFFICE VISIT (OUTPATIENT)
Dept: URGENT CARE | Facility: CLINIC | Age: 27
End: 2022-06-13
Payer: COMMERCIAL

## 2022-06-13 VITALS
SYSTOLIC BLOOD PRESSURE: 120 MMHG | RESPIRATION RATE: 18 BRPM | WEIGHT: 252 LBS | TEMPERATURE: 98 F | HEIGHT: 66 IN | BODY MASS INDEX: 40.5 KG/M2 | HEART RATE: 98 BPM | DIASTOLIC BLOOD PRESSURE: 75 MMHG | OXYGEN SATURATION: 97 %

## 2022-06-13 DIAGNOSIS — J01.00 ACUTE NON-RECURRENT MAXILLARY SINUSITIS: Primary | ICD-10-CM

## 2022-06-13 PROCEDURE — 99213 OFFICE O/P EST LOW 20 MIN: CPT | Performed by: PHYSICIAN ASSISTANT

## 2022-06-13 RX ORDER — AMOXICILLIN AND CLAVULANATE POTASSIUM 875; 125 MG/1; MG/1
1 TABLET, FILM COATED ORAL EVERY 12 HOURS SCHEDULED
Qty: 14 TABLET | Refills: 0 | Status: SHIPPED | OUTPATIENT
Start: 2022-06-13 | End: 2022-06-20

## 2022-06-13 RX ORDER — PREDNISONE 10 MG/1
10 TABLET ORAL DAILY
Qty: 21 TABLET | Refills: 0 | Status: SHIPPED | OUTPATIENT
Start: 2022-06-13 | End: 2022-06-27

## 2022-06-13 NOTE — PATIENT INSTRUCTIONS
Take antibiotics as prescribed  Complete full dose even if symptoms began to improve  Prednisone as prescribed  Do not use ibuprofen while taking prednisone  Take prednisone 1st in the morning  Tylenol  Cold medicines  Follow up with family doctor in 3-5 days  Go to ER symptoms become severe

## 2022-06-13 NOTE — PROGRESS NOTES
[facility]        NAME: Rip Quezada is a 24 y o  female  : 1995    MRN: [id number]  DATE: 2022  TIME: 4:45 PM    Assessment and Plan   Acute non-recurrent maxillary sinusitis [J01 00]  1  Acute non-recurrent maxillary sinusitis  amoxicillin-clavulanate (AUGMENTIN) 875-125 mg per tablet    predniSONE 10 mg tablet         Patient Instructions   Take antibiotics as prescribed  Complete full dose even if symptoms began to improve  Prednisone as prescribed  Do not use ibuprofen while taking prednisone  Take prednisone 1st in the morning  Tylenol  Cold medicines  Follow up with PCP in 3-5 days  Proceed to  ER if symptoms worsen  Chief Complaint     Chief Complaint   Patient presents with    URI     Congestion, body aches, ear pressure, cough         History of Present Illness       Patient 82-year-old female with significant past medical history of PCOS, hyperlipidemia, asthma, and seasonal allergies presents the office complaining of body aches, fatigue, congestion, ear pressure, and mild cough for 1 5 weeks  She denies fever, chills, sore throat, SOB, CP, nausea, vomiting, or abdominal pain  She took 3 at home COVID tests which were negative  She has been taking her allergy medications as prescribed  Symptoms are getting worse  Review of Systems   Review of Systems   Constitutional: Negative for chills and fever  HENT: Positive for congestion, ear pain, rhinorrhea and sinus pain  Negative for sore throat  Respiratory: Positive for cough  Negative for shortness of breath  Cardiovascular: Negative for chest pain and palpitations  Gastrointestinal: Negative for abdominal pain, diarrhea, nausea and vomiting  Musculoskeletal: Positive for myalgias  Neurological: Negative for dizziness, light-headedness and headaches           Current Medications       Current Outpatient Medications:     albuterol (PROVENTIL HFA,VENTOLIN HFA) 90 mcg/act inhaler, Inhale 2 puffs every 4 (four) hours as needed for wheezing, Disp: 18 g, Rfl: 1    amoxicillin-clavulanate (AUGMENTIN) 875-125 mg per tablet, Take 1 tablet by mouth every 12 (twelve) hours for 7 days, Disp: 14 tablet, Rfl: 0    Cholecalciferol (Vitamin D3) 50 MCG (2000 UT) TABS, Take by mouth daily 4,000 IU , Disp: , Rfl:     fluticasone (FLONASE) 50 mcg/act nasal spray, 1 spray into each nostril daily, Disp: , Rfl:     levonorgestrel (MIRENA) 20 MCG/24HR IUD, 1 each by Intrauterine route once, Disp: , Rfl:     loratadine (CLARITIN) 10 mg tablet, Take 10 mg by mouth daily, Disp: , Rfl:     metFORMIN (GLUCOPHAGE-XR) 500 mg 24 hr tablet, Take 3 tablets (1,500 mg total) by mouth daily with dinner, Disp: 270 tablet, Rfl: 2    multivitamin (THERAGRAN) TABS, Take 1 tablet by mouth daily, Disp: , Rfl:     predniSONE 10 mg tablet, Take 1 tablet (10 mg total) by mouth daily Take 6 on day 1, take 5 on day 2, take 4 on day 3, take 3 on day 4, take 2 on day 5, take 1 on day 6 , Disp: 21 tablet, Rfl: 0    venlafaxine (EFFEXOR-XR) 37 5 mg 24 hr capsule, 1 pill by mouth daily x 1 week, then increase to 75mg 1 pill by mouth daily thereafter , Disp: 7 capsule, Rfl: 0    venlafaxine (EFFEXOR-XR) 75 mg 24 hr capsule, Take 1 capsule (75 mg total) by mouth daily with breakfast Start after taking 37 5mg 1 pill daily x 7 days   (Patient not taking: Reported on 6/13/2022), Disp: 30 capsule, Rfl: 1    Current Allergies     Allergies as of 06/13/2022    (No Known Allergies)            The following portions of the patient's history were reviewed and updated as appropriate: allergies, current medications, past family history, past medical history, past social history, past surgical history and problem list      Past Medical History:   Diagnosis Date    Anxiety     BMI 40 0-44 9, adult ([facility] ) 03/11/2021    Depression     History of ovarian cyst 2018    Hyperlipidemia     Migraine     Mild intermittent asthma     [facility] )  Other headache syndrome 11/16/2021    PID (pelvic inflammatory disease) 2018    Polycystic ovary syndrome     Pre-diabetes     Varicella     had vaccine    Vitamin D deficiency        Past Surgical History:   Procedure Laterality Date    WISDOM TOOTH EXTRACTION         Family History   Problem Relation Age of Onset    JOSELINE disease Mother     Diabetes unspecified Mother     Asthma Mother     Seizures Father 15    Asperger's syndrome Sister     Autism Brother     No Known Problems Son     Colon cancer Maternal Grandmother     Heart attack Maternal Grandfather     No Known Problems Paternal Grandmother     No Known Problems Paternal Grandfather          Medications have been verified  Objective   /75   Pulse 98   Temp 98 °F (36 7 °C)   Resp 18   Ht 5' 6" (1 676 m)   Wt 114 kg (252 lb)   SpO2 97%   Breastfeeding No   BMI 40 67 kg/m²   No LMP recorded  Physical Exam     Physical Exam  Vitals and nursing note reviewed  Constitutional:       Appearance: Normal appearance  She is well-developed  HENT:      Head: Normocephalic and atraumatic  Right Ear: Tympanic membrane, ear canal and external ear normal       Left Ear: Tympanic membrane, ear canal and external ear normal       Nose: Congestion and rhinorrhea present  Mouth/Throat:      Pharynx: Uvula midline  Eyes:      General: Lids are normal       Conjunctiva/sclera: Conjunctivae normal       Pupils: Pupils are equal, round, and reactive to light  Cardiovascular:      Rate and Rhythm: Normal rate and regular rhythm  Pulses: Normal pulses  Heart sounds: Normal heart sounds  No murmur heard  No friction rub  No gallop  Pulmonary:      Effort: Pulmonary effort is normal       Breath sounds: Normal breath sounds  No wheezing, rhonchi or rales  Musculoskeletal:         General: Normal range of motion  Cervical back: Neck supple  Lymphadenopathy:      Cervical: No cervical adenopathy  Skin:     General: Skin is warm and dry  Capillary Refill: Capillary refill takes less than 2 seconds  Neurological:      Mental Status: She is alert
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
## 2022-06-25 ENCOUNTER — OFFICE VISIT (OUTPATIENT)
Dept: URGENT CARE | Facility: CLINIC | Age: 27
End: 2022-06-25
Payer: COMMERCIAL

## 2022-06-25 ENCOUNTER — HOSPITAL ENCOUNTER (EMERGENCY)
Facility: HOSPITAL | Age: 27
Discharge: HOME | End: 2022-06-25
Attending: EMERGENCY MEDICINE
Payer: COMMERCIAL

## 2022-06-25 VITALS
DIASTOLIC BLOOD PRESSURE: 90 MMHG | SYSTOLIC BLOOD PRESSURE: 143 MMHG | OXYGEN SATURATION: 98 % | TEMPERATURE: 98.1 F | HEART RATE: 98 BPM | RESPIRATION RATE: 18 BRPM

## 2022-06-25 VITALS
HEART RATE: 107 BPM | DIASTOLIC BLOOD PRESSURE: 68 MMHG | WEIGHT: 255 LBS | SYSTOLIC BLOOD PRESSURE: 137 MMHG | TEMPERATURE: 97.7 F | OXYGEN SATURATION: 96 % | RESPIRATION RATE: 16 BRPM | BODY MASS INDEX: 41.16 KG/M2

## 2022-06-25 DIAGNOSIS — J32.9 SINUSITIS: ICD-10-CM

## 2022-06-25 DIAGNOSIS — J01.90 ACUTE SINUSITIS, RECURRENCE NOT SPECIFIED, UNSPECIFIED LOCATION: Primary | ICD-10-CM

## 2022-06-25 DIAGNOSIS — J02.9 PHARYNGITIS, UNSPECIFIED ETIOLOGY: ICD-10-CM

## 2022-06-25 DIAGNOSIS — J02.9 PHARYNGITIS: Primary | ICD-10-CM

## 2022-06-25 LAB — S PYO AG THROAT QL: NEGATIVE

## 2022-06-25 PROCEDURE — 86308 HETEROPHILE ANTIBODY SCREEN: CPT | Performed by: EMERGENCY MEDICINE

## 2022-06-25 PROCEDURE — 87880 STREP A ASSAY W/OPTIC: CPT | Performed by: PHYSICIAN ASSISTANT

## 2022-06-25 PROCEDURE — 99283 EMERGENCY DEPT VISIT LOW MDM: CPT

## 2022-06-25 PROCEDURE — 87070 CULTURE OTHR SPECIMN AEROBIC: CPT | Performed by: PHYSICIAN ASSISTANT

## 2022-06-25 PROCEDURE — 36415 COLL VENOUS BLD VENIPUNCTURE: CPT | Performed by: EMERGENCY MEDICINE

## 2022-06-25 PROCEDURE — 99213 OFFICE O/P EST LOW 20 MIN: CPT | Performed by: PHYSICIAN ASSISTANT

## 2022-06-25 PROCEDURE — 99284 EMERGENCY DEPT VISIT MOD MDM: CPT | Performed by: EMERGENCY MEDICINE

## 2022-06-25 PROCEDURE — 96372 THER/PROPH/DIAG INJ SC/IM: CPT

## 2022-06-25 RX ORDER — AZITHROMYCIN 250 MG/1
TABLET, FILM COATED ORAL
Qty: 6 TABLET | Refills: 0 | Status: SHIPPED | OUTPATIENT
Start: 2022-06-25 | End: 2022-06-30

## 2022-06-25 RX ORDER — KETOROLAC TROMETHAMINE 30 MG/ML
15 INJECTION, SOLUTION INTRAMUSCULAR; INTRAVENOUS ONCE
Status: COMPLETED | OUTPATIENT
Start: 2022-06-25 | End: 2022-06-25

## 2022-06-25 RX ORDER — LIDOCAINE HYDROCHLORIDE 20 MG/ML
15 SOLUTION OROPHARYNGEAL 4 TIMES DAILY PRN
Qty: 100 ML | Refills: 0 | Status: SHIPPED | OUTPATIENT
Start: 2022-06-25 | End: 2022-06-27

## 2022-06-25 RX ADMIN — KETOROLAC TROMETHAMINE 15 MG: 30 INJECTION, SOLUTION INTRAMUSCULAR; INTRAVENOUS at 22:01

## 2022-06-25 RX ADMIN — DEXAMETHASONE SODIUM PHOSPHATE 10 MG: 10 INJECTION, SOLUTION INTRAMUSCULAR; INTRAVENOUS at 22:01

## 2022-06-25 NOTE — PATIENT INSTRUCTIONS
Take antibiotic as instructed  Use lidocaine gargles for comfort as needed  Due to your ongoing medical concerns, recommend that you contact your primary care provider as soon as possible to arrange a follow-up for the next 7-10 days  You may also benefit from further ENT evaluation

## 2022-06-25 NOTE — PROGRESS NOTES
[facility]    NAME: Danika Hodges is a 24 y o  female  : 1995    MRN: [id number]  DATE: 2022  TIME: 10:55 AM    Assessment and Plan   Acute sinusitis, recurrence not specified, unspecified location [J01 90]  1  Acute sinusitis, recurrence not specified, unspecified location  POCT rapid strepA    Throat culture    Lidocaine Viscous HCl (XYLOCAINE) 2 % mucosal solution   2  Pharyngitis, unspecified etiology  azithromycin (ZITHROMAX) 250 mg tablet       Patient Instructions   Patient Instructions      Take antibiotic as instructed  Use lidocaine gargles for comfort as needed  Due to your ongoing medical concerns, recommend that you contact your primary care provider as soon as possible to arrange a follow-up for the next 7-10 days  You may also benefit from further ENT evaluation  Chief Complaint     Chief Complaint   Patient presents with    Sore Throat     X 3 weeks:  Sore throat ("feels like razor blades"), sinus pressure, bilateral ear pain, nasal congestion, post nasal drip, fatigue  Denies fever, chills, n/v/d  Pt took covid test 3 days ago (negative)  History of Present Illness   Danika Hodges presents to the clinic c/o    42-year-old female comes in with persistent sore throat nasal congestion drainage with some discomfort in her ears and thick mucus  Has been dealing with this for several weeks  Was last seen here middle of  and placed on Augmentin, prednisone without much relief  She has not contacted her primary care about this  She used to see an ENT  She has history of asthma and seasonal allergies  She uses Flonase and Claritin  She is a nonsmoker  She did to home COVID test early on that were negative  Review of Systems   Review of Systems   Constitutional: Positive for fatigue  Negative for chills and fever  HENT: Positive for congestion, ear pain, postnasal drip, rhinorrhea, sore throat and trouble swallowing   Negative for ear discharge, sinus pressure, sinus pain and voice change  Respiratory: Positive for cough  Negative for apnea, choking, chest tightness, shortness of breath, wheezing and stridor  Cardiovascular: Negative  Current Medications     Long-Term Medications   Medication Sig Dispense Refill    Cholecalciferol (Vitamin D3) 50 MCG (2000 UT) TABS Take by mouth daily 4,000 IU       metFORMIN (GLUCOPHAGE-XR) 500 mg 24 hr tablet Take 3 tablets (1,500 mg total) by mouth daily with dinner 270 tablet 2    multivitamin (THERAGRAN) TABS Take 1 tablet by mouth daily      venlafaxine (EFFEXOR-XR) 37 5 mg 24 hr capsule 1 pill by mouth daily x 1 week, then increase to 75mg 1 pill by mouth daily thereafter  7 capsule 0    venlafaxine (EFFEXOR-XR) 75 mg 24 hr capsule Take 1 capsule (75 mg total) by mouth daily with breakfast Start after taking 37 5mg 1 pill daily x 7 days   (Patient not taking: Reported on 6/13/2022) 30 capsule 1       Current Allergies     Allergies as of 06/25/2022    (No Known Allergies)          The following portions of the patient's history were reviewed and updated as appropriate: allergies, current medications, past family history, past medical history, past social history, past surgical history and problem list   Past Medical History:   Diagnosis Date    Anxiety     BMI 40 0-44 9, adult ([facility] ) 03/11/2021    Depression     History of ovarian cyst 2018    Hyperlipidemia     Migraine     Mild intermittent asthma     Morbid obesity ([facility] )     Other headache syndrome 11/16/2021    PID (pelvic inflammatory disease) 2018    Polycystic ovary syndrome     Pre-diabetes     Varicella     had vaccine    Vitamin D deficiency      Past Surgical History:   Procedure Laterality Date    WISDOM TOOTH EXTRACTION       Family History   Problem Relation Age of Onset    JOSELINE disease Mother     Diabetes unspecified Mother     Asthma Mother     Seizures Father 15    Asperger's syndrome Sister     Autism Brother     No Known Problems Son     Colon cancer Maternal Grandmother     Heart attack Maternal Grandfather     No Known Problems Paternal Grandmother     No Known Problems Paternal Grandfather        Objective   /68   Pulse (!) 107   Temp 97 7 °F (36 5 °C)   Resp 16   Wt 116 kg (255 lb)   SpO2 96%   BMI 41 16 kg/m²   No LMP recorded  Physical Exam     Physical Exam  Constitutional:       General: She is not in acute distress  Appearance: Normal appearance  She is obese  She is not ill-appearing, toxic-appearing or diaphoretic  HENT:      Head: Normocephalic and atraumatic  Right Ear: Tympanic membrane and ear canal normal       Left Ear: Tympanic membrane and ear canal normal       Nose: Congestion and rhinorrhea present  Mouth/Throat:      Mouth: Mucous membranes are moist  No oral lesions  Pharynx: Uvula midline  Pharyngeal swelling and posterior oropharyngeal erythema present  No oropharyngeal exudate or uvula swelling  Tonsils: No tonsillar exudate  Eyes:      General: No scleral icterus  Right eye: No discharge  Left eye: No discharge  Extraocular Movements: Extraocular movements intact  Conjunctiva/sclera: Conjunctivae normal       Pupils: Pupils are equal, round, and reactive to light  Cardiovascular:      Rate and Rhythm: Normal rate and regular rhythm  Heart sounds: Normal heart sounds  No murmur heard  No friction rub  No gallop  Pulmonary:      Effort: Pulmonary effort is normal  No respiratory distress  Breath sounds: Normal breath sounds  No stridor  No wheezing, rhonchi or rales  Musculoskeletal:      Cervical back: Normal range of motion and neck supple  No rigidity or tenderness  No muscular tenderness  Lymphadenopathy:      Cervical: No cervical adenopathy  Skin:     General: Skin is warm and dry  Findings: No erythema     Neurological:      Mental Status: She is alert and oriented to person, place, and time     Psychiatric:         Mood and Affect: Mood normal          Behavior: Behavior normal

## 2022-06-26 ENCOUNTER — TELEPHONE (OUTPATIENT)
Dept: URGENT CARE | Facility: CLINIC | Age: 27
End: 2022-06-26

## 2022-06-26 LAB — HETEROPH AB SER QL: NEGATIVE

## 2022-06-26 NOTE — ED PROVIDER NOTES
History  Chief Complaint   Patient presents with    Sore Throat     Pt presents ambulatory with c/o sore throat, seen today at urgent care and put on zpack no better  63-year-old female with a past medical history of seasonal allergies, asthma, anxiety, hyperlipidemia, depression, migraine, and PCOS presents with sore throat  Patient has had a sore throat for 3 weeks now  She also endorses congestion with green mucus and cough, which is sometimes productive  She has generalized pressure in her ears and around her whole head  Patient was seen at urgent care when her symptoms initially started and she was put on Augmentin and prednisone  Patient completed both of these and states that her symptoms are unchanged  She has had 3 negative COVID tests  Patient went back to urgent care today and was swabbed for strep, which was negative  She was started on azithromycin and given Lidoderm  Patient states that when she picked up the Lidoderm, it was a thick gel and not a swish and swallow  She was not sure how she should use this for her sore throat  Patient has had no fevers, chills, vomiting, or diarrhea  No sick contacts  No recent travel  Patient states that she stay a many sinus infections as a kid  She has not seen ENT in many years  Prior to Admission Medications   Prescriptions Last Dose Informant Patient Reported? Taking?    Cholecalciferol (Vitamin D3) 50 MCG (2000) TABS  Self Yes No   Sig: Take by mouth daily 4,000 IU    Lidocaine Viscous HCl (XYLOCAINE) 2 % mucosal solution   No No   Sig: Swish and spit 15 mL 4 (four) times a day as needed for mouth pain or discomfort   albuterol (PROVENTIL HFA,VENTOLIN HFA) 90 mcg/act inhaler   No No   Sig: Inhale 2 puffs every 4 (four) hours as needed for wheezing   azithromycin (ZITHROMAX) 250 mg tablet   No No   Sig: Take 2 tablets day 1,  then one tablet daily until gone   fluticasone (FLONASE) 50 mcg/act nasal spray   Yes No   Si spray into each nostril daily   levonorgestrel (MIRENA) 20 MCG/24HR IUD  Self Yes No   Si each by Intrauterine route once   loratadine (CLARITIN) 10 mg tablet   Yes No   Sig: Take 10 mg by mouth daily   metFORMIN (GLUCOPHAGE-XR) 500 mg 24 hr tablet   No No   Sig: Take 3 tablets (1,500 mg total) by mouth daily with dinner   multivitamin (THERAGRAN) TABS   Yes No   Sig: Take 1 tablet by mouth daily   predniSONE 10 mg tablet   No No   Sig: Take 1 tablet (10 mg total) by mouth daily Take 6 on day 1, take 5 on day 2, take 4 on day 3, take 3 on day 4, take 2 on day 5, take 1 on day 6  Patient not taking: Reported on 2022   venlafaxine (EFFEXOR-XR) 37 5 mg 24 hr capsule   No No   Si pill by mouth daily x 1 week, then increase to 75mg 1 pill by mouth daily thereafter  venlafaxine (EFFEXOR-XR) 75 mg 24 hr capsule   No No   Sig: Take 1 capsule (75 mg total) by mouth daily with breakfast Start after taking 37 5mg 1 pill daily x 7 days     Patient not taking: Reported on 2022      Facility-Administered Medications: None       Past Medical History:   Diagnosis Date    Anxiety     [facility] 40 0-44 9[facility]    Depression     History of ovarian cyst     Hyperlipidemia     Migraine     Mild intermittent asthma     Morbid [facility]     Other headache syndrome 2021    PID (pelvic inflammatory disease) 2018    Polycystic ovary syndrome     Pre-diabetes     Varicella     had vaccine    Vitamin D deficiency        Past Surgical History:   Procedure Laterality Date    WISDOM TOOTH EXTRACTION         Family History   Problem Relation Age of Onset    JOSELINE disease Mother     Diabetes unspecified Mother     Asthma Mother     Seizures Father 15    Asperger's syndrome Sister     Autism Brother     No Known Problems Son     Colon cancer Maternal Grandmother     Heart attack Maternal Grandfather     No Known Problems Paternal Grandmother     No Known Problems Paternal Grandfather I have reviewed and agree with the history as documented  E-Cigarette/Vaping    E-Cigarette Use Never User      E-Cigarette/Vaping Substances    Nicotine No     THC No     CBD No     Flavoring No     Other No     Unknown No      Social History     Tobacco Use    Smoking status: Former Smoker     Packs/day: 0 10     Years: 0 20     Pack years: 0 02     Types: Cigarettes     Start date: 2017     Quit date: 10/1/2017     Years since quittin 7    Smokeless tobacco: Never Used    Tobacco comment: Smoked socially    Vaping Use    Vaping Use: Never used   Substance Use Topics    Alcohol use: Yes     Alcohol/week: 1 0 standard drink     Types: 1 Glasses of wine per week     Comment: socially    Drug use: Never        Review of Systems   Constitutional: Negative for chills and fever  HENT: Positive for congestion, ear pain, sinus pressure and sore throat  Negative for ear discharge, facial swelling, trouble swallowing and voice change  Eyes: Negative for pain and redness  Respiratory: Negative for cough, chest tightness, shortness of breath and wheezing  Cardiovascular: Negative for chest pain and palpitations  Gastrointestinal: Negative for abdominal pain, diarrhea, nausea and vomiting  Endocrine: Negative  Genitourinary: Negative for difficulty urinating and hematuria  Musculoskeletal: Negative for back pain and myalgias  Skin: Negative for pallor and rash  Allergic/Immunologic: Negative  Neurological: Negative for dizziness, weakness, light-headedness and headaches  Hematological: Negative          Physical Exam  ED Triage Vitals [22]   Temperature Pulse Respirations Blood Pressure SpO2   98 1 °F (36 7 °C) 98 18 143/90 98 %      Temp Source Heart Rate Source Patient Position - Orthostatic VS BP Location FiO2 (%)   Oral Monitor -- -- --      Pain Score       --             Orthostatic Vital Signs  Vitals:    22   BP: 143/90   Pulse: 98 Physical Exam  Vitals and nursing note reviewed  Constitutional:       General: She is not in acute distress  Appearance: Normal appearance  She is well-developed  She is not ill-appearing  HENT:      Head: Normocephalic and atraumatic  Comments: + sinus tenderness     Right Ear: Tympanic membrane and ear canal normal  Tympanic membrane is not erythematous, retracted or bulging  Left Ear: Tympanic membrane and ear canal normal  Tympanic membrane is not erythematous, retracted or bulging  Mouth/Throat:      Mouth: Mucous membranes are moist       Pharynx: Posterior oropharyngeal erythema present  No pharyngeal swelling or oropharyngeal exudate  Tonsils: No tonsillar exudate  Eyes:      Conjunctiva/sclera: Conjunctivae normal    Cardiovascular:      Rate and Rhythm: Normal rate and regular rhythm  Heart sounds: No murmur heard  Pulmonary:      Effort: Pulmonary effort is normal  No respiratory distress  Breath sounds: Normal breath sounds  No wheezing, rhonchi or rales  Abdominal:      Palpations: Abdomen is soft  Musculoskeletal:         General: Normal range of motion  Cervical back: Normal range of motion and neck supple  Lymphadenopathy:      Cervical: No cervical adenopathy  Skin:     General: Skin is warm and dry  Neurological:      General: No focal deficit present  Mental Status: She is alert and oriented to person, place, and time  ED Medications  Medications   dexamethasone oral liquid 10 mg 1 mL (10 mg Oral Given 6/25/22 2201)   ketorolac (TORADOL) injection 15 mg (15 mg Intramuscular Given 6/25/22 2201)       Diagnostic Studies  Results Reviewed     Procedure Component Value Units Date/Time    Mononucleosis screen [[id number]] Collected: 06/25/22 2159    Lab Status:  In process Specimen: Blood from Arm, Left Updated: 06/25/22 2209                 No orders to display         Procedures  Procedures      ED Course MDM  Number of Diagnoses or Management Options  Pharyngitis: established and worsening  Sinusitis: established and worsening  Diagnosis management comments: 22-year-old female presents with sore throat, cough, and congestion  Recommend patient continue antibiotics  Will send Monospot test   She was instructed that she could stop the antibiotics if this comes back positive  Will give Decadron and Toradol for symptoms now  Will send magic mouthwash to the pharmacy since patient has mucosal lidocaine, which she cannot swish and swallow  Patient has a virtual visit with her PCP in 2 days  Amount and/or Complexity of Data Reviewed  Clinical lab tests: ordered and reviewed  Review and summarize past medical records: yes  Discuss the patient with other providers: yes    Risk of Complications, Morbidity, and/or Mortality  Presenting problems: low  Diagnostic procedures: low  Management options: low    Patient Progress  Patient progress: improved    Patient felt better after medications  Recommend over-the-counter medications for pain  She was given a prescription for Magic mouthwash  Follow-up about results of Monospot test  Recommend follow up with primary care physician  Return precautions given  All questions answered  Disposition  Final diagnoses:   Pharyngitis   Sinusitis     Time reflects when diagnosis was documented in both MDM as applicable and the Disposition within this note     Time User Action Codes Description Comment    6/25/2022 10:09 PM Carrillo Doyle Add [J02 9] Pharyngitis     6/25/2022 10:09 PM Carrillo Doyle Add [J32 9] Sinusitis       ED Disposition     ED Disposition   Discharge    Condition   Good    Date/Time   Sat Jun 25, 2022 10:09 PM    Comment   Cristin De La Cruz discharge to home/self care                 Follow-up Information     Follow up With Specialties Details Why Contact Duy Pringle, [address] [facility] [facility]            Patient's Medications   Discharge Prescriptions    AL MAG OXIDE-DIPHENHYDRAMINE-LIDOCAINE VISCOUS (MAGIC MOUTHWASH) 1:1:1 SUSPENSION    Swish and swallow 10 mL every 4 (four) hours as needed for mouth pain or discomfort       Start Date: 6/25/2022 End Date: --       Order Dose: 10 mL       Quantity: 90 mL    Refills: 0     No discharge procedures on file  PDMP Review       Value Time User    PDMP Reviewed  Yes 10/9/2021  8:18 AM Ganesh Braswell MD           ED Provider  Attending physically available and evaluated Justinkaylen KhanRockwall  I managed the patient along with the ED Attending      Electronically Signed by         Ramin Estrada DO  06/25/22 1173

## 2022-06-26 NOTE — TELEPHONE ENCOUNTER
Spoke with patient to inform of negative throat culture test results  She was seen in the emergency room yesterday and treated with prednisone and says she is doing much better  Follow-up with family doctor as needed

## 2022-06-26 NOTE — DISCHARGE INSTRUCTIONS
You have been seen for pharyngitis and sinusitis  You should return to the ED if you develop inability to swallow, inability to breathe, abdominal pain, or other worsening symptoms  Follow up with your primary care physician  Continue azithromycin  You will get a call if your mononucleosis test is positive  If it is positive, you may stop the azithromycin  Use over-the-counter medications such as Motrin, Advil, or Aleve for pain  Use magic mouthwash as needed for sore throat

## 2022-06-26 NOTE — ED ATTENDING ATTESTATION
[place] Osei MD, saw and evaluated the patient  I have discussed the patient with the resident and agree with the resident's findings, Plan of Care, and MDM as documented in the resident's note, except where noted  All available labs and Radiology studies were reviewed  I was present for key portions of any procedure(s) performed by the resident and I was immediately available to provide assistance  At this point I agree with the current assessment done in the Emergency Department  I have conducted an independent evaluation of this patient a history and physical is as follows:    23 yo morbidly obese female with a history of asthma, anxiety, depression, hyperlipidemia, and chronic low back pain presents to the ED complaining of URI symptoms x 3 weeks  The patient has been seen at an urgent care twice for her symptoms, once on 6/13 and again earlier today  She reports a sore throat, nasal congestion, rhinorrhea, and sinus "pressure"  She was treated with Prednisone and a course of Augmentin earlier this month but her symptoms did not improve  Today she was prescribed a course of Zithromax and viscous lidocaine  No fevers/chills  No difficulty eating/drinking  Multiple home COVID tests were negative  No other specific complaints  Gen: NAD, AA&Ox3  HEENT: PERRL, EOMI  Throat: (+) mild posterior pharyngeal erythema, no tonsillar swelling, no exudate, uvula midline, no PTA  Neck: supple  CV: RRR  Lungs: CTA B/L  Abdomen: soft, NT/ND  Ext: no swelling or deformity  Neuro: 5/5 strength all extremities, sensation grossly intact    ED Course  The patient is very well appearing with stable vital signs and a benign exam  Rapid strep today was negative  Monospot sent to the lab  Plan for IM Decadron, Toradol, and magic mouthwash  The patient was instructed to complete her current course of antibiotics as prescribed  Otherwise plan for follow up with her PCP next week for reassessment   She is agreeable to this plan  Strict return precautions provided        Critical Care Time  Procedures

## 2022-06-27 ENCOUNTER — TELEMEDICINE (OUTPATIENT)
Dept: FAMILY MEDICINE CLINIC | Facility: CLINIC | Age: 27
End: 2022-06-27
Payer: COMMERCIAL

## 2022-06-27 ENCOUNTER — LAB (OUTPATIENT)
Dept: LAB | Facility: CLINIC | Age: 27
End: 2022-06-27
Payer: COMMERCIAL

## 2022-06-27 VITALS — HEIGHT: 66 IN | BODY MASS INDEX: 41.16 KG/M2

## 2022-06-27 DIAGNOSIS — J45.30 MILD PERSISTENT ASTHMA WITHOUT COMPLICATION: Primary | ICD-10-CM

## 2022-06-27 DIAGNOSIS — M54.50 LUMBAR BACK PAIN: ICD-10-CM

## 2022-06-27 DIAGNOSIS — R73.01 IFG (IMPAIRED FASTING GLUCOSE): ICD-10-CM

## 2022-06-27 DIAGNOSIS — E66.01 MORBID OBESITY (HCC): ICD-10-CM

## 2022-06-27 DIAGNOSIS — F32.A DEPRESSION, UNSPECIFIED DEPRESSION TYPE: ICD-10-CM

## 2022-06-27 DIAGNOSIS — J30.9 ALLERGIC RHINITIS, UNSPECIFIED SEASONALITY, UNSPECIFIED TRIGGER: ICD-10-CM

## 2022-06-27 DIAGNOSIS — F41.9 ANXIETY: ICD-10-CM

## 2022-06-27 DIAGNOSIS — E55.9 VITAMIN D DEFICIENCY: ICD-10-CM

## 2022-06-27 DIAGNOSIS — E78.5 HYPERLIPIDEMIA, UNSPECIFIED HYPERLIPIDEMIA TYPE: ICD-10-CM

## 2022-06-27 DIAGNOSIS — G44.89 OTHER HEADACHE SYNDROME: ICD-10-CM

## 2022-06-27 LAB
25(OH)D3 SERPL-MCNC: 44.6 NG/ML (ref 30–100)
ALBUMIN SERPL BCP-MCNC: 4 G/DL (ref 3.5–5)
ALP SERPL-CCNC: 56 U/L (ref 46–116)
ALT SERPL W P-5'-P-CCNC: 39 U/L (ref 12–78)
ANION GAP SERPL CALCULATED.3IONS-SCNC: 5 MMOL/L (ref 4–13)
AST SERPL W P-5'-P-CCNC: 19 U/L (ref 5–45)
BACTERIA THROAT CULT: NORMAL
BILIRUB SERPL-MCNC: 0.59 MG/DL (ref 0.2–1)
BUN SERPL-MCNC: 12 MG/DL (ref 5–25)
CALCIUM SERPL-MCNC: 9.2 MG/DL (ref 8.3–10.1)
CHLORIDE SERPL-SCNC: 108 MMOL/L (ref 100–108)
CHOLEST SERPL-MCNC: 202 MG/DL
CO2 SERPL-SCNC: 27 MMOL/L (ref 21–32)
CREAT SERPL-MCNC: 0.84 MG/DL (ref 0.6–1.3)
EST. AVERAGE GLUCOSE BLD GHB EST-MCNC: 120 MG/DL
GFR SERPL CREATININE-BSD FRML MDRD: 96 ML/MIN/1.73SQ M
GLUCOSE P FAST SERPL-MCNC: 96 MG/DL (ref 65–99)
HBA1C MFR BLD: 5.8 %
HDLC SERPL-MCNC: 54 MG/DL
LDLC SERPL CALC-MCNC: 129 MG/DL (ref 0–100)
LDLC SERPL DIRECT ASSAY-MCNC: 127 MG/DL (ref 0–100)
MAGNESIUM SERPL-MCNC: 2.2 MG/DL (ref 1.6–2.6)
NONHDLC SERPL-MCNC: 148 MG/DL
POTASSIUM SERPL-SCNC: 3.7 MMOL/L (ref 3.5–5.3)
PROT SERPL-MCNC: 7.7 G/DL (ref 6.4–8.2)
SODIUM SERPL-SCNC: 140 MMOL/L (ref 136–145)
TRIGL SERPL-MCNC: 95 MG/DL
TSH SERPL DL<=0.05 MIU/L-ACNC: 1.85 UIU/ML (ref 0.45–4.5)

## 2022-06-27 PROCEDURE — 80061 LIPID PANEL: CPT

## 2022-06-27 PROCEDURE — 36415 COLL VENOUS BLD VENIPUNCTURE: CPT

## 2022-06-27 PROCEDURE — 83036 HEMOGLOBIN GLYCOSYLATED A1C: CPT

## 2022-06-27 PROCEDURE — 83735 ASSAY OF MAGNESIUM: CPT

## 2022-06-27 PROCEDURE — 99214 OFFICE O/P EST MOD 30 MIN: CPT | Performed by: FAMILY MEDICINE

## 2022-06-27 PROCEDURE — 82306 VITAMIN D 25 HYDROXY: CPT

## 2022-06-27 PROCEDURE — 84443 ASSAY THYROID STIM HORMONE: CPT

## 2022-06-27 PROCEDURE — 83721 ASSAY OF BLOOD LIPOPROTEIN: CPT

## 2022-06-27 PROCEDURE — 80053 COMPREHEN METABOLIC PANEL: CPT

## 2022-06-27 RX ORDER — VENLAFAXINE HYDROCHLORIDE 75 MG/1
75 CAPSULE, EXTENDED RELEASE ORAL
Qty: 90 CAPSULE | Refills: 2 | Status: SHIPPED | OUTPATIENT
Start: 2022-06-27

## 2022-06-27 RX ORDER — MONTELUKAST SODIUM 10 MG/1
10 TABLET ORAL
Qty: 30 TABLET | Refills: 1 | Status: SHIPPED | OUTPATIENT
Start: 2022-06-27 | End: 2022-08-09 | Stop reason: SDUPTHER

## 2022-06-27 RX ORDER — FLUTICASONE PROPIONATE 50 MCG
2 SPRAY, SUSPENSION (ML) NASAL DAILY PRN
Qty: 54.6 ML | Refills: 2 | Status: SHIPPED | OUTPATIENT
Start: 2022-06-27

## 2022-06-27 NOTE — RESULT ENCOUNTER NOTE
Prediabetes - Improved  Hyperlipidemia - Stable  Elevated total, HDL (good), LDL (bad) cholesterol- Recommend lifestyle modifications - low fat, low cholesterol diet, exercise, weight loss  All other labs stable  Continue plan of care        Message sent to patient via [facility] patient portal

## 2022-06-27 NOTE — ASSESSMENT & PLAN NOTE
Uncontrolled  Start Singulair 10mg QHS as uncontrolled AR likely contributing  Continue albuterol HFA p r n     Check pre and post spirometry in the future s/p delivery when COVID-19 restrictions are lifted

## 2022-06-27 NOTE — ASSESSMENT & PLAN NOTE
Uncontrolled  Start Singulair 10mg QHS  Continue Flonase and Claritin vs  Trial of alternate antihistamine

## 2022-08-08 ENCOUNTER — LAB (OUTPATIENT)
Dept: LAB | Facility: CLINIC | Age: 27
End: 2022-08-08
Payer: COMMERCIAL

## 2022-08-08 DIAGNOSIS — F41.9 ANXIETY: ICD-10-CM

## 2022-08-08 DIAGNOSIS — F32.A DEPRESSION, UNSPECIFIED DEPRESSION TYPE: ICD-10-CM

## 2022-08-08 LAB
ALBUMIN SERPL BCP-MCNC: 3.8 G/DL (ref 3.5–5)
ALP SERPL-CCNC: 54 U/L (ref 46–116)
ALT SERPL W P-5'-P-CCNC: 39 U/L (ref 12–78)
ANION GAP SERPL CALCULATED.3IONS-SCNC: 6 MMOL/L (ref 4–13)
AST SERPL W P-5'-P-CCNC: 22 U/L (ref 5–45)
BILIRUB SERPL-MCNC: 0.81 MG/DL (ref 0.2–1)
BUN SERPL-MCNC: 12 MG/DL (ref 5–25)
CALCIUM SERPL-MCNC: 9.1 MG/DL (ref 8.3–10.1)
CHLORIDE SERPL-SCNC: 106 MMOL/L (ref 96–108)
CO2 SERPL-SCNC: 25 MMOL/L (ref 21–32)
CREAT SERPL-MCNC: 0.79 MG/DL (ref 0.6–1.3)
GFR SERPL CREATININE-BSD FRML MDRD: 104 ML/MIN/1.73SQ M
GLUCOSE P FAST SERPL-MCNC: 90 MG/DL (ref 65–99)
POTASSIUM SERPL-SCNC: 4.2 MMOL/L (ref 3.5–5.3)
PROT SERPL-MCNC: 7.7 G/DL (ref 6.4–8.4)
SODIUM SERPL-SCNC: 137 MMOL/L (ref 135–147)

## 2022-08-08 PROCEDURE — 36415 COLL VENOUS BLD VENIPUNCTURE: CPT

## 2022-08-08 PROCEDURE — 80053 COMPREHEN METABOLIC PANEL: CPT

## 2022-08-09 ENCOUNTER — OFFICE VISIT (OUTPATIENT)
Dept: FAMILY MEDICINE CLINIC | Facility: CLINIC | Age: 27
End: 2022-08-09
Payer: COMMERCIAL

## 2022-08-09 VITALS
SYSTOLIC BLOOD PRESSURE: 116 MMHG | WEIGHT: 258 LBS | OXYGEN SATURATION: 97 % | HEART RATE: 87 BPM | HEIGHT: 66 IN | BODY MASS INDEX: 41.46 KG/M2 | TEMPERATURE: 98 F | DIASTOLIC BLOOD PRESSURE: 62 MMHG

## 2022-08-09 DIAGNOSIS — R10.84 GENERALIZED ABDOMINAL PAIN: ICD-10-CM

## 2022-08-09 DIAGNOSIS — E55.9 VITAMIN D DEFICIENCY: ICD-10-CM

## 2022-08-09 DIAGNOSIS — R19.8 POSITIVE MURPHY'S SIGN: ICD-10-CM

## 2022-08-09 DIAGNOSIS — J30.9 ALLERGIC RHINITIS, UNSPECIFIED SEASONALITY, UNSPECIFIED TRIGGER: ICD-10-CM

## 2022-08-09 DIAGNOSIS — F32.A DEPRESSION, UNSPECIFIED DEPRESSION TYPE: ICD-10-CM

## 2022-08-09 DIAGNOSIS — F41.9 ANXIETY: ICD-10-CM

## 2022-08-09 DIAGNOSIS — M54.50 LUMBAR BACK PAIN: ICD-10-CM

## 2022-08-09 DIAGNOSIS — J45.30 MILD PERSISTENT ASTHMA WITHOUT COMPLICATION: ICD-10-CM

## 2022-08-09 DIAGNOSIS — E78.5 HYPERLIPIDEMIA, UNSPECIFIED HYPERLIPIDEMIA TYPE: ICD-10-CM

## 2022-08-09 DIAGNOSIS — E66.01 MORBID OBESITY (HCC): ICD-10-CM

## 2022-08-09 DIAGNOSIS — Z13.6 SCREENING FOR CARDIOVASCULAR CONDITION: ICD-10-CM

## 2022-08-09 DIAGNOSIS — Z23 NEED FOR VACCINATION: ICD-10-CM

## 2022-08-09 DIAGNOSIS — R73.01 IFG (IMPAIRED FASTING GLUCOSE): Primary | ICD-10-CM

## 2022-08-09 DIAGNOSIS — Z13.1 DIABETES MELLITUS SCREENING: ICD-10-CM

## 2022-08-09 DIAGNOSIS — G44.89 OTHER HEADACHE SYNDROME: ICD-10-CM

## 2022-08-09 DIAGNOSIS — R19.7 DIARRHEA, UNSPECIFIED TYPE: ICD-10-CM

## 2022-08-09 PROCEDURE — 90472 IMMUNIZATION ADMIN EACH ADD: CPT

## 2022-08-09 PROCEDURE — 3725F SCREEN DEPRESSION PERFORMED: CPT | Performed by: FAMILY MEDICINE

## 2022-08-09 PROCEDURE — 99214 OFFICE O/P EST MOD 30 MIN: CPT | Performed by: FAMILY MEDICINE

## 2022-08-09 PROCEDURE — 90471 IMMUNIZATION ADMIN: CPT

## 2022-08-09 PROCEDURE — 90651 9VHPV VACCINE 2/3 DOSE IM: CPT

## 2022-08-09 PROCEDURE — 90677 PCV20 VACCINE IM: CPT

## 2022-08-09 RX ORDER — FAMOTIDINE 20 MG/1
20 TABLET, FILM COATED ORAL 2 TIMES DAILY PRN
Qty: 60 TABLET | Refills: 1 | Status: SHIPPED | OUTPATIENT
Start: 2022-08-09

## 2022-08-09 RX ORDER — MONTELUKAST SODIUM 10 MG/1
10 TABLET ORAL
Qty: 90 TABLET | Refills: 2 | Status: SHIPPED | OUTPATIENT
Start: 2022-08-09

## 2022-08-09 RX ORDER — DULOXETIN HYDROCHLORIDE 60 MG/1
CAPSULE, DELAYED RELEASE ORAL
COMMUNITY
Start: 2022-04-26 | End: 2022-08-09
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
## 2022-08-12 NOTE — ASSESSMENT & PLAN NOTE
Improved  Continue Singulair 10mg QHS  Continue albuterol HFA p r n     Check pre and post spirometry in the future s/p delivery when COVID-19 restrictions are lifted  Pending Allergist consult

## 2022-08-12 NOTE — ASSESSMENT & PLAN NOTE
Improved  Continue Singulair 10mg QHS  Continue Flonase and Claritin vs  Trial of alternate antihistamine  Management per ENT  Pending Allergist consult

## 2022-09-20 ENCOUNTER — CONSULT (OUTPATIENT)
Dept: GASTROENTEROLOGY | Facility: CLINIC | Age: 27
End: 2022-09-20
Payer: COMMERCIAL

## 2022-09-20 VITALS
BODY MASS INDEX: 40.95 KG/M2 | HEIGHT: 66 IN | DIASTOLIC BLOOD PRESSURE: 80 MMHG | SYSTOLIC BLOOD PRESSURE: 130 MMHG | TEMPERATURE: 96.9 F | WEIGHT: 254.8 LBS

## 2022-09-20 DIAGNOSIS — K21.9 GASTROESOPHAGEAL REFLUX DISEASE, UNSPECIFIED WHETHER ESOPHAGITIS PRESENT: Primary | ICD-10-CM

## 2022-09-20 DIAGNOSIS — R19.7 DIARRHEA, UNSPECIFIED TYPE: ICD-10-CM

## 2022-09-20 DIAGNOSIS — R10.84 GENERALIZED ABDOMINAL PAIN: ICD-10-CM

## 2022-09-20 DIAGNOSIS — R63.5 WEIGHT GAIN: ICD-10-CM

## 2022-09-20 PROCEDURE — 99244 OFF/OP CNSLTJ NEW/EST MOD 40: CPT | Performed by: INTERNAL MEDICINE

## 2022-09-20 RX ORDER — OMEPRAZOLE 20 MG/1
20 CAPSULE, DELAYED RELEASE ORAL 2 TIMES DAILY
Qty: 180 CAPSULE | Refills: 5 | Status: SHIPPED | OUTPATIENT
Start: 2022-09-20 | End: 2022-12-19

## 2022-09-20 NOTE — PROGRESS NOTES
[facility] - Outpatient Consultation  Reynaldo Yee 24 y o  female MRN: [id number]  Encounter: [id number]          ASSESSMENT AND PLAN:      1  Diarrhea, unspecified type  - Celiac Disease Panel; Future  - White Blood Cells, Stool by Gram Stain; Future  - Stool Enteric Bacterial Panel by PCR; Future  - Calprotectin,Fecal; Future  - H  pylori antigen, stool; Future  - Giardia antigen; Future  - Ova and parasite examination; Future  - Ova and parasite examination; Future  - Clostridium difficile toxin by PCR; Future    2  Generalized abdominal pain  3  Gastroesophageal reflux disease, unspecified whether esophagitis present  - omeprazole (PriLOSEC) 20 mg delayed release capsule; Take 1 capsule (20 mg total) by mouth 2 (two) times a day  Dispense: 180 capsule; Refill: 5    4  Weight gain  - Ambulatory Referral to Nutrition Services; Future    Plan for conservative workup as she works on weight loss  If symptoms do not improve we can consider endoscopic evaluation  She can also try a lactose-free and gluten free diet to see if diarrhea improves  I suspect her symptoms are most likely related to medication such as metformin and Effexor which were recently started  Trial of a drug holiday to see if symptoms improve may not be unreasonable prior to endoscopic evaluation  She would have to work with her PCP for this     ______________________________________________________________________    HPI:      She is a 29-year-old female with history of acid reflux disease, diarrhea, generalized abdominal pain, and weight gain  She is 1 year postpartum presents here for evaluation for change in bowel habits associated abdominal pain and worsening acid reflux disease  She had gain weight but now back to baseline  Abdominal pain and diarrhea are new    She was started on metformin and Effexor which I suspect her attribute in to some of her abdominal pain may also be resulting in some weight gain   She plans to see a nutritionist for further evaluation of weight gain  She will discuss various meal plans  She is also considering coming off of Effexor as symptoms have not improved with this  Abdominal his look in the lower abdomen associated bowel movements  Denies any bloody bowel minutes  She has 2-3 watery bowel movements per day  She also has symptoms of acid reflux disease which have not responded to famotidine 20 milligrams b i d  She has not tried omeprazole  No prior endoscopic evaluation  Her biggest complaints regarding unable to lose weight despite dietary and lifestyle changes  REVIEW OF SYSTEMS:    CONSTITUTIONAL: Denies any fever, chills, rigors, and weight loss  HEENT: No earache or tinnitus  Denies hearing loss or visual disturbances  CARDIOVASCULAR: No chest pain or palpitations  RESPIRATORY: Denies any cough, hemoptysis, shortness of breath or dyspnea on exertion  GASTROINTESTINAL: As noted in the History of Present Illness  GENITOURINARY: No problems with urination  Denies any hematuria or dysuria  NEUROLOGIC: No dizziness or vertigo, denies headaches  MUSCULOSKELETAL: Denies any muscle or joint pain  SKIN: Denies skin rashes or itching  ENDOCRINE: Denies excessive thirst  Denies intolerance to heat or cold  PSYCHOSOCIAL: Denies depression or anxiety  Denies any recent memory loss         Historical Information   Past Medical History:   Diagnosis Date    Anxiety     [facility] 03/11/[facility]    Depression     History of ovarian cyst 2018    Hyperlipidemia     Migraine     Mild intermittent asthma     Morbid obesity ([facility] )     Other headache syndrome 11/16/2021    PID (pelvic inflammatory disease) 2018    Polycystic ovary syndrome     Pre-diabetes     Varicella     had vaccine    Vitamin D deficiency      Past Surgical History:   Procedure Laterality Date    WISDOM TOOTH EXTRACTION       Social History   Social History Substance and Sexual Activity   Alcohol Use Yes    Alcohol/week: 1 0 standard drink    Types: 1 Glasses of wine per week    Comment: socially     Social History     Substance and Sexual Activity   Drug Use Never     Social History     Tobacco Use   Smoking Status Former Smoker    Packs/day: 0 10    Years: 0 20    Pack years: 0 02    Types: Cigarettes    Start date: 2017   Alvarado Hawkins Quit date: 10/1/2017    Years since quittin 9   Smokeless Tobacco Never Used   Tobacco Comment    Smoked socially      Family History   Problem Relation Age of Onset    JOSELIEN disease Mother     Diabetes unspecified Mother     Asthma Mother     Seizures Father 15    Asperger's syndrome Sister     Autism Brother     No Known Problems Son     Colon cancer Maternal Grandmother     Heart attack Maternal Grandfather     No Known Problems Paternal Grandmother     No Known Problems Paternal Grandfather        Meds/Allergies       Current Outpatient Medications:     omeprazole (PriLOSEC) 20 mg delayed release capsule    albuterol (PROVENTIL HFA,VENTOLIN HFA) 90 mcg/act inhaler    Cholecalciferol (Vitamin D3) 50 MCG (2000 UT) TABS    famotidine (PEPCID) 20 mg tablet    fluticasone (FLONASE) 50 mcg/act nasal spray    levonorgestrel (MIRENA) 20 MCG/24HR IUD    loratadine (CLARITIN) 10 mg tablet    metFORMIN (GLUCOPHAGE-XR) 500 mg 24 hr tablet    montelukast (SINGULAIR) 10 mg tablet    multivitamin (THERAGRAN) TABS    venlafaxine (EFFEXOR-XR) 75 mg 24 hr capsule    No Known Allergies        Objective     Blood pressure 130/80, temperature (!) 96 9 °F (36 1 °C), temperature source Tympanic, height 5' 6" (1 676 m), weight 116 kg (254 lb 12 8 oz), not currently breastfeeding  Body mass index is 41 13 kg/m²          PHYSICAL EXAM:      General Appearance:   Alert, cooperative, no distress   HEENT:   Normocephalic, atraumatic, anicteric      Neck:  Supple, symmetrical, trachea midline   Lungs:   Clear to auscultation bilaterally; no rales, rhonchi or wheezing; respirations unlabored    Heart[de-identified]   Regular rate and rhythm; no murmur, rub, or gallop  Abdomen:   Soft, non-tender, non-distended; normal bowel sounds; no masses, no organomegaly    Genitalia:   Deferred    Rectal:   Deferred    Extremities:  No cyanosis, clubbing or edema    Pulses:  2+ and symmetric    Skin:  No jaundice, rashes, or lesions    Lymph nodes:  No palpable cervical lymphadenopathy        Lab Results:   No visits with results within 1 Day(s) from this visit  Latest known visit with results is:   Lab on 08/08/2022   Component Date Value    Sodium 08/08/2022 137     Potassium 08/08/2022 4 2     Chloride 08/08/2022 106     CO2 08/08/2022 25     ANION GAP 08/08/2022 6     BUN 08/08/2022 12     Creatinine 08/08/2022 0 79     Glucose, Fasting 08/08/2022 90     Calcium 08/08/2022 9 1     AST 08/08/2022 22     ALT 08/08/2022 39     Alkaline Phosphatase 08/08/2022 54     Total Protein 08/08/2022 7 7     Albumin 08/08/2022 3 8     Total Bilirubin 08/08/2022 0 81     eGFR 08/08/2022 104          Radiology Results:   No results found  Answers for HPI/ROS submitted by the patient on 9/20/2022  Chronicity: chronic  Onset: more than 1 month ago  Onset quality: sudden  Frequency: daily  Episode duration: 1 hours  Progression since onset: unchanged  Pain location: generalized abdominal region  Pain - numeric: 6/10  Pain quality: cramping, sharp  Radiates to: does not radiate  anorexia: Yes  arthralgias: No  belching: Yes  constipation: No  diarrhea: Yes  dysuria: No  fever: No  frequency: No  headaches: No  hematochezia: No  hematuria: No  melena: No  myalgias: No  nausea: Yes  weight loss: No  vomiting: No  Aggravated by: eating  Relieved by: nothing

## 2023-01-11 ENCOUNTER — OFFICE VISIT (OUTPATIENT)
Dept: INTERNAL MEDICINE CLINIC | Age: 28
End: 2023-01-11

## 2023-01-11 VITALS
BODY MASS INDEX: 41.62 KG/M2 | HEIGHT: 66 IN | DIASTOLIC BLOOD PRESSURE: 70 MMHG | OXYGEN SATURATION: 98 % | SYSTOLIC BLOOD PRESSURE: 122 MMHG | TEMPERATURE: 98.1 F | WEIGHT: 259 LBS | HEART RATE: 82 BPM

## 2023-01-11 DIAGNOSIS — Z23 NEED FOR INFLUENZA VACCINATION: ICD-10-CM

## 2023-01-11 DIAGNOSIS — E78.5 HYPERLIPIDEMIA, UNSPECIFIED HYPERLIPIDEMIA TYPE: ICD-10-CM

## 2023-01-11 DIAGNOSIS — R73.01 IFG (IMPAIRED FASTING GLUCOSE): Primary | ICD-10-CM

## 2023-01-11 DIAGNOSIS — J45.30 MILD PERSISTENT ASTHMA WITHOUT COMPLICATION: ICD-10-CM

## 2023-01-11 DIAGNOSIS — J30.9 ALLERGIC RHINITIS, UNSPECIFIED SEASONALITY, UNSPECIFIED TRIGGER: ICD-10-CM

## 2023-01-11 DIAGNOSIS — K21.9 GASTROESOPHAGEAL REFLUX DISEASE, UNSPECIFIED WHETHER ESOPHAGITIS PRESENT: ICD-10-CM

## 2023-01-11 DIAGNOSIS — F41.9 ANXIETY: ICD-10-CM

## 2023-01-11 RX ORDER — OMEPRAZOLE 20 MG/1
20 CAPSULE, DELAYED RELEASE ORAL DAILY
Qty: 90 CAPSULE | Refills: 2 | Status: SHIPPED | OUTPATIENT
Start: 2023-01-11 | End: 2023-04-11

## 2023-01-11 RX ORDER — MONTELUKAST SODIUM 10 MG/1
10 TABLET ORAL
Qty: 90 TABLET | Refills: 2 | Status: SHIPPED | OUTPATIENT
Start: 2023-01-11

## 2023-01-11 RX ORDER — METFORMIN HYDROCHLORIDE 500 MG/1
1500 TABLET, EXTENDED RELEASE ORAL
Qty: 270 TABLET | Refills: 2 | Status: SHIPPED | OUTPATIENT
Start: 2023-01-11

## 2023-01-11 RX ORDER — SERTRALINE HYDROCHLORIDE 25 MG/1
25 TABLET, FILM COATED ORAL DAILY
Qty: 30 TABLET | Refills: 3 | Status: SHIPPED | OUTPATIENT
Start: 2023-01-11

## 2023-01-11 NOTE — PROGRESS NOTES
Assessment/Plan:         Diagnoses and all orders for this visit:    IFG (impaired fasting glucose)  Comments:  continue metformin  check labs  encourage daily aerobic exercise  low carb diet   Orders:  -     metFORMIN (GLUCOPHAGE-XR) 500 mg 24 hr tablet; Take 3 tablets (1,500 mg total) by mouth daily with dinner  -     CBC and differential; Future  -     Comprehensive metabolic panel; Future  -     HEMOGLOBIN A1C W/ EAG ESTIMATION; Future    Need for influenza vaccination  -     influenza vaccine, quadrivalent, 0 5 mL, preservative-free, for adult and pediatric patients 6 mos+ (AFLURIA, FLUARIX, FLULAVAL, FLUZONE)    Gastroesophageal reflux disease, unspecified whether esophagitis present  Comments:  resume omeprazole, may use prn   Orders:  -     omeprazole (PriLOSEC) 20 mg delayed release capsule; Take 1 capsule (20 mg total) by mouth daily  -     CBC and differential; Future  -     Comprehensive metabolic panel; Future  -     HEMOGLOBIN A1C W/ EAG ESTIMATION; Future    Mild persistent asthma without complication  -     montelukast (SINGULAIR) 10 mg tablet; Take 1 tablet (10 mg total) by mouth daily at bedtime  -     CBC and differential; Future  -     Comprehensive metabolic panel; Future  -     HEMOGLOBIN A1C W/ EAG ESTIMATION; Future    Allergic rhinitis, unspecified seasonality, unspecified trigger  -     montelukast (SINGULAIR) 10 mg tablet; Take 1 tablet (10 mg total) by mouth daily at bedtime    Anxiety  Comments:  no improvement in past (on cymbalta, effexor)  +sertraline 25mg daily   f/u in 4-6 weeks, report any SE    Orders:  -     sertraline (Zoloft) 25 mg tablet; Take 1 tablet (25 mg total) by mouth daily    Hyperlipidemia, unspecified hyperlipidemia type  -     CBC and differential; Future  -     Comprehensive metabolic panel; Future  -     HEMOGLOBIN A1C W/ EAG ESTIMATION; Future        BMI Counseling: Body mass index is 41 8 kg/m²   The BMI is above normal  Nutrition recommendations include encouraging healthy choices of fruits and vegetables, consuming healthier snacks, moderation in carbohydrate intake and increasing intake of lean protein  Exercise recommendations include exercising 3-5 times per week and strength training exercises  Rationale for BMI follow-up plan is due to patient being overweight or obese  Subjective:      Patient ID: Ashley Barragan is a 24 y o  female  23 y/o female with hx of Ethan, pcos, impaired FBS presents for establish care exam  Pt reports she has been off her medications for a few months but would like to resume this (singulair, metformin, omeprazole)  Pt reports she has been feeling well     Pt would like to lose weight  Plans to start exercising program at home    Pt reports caffeine use 1 / day         The following portions of the patient's history were reviewed and updated as appropriate: allergies, current medications, past family history, past medical history, past social history, past surgical history and problem list     Review of Systems   Constitutional: Negative for activity change, appetite change, chills, diaphoresis, fatigue and fever  HENT: Negative for congestion and sore throat  Eyes: Negative for photophobia, pain and itching  Respiratory: Negative for cough, shortness of breath and wheezing  Cardiovascular: Negative for chest pain and leg swelling  Gastrointestinal: Negative for abdominal pain, constipation, diarrhea and nausea  Genitourinary: Negative for dysuria and flank pain  Musculoskeletal: Negative for arthralgias, back pain, gait problem and joint swelling  Skin: Negative for rash  Neurological: Negative for dizziness, weakness, light-headedness and headaches  Hematological: Does not bruise/bleed easily  Psychiatric/Behavioral: Negative for dysphoric mood and sleep disturbance  The patient is nervous/anxious            Past Medical History:   Diagnosis Date   • Anxiety    • BMI 40 0-44 9, [facility] ) 03/11/2021   • Depression    • History of ovarian cyst 2018   • Hyperlipidemia    • Migraine    • Mild intermittent asthma    • Morbid obesity ([facility] )    • Other headache syndrome 11/16/2021   • PID (pelvic inflammatory disease) 2018   • Polycystic ovary syndrome    • Pre-diabetes    • Varicella     had vaccine   • Vitamin D deficiency          Current Outpatient Medications:   •  fluticasone (FLONASE) 50 mcg/act nasal spray, 2 sprays into each nostril daily as needed for rhinitis, Disp: 54 6 mL, Rfl: 2  •  levonorgestrel (MIRENA) 20 MCG/24HR IUD, 1 each by Intrauterine route once, Disp: , Rfl:   •  metFORMIN (GLUCOPHAGE-XR) 500 mg 24 hr tablet, Take 3 tablets (1,500 mg total) by mouth daily with dinner, Disp: 270 tablet, Rfl: 2  •  montelukast (SINGULAIR) 10 mg tablet, Take 1 tablet (10 mg total) by mouth daily at bedtime, Disp: 90 tablet, Rfl: 2  •  omeprazole (PriLOSEC) 20 mg delayed release capsule, Take 1 capsule (20 mg total) by mouth daily, Disp: 90 capsule, Rfl: 2  •  sertraline (Zoloft) 25 mg tablet, Take 1 tablet (25 mg total) by mouth daily, Disp: 30 tablet, Rfl: 3  •  albuterol (PROVENTIL HFA,VENTOLIN HFA) 90 mcg/act inhaler, Inhale 2 puffs every 4 (four) hours as needed for wheezing (Patient not taking: Reported on 1/11/2023), Disp: 18 g, Rfl: 1  •  multivitamin (THERAGRAN) TABS, Take 1 tablet by mouth daily (Patient not taking: Reported on 1/11/2023), Disp: , Rfl:     No Known Allergies    Social History   Past Surgical History:   Procedure Laterality Date   • WISDOM TOOTH EXTRACTION       Family History   Problem Relation Age of Onset   • JOSELINE disease Mother    • Diabetes unspecified Mother    • Asthma Mother    • Seizures Father 15   • Asperger's syndrome Sister    • Autism Brother    • No Known Problems Son    • Colon cancer Maternal Grandmother    • Heart attack Maternal Grandfather    • No Known Problems Paternal Grandmother    • No Known Problems Paternal Grandfather        Objective:  /70 (BP Location: Left arm, Patient Position: Sitting, Cuff Size: Large)   Pulse 82   Temp 98 1 °F (36 7 °C) (Temporal)   Ht 5' 6" (1 676 m)   Wt 117 kg (259 lb)   SpO2 98%   BMI 41 80 kg/m²        Physical Exam  Vitals reviewed  Constitutional:       General: She is not in acute distress  HENT:      Head: Normocephalic and atraumatic  Right Ear: Tympanic membrane, ear canal and external ear normal       Left Ear: Tympanic membrane, ear canal and external ear normal    Eyes:      General:         Right eye: No discharge  Left eye: No discharge  Extraocular Movements: Extraocular movements intact  Conjunctiva/sclera: Conjunctivae normal       Pupils: Pupils are equal, round, and reactive to light  Cardiovascular:      Rate and Rhythm: Normal rate and regular rhythm  Pulses: Normal pulses  Pulmonary:      Effort: Pulmonary effort is normal  No respiratory distress  Breath sounds: Normal breath sounds  No wheezing, rhonchi or rales  Abdominal:      General: Bowel sounds are normal  There is no distension  Musculoskeletal:         General: Normal range of motion  Cervical back: Normal range of motion  Right lower leg: No edema  Left lower leg: No edema  Lymphadenopathy:      Cervical: No cervical adenopathy  Skin:     General: Skin is warm  Findings: No erythema or rash  Neurological:      General: No focal deficit present  Mental Status: She is alert and oriented to person, place, and time     Psychiatric:         Mood and Affect: Mood normal          Behavior: Behavior normal

## 2023-01-29 ENCOUNTER — OFFICE VISIT (OUTPATIENT)
Dept: URGENT CARE | Age: 28
End: 2023-01-29

## 2023-01-29 VITALS
DIASTOLIC BLOOD PRESSURE: 62 MMHG | RESPIRATION RATE: 12 BRPM | SYSTOLIC BLOOD PRESSURE: 113 MMHG | TEMPERATURE: 97.6 F | OXYGEN SATURATION: 98 % | HEART RATE: 80 BPM

## 2023-01-29 DIAGNOSIS — J02.9 SORE THROAT: Primary | ICD-10-CM

## 2023-01-29 DIAGNOSIS — J01.00 ACUTE NON-RECURRENT MAXILLARY SINUSITIS: ICD-10-CM

## 2023-01-29 LAB — S PYO AG THROAT QL: NEGATIVE

## 2023-01-29 RX ORDER — AMOXICILLIN AND CLAVULANATE POTASSIUM 875; 125 MG/1; MG/1
1 TABLET, FILM COATED ORAL EVERY 12 HOURS SCHEDULED
Qty: 14 TABLET | Refills: 0 | Status: SHIPPED | OUTPATIENT
Start: 2023-01-29 | End: 2023-02-05

## 2023-01-29 NOTE — PROGRESS NOTES
[facility]        NAME: Heike Rosado is a 24 y o  female  : 1995    MRN: [id number]  DATE: 2023  TIME: 9:43 AM    Assessment and Plan   Sore throat [J02 9]  1  Sore throat  POCT rapid strepA    amoxicillin-clavulanate (AUGMENTIN) 875-125 mg per tablet      2  Acute non-recurrent maxillary sinusitis              Patient Instructions   Augmentin twice a day for 7 days  Continue Flonase daily  Tylenol and/or Motrin as needed for pain or fever  Saline sinus rinses  Cool-mist humidifier Acacian at night  Follow-up with your primary care provider if no improvement  Follow up with PCP in 3-5 days  Proceed to  ER if symptoms worsen  Chief Complaint     Chief Complaint   Patient presents with   • Cough     Started a week ago; negative home COVID   • Sore Throat   • Nasal Congestion     Sinus congestion and pressure         History of Present Illness       Patient is a 79-year-old female presenting with 1 week of sinus congestion, sore throat, and cough  She has maxillary facial pressure bilaterally  Denies ear pain  Denies fever but has had chills  She uses Flonase on a daily basis  She tried NyQuil without improvement  She took a home COVID test 3 days ago that was negative  Cough  Associated symptoms include chills and a sore throat  Pertinent negatives include no ear pain, fever, myalgias, rash or rhinorrhea  Sore Throat   Associated symptoms include congestion and coughing  Pertinent negatives include no ear pain  Review of Systems   Review of Systems   Constitutional: Positive for chills  Negative for activity change and fever  HENT: Positive for congestion, sinus pressure, sinus pain and sore throat  Negative for ear pain and rhinorrhea  Respiratory: Positive for cough  Musculoskeletal: Negative for myalgias  Skin: Negative for rash           Current Medications       Current Outpatient Medications:   •  amoxicillin-clavulanate (AUGMENTIN) 875-125 mg per tablet, Take 1 tablet by mouth every 12 (twelve) hours for 7 days, Disp: 14 tablet, Rfl: 0  •  albuterol (PROVENTIL HFA,VENTOLIN HFA) 90 mcg/act inhaler, Inhale 2 puffs every 4 (four) hours as needed for wheezing (Patient not taking: Reported on 1/11/2023), Disp: 18 g, Rfl: 1  •  fluticasone (FLONASE) 50 mcg/act nasal spray, 2 sprays into each nostril daily as needed for rhinitis, Disp: 54 6 mL, Rfl: 2  •  levonorgestrel (MIRENA) 20 MCG/24HR IUD, 1 each by Intrauterine route once, Disp: , Rfl:   •  metFORMIN (GLUCOPHAGE-XR) 500 mg 24 hr tablet, Take 3 tablets (1,500 mg total) by mouth daily with dinner, Disp: 270 tablet, Rfl: 2  •  montelukast (SINGULAIR) 10 mg tablet, Take 1 tablet (10 mg total) by mouth daily at bedtime, Disp: 90 tablet, Rfl: 2  •  multivitamin (THERAGRAN) TABS, Take 1 tablet by mouth daily (Patient not taking: Reported on 1/11/2023), Disp: , Rfl:   •  omeprazole (PriLOSEC) 20 mg delayed release capsule, Take 1 capsule (20 mg total) by mouth daily, Disp: 90 capsule, Rfl: 2  •  sertraline (Zoloft) 25 mg tablet, Take 1 tablet (25 mg total) by mouth daily, Disp: 30 tablet, Rfl: 3    Current Allergies     Allergies as of 01/29/2023   • (No Known Allergies)            The following portions of the patient's history were reviewed and updated as appropriate: allergies, current medications, past family history, past medical history, past social history, past surgical history and problem list      Past Medical History:   Diagnosis Date   • Anxiety    • BMI 40 0-44 9, adult ([facility] ) 03/11/2021   • Depression    • History of ovarian cyst 2018   • Hyperlipidemia    • Migraine    • Mild intermittent asthma    • Morbid obesity ([facility] )    • Other headache syndrome 11/16/2021   • PID (pelvic inflammatory disease) 2018   • Polycystic ovary syndrome    • Pre-diabetes    • Varicella     had vaccine   • Vitamin D deficiency        Past Surgical History:   Procedure Laterality Date   • WISDOM TOOTH EXTRACTION Family History   Problem Relation Age of Onset   • JOSELINE disease Mother    • Diabetes unspecified Mother    • Asthma Mother    • Seizures Father 15   • Asperger's syndrome Sister    • Autism Brother    • No Known Problems Son    • Colon cancer Maternal Grandmother    • Heart attack Maternal Grandfather    • No Known Problems Paternal Grandmother    • No Known Problems Paternal Grandfather          Medications have been verified  Objective   /62 (BP Location: Left arm, Patient Position: Sitting, Cuff Size: Large)   Pulse 80   Temp 97 6 °F (36 4 °C) (Temporal)   Resp 12   SpO2 98%      Rapid strep: negative  Physical Exam     Physical Exam  Vitals reviewed  Constitutional:       General: She is awake  She is not in acute distress  Appearance: Normal appearance  She is well-developed and normal weight  She is not ill-appearing or toxic-appearing  HENT:      Head: Normocephalic  Right Ear: Hearing, tympanic membrane, ear canal and external ear normal       Left Ear: Hearing, tympanic membrane, ear canal and external ear normal       Nose: Congestion present  Right Sinus: Maxillary sinus tenderness present  Left Sinus: Maxillary sinus tenderness present  Mouth/Throat:      Lips: Pink  Pharynx: Oropharynx is clear  Cardiovascular:      Rate and Rhythm: Normal rate and regular rhythm  Heart sounds: Normal heart sounds, S1 normal and S2 normal    Pulmonary:      Effort: Pulmonary effort is normal       Breath sounds: Normal breath sounds  No decreased breath sounds, wheezing, rhonchi or rales  Skin:     General: Skin is warm and moist    Neurological:      General: No focal deficit present  Mental Status: She is alert and oriented to person, place, and time  Psychiatric:         Behavior: Behavior is cooperative

## 2023-02-09 ENCOUNTER — OFFICE VISIT (OUTPATIENT)
Dept: URGENT CARE | Facility: CLINIC | Age: 28
End: 2023-02-09

## 2023-02-09 VITALS
RESPIRATION RATE: 16 BRPM | SYSTOLIC BLOOD PRESSURE: 127 MMHG | DIASTOLIC BLOOD PRESSURE: 69 MMHG | TEMPERATURE: 97.8 F | OXYGEN SATURATION: 96 % | HEART RATE: 82 BPM

## 2023-02-09 DIAGNOSIS — H69.83 DYSFUNCTION OF BOTH EUSTACHIAN TUBES: Primary | ICD-10-CM

## 2023-02-09 RX ORDER — PREDNISONE 10 MG/1
TABLET ORAL
Qty: 30 TABLET | Refills: 0 | Status: SHIPPED | OUTPATIENT
Start: 2023-02-09

## 2023-02-09 NOTE — PROGRESS NOTES
[facility]        NAME: Fabienne Power is a 24 y o  female  : 1995    MRN: [id number]  DATE: 2023  TIME: 12:56 PM    Assessment and Plan   Dysfunction of both eustachian tubes [H69 83]  1  Dysfunction of both eustachian tubes  predniSONE 10 mg tablet    al mag oxide-diphenhydramine-lidocaine viscous (MAGIC MOUTHWASH) 1:1:1 suspension            Patient Instructions       Follow up with PCP As needed  Increase fluids  Chief Complaint     Chief Complaint   Patient presents with   • Sore Throat     Pt reports sore throat since before last visit to care now on 23  Was prx abx but symptoms persist           History of Present Illness       Patient was seen last week and started on Augmentin for a pharyngitis  Chart was reviewed  Strep was negative  Her sore throat continues as well as her cough and congestion has increased  Her symptoms increased when she lies down  Sore Throat   This is a new problem  The current episode started 1 to 4 weeks ago  The problem has been gradually worsening  There has been no fever  The pain is moderate  Associated symptoms include congestion and coughing  Pertinent negatives include no abdominal pain, diarrhea, drooling, ear discharge, ear pain, headaches, hoarse voice, plugged ear sensation, neck pain, shortness of breath, stridor, swollen glands, trouble swallowing or vomiting  She has had no exposure to strep or mono  Review of Systems   Review of Systems   HENT: Positive for congestion and sore throat  Negative for drooling, ear discharge, ear pain, hoarse voice and trouble swallowing  Respiratory: Positive for cough  Negative for shortness of breath and stridor  Gastrointestinal: Negative for abdominal pain, diarrhea and vomiting  Musculoskeletal: Negative for neck pain  Neurological: Negative for headaches  All other systems reviewed and are negative          Current Medications       Current Outpatient Medications: •  al mag oxide-diphenhydramine-lidocaine viscous (MAGIC MOUTHWASH) 1:1:1 suspension, Swish and swallow 10 mL every 4 (four) hours as needed for mouth pain or discomfort, Disp: 180 mL, Rfl: 0  •  predniSONE 10 mg tablet, 5 x 4 days, 4 x 1, 3 x 1, 2 x 1, 1 x 1, Disp: 30 tablet, Rfl: 0  •  albuterol (PROVENTIL HFA,VENTOLIN HFA) 90 mcg/act inhaler, Inhale 2 puffs every 4 (four) hours as needed for wheezing (Patient not taking: Reported on 1/11/2023), Disp: 18 g, Rfl: 1  •  fluticasone (FLONASE) 50 mcg/act nasal spray, 2 sprays into each nostril daily as needed for rhinitis, Disp: 54 6 mL, Rfl: 2  •  levonorgestrel (MIRENA) 20 MCG/24HR IUD, 1 each by Intrauterine route once, Disp: , Rfl:   •  metFORMIN (GLUCOPHAGE-XR) 500 mg 24 hr tablet, Take 3 tablets (1,500 mg total) by mouth daily with dinner, Disp: 270 tablet, Rfl: 2  •  montelukast (SINGULAIR) 10 mg tablet, Take 1 tablet (10 mg total) by mouth daily at bedtime, Disp: 90 tablet, Rfl: 2  •  multivitamin (THERAGRAN) TABS, Take 1 tablet by mouth daily (Patient not taking: Reported on 1/11/2023), Disp: , Rfl:   •  omeprazole (PriLOSEC) 20 mg delayed release capsule, Take 1 capsule (20 mg total) by mouth daily, Disp: 90 capsule, Rfl: 2  •  sertraline (Zoloft) 25 mg tablet, Take 1 tablet (25 mg total) by mouth daily, Disp: 30 tablet, Rfl: 3    Current Allergies     Allergies as of 02/09/2023   • (No Known Allergies)            The following portions of the patient's history were reviewed and updated as appropriate: allergies, current medications, past family history, past medical history, past social history, past surgical history and problem list      Past Medical History:   Diagnosis Date   • Anxiety    • BMI 40 0-44 9, adult ([facility] ) 03/11/2021   • Depression    • History of ovarian cyst 2018   • Hyperlipidemia    • Migraine    • Mild intermittent asthma    • Morbid obesity ([address] )    • Other headache syndrome 11/16/2021   • PID (pelvic inflammatory disease) 2018   • Polycystic ovary syndrome    • Pre-diabetes    • Varicella     had vaccine   • Vitamin D deficiency        Past Surgical History:   Procedure Laterality Date   • WISDOM TOOTH EXTRACTION         Family History   Problem Relation Age of Onset   • JOSELINE disease Mother    • Diabetes unspecified Mother    • Asthma Mother    • Seizures Father 15   • Asperger's syndrome Sister    • Autism Brother    • No Known Problems Son    • Colon cancer Maternal Grandmother    • Heart attack Maternal Grandfather    • No Known Problems Paternal Grandmother    • No Known Problems Paternal Grandfather          Medications have been verified  Objective   /69   Pulse 82   Temp 97 8 °F (36 6 °C)   Resp 16   SpO2 96%   No LMP recorded  Physical Exam     Physical Exam  Vitals and nursing note reviewed  Constitutional:       Appearance: She is well-developed and normal weight  HENT:      Right Ear: Ear canal and external ear normal       Left Ear: Ear canal and external ear normal       Nose: Congestion and rhinorrhea present  Mouth/Throat:      Mouth: Mucous membranes are moist       Pharynx: Posterior oropharyngeal erythema present  No oropharyngeal exudate  Eyes:      Conjunctiva/sclera: Conjunctivae normal    Cardiovascular:      Rate and Rhythm: Normal rate and regular rhythm  Pulses: Normal pulses  Heart sounds: Normal heart sounds  Pulmonary:      Effort: Pulmonary effort is normal       Breath sounds: Normal breath sounds  Lymphadenopathy:      Cervical: No cervical adenopathy  Neurological:      Mental Status: She is alert     Psychiatric:         Mood and Affect: Mood normal          Behavior: Behavior normal        TMs bulging, nasal mucosa boggy, good transillumination and positive maxillary tenderness

## 2023-02-20 ENCOUNTER — LAB (OUTPATIENT)
Dept: LAB | Facility: CLINIC | Age: 28
End: 2023-02-20

## 2023-02-20 DIAGNOSIS — K21.9 GASTROESOPHAGEAL REFLUX DISEASE, UNSPECIFIED WHETHER ESOPHAGITIS PRESENT: ICD-10-CM

## 2023-02-20 DIAGNOSIS — R73.01 IFG (IMPAIRED FASTING GLUCOSE): ICD-10-CM

## 2023-02-20 DIAGNOSIS — E78.5 HYPERLIPIDEMIA, UNSPECIFIED HYPERLIPIDEMIA TYPE: ICD-10-CM

## 2023-02-20 DIAGNOSIS — J45.30 MILD PERSISTENT ASTHMA WITHOUT COMPLICATION: ICD-10-CM

## 2023-02-20 LAB
ALBUMIN SERPL BCP-MCNC: 3.8 G/DL (ref 3.5–5)
ALP SERPL-CCNC: 66 U/L (ref 46–116)
ALT SERPL W P-5'-P-CCNC: 43 U/L (ref 12–78)
ANION GAP SERPL CALCULATED.3IONS-SCNC: 7 MMOL/L (ref 4–13)
AST SERPL W P-5'-P-CCNC: 18 U/L (ref 5–45)
BASOPHILS # BLD AUTO: 0.05 THOUSANDS/ÂΜL (ref 0–0.1)
BASOPHILS NFR BLD AUTO: 1 % (ref 0–1)
BILIRUB SERPL-MCNC: 1.12 MG/DL (ref 0.2–1)
BUN SERPL-MCNC: 12 MG/DL (ref 5–25)
CALCIUM SERPL-MCNC: 9 MG/DL (ref 8.3–10.1)
CHLORIDE SERPL-SCNC: 108 MMOL/L (ref 96–108)
CO2 SERPL-SCNC: 22 MMOL/L (ref 21–32)
CREAT SERPL-MCNC: 0.71 MG/DL (ref 0.6–1.3)
EOSINOPHIL # BLD AUTO: 0.15 THOUSAND/ÂΜL (ref 0–0.61)
EOSINOPHIL NFR BLD AUTO: 2 % (ref 0–6)
ERYTHROCYTE [DISTWIDTH] IN BLOOD BY AUTOMATED COUNT: 13.8 % (ref 11.6–15.1)
GFR SERPL CREATININE-BSD FRML MDRD: 118 ML/MIN/1.73SQ M
GLUCOSE P FAST SERPL-MCNC: 107 MG/DL (ref 65–99)
HCT VFR BLD AUTO: 42.8 % (ref 34.8–46.1)
HGB BLD-MCNC: 14 G/DL (ref 11.5–15.4)
IMM GRANULOCYTES # BLD AUTO: 0.04 THOUSAND/UL (ref 0–0.2)
IMM GRANULOCYTES NFR BLD AUTO: 0 % (ref 0–2)
LYMPHOCYTES # BLD AUTO: 2.36 THOUSANDS/ÂΜL (ref 0.6–4.47)
LYMPHOCYTES NFR BLD AUTO: 23 % (ref 14–44)
MCH RBC QN AUTO: 29.7 PG (ref 26.8–34.3)
MCHC RBC AUTO-ENTMCNC: 32.7 G/DL (ref 31.4–37.4)
MCV RBC AUTO: 91 FL (ref 82–98)
MONOCYTES # BLD AUTO: 0.55 THOUSAND/ÂΜL (ref 0.17–1.22)
MONOCYTES NFR BLD AUTO: 5 % (ref 4–12)
NEUTROPHILS # BLD AUTO: 7.19 THOUSANDS/ÂΜL (ref 1.85–7.62)
NEUTS SEG NFR BLD AUTO: 69 % (ref 43–75)
NRBC BLD AUTO-RTO: 0 /100 WBCS
PLATELET # BLD AUTO: 294 THOUSANDS/UL (ref 149–390)
PMV BLD AUTO: 10.3 FL (ref 8.9–12.7)
POTASSIUM SERPL-SCNC: 4.1 MMOL/L (ref 3.5–5.3)
PROT SERPL-MCNC: 7.3 G/DL (ref 6.4–8.4)
RBC # BLD AUTO: 4.71 MILLION/UL (ref 3.81–5.12)
SODIUM SERPL-SCNC: 137 MMOL/L (ref 135–147)
WBC # BLD AUTO: 10.34 THOUSAND/UL (ref 4.31–10.16)

## 2023-02-20 NOTE — RESULT ENCOUNTER NOTE
Results forwarded to PCP Ms Ana Almaraz PA-C to address        Message sent to patient via [facility] patient portal

## 2023-02-22 ENCOUNTER — OFFICE VISIT (OUTPATIENT)
Dept: INTERNAL MEDICINE CLINIC | Age: 28
End: 2023-02-22

## 2023-02-22 VITALS
WEIGHT: 260 LBS | HEIGHT: 66 IN | TEMPERATURE: 97.1 F | OXYGEN SATURATION: 97 % | DIASTOLIC BLOOD PRESSURE: 60 MMHG | SYSTOLIC BLOOD PRESSURE: 128 MMHG | BODY MASS INDEX: 41.78 KG/M2 | HEART RATE: 96 BPM

## 2023-02-22 DIAGNOSIS — E78.5 HYPERLIPIDEMIA, UNSPECIFIED HYPERLIPIDEMIA TYPE: ICD-10-CM

## 2023-02-22 DIAGNOSIS — F41.9 ANXIETY: ICD-10-CM

## 2023-02-22 DIAGNOSIS — F32.A DEPRESSION, UNSPECIFIED DEPRESSION TYPE: Primary | ICD-10-CM

## 2023-02-22 DIAGNOSIS — R73.01 IFG (IMPAIRED FASTING GLUCOSE): ICD-10-CM

## 2023-02-22 LAB
EST. AVERAGE GLUCOSE BLD GHB EST-MCNC: 117 MG/DL
HBA1C MFR BLD: 5.7 %

## 2023-02-22 NOTE — PROGRESS NOTES
Assessment/Plan:         Diagnoses and all orders for this visit:    Depression, unspecified depression type  Comments:  continue current dose sertraline   Orders:  -     CBC and differential; Future  -     Comprehensive metabolic panel; Future  -     HEMOGLOBIN A1C W/ EAG ESTIMATION; Future    IFG (impaired fasting glucose)  Comments:  continue metformin  low carb low sugar diet   Orders:  -     CBC and differential; Future  -     Comprehensive metabolic panel; Future  -     HEMOGLOBIN A1C W/ EAG ESTIMATION; Future    Anxiety    Hyperlipidemia, unspecified hyperlipidemia type  Comments:  low chol diet   Orders:  -     CBC and differential; Future  -     Comprehensive metabolic panel; Future  -     HEMOGLOBIN A1C W/ EAG ESTIMATION; Future          Subjective:      Patient ID: Jessica Lafleur is a 24 y o  female  25 y/o female with hx of IFG, anxiety, hld presents for f/u  Pt started sertraline 1 month ago and reports improvement in depressive sx  Sleeping well, appetite unchanged  Pt had labs done, reviewed  hga1c 5 7    Pt tolerating metformin at current dose       The following portions of the patient's history were reviewed and updated as appropriate: allergies, current medications, past family history, past medical history, past social history, past surgical history and problem list     Review of Systems   Constitutional: Negative for activity change, appetite change, chills, diaphoresis, fatigue and fever  HENT: Negative for congestion, sinus pressure, sinus pain and sore throat  Eyes: Negative for pain and visual disturbance  Respiratory: Negative for cough, shortness of breath and wheezing  Cardiovascular: Negative for chest pain and leg swelling  Gastrointestinal: Negative for abdominal pain, constipation, diarrhea and nausea  Musculoskeletal: Negative for arthralgias, back pain and gait problem  Skin: Negative for rash     Neurological: Negative for dizziness, weakness, light-headedness and headaches  Hematological: Does not bruise/bleed easily  Psychiatric/Behavioral: Negative for dysphoric mood and sleep disturbance  The patient is not nervous/anxious            Past Medical History:   Diagnosis Date   • Anxiety    • Asthma 2005   • BMI 40 0-44 9, adult ([facility] ) 03/11/2021   • Depression    • Diabetes mellitus ([place] [address] )     Gestational diabetes   • History of ovarian cyst 2018   • Hyperlipidemia    • Migraine    • Mild intermittent asthma    • Morbid obesity ([place] [address] )    • Other headache syndrome 11/16/2021   • PID (pelvic inflammatory disease) 2018   • Polycystic ovary syndrome    • Pre-diabetes    • Varicella     had vaccine   • Vitamin D deficiency          Current Outpatient Medications:   •  fluticasone (FLONASE) 50 mcg/act nasal spray, 2 sprays into each nostril daily as needed for rhinitis, Disp: 54 6 mL, Rfl: 2  •  levonorgestrel (MIRENA) 20 MCG/24HR IUD, 1 each by Intrauterine route once, Disp: , Rfl:   •  metFORMIN (GLUCOPHAGE-XR) 500 mg 24 hr tablet, Take 3 tablets (1,500 mg total) by mouth daily with dinner, Disp: 270 tablet, Rfl: 2  •  montelukast (SINGULAIR) 10 mg tablet, Take 1 tablet (10 mg total) by mouth daily at bedtime, Disp: 90 tablet, Rfl: 2  •  multivitamin (THERAGRAN) TABS, Take 1 tablet by mouth daily, Disp: , Rfl:   •  omeprazole (PriLOSEC) 20 mg delayed release capsule, Take 1 capsule (20 mg total) by mouth daily, Disp: 90 capsule, Rfl: 2  •  sertraline (Zoloft) 25 mg tablet, Take 1 tablet (25 mg total) by mouth daily, Disp: 30 tablet, Rfl: 3    No Known Allergies    Social History   Past Surgical History:   Procedure Laterality Date   • WISDOM TOOTH EXTRACTION       Family History   Problem Relation Age of Onset   • JOSELINE disease Mother    • Diabetes unspecified Mother    • Asthma Mother    • Seizures Father 15   • Asperger's syndrome Sister    • Autism Brother    • No Known Problems Son    • Colon cancer Maternal Grandmother    • Heart attack Maternal Grandfather    • Heart disease Maternal Grandfather    • No Known Problems Paternal Grandmother    • No Known Problems Paternal Grandfather        Objective:  /60 (BP Location: Left arm, Patient Position: Sitting, Cuff Size: Large)   Pulse 96   Temp (!) 97 1 °F (36 2 °C) (Temporal)   Ht 5' 6" (1 676 m)   Wt 118 kg (260 lb)   SpO2 97%   BMI 41 97 kg/m²        Physical Exam  Vitals reviewed  Constitutional:       General: She is not in acute distress  HENT:      Head: Normocephalic and atraumatic  Nose: Nose normal    Eyes:      General:         Right eye: No discharge  Left eye: No discharge  Extraocular Movements: Extraocular movements intact  Conjunctiva/sclera: Conjunctivae normal       Pupils: Pupils are equal, round, and reactive to light  Neck:      Comments: Small cyst - tender R anterior cervical   Less than size of a pea  Cardiovascular:      Rate and Rhythm: Normal rate and regular rhythm  Pulmonary:      Effort: Pulmonary effort is normal  No respiratory distress  Breath sounds: Normal breath sounds  No wheezing, rhonchi or rales  Musculoskeletal:      Cervical back: Normal range of motion  Right lower leg: No edema  Left lower leg: No edema  Lymphadenopathy:      Cervical: No cervical adenopathy  Skin:     Findings: No erythema or rash  Neurological:      General: No focal deficit present  Mental Status: She is alert and oriented to person, place, and time     Psychiatric:         Mood and Affect: Mood normal

## 2023-02-22 NOTE — RESULT ENCOUNTER NOTE
Forwarded to PCP Ms Mayra Green PA-C to address      Message sent to patient via [employer] patient portal

## 2023-03-16 ENCOUNTER — ANNUAL EXAM (OUTPATIENT)
Dept: OBGYN CLINIC | Facility: CLINIC | Age: 28
End: 2023-03-16

## 2023-03-16 VITALS
HEIGHT: 66 IN | SYSTOLIC BLOOD PRESSURE: 118 MMHG | BODY MASS INDEX: 41.62 KG/M2 | WEIGHT: 259 LBS | DIASTOLIC BLOOD PRESSURE: 64 MMHG

## 2023-03-16 DIAGNOSIS — N39.3 SUI (STRESS URINARY INCONTINENCE, FEMALE): ICD-10-CM

## 2023-03-16 DIAGNOSIS — Z01.419 WELL WOMAN EXAM WITH ROUTINE GYNECOLOGICAL EXAM: Primary | ICD-10-CM

## 2023-03-16 NOTE — PROGRESS NOTES
ASSESSMENT & PLAN:   Diagnoses and all orders for this visit:    Well woman exam with routine gynecological exam    LIYAH (stress urinary incontinence, female)  -     Ambulatory referral to Physical Therapy; Future          The following were reviewed in today's visit: ASCCP guidelines, breast self exam, family planning choices, exercise and healthy diet  Patient to return to office in yearly for annual exam      All questions have been answered to her satisfaction  CC:  Annual Gynecologic Examination  Chief Complaint   Patient presents with   • Gynecologic Exam     Pt here today for her annual exam  pap 04/2021 wnl    Mirena placed 12/2021       HPI: Silas Mcintyre is a 34 y o  Will  who presents for annual gynecologic examination  She has the following concerns:  Having stress urinary incontinence with sneezing  Consider another pregnancy in about 2 years      Health Maintenance:    Exercise: infrequently  Breast exams/breast awareness: no  Diet: well balanced diet      Past Medical History:   Diagnosis Date   • Anxiety    • Asthma 2005   • BMI 40 0-44 9, adult ([facility] ) 03/11/2021   • Depression    • Diabetes mellitus ([facility] )     Gestational diabetes   • History of ovarian cyst 2018   • Hyperlipidemia    • Migraine    • Mild intermittent asthma    • Morbid obesity ([facility] )    • Other headache syndrome 11/16/2021   • PID (pelvic inflammatory disease) 2018   • Polycystic ovary syndrome    • Pre-diabetes    • Varicella     had vaccine   • Vitamin D deficiency        Past Surgical History:   Procedure Laterality Date   • WISDOM TOOTH EXTRACTION         Past OB/Gyn History:  Period Pattern: (!) Irregular  Menstrual Flow: Light  Menstrual Control: Thin padNo LMP recorded (lmp unknown)  Last Pap: 4/2021 : no abnormalities  History of abnormal Pap smear: no  HPV vaccine completed: yes    Patient is currently sexually active     STD testing: no  Current contraception: Mirena IUD placed 12/2021      Family History  Family History   Problem Relation Age of Onset   • JOSELINE disease Mother    • Diabetes unspecified Mother    • Asthma Mother    • Seizures Father 15   • Asperger's syndrome Sister    • Autism Brother    • No Known Problems Son    • Colon cancer Maternal Grandmother    • Heart attack Maternal Grandfather    • Heart disease Maternal Grandfather    • No Known Problems Paternal Grandmother    • No Known Problems Paternal Grandfather        Family history of uterine or ovarian cancer: no  Family history of breast cancer: no  Family history of colon cancer: yes    Social History:  Social History     Socioeconomic History   • Marital status: Single     Spouse name: Not on file   • Number of children: 0   • Years of education: Not on file   • Highest education level: Not on file   Occupational History   • Occupation: Dietary Aide     Employer: [employer]   Tobacco Use   • Smoking status: Former     Packs/day: 0 10     Years: 0 20     Pack years: 0 02     Types: Cigarettes     Start date: 2017     Quit date: 10/1/2017     Years since quittin 4   • Smokeless tobacco: Never   • Tobacco comments:     Smoked socially    Vaping Use   • Vaping Use: Never used   Substance and Sexual Activity   • Alcohol use: Yes     Alcohol/week: 1 0 standard drink     Types: 1 Glasses of wine per week     Comment: socially   • Drug use: Never   • Sexual activity: Yes     Partners: Male     Birth control/protection: I U D     Other Topics Concern   • Not on file   Social History Narrative    Single    Lives with boyfriend, son    1 Child - 3 Son    Farm      Social Determinants of Health     Financial Resource Strain: Not on file   Food Insecurity: Not on file   Transportation Needs: Not on file   Physical Activity: Not on file   Stress: Not on file   Social Connections: Not on file   Intimate Partner Violence: Not on file   Housing Stability: Not on file     Domestic violence screen: negative    Allergies:  No Known Allergies    Medications:    Current Outpatient Medications:   •  fluticasone (FLONASE) 50 mcg/act nasal spray, 2 sprays into each nostril daily as needed for rhinitis, Disp: 54 6 mL, Rfl: 2  •  levonorgestrel (MIRENA) 20 MCG/24HR IUD, 1 each by Intrauterine route once, Disp: , Rfl:   •  metFORMIN (GLUCOPHAGE-XR) 500 mg 24 hr tablet, Take 3 tablets (1,500 mg total) by mouth daily with dinner, Disp: 270 tablet, Rfl: 2  •  montelukast (SINGULAIR) 10 mg tablet, Take 1 tablet (10 mg total) by mouth daily at bedtime, Disp: 90 tablet, Rfl: 2  •  multivitamin (THERAGRAN) TABS, Take 1 tablet by mouth daily, Disp: , Rfl:   •  omeprazole (PriLOSEC) 20 mg delayed release capsule, Take 1 capsule (20 mg total) by mouth daily, Disp: 90 capsule, Rfl: 2  •  sertraline (Zoloft) 25 mg tablet, Take 1 tablet (25 mg total) by mouth daily, Disp: 30 tablet, Rfl: 3    Review of Systems:  Denies fevers, chills, unintentional weight loss, excessive fatigue, chest pain, shortness of breath, abdominal pain, nausea, vomiting, urinary frequency, vaginal bleeding, vaginal discharge  All other systems negative unless otherwise stated  Physical Exam:  /64 (BP Location: Left arm, Patient Position: Sitting, Cuff Size: Standard)   Ht 5' 6" (1 676 m)   Wt 117 kg (259 lb)   LMP  (LMP Unknown)   BMI 41 80 kg/m²  Body mass index is 41 8 kg/m²  GEN: The patient was alert and oriented x3, pleasant well-appearing female in no acute distress  HEENT:  Unremarkable, no anterior or posterior lymphadenopathy, no thyromegaly  CV:  Regular rate and rhythm, normal S1 and S2, no murmurs  RESP:  Clear to auscultation bilaterally, no wheezes, rales or rhonchi  BREAST:  Symmetric breasts with no palpable breast masses or obvious breast lesions  She has no retractions or nipple discharge  She has no axillary abnormalities or palpable masses     GI:  Soft, nontender, non-distended  MSK: bilateral lower extremities are nontender, no edema  : Normal appearing external female genitalia, normal appearing urethral meatus  Normal appearing vaginal epithelium  On bimanual exam, no cervical motion tenderness, IUD threads palpable; uterus is smooth, mobile, nontender 6 weeks size  No tenderness or fullness in the bilateral adnexa

## 2023-03-27 DIAGNOSIS — F41.9 ANXIETY: ICD-10-CM

## 2023-03-27 RX ORDER — SERTRALINE HYDROCHLORIDE 25 MG/1
25 TABLET, FILM COATED ORAL DAILY
Qty: 90 TABLET | Refills: 0 | Status: SHIPPED | OUTPATIENT
Start: 2023-03-27

## 2023-05-06 ENCOUNTER — OFFICE VISIT (OUTPATIENT)
Dept: URGENT CARE | Facility: MEDICAL CENTER | Age: 28
End: 2023-05-06

## 2023-05-06 VITALS
HEIGHT: 66 IN | TEMPERATURE: 98.9 F | OXYGEN SATURATION: 99 % | BODY MASS INDEX: 41.78 KG/M2 | WEIGHT: 260 LBS | HEART RATE: 89 BPM | DIASTOLIC BLOOD PRESSURE: 64 MMHG | SYSTOLIC BLOOD PRESSURE: 113 MMHG | RESPIRATION RATE: 22 BRPM

## 2023-05-06 DIAGNOSIS — L50.9 URTICARIA: Primary | ICD-10-CM

## 2023-05-06 RX ORDER — PREDNISONE 50 MG/1
50 TABLET ORAL DAILY
Qty: 5 TABLET | Refills: 0 | Status: SHIPPED | OUTPATIENT
Start: 2023-05-06 | End: 2023-05-11

## 2023-05-06 NOTE — PATIENT INSTRUCTIONS
1  Take prednisone 50mg  Daily x 5 days  2  Cool compresses to skin as needed for comfort  3  Benadryl as needed for itching  4  Follow up with Allergist if symptoms persist  5  Proceed to  ER if symptoms worsen

## 2023-05-06 NOTE — PROGRESS NOTES
[facility]        NAME: Meche Walsh is a 34 y o  female  : 1995    MRN: [id number]  DATE: May 6, 2023  TIME: 7:00 PM    Assessment and Plan   Urticaria [L50 9]  1  Urticaria  predniSONE 50 mg tablet            Patient Instructions     1  Take prednisone 50mg  Daily x 5 days  2  Cool compresses to skin as needed for comfort  3  Benadryl as needed for itching  4  Follow up with Allergist if symptoms persist  5  Proceed to  ER if symptoms worsen  Chief Complaint     Chief Complaint   Patient presents with    Rash     Hives started on inner thighs approx an hour prior to arrival after taking walk outside; patient took benadryl with little relief; no new foods, medications, detergents, or insect bites     Denies airway difficulty but states she feels mildly short of breath    Lip Swelling     Left lip swelling that started an hour prior to arrival          History of Present Illness       Kristie Bauman is a 28-year-old female who presents with a 1 day history of lip swelling and hives  Patient reports she was walking whether daughter when she started developing swelling of her left lower lip followed by hives on her chest, back and extremities  She denies use of any new medications, she reports no new foods, beverages or clothing  Patient denies any difficulty breathing or swallowing  She had no vomiting since the onset of her symptoms  Review of Systems   Review of Systems   Constitutional: Negative  HENT: Negative  Respiratory: Negative  Gastrointestinal: Negative  Skin: Positive for rash           Current Medications       Current Outpatient Medications:     predniSONE 50 mg tablet, Take 1 tablet (50 mg total) by mouth daily for 5 days, Disp: 5 tablet, Rfl: 0    fluticasone (FLONASE) 50 mcg/act nasal spray, 2 sprays into each nostril daily as needed for rhinitis, Disp: 54 6 mL, Rfl: 2    levonorgestrel (MIRENA) 20 MCG/24HR IUD, 1 each by Intrauterine route once, Disp: , Rfl:     metFORMIN (GLUCOPHAGE-XR) 500 mg 24 hr tablet, Take 3 tablets (1,500 mg total) by mouth daily with dinner, Disp: 270 tablet, Rfl: 0    montelukast (SINGULAIR) 10 mg tablet, Take 1 tablet (10 mg total) by mouth daily at bedtime, Disp: 90 tablet, Rfl: 0    multivitamin (THERAGRAN) TABS, Take 1 tablet by mouth daily, Disp: , Rfl:     omeprazole (PriLOSEC) 20 mg delayed release capsule, Take 1 capsule (20 mg total) by mouth daily, Disp: 90 capsule, Rfl: 0    sertraline (Zoloft) 25 mg tablet, Take 1 tablet (25 mg total) by mouth daily, Disp: 90 tablet, Rfl: 0    Current Allergies     Allergies as of 05/06/2023    (No Known Allergies)            The following portions of the patient's history were reviewed and updated as appropriate: allergies, current medications, past family history, past medical history, past social history, past surgical history and problem list      Past Medical History:   Diagnosis Date    Anxiety     Asthma 2005    BMI 40 0-44 9, adult ([facility] [address] ) 03/11/2021    Depression     Diabetes mellitus ([facility] )     Gestational diabetes    History of ovarian cyst 2018    Hyperlipidemia     Migraine     Mild intermittent asthma     Morbid obesity ([facility] )     Other headache syndrome 11/16/2021    PID (pelvic inflammatory disease) 2018    Polycystic ovary syndrome     Pre-diabetes     Varicella     had vaccine    Vitamin D deficiency        Past Surgical History:   Procedure Laterality Date    WISDOM TOOTH EXTRACTION         Family History   Problem Relation Age of Onset    JOSELINE disease Mother     Diabetes unspecified Mother     Asthma Mother     Seizures Father 15    Asperger's syndrome Sister     Autism Brother     No Known Problems Son     Colon cancer Maternal Grandmother     Heart attack Maternal Grandfather     Heart disease Maternal Grandfather     No Known Problems Paternal Grandmother     No Known Problems Paternal Grandfather          Medications have been "verified  Objective   /64   Pulse 89   Temp 98 9 °F (37 2 °C) (Temporal)   Resp 22   Ht 5' 6\" (1 676 m)   Wt 118 kg (260 lb)   SpO2 99%   BMI 41 97 kg/m²   No LMP recorded  Physical Exam     Physical Exam  Constitutional:       General: She is not in acute distress  Appearance: Normal appearance  She is not ill-appearing  HENT:      Head: Normocephalic and atraumatic  Right Ear: Tympanic membrane and ear canal normal       Left Ear: Tympanic membrane and ear canal normal       Nose: Nose normal       Mouth/Throat:      Lips: Pink  Pharynx: No pharyngeal swelling, oropharyngeal exudate or posterior oropharyngeal erythema  Cardiovascular:      Rate and Rhythm: Normal rate and regular rhythm  Heart sounds: Normal heart sounds, S1 normal and S2 normal  No murmur heard  Pulmonary:      Effort: Pulmonary effort is normal       Breath sounds: Normal breath sounds and air entry  Skin:     Comments: Diffuse, raised erythematous patches noted on the chest, back and lower legs  There are several embedded hives and swelling of the left lower lip but no swelling of the tongue or mucous membranes  Neurological:      Mental Status: She is alert                     "

## 2023-06-14 NOTE — ASSESSMENT & PLAN NOTE
Pending labs  Continue PNV and OTC Vitamin D 2,000IU daily  Home Suture Removal Text: Patient was provided a home suture removal kit and will remove their sutures at home.  If they have any questions or difficulties they will call the office.

## 2023-08-16 DIAGNOSIS — F41.9 ANXIETY: ICD-10-CM

## 2023-08-16 DIAGNOSIS — K21.9 GASTROESOPHAGEAL REFLUX DISEASE, UNSPECIFIED WHETHER ESOPHAGITIS PRESENT: ICD-10-CM

## 2023-08-16 RX ORDER — SERTRALINE HYDROCHLORIDE 25 MG/1
25 TABLET, FILM COATED ORAL DAILY
Qty: 90 TABLET | Refills: 0 | Status: SHIPPED | OUTPATIENT
Start: 2023-08-16

## 2023-08-16 RX ORDER — OMEPRAZOLE 20 MG/1
20 CAPSULE, DELAYED RELEASE ORAL DAILY
Qty: 90 CAPSULE | Refills: 0 | Status: SHIPPED | OUTPATIENT
Start: 2023-08-16 | End: 2023-11-14

## 2023-08-19 ENCOUNTER — OFFICE VISIT (OUTPATIENT)
Dept: URGENT CARE | Facility: MEDICAL CENTER | Age: 28
End: 2023-08-19
Payer: COMMERCIAL

## 2023-08-19 VITALS
HEIGHT: 66 IN | WEIGHT: 260 LBS | BODY MASS INDEX: 41.78 KG/M2 | OXYGEN SATURATION: 100 % | TEMPERATURE: 98.9 F | RESPIRATION RATE: 22 BRPM | HEART RATE: 118 BPM

## 2023-08-19 DIAGNOSIS — J02.9 ACUTE PHARYNGITIS, UNSPECIFIED ETIOLOGY: ICD-10-CM

## 2023-08-19 DIAGNOSIS — J06.9 UPPER RESPIRATORY TRACT INFECTION, UNSPECIFIED TYPE: Primary | ICD-10-CM

## 2023-08-19 LAB — S PYO AG THROAT QL: NEGATIVE

## 2023-08-19 PROCEDURE — 87880 STREP A ASSAY W/OPTIC: CPT | Performed by: PHYSICIAN ASSISTANT

## 2023-08-19 PROCEDURE — 99213 OFFICE O/P EST LOW 20 MIN: CPT | Performed by: PHYSICIAN ASSISTANT

## 2023-08-19 NOTE — PATIENT INSTRUCTIONS
1. Increase fluids  2. Motrin as needed for throat pain  3. Nasal saline as needed for congestion  4.  Follow up with PCP in 3-5 days if symptoms persist.

## 2023-08-19 NOTE — PROGRESS NOTES
[facility]        NAME: Shyla Souza a 34 y.o. female  : 1995    MRN: [id number]  DATE: 2023  TIME: 8:43 AM    Assessment and Plan   Upper respiratory tract infection, unspecified type [J06.9]  1. Upper respiratory tract infection, unspecified type        2. Acute pharyngitis, unspecified etiology  POCT rapid strepA            Patient Instructions     1. Increase fluids  2. Motrin as needed for throat pain  3. Nasal saline as needed for congestion  4. Follow up with PCP in 3-5 days if symptoms persist.    Chief Complaint     Chief Complaint   Patient presents with   • Sore Throat     Sore throat 3-5 days ago with white patches; strep exposure          History of Present Illness       Cristin 32-year-old female presents with a 3-day history of acute onset sore throat, nasal congestion and runny nose. Patient reports no fever, chills or body aches. She has reported a slight cough and fatigue since the onset of her illness. Patient reports her son has similar symptoms. Sore Throat   Associated symptoms include congestion and coughing. Review of Systems   Review of Systems   Constitutional: Negative. HENT: Positive for congestion, postnasal drip, rhinorrhea and sore throat. Respiratory: Positive for cough.           Current Medications       Current Outpatient Medications:   •  fluticasone (FLONASE) 50 mcg/act nasal spray, 2 sprays into each nostril daily as needed for rhinitis, Disp: 54.6 mL, Rfl: 2  •  levonorgestrel (MIRENA) 20 MCG/24HR IUD, 1 each by Intrauterine route once, Disp: , Rfl:   •  metFORMIN (GLUCOPHAGE-XR) 500 mg 24 hr tablet, Take 3 tablets (1,500 mg total) by mouth daily with dinner, Disp: 270 tablet, Rfl: 0  •  montelukast (SINGULAIR) 10 mg tablet, Take 1 tablet (10 mg total) by mouth daily at bedtime, Disp: 90 tablet, Rfl: 0  •  multivitamin (THERAGRAN) TABS, Take 1 tablet by mouth daily, Disp: , Rfl:   •  omeprazole (PriLOSEC) 20 mg delayed release capsule, Take 1 capsule (20 mg total) by mouth daily, Disp: 90 capsule, Rfl: 0  •  sertraline (Zoloft) 25 mg tablet, Take 1 tablet (25 mg total) by mouth daily, Disp: 90 tablet, Rfl: 0    Current Allergies     Allergies as of 08/19/2023   • (No Known Allergies)            The following portions of the patient's history were reviewed and updated as appropriate: allergies, current medications, past family history, past medical history, past social history, past surgical history and problem list.     Past Medical History:   Diagnosis Date   • Anxiety    • Asthma 2005   • BMI 40.0-44.9, adult ([address]) 03/11/2021   • Depression    • Diabetes mellitus ([address])     Gestational diabetes   • History of ovarian cyst 2018   • Hyperlipidemia    • Migraine    • Mild intermittent asthma    • Morbid obesity ([address])    • Other headache syndrome 11/16/2021   • PID (pelvic inflammatory disease) 2018   • Polycystic ovary syndrome    • Pre-diabetes    • Varicella     had vaccine   • Vitamin D deficiency        Past Surgical History:   Procedure Laterality Date   • WISDOM TOOTH EXTRACTION         Family History   Problem Relation Age of Onset   • JOSELINE disease Mother    • Diabetes unspecified Mother    • Asthma Mother    • Seizures Father 15   • Asperger's syndrome Sister    • Autism Brother    • No Known Problems Son    • Colon cancer Maternal Grandmother    • Heart attack Maternal Grandfather    • Heart disease Maternal Grandfather    • No Known Problems Paternal Grandmother    • No Known Problems Paternal Grandfather          Medications have been verified. Objective   Pulse (!) 118   Temp 98.9 °F (37.2 °C) (Temporal)   Resp 22   Ht 5' 6" (1.676 m)   Wt 118 kg (260 lb)   SpO2 100%   BMI 41.97 kg/m²   No LMP recorded. Physical Exam     Physical Exam  Constitutional:       General: She is not in acute distress. Appearance: She is well-developed. She is not ill-appearing.    HENT:      Head: Normocephalic and atraumatic. Right Ear: Tympanic membrane and ear canal normal.      Left Ear: Tympanic membrane and ear canal normal.      Nose: Congestion and rhinorrhea present. Rhinorrhea is clear. Mouth/Throat:      Lips: Pink. Pharynx: Posterior oropharyngeal erythema present. No oropharyngeal exudate. Cardiovascular:      Rate and Rhythm: Normal rate and regular rhythm. Heart sounds: Normal heart sounds, S1 normal and S2 normal. No murmur heard. Pulmonary:      Effort: Pulmonary effort is normal.      Breath sounds: Normal breath sounds and air entry. Neurological:      Mental Status: She is alert.

## 2023-08-31 ENCOUNTER — OFFICE VISIT (OUTPATIENT)
Dept: INTERNAL MEDICINE CLINIC | Age: 28
End: 2023-08-31
Payer: COMMERCIAL

## 2023-08-31 VITALS
SYSTOLIC BLOOD PRESSURE: 124 MMHG | OXYGEN SATURATION: 97 % | WEIGHT: 252 LBS | TEMPERATURE: 97.2 F | HEART RATE: 72 BPM | DIASTOLIC BLOOD PRESSURE: 72 MMHG | BODY MASS INDEX: 40.5 KG/M2 | HEIGHT: 66 IN

## 2023-08-31 DIAGNOSIS — R53.83 OTHER FATIGUE: ICD-10-CM

## 2023-08-31 DIAGNOSIS — E78.5 HYPERLIPIDEMIA, UNSPECIFIED HYPERLIPIDEMIA TYPE: ICD-10-CM

## 2023-08-31 DIAGNOSIS — E66.01 CLASS 3 SEVERE OBESITY DUE TO EXCESS CALORIES WITHOUT SERIOUS COMORBIDITY WITH BODY MASS INDEX (BMI) OF 40.0 TO 44.9 IN ADULT (HCC): ICD-10-CM

## 2023-08-31 DIAGNOSIS — R73.01 IFG (IMPAIRED FASTING GLUCOSE): ICD-10-CM

## 2023-08-31 DIAGNOSIS — J45.30 MILD PERSISTENT ASTHMA WITHOUT COMPLICATION: ICD-10-CM

## 2023-08-31 DIAGNOSIS — F41.9 ANXIETY: Primary | ICD-10-CM

## 2023-08-31 DIAGNOSIS — K21.9 GASTROESOPHAGEAL REFLUX DISEASE, UNSPECIFIED WHETHER ESOPHAGITIS PRESENT: ICD-10-CM

## 2023-08-31 PROCEDURE — 99214 OFFICE O/P EST MOD 30 MIN: CPT | Performed by: PHYSICIAN ASSISTANT

## 2023-08-31 RX ORDER — METFORMIN HYDROCHLORIDE 500 MG/1
1500 TABLET, EXTENDED RELEASE ORAL
Qty: 270 TABLET | Refills: 1 | Status: SHIPPED | OUTPATIENT
Start: 2023-08-31

## 2023-08-31 NOTE — PROGRESS NOTES
Assessment/Plan:         Diagnoses and all orders for this visit:    Anxiety  Comments:  increase sertraline to 50mg daily  call if not improved after 3-4 weeks      Orders:  -     sertraline (Zoloft) 50 mg tablet; Take 1 tablet (50 mg total) by mouth daily    IFG (impaired fasting glucose)  Comments:  continue metformin  check labs  encourage daily aerobic exercise  low carb diet   Orders:  -     metFORMIN (GLUCOPHAGE-XR) 500 mg 24 hr tablet; Take 3 tablets (1,500 mg total) by mouth daily with dinner  -     Comprehensive metabolic panel; Future  -     CBC and differential; Future  -     Lipid panel; Future  -     TSH, 3rd generation; Future  -     HEMOGLOBIN A1C W/ EAG ESTIMATION; Future    Hyperlipidemia, unspecified hyperlipidemia type  Comments:  low chol diet   Orders:  -     Comprehensive metabolic panel; Future  -     CBC and differential; Future  -     Lipid panel; Future  -     TSH, 3rd generation; Future  -     HEMOGLOBIN A1C W/ EAG ESTIMATION; Future    Gastroesophageal reflux disease, unspecified whether esophagitis present    Mild persistent asthma without complication  -     Comprehensive metabolic panel; Future  -     CBC and differential; Future  -     Lipid panel; Future  -     TSH, 3rd generation; Future  -     HEMOGLOBIN A1C W/ EAG ESTIMATION; Future    Class 3 severe obesity due to excess calories without serious comorbidity with body mass index (BMI) of 40.0 to 44.9 in adult [facility])  Comments:  discussed weight management referral  recommend weighted exercises and low carb diet   Orders:  -     Ambulatory Referral to Weight Management; Future  -     Comprehensive metabolic panel; Future  -     CBC and differential; Future  -     Lipid panel; Future  -     TSH, 3rd generation; Future  -     HEMOGLOBIN A1C W/ EAG ESTIMATION; Future    Other fatigue  -     Comprehensive metabolic panel; Future  -     CBC and differential; Future  -     TSH, 3rd generation;  Future          Subjective:      Patient ID: Chester Simmonds is a 34 y.o. female. 33 y/o female with hx of MEGAN, impaired fbs, asthma presents for f/u  Pt reports increased anxiety over losing her job and would like to try to increase her sertraline dose due to this  Pt has started a new job approx 3 weeks ago and has increasing stress with this   Bp well controlled     Continue metformin   No recent labs  hga1c due   Pt reports difficulty with weight loss  Has tried to cut down on sugar and sweets   Going for walks     Pt reports hx of headaches but reports migraine h/a over the summer, photophobia, nausea, sound sensitivity   Lasted for a few hours and took excedrin which helped her sx  She rested and it went away  Had not had similar h/a in past         The following portions of the patient's history were reviewed and updated as appropriate: allergies, current medications, past family history, past medical history, past social history, past surgical history and problem list.    Review of Systems   Constitutional: Negative for activity change, appetite change, chills, diaphoresis, fatigue and fever. HENT: Negative for congestion and sore throat. Eyes: Negative for pain and redness. Respiratory: Negative for cough, shortness of breath and wheezing. Cardiovascular: Negative for chest pain and leg swelling. Gastrointestinal: Negative for abdominal pain, constipation, diarrhea and nausea. Genitourinary: Negative for dysuria, flank pain and frequency. Musculoskeletal: Negative for arthralgias and back pain. Skin: Negative for rash. Neurological: Positive for headaches. Negative for dizziness and light-headedness. Psychiatric/Behavioral: Negative for sleep disturbance. The patient is not nervous/anxious.           Past Medical History:   Diagnosis Date   • Anxiety    • Asthma 2005   • BMI 40.0-44.9, adult ([facility]) 03/11/2021   • Depression    • Diabetes mellitus ([facility])     Gestational diabetes   • History of ovarian cyst 2018   • Hyperlipidemia    • Migraine    • Mild intermittent asthma    • Morbid obesity ([address])    • Other headache syndrome 11/16/2021   • PID (pelvic inflammatory disease) 2018   • Polycystic ovary syndrome    • Pre-diabetes    • Varicella     had vaccine   • Vitamin D deficiency          Current Outpatient Medications:   •  fluticasone (FLONASE) 50 mcg/act nasal spray, 2 sprays into each nostril daily as needed for rhinitis, Disp: 54.6 mL, Rfl: 2  •  levonorgestrel (MIRENA) 20 MCG/24HR IUD, 1 each by Intrauterine route once, Disp: , Rfl:   •  metFORMIN (GLUCOPHAGE-XR) 500 mg 24 hr tablet, Take 3 tablets (1,500 mg total) by mouth daily with dinner, Disp: 270 tablet, Rfl: 1  •  montelukast (SINGULAIR) 10 mg tablet, Take 1 tablet (10 mg total) by mouth daily at bedtime, Disp: 90 tablet, Rfl: 0  •  multivitamin (THERAGRAN) TABS, Take 1 tablet by mouth daily, Disp: , Rfl:   •  omeprazole (PriLOSEC) 20 mg delayed release capsule, Take 1 capsule (20 mg total) by mouth daily, Disp: 90 capsule, Rfl: 0  •  sertraline (Zoloft) 50 mg tablet, Take 1 tablet (50 mg total) by mouth daily, Disp: 90 tablet, Rfl: 2    No Known Allergies    Social History   Past Surgical History:   Procedure Laterality Date   • WISDOM TOOTH EXTRACTION       Family History   Problem Relation Age of Onset   • JOSELINE disease Mother    • Diabetes unspecified Mother    • Asthma Mother    • Seizures Father 15   • Asperger's syndrome Sister    • Autism Brother    • No Known Problems Son    • Colon cancer Maternal Grandmother    • Heart attack Maternal Grandfather    • Heart disease Maternal Grandfather    • No Known Problems Paternal Grandmother    • No Known Problems Paternal Grandfather        Objective:  /72 (BP Location: Left arm, Patient Position: Sitting, Cuff Size: Large)   Pulse 72   Temp (!) 97.2 °F (36.2 °C) (Temporal)   Ht 5' 6" (1.676 m)   Wt 114 kg (252 lb)   SpO2 97% Comment: room air  BMI 40.67 kg/m²        Physical Exam  Vitals reviewed. Constitutional:       General: She is not in acute distress. Appearance: She is obese. HENT:      Head: Normocephalic and atraumatic. Right Ear: Tympanic membrane, ear canal and external ear normal.      Left Ear: Tympanic membrane, ear canal and external ear normal.      Nose: Nose normal.      Mouth/Throat:      Mouth: Mucous membranes are moist.   Eyes:      General:         Right eye: No discharge. Left eye: No discharge. Extraocular Movements: Extraocular movements intact. Conjunctiva/sclera: Conjunctivae normal.      Pupils: Pupils are equal, round, and reactive to light. Cardiovascular:      Rate and Rhythm: Normal rate and regular rhythm. Pulmonary:      Effort: Pulmonary effort is normal. No respiratory distress. Breath sounds: Normal breath sounds. No wheezing, rhonchi or rales. Musculoskeletal:         General: Normal range of motion. Cervical back: Normal range of motion. Right lower leg: No edema. Left lower leg: No edema. Lymphadenopathy:      Cervical: No cervical adenopathy. Skin:     General: Skin is warm. Findings: No erythema or rash. Neurological:      General: No focal deficit present. Mental Status: She is alert and oriented to person, place, and time.    Psychiatric:         Mood and Affect: Mood normal.         Behavior: Behavior normal.

## 2023-09-07 DIAGNOSIS — J01.10 ACUTE NON-RECURRENT FRONTAL SINUSITIS: Primary | ICD-10-CM

## 2023-09-07 RX ORDER — AZITHROMYCIN 250 MG/1
TABLET, FILM COATED ORAL
Qty: 6 TABLET | Refills: 0 | Status: SHIPPED | OUTPATIENT
Start: 2023-09-07 | End: 2023-09-12

## 2023-10-05 NOTE — PROGRESS NOTES
Weight Management Medical Nutrition Assessment  Roddy Perez is here for medical meal planning. Current wt: 245.3 lbs. Has been working on weight loss since May 2023 and has lost ~15 lbs. Currently on metformin. Has done [facility] in the past and thinking about rejoining. Today looking for basic guidance. Biggest issue is skipping breakfast. Encouraged to have something within 2 hours. May benefit from use of protein shake. Questions answered re: calories needs, exercise, rice, portion sizes. Meal plan and other resources provided. Options for f/u reviewed. She did not wish to schedule a f/u at this time but will reach out if needed.      Dislikes: greek yogurt    Patient seen by Medical Provider in past 6 months:  no  Requested to schedule appointment with Medical Provider: No    Anthropometric Measurements  Start Weight (#): 245.3 lbs 10/6/23  Current Weight (#):  -  TBW % Change from start weight: -  Ideal Body Weight (#): 151.9 lbs BMI 25 (127.5 lbs 65.5")  Goal Weight (#): 200 lbs   Highest: 260 lbs   Lowest: 180 lbs     Weight Loss History  Previous weight loss attempts: metformin, [facility], diet, exercise      Food and Nutrition Related History  Wake up: 5:15-5:30   Bed Time: 9:30    Food Recall  Breakfast: 6:15: 2 hard boiled eggs OR oatmeal packet OR DD avocado toast OR cereal (frosted or cheerios) w/whole  OR skip most days    Snack: skip OR kids granola bar OR cheez its   Lunch: 12-2:00: premade salad OR 2-3 deli meat, 1 cheese, unsweetened applesauce or apple, veggies, sometimes veggie straws   Snack: skip OR tuna pack  Dinner: 5:30-6:30: ~5 oz protein, 1/2-1 cup carb, ~1 cup veggies   Snack:8:30-9:30  freeze pops OR 2 Armenian    Beverages: water, sugar free beverages, regular soda and diet soda  Volume of beverage intake: good water intake     Weekends: Same  Cravings: freeze pops   Trouble area of day: morning     Frequency of Eating out: 2-3x/wk  Food restrictions: n/a  Cooking: self   Food Shopping: self    Physical Activity Intake  Activity:Walking  Frequency:several times per week  Physical limitations/barriers to exercise: n/a    Estimated Needs  Energy  SECA: BMR: n/a     X 1.3 -1000 =  Mitch Lewis Energy Needs: BMR :  4122  1-2# loss weekly sedentary: 6443-8918            1-2# loss weekly lightly active: 2617-0973  Maintenance calories for sedentary activity level: 2234  Protein: 70-87 gm      (1.2-1.5g/kg IBW)  Fluid: 68 oz     (35mL/kg IBW)    Nutrition Diagnosis  Yes;     Overweight/obesity  related to Excess energy intake as evidenced by  BMI more than normative standard for age and sex (obesity-grade III 36+)       Nutrition Intervention    Nutrition Prescription  Calories:6754-6056  Protein:  gm    Meal Plan (Jayant/Pro)  Breakfast: 200-225, 20-30  Snack: 0-150, 0-10  Lunch: 250-350, 20  Snack: 0-150, 0-10  Dinner: 400, 35  Snack: 100-150, 5-10    Nutrition Education:    Calorie controlled menu  Lean protein food choices  Healthy snack options  Food journaling tips      Nutrition Counseling:  Strategies: meal planning, portion sizes, healthy snack choices, hydration, fiber intake, protein intake, exercise, food journal      Monitoring and Evaluation:  Evaluation criteria:  Energy Intake  Meet protein needs  Maintain adequate hydration  Monitor weekly weight  Meal planning/preparation  Food journal   Decreased portions at mealtimes and snacks  Physical activity     Barriers to learning:none  Readiness to change: Action:  (Changing behavior)  Comprehension: very good  Expected Compliance: very good

## 2023-10-06 ENCOUNTER — OFFICE VISIT (OUTPATIENT)
Dept: BARIATRICS | Facility: CLINIC | Age: 28
End: 2023-10-06

## 2023-10-06 VITALS — HEIGHT: 66 IN | WEIGHT: 245.3 LBS | BODY MASS INDEX: 39.42 KG/M2

## 2023-10-06 DIAGNOSIS — E66.01 CLASS 3 SEVERE OBESITY DUE TO EXCESS CALORIES WITHOUT SERIOUS COMORBIDITY WITH BODY MASS INDEX (BMI) OF 40.0 TO 44.9 IN ADULT (HCC): ICD-10-CM

## 2023-10-06 PROCEDURE — WMDI30

## 2023-10-06 PROCEDURE — RECHECK

## 2023-10-30 ENCOUNTER — APPOINTMENT (OUTPATIENT)
Dept: LAB | Facility: CLINIC | Age: 28
End: 2023-10-30
Payer: COMMERCIAL

## 2023-10-30 DIAGNOSIS — R53.83 OTHER FATIGUE: ICD-10-CM

## 2023-10-30 DIAGNOSIS — J45.30 MILD PERSISTENT ASTHMA WITHOUT COMPLICATION: ICD-10-CM

## 2023-10-30 DIAGNOSIS — E66.01 CLASS 3 SEVERE OBESITY DUE TO EXCESS CALORIES WITHOUT SERIOUS COMORBIDITY WITH BODY MASS INDEX (BMI) OF 40.0 TO 44.9 IN ADULT (HCC): ICD-10-CM

## 2023-10-30 DIAGNOSIS — R73.01 IFG (IMPAIRED FASTING GLUCOSE): ICD-10-CM

## 2023-10-30 DIAGNOSIS — E78.5 HYPERLIPIDEMIA, UNSPECIFIED HYPERLIPIDEMIA TYPE: ICD-10-CM

## 2023-10-30 LAB
ALBUMIN SERPL BCP-MCNC: 4.3 G/DL (ref 3.5–5)
ALP SERPL-CCNC: 52 U/L (ref 34–104)
ALT SERPL W P-5'-P-CCNC: 28 U/L (ref 7–52)
ANION GAP SERPL CALCULATED.3IONS-SCNC: 10 MMOL/L
AST SERPL W P-5'-P-CCNC: 21 U/L (ref 13–39)
BASOPHILS # BLD AUTO: 0.04 THOUSANDS/ÂΜL (ref 0–0.1)
BASOPHILS NFR BLD AUTO: 1 % (ref 0–1)
BILIRUB SERPL-MCNC: 1.1 MG/DL (ref 0.2–1)
BUN SERPL-MCNC: 15 MG/DL (ref 5–25)
CALCIUM SERPL-MCNC: 9.2 MG/DL (ref 8.4–10.2)
CHLORIDE SERPL-SCNC: 103 MMOL/L (ref 96–108)
CHOLEST SERPL-MCNC: 181 MG/DL
CO2 SERPL-SCNC: 25 MMOL/L (ref 21–32)
CREAT SERPL-MCNC: 0.65 MG/DL (ref 0.6–1.3)
EOSINOPHIL # BLD AUTO: 0.11 THOUSAND/ÂΜL (ref 0–0.61)
EOSINOPHIL NFR BLD AUTO: 2 % (ref 0–6)
ERYTHROCYTE [DISTWIDTH] IN BLOOD BY AUTOMATED COUNT: 13.3 % (ref 11.6–15.1)
EST. AVERAGE GLUCOSE BLD GHB EST-MCNC: 117 MG/DL
GFR SERPL CREATININE-BSD FRML MDRD: 122 ML/MIN/1.73SQ M
GLUCOSE P FAST SERPL-MCNC: 91 MG/DL (ref 65–99)
HBA1C MFR BLD: 5.7 %
HCT VFR BLD AUTO: 41.8 % (ref 34.8–46.1)
HDLC SERPL-MCNC: 55 MG/DL
HGB BLD-MCNC: 14 G/DL (ref 11.5–15.4)
IMM GRANULOCYTES # BLD AUTO: 0.03 THOUSAND/UL (ref 0–0.2)
IMM GRANULOCYTES NFR BLD AUTO: 0 % (ref 0–2)
LDLC SERPL CALC-MCNC: 101 MG/DL (ref 0–100)
LYMPHOCYTES # BLD AUTO: 2.19 THOUSANDS/ÂΜL (ref 0.6–4.47)
LYMPHOCYTES NFR BLD AUTO: 31 % (ref 14–44)
MCH RBC QN AUTO: 30.4 PG (ref 26.8–34.3)
MCHC RBC AUTO-ENTMCNC: 33.5 G/DL (ref 31.4–37.4)
MCV RBC AUTO: 91 FL (ref 82–98)
MONOCYTES # BLD AUTO: 0.36 THOUSAND/ÂΜL (ref 0.17–1.22)
MONOCYTES NFR BLD AUTO: 5 % (ref 4–12)
NEUTROPHILS # BLD AUTO: 4.37 THOUSANDS/ÂΜL (ref 1.85–7.62)
NEUTS SEG NFR BLD AUTO: 61 % (ref 43–75)
NONHDLC SERPL-MCNC: 126 MG/DL
NRBC BLD AUTO-RTO: 0 /100 WBCS
PLATELET # BLD AUTO: 278 THOUSANDS/UL (ref 149–390)
PMV BLD AUTO: 10.4 FL (ref 8.9–12.7)
POTASSIUM SERPL-SCNC: 4.2 MMOL/L (ref 3.5–5.3)
PROT SERPL-MCNC: 7.4 G/DL (ref 6.4–8.4)
RBC # BLD AUTO: 4.61 MILLION/UL (ref 3.81–5.12)
SODIUM SERPL-SCNC: 138 MMOL/L (ref 135–147)
TRIGL SERPL-MCNC: 125 MG/DL
TSH SERPL DL<=0.05 MIU/L-ACNC: 1.69 UIU/ML (ref 0.45–4.5)
WBC # BLD AUTO: 7.1 THOUSAND/UL (ref 4.31–10.16)

## 2023-10-30 PROCEDURE — 85025 COMPLETE CBC W/AUTO DIFF WBC: CPT

## 2023-10-30 PROCEDURE — 80053 COMPREHEN METABOLIC PANEL: CPT

## 2023-10-30 PROCEDURE — 83036 HEMOGLOBIN GLYCOSYLATED A1C: CPT

## 2023-10-30 PROCEDURE — 36415 COLL VENOUS BLD VENIPUNCTURE: CPT

## 2023-10-30 PROCEDURE — 84443 ASSAY THYROID STIM HORMONE: CPT

## 2023-10-30 PROCEDURE — 80061 LIPID PANEL: CPT

## 2023-12-19 ENCOUNTER — HOSPITAL ENCOUNTER (EMERGENCY)
Facility: HOSPITAL | Age: 28
Discharge: HOME | End: 2023-12-19
Attending: EMERGENCY MEDICINE

## 2023-12-19 ENCOUNTER — APPOINTMENT (OUTPATIENT)
Dept: RADIOLOGY | Facility: HOSPITAL | Age: 28
End: 2023-12-19

## 2023-12-19 VITALS
BODY MASS INDEX: 40.5 KG/M2 | SYSTOLIC BLOOD PRESSURE: 117 MMHG | OXYGEN SATURATION: 99 % | TEMPERATURE: 98.2 F | HEART RATE: 84 BPM | WEIGHT: 252 LBS | RESPIRATION RATE: 18 BRPM | HEIGHT: 66 IN | DIASTOLIC BLOOD PRESSURE: 59 MMHG

## 2023-12-19 DIAGNOSIS — J06.9 VIRAL URI WITH COUGH: Primary | ICD-10-CM

## 2023-12-19 LAB
FLUAV RNA RESP QL NAA+PROBE: NEGATIVE
FLUBV RNA RESP QL NAA+PROBE: NEGATIVE
RSV RNA RESP QL NAA+PROBE: NEGATIVE
SARS-COV-2 RNA RESP QL NAA+PROBE: NEGATIVE

## 2023-12-19 PROCEDURE — 0241U HB NFCT DS VIR RESP RNA 4 TRGT: CPT | Performed by: EMERGENCY MEDICINE

## 2023-12-19 PROCEDURE — 71046 X-RAY EXAM CHEST 2 VIEWS: CPT

## 2023-12-19 PROCEDURE — 99284 EMERGENCY DEPT VISIT MOD MDM: CPT

## 2023-12-19 PROCEDURE — 99284 EMERGENCY DEPT VISIT MOD MDM: CPT | Performed by: PHYSICIAN ASSISTANT

## 2023-12-19 RX ORDER — BENZONATATE 100 MG/1
100 CAPSULE ORAL EVERY 8 HOURS
Qty: 21 CAPSULE | Refills: 0 | Status: SHIPPED | OUTPATIENT
Start: 2023-12-19

## 2023-12-19 NOTE — Clinical Note
Cristin De La Cruz was seen and treated in our emergency department on 12/19/2023.                Diagnosis:     Cristin  .    She may return on this date:     Cristin De La Cruz is excused from work Tuesday December 19     If you have any questions or concerns, please don't hesitate to call.      Bret Peralta PA-C    ______________________________           _______________          _______________  Hospital Representative                              Date                                Time

## 2023-12-19 NOTE — DISCHARGE INSTRUCTIONS
Tessalon perles for cough    Follow up with your primary care provider if no improvement next 2 to 3 days    Return to ED for fever, shortness of breath, worsening symptoms

## 2023-12-19 NOTE — ED PROVIDER NOTES
History  Chief Complaint   Patient presents with    Cough     Pt to er with reports of cough, congestion, and sore throat for 5 days. States she does not have insurance so she could not afford to go to urgent care or pcp      Patient is a 28-year-old white female with history of anxiety, asthma, gestational diabetes, hyperlipidemia, PCOS who reports 5-day history of cough, head congestion, sore throat only with coughing.  No fever or chills.  No ear pain.  Positive runny nose.  No shortness of breath or wheezing.  She had the flu shot.  She denies smoking.  No abdominal pain, nausea vomiting or diarrhea.        Prior to Admission Medications   Prescriptions Last Dose Informant Patient Reported? Taking?   fluticasone (FLONASE) 50 mcg/act nasal spray  Self No No   Si sprays into each nostril daily as needed for rhinitis   levonorgestrel (MIRENA) 20 MCG/24HR IUD  Self Yes No   Si each by Intrauterine route once   metFORMIN (GLUCOPHAGE-XR) 500 mg 24 hr tablet   No No   Sig: Take 3 tablets (1,500 mg total) by mouth daily with dinner   montelukast (SINGULAIR) 10 mg tablet  Self No No   Sig: Take 1 tablet (10 mg total) by mouth daily at bedtime   multivitamin (THERAGRAN) TABS  Self Yes No   Sig: Take 1 tablet by mouth daily   omeprazole (PriLOSEC) 20 mg delayed release capsule  Self No No   Sig: Take 1 capsule (20 mg total) by mouth daily   sertraline (Zoloft) 50 mg tablet   No No   Sig: Take 1 tablet (50 mg total) by mouth daily      Facility-Administered Medications: None       Past Medical History:   Diagnosis Date    Anxiety     Asthma     BMI 40.0-44.9, adult ([facility]) 2021    Depression     Diabetes mellitus ([facility])     Gestational diabetes    History of ovarian cyst     Hyperlipidemia     Migraine     Mild intermittent asthma     Morbid obesity ([facility])     Other headache syndrome 2021    PID (pelvic inflammatory disease) 2018    Polycystic ovary syndrome     Pre-diabetes     Varicella     had  vaccine    Vitamin D deficiency        Past Surgical History:   Procedure Laterality Date    WISDOM TOOTH EXTRACTION         Family History   Problem Relation Age of Onset    JOSELINE disease Mother     Diabetes unspecified Mother     Asthma Mother     Seizures Father 13    Asperger's syndrome Sister     Autism Brother     No Known Problems Son     Colon cancer Maternal Grandmother     Heart attack Maternal Grandfather     Heart disease Maternal Grandfather     No Known Problems Paternal Grandmother     No Known Problems Paternal Grandfather      I have reviewed and agree with the history as documented.    E-Cigarette/Vaping    E-Cigarette Use Never User      E-Cigarette/Vaping Substances    Nicotine No     THC No     CBD No     Flavoring No     Other No     Unknown No      Social History     Tobacco Use    Smoking status: Former     Current packs/day: 0.00     Average packs/day: 0.1 packs/day for 0.4 years     Types: Cigarettes     Start date: 2017     Quit date: 10/1/2017     Years since quittin.2    Smokeless tobacco: Never    Tobacco comments:     Smoked socially    Vaping Use    Vaping status: Never Used   Substance Use Topics    Alcohol use: Yes     Alcohol/week: 1.0 standard drink of alcohol     Types: 1 Glasses of wine per week     Comment: socially    Drug use: Never       Review of Systems   Constitutional:  Negative for chills and fever.   HENT:  Positive for congestion and rhinorrhea. Negative for ear pain, sneezing and sore throat.    Eyes:  Negative for pain.   Respiratory:  Positive for cough. Negative for shortness of breath.    Cardiovascular:  Negative for chest pain and palpitations.   Gastrointestinal:  Negative for abdominal pain, diarrhea, nausea and vomiting.   Genitourinary:  Negative for dysuria and hematuria.   Skin:  Negative for rash.   Neurological:  Negative for syncope and headaches.   All other systems reviewed and are negative.      Physical Exam  Physical Exam  Vitals and nursing  note reviewed.   Constitutional:       General: She is not in acute distress.     Appearance: Normal appearance. She is not ill-appearing, toxic-appearing or diaphoretic.   HENT:      Head: Normocephalic and atraumatic.      Right Ear: Tympanic membrane, ear canal and external ear normal.      Left Ear: Tympanic membrane, ear canal and external ear normal.      Nose: Nose normal.      Mouth/Throat:      Pharynx: Oropharynx is clear.   Eyes:      Extraocular Movements: Extraocular movements intact.      Conjunctiva/sclera: Conjunctivae normal.      Pupils: Pupils are equal, round, and reactive to light.   Cardiovascular:      Rate and Rhythm: Normal rate and regular rhythm.      Pulses: Normal pulses.      Heart sounds: Normal heart sounds.   Pulmonary:      Effort: Pulmonary effort is normal.      Breath sounds: Normal breath sounds.   Abdominal:      General: Abdomen is flat. Bowel sounds are normal.      Palpations: Abdomen is soft.   Musculoskeletal:         General: Normal range of motion.      Cervical back: Normal range of motion and neck supple.   Skin:     General: Skin is warm and dry.      Capillary Refill: Capillary refill takes less than 2 seconds.   Neurological:      General: No focal deficit present.      Mental Status: She is alert and oriented to person, place, and time. Mental status is at baseline.         Vital Signs  ED Triage Vitals [12/19/23 0751]   Temperature Pulse Respirations Blood Pressure SpO2   98.2 °F (36.8 °C) 84 18 117/59 99 %      Temp Source Heart Rate Source Patient Position - Orthostatic VS BP Location FiO2 (%)   Oral Monitor Sitting Left arm --      Pain Score       --           Vitals:    12/19/23 0751   BP: 117/59   Pulse: 84   Patient Position - Orthostatic VS: Sitting         Visual Acuity      ED Medications  Medications - No data to display    Diagnostic Studies  Results Reviewed       Procedure Component Value Units Date/Time    COVID/FLU/RSV [[id number]]  (Normal)  Collected: 12/19/23 0753    Lab Status: Final result Specimen: Nares from Nose Updated: 12/19/23 0910     SARS-CoV-2 Negative     INFLUENZA A PCR Negative     INFLUENZA B PCR Negative     RSV PCR Negative    Narrative:      FOR PEDIATRIC PATIENTS - copy/paste COVID Guidelines URL to browser: [email or web address]    SARS-CoV-2 assay is a Nucleic Acid Amplification assay intended for the  qualitative detection of nucleic acid from SARS-CoV-2 in nasopharyngeal  swabs. Results are for the presumptive identification of SARS-CoV-2 RNA.    Positive results are indicative of infection with SARS-CoV-2, the virus  causing COVID-19, but do not rule out bacterial infection or co-infection  with other viruses. Laboratories within the [place] and its  territories are required to report all positive results to the appropriate  public health authorities. Negative results do not preclude SARS-CoV-2  infection and should not be used as the sole basis for treatment or other  patient management decisions. Negative results must be combined with  clinical observations, patient history, and epidemiological information.  This test has not been FDA cleared or approved.    This test has been authorized by FDA under an Emergency Use Authorization  (EUA). This test is only authorized for the duration of time the  declaration that circumstances exist justifying the authorization of the  emergency use of an in vitro diagnostic tests for detection of SARS-CoV-2  virus and/or diagnosis of COVID-19 infection under section 564(b)(1) of  the Act, 21 U.S.C. 360bbb-3(b)(1), unless the authorization is terminated  or revoked sooner. The test has been validated but independent review by FDA  and CLIA is pending.    Test performed using [employer]: This RT-PCR assay targets N2,  a region unique to SARS-CoV-2. A conserved region in the E-gene was chosen  for pan-Sarbecovirus detection which  includes SARS-CoV-2.    According to CMS-2020-01-R, this platform meets the definition of high-throughput technology.                   XR chest 2 views    (Results Pending)              Procedures  Procedures         ED Course                               SBIRT 22yo+      Flowsheet Row Most Recent Value   Initial Alcohol Screen: US AUDIT-C     1. How often do you have a drink containing alcohol? 0 Filed at: 12/19/2023 0752   2. How many drinks containing alcohol do you have on a typical day you are drinking?  0 Filed at: 12/19/2023 0752   3a. Male UNDER 65: How often do you have five or more drinks on one occasion? 0 Filed at: 12/19/2023 0752   3b. FEMALE Any Age, or MALE 65+: How often do you have 4 or more drinks on one occassion? 0 Filed at: 12/19/2023 0752   Audit-C Score 0 Filed at: 12/19/2023 0752   LURDES: How many times in the past year have you...    Used an illegal drug or used a prescription medication for non-medical reasons? Never Filed at: 12/19/2023 0752                      Medical Decision Making  Patient is well-appearing and nontoxic.  She is presenting with 5-day history of cough, head congestion, sore throat only with coughing.  Differential diagnosis includes COVID, influenza, RSV, viral syndrome, pneumonia.  Chest x-ray was ordered.  I independently interpreted as no acute infiltrate.  Pulse ox is 99% on room air indicating adequate oxygenation.  Patient is in no respiratory distress.  Suspect viral syndrome at this time.  Will prescribe Tessalon Perles for cough.  Patient was advised to follow-up with her primary care provider if no improvement next 2 to 3 days.  Return precautions given including increased work of breathing or worsening symptoms    Amount and/or Complexity of Data Reviewed  Radiology: ordered.             Disposition  Final diagnoses:   None     ED Disposition       None          Follow-up Information    None         Patient's Medications   Discharge Prescriptions    No  medications on file       No discharge procedures on file.    PDMP Review         Value Time User    PDMP Reviewed  Yes 10/9/2021  8:18 AM Karla Noble MD            ED Provider  Electronically Signed by             Bret Peralta PA-C  12/19/23 0920

## 2024-01-02 DIAGNOSIS — J01.10 ACUTE NON-RECURRENT FRONTAL SINUSITIS: Primary | ICD-10-CM

## 2024-01-02 RX ORDER — AMOXICILLIN AND CLAVULANATE POTASSIUM 875; 125 MG/1; MG/1
1 TABLET, FILM COATED ORAL EVERY 12 HOURS SCHEDULED
Qty: 14 TABLET | Refills: 0 | Status: SHIPPED | OUTPATIENT
Start: 2024-01-02 | End: 2024-01-09

## 2024-03-06 ENCOUNTER — OFFICE VISIT (OUTPATIENT)
Dept: INTERNAL MEDICINE CLINIC | Age: 29
End: 2024-03-06
Payer: COMMERCIAL

## 2024-03-06 VITALS
OXYGEN SATURATION: 97 % | HEART RATE: 87 BPM | TEMPERATURE: 98.3 F | SYSTOLIC BLOOD PRESSURE: 124 MMHG | DIASTOLIC BLOOD PRESSURE: 70 MMHG | HEIGHT: 66 IN | WEIGHT: 261 LBS | BODY MASS INDEX: 41.95 KG/M2

## 2024-03-06 DIAGNOSIS — J35.1 LARGE TONSILS: ICD-10-CM

## 2024-03-06 DIAGNOSIS — E66.01 CLASS 3 SEVERE OBESITY DUE TO EXCESS CALORIES WITHOUT SERIOUS COMORBIDITY WITH BODY MASS INDEX (BMI) OF 40.0 TO 44.9 IN ADULT (HCC): Primary | ICD-10-CM

## 2024-03-06 DIAGNOSIS — J06.9 UPPER RESPIRATORY TRACT INFECTION, UNSPECIFIED TYPE: ICD-10-CM

## 2024-03-06 DIAGNOSIS — R09.A2 GLOBUS SENSATION: ICD-10-CM

## 2024-03-06 DIAGNOSIS — L50.2 HIVES DUE TO HEAT EXPOSURE: ICD-10-CM

## 2024-03-06 DIAGNOSIS — R53.83 OTHER FATIGUE: ICD-10-CM

## 2024-03-06 DIAGNOSIS — J35.8 TONSIL STONE: ICD-10-CM

## 2024-03-06 PROCEDURE — 99214 OFFICE O/P EST MOD 30 MIN: CPT | Performed by: PHYSICIAN ASSISTANT

## 2024-03-06 NOTE — PROGRESS NOTES
Assessment/Plan:         Diagnoses and all orders for this visit:    Class 3 severe obesity due to excess calories without serious comorbidity with body mass index (BMI) of 40.0 to 44.9 in adult (HCC)  Comments:  discussed weight loss options  low carb diet  continue exercise  Orders:  -     CBC and differential; Future  -     Comprehensive metabolic panel; Future  -     Lipid panel; Future  -     TSH, 3rd generation with Free T4 reflex; Future    Upper respiratory tract infection, unspecified type    Tonsil stone  -     Ambulatory Referral to Otolaryngology; Future    Hives due to heat exposure  -     CBC and differential; Future  -     Comprehensive metabolic panel; Future  -     Lipid panel; Future  -     TSH, 3rd generation with Free T4 reflex; Future    Large tonsils  -     Ambulatory Referral to Otolaryngology; Future    Globus sensation  -     CBC and differential; Future  -     Comprehensive metabolic panel; Future  -     Lipid panel; Future  -     TSH, 3rd generation with Free T4 reflex; Future  -     Ambulatory Referral to Otolaryngology; Future    Other fatigue  -     CBC and differential; Future  -     Comprehensive metabolic panel; Future  -     Lipid panel; Future  -     TSH, 3rd generation with Free T4 reflex; Future          Subjective:      Patient ID: Cristin De La Cruz is a 28 y.o. female.    29 y/o female with c/o difficulty losing weights   Pt previously saw weight management and had lost weight following low carb diet but states over the holidays it was stressful and she gained the weight back   Pt states she has tried to increase exercise as well   Pt would like to consider weight loss medications    Pt c/o sore throat and earache x 6 days  Covid test negative   Pt reports she keeps getting sick on and off   C/o hx of tonsil stones     Pt c/o getting hives when she gets hot  Starts behind her ears and travels down body  Resolved with benadryl           The following portions of the patient's  history were reviewed and updated as appropriate: allergies, current medications, past family history, past medical history, past social history, past surgical history, and problem list.    Review of Systems   Constitutional:  Negative for appetite change, chills, diaphoresis, fatigue and fever.   HENT:  Negative for congestion and sore throat.    Eyes:  Negative for pain and redness.   Respiratory:  Negative for cough and shortness of breath.    Cardiovascular:  Negative for chest pain and leg swelling.   Gastrointestinal:  Negative for abdominal pain, constipation, diarrhea and nausea.   Genitourinary:  Negative for dysuria and flank pain.   Musculoskeletal:  Negative for arthralgias and back pain.   Skin:  Negative for rash.   Neurological:  Negative for dizziness, light-headedness and headaches.   Psychiatric/Behavioral:  Negative for sleep disturbance. The patient is not nervous/anxious.          Past Medical History:   Diagnosis Date    Anxiety     Asthma 2005    BMI 40.0-44.9, adult (Formerly [facility]) 03/11/2021    Depression     Diabetes mellitus (Formerly [facility])     Gestational diabetes    History of ovarian cyst 2018    Hyperlipidemia     Migraine     Mild intermittent asthma     Morbid obesity (Formerly [facility])     Other headache syndrome 11/16/2021    PID (pelvic inflammatory disease) 2018    Polycystic ovary syndrome     Pre-diabetes     Varicella     had vaccine    Vitamin D deficiency          Current Outpatient Medications:     fluticasone (FLONASE) 50 mcg/act nasal spray, 2 sprays into each nostril daily as needed for rhinitis, Disp: 54.6 mL, Rfl: 2    levonorgestrel (MIRENA) 20 MCG/24HR IUD, 1 each by Intrauterine route once, Disp: , Rfl:     metFORMIN (GLUCOPHAGE-XR) 500 mg 24 hr tablet, Take 3 tablets (1,500 mg total) by mouth daily with dinner, Disp: 270 tablet, Rfl: 1    montelukast (SINGULAIR) 10 mg tablet, Take 1 tablet (10 mg total) by mouth daily at bedtime, Disp: 90 tablet, Rfl: 0    multivitamin (THERAGRAN) TABS, Take 1  "tablet by mouth daily, Disp: , Rfl:     omeprazole (PriLOSEC) 20 mg delayed release capsule, Take 1 capsule (20 mg total) by mouth daily, Disp: 90 capsule, Rfl: 0    sertraline (Zoloft) 50 mg tablet, Take 1 tablet (50 mg total) by mouth daily, Disp: 90 tablet, Rfl: 2    No Known Allergies    Social History   Past Surgical History:   Procedure Laterality Date    WISDOM TOOTH EXTRACTION       Family History   Problem Relation Age of Onset    JOSELINE disease Mother     Diabetes unspecified Mother     Asthma Mother     Seizures Father 13    Asperger's syndrome Sister     Autism Brother     No Known Problems Son     Colon cancer Maternal Grandmother     Heart attack Maternal Grandfather     Heart disease Maternal Grandfather     No Known Problems Paternal Grandmother     No Known Problems Paternal Grandfather        Objective:  /70 (BP Location: Left arm, Patient Position: Sitting, Cuff Size: Large)   Pulse 87   Temp 98.3 °F (36.8 °C) (Temporal)   Ht 5' 6\" (1.676 m)   Wt 118 kg (261 lb)   SpO2 97%   BMI 42.13 kg/m²        Physical Exam  Vitals reviewed.   Constitutional:       General: She is not in acute distress.     Appearance: She is obese.   HENT:      Head: Normocephalic and atraumatic.      Right Ear: Tympanic membrane, ear canal and external ear normal. There is no impacted cerumen.      Left Ear: Tympanic membrane, ear canal and external ear normal. There is no impacted cerumen.      Nose: Congestion present.      Mouth/Throat:      Pharynx: No oropharyngeal exudate.      Comments: Large tonsils, R tonsil stone  No erythema  Cardiovascular:      Rate and Rhythm: Normal rate and regular rhythm.   Pulmonary:      Effort: Pulmonary effort is normal. No respiratory distress.      Breath sounds: Normal breath sounds. No wheezing or rales.   Musculoskeletal:      Cervical back: Normal range of motion.      Right lower leg: No edema.      Left lower leg: No edema.   Neurological:      General: No focal deficit " present.      Mental Status: She is alert and oriented to person, place, and time.

## 2024-03-06 NOTE — PATIENT INSTRUCTIONS
Check into weight management drugs   Phentermine - prescription   orlistat (conchita) over the counter   Contrave   Zepbound or wegovy (injections)           Weight Management   AMBULATORY CARE:   Why it is important to manage your weight:  Being overweight increases your risk of health conditions such as heart disease, high blood pressure, type 2 diabetes, and certain types of cancer. It can also increase your risk for osteoarthritis, sleep apnea, and other respiratory problems. Aim for a slow, steady weight loss. Even a small amount of weight loss can lower your risk of health problems.  Risks of being overweight:  Extra weight can cause many health problems, including the following:  Diabetes (high blood sugar level)    High blood pressure or high cholesterol    Heart disease    Stroke    Gallbladder or liver disease    Cancer of the colon, breast, prostate, liver, or kidney    Sleep apnea    Arthritis or gout    Screening  is done to check for health conditions before you have signs or symptoms. If you are 35 to 70 years old, your blood sugar level may be checked every 3 years for signs of prediabetes or diabetes. Your healthcare provider will check your blood pressure at each visit. High blood pressure can lead to a stroke or other problems. Your provider may check for signs of heart disease, cancer, or other health problems.  How to lose weight safely:  A safe and healthy way to lose weight is to eat fewer calories and get regular exercise.  You can lose up about 1 pound a week by decreasing the number of calories you eat by 500 calories each day. You can decrease calories by eating smaller portion sizes or by cutting out high-calorie foods. Read labels to find out how many calories are in the foods you eat.         You can also burn calories with exercise such as walking, swimming, or biking. You will be more likely to keep weight off if you make these changes part of your lifestyle. Exercise at least 30 minutes  per day on most days of the week. You can also fit in more physical activity by taking the stairs instead of the elevator or parking farther away from stores. Ask your healthcare provider about the best exercise plan for you.       Healthy meal plan for weight management:  A healthy meal plan includes a variety of foods, contains fewer calories, and helps you stay healthy. A healthy meal plan includes the following:     Eat whole-grain foods more often.  A healthy meal plan should contain fiber. Fiber is the part of grains, fruits, and vegetables that is not broken down by your body. Whole-grain foods are healthy and provide extra fiber in your diet. Some examples of whole-grain foods are whole-wheat breads and pastas, oatmeal, brown rice, and bulgur.    Eat a variety of vegetables every day.  Include dark, leafy greens such as spinach, kale, alejo greens, and mustard greens. Eat yellow and orange vegetables such as carrots, sweet potatoes, and winter squash.     Eat a variety of fruits every day.  Choose fresh or canned fruit (canned in its own juice or light syrup) instead of juice. Fruit juice has very little or no fiber.    Eat low-fat dairy foods.  Drink fat-free (skim) milk or 1% milk. Eat fat-free yogurt and low-fat cottage cheese. Try low-fat cheeses such as mozzarella and other reduced-fat cheeses.    Choose meat and other protein foods that are low in fat.  Choose beans or other legumes such as split peas or lentils. Choose fish, skinless poultry (chicken or turkey), or lean cuts of red meat (beef or pork). Before you cook meat or poultry, cut off any visible fat.     Use less fat and oil.  Try baking foods instead of frying them. Add less fat, such as margarine, sour cream, regular salad dressing and mayonnaise to foods. Eat fewer high-fat foods. Some examples of high-fat foods include french fries, doughnuts, ice cream, and cakes.    Eat fewer sweets.  Limit foods and drinks that are high in sugar.  This includes candy, cookies, regular soda, and sweetened drinks.  Ways to decrease calories:   Eat smaller portions.     Use a small plate with smaller servings.    Do not eat second helpings.    When you eat at a restaurant, ask for a box and place half of your meal in the box before you eat.    Share an entrée with someone else.    Replace high-calorie snacks with healthy, low-calorie snacks.     Choose fresh fruit, vegetables, fat-free rice cakes, or air-popped popcorn instead of potato chips, nuts, or chocolate.    Choose water or calorie-free drinks instead of soda or sweetened drinks.    Do not shop for groceries when you are hungry.  You may be more likely to make unhealthy food choices. Take a grocery list of healthy foods and shop after you have eaten.    Eat regular meals. Do not skip meals. Skipping meals can lead to overeating later in the day. This can make it harder for you to lose weight. Eat a healthy snack in place of a meal if you do not have time to eat a regular meal. Talk with a dietitian to help you create a meal plan and schedule that is right for you.    Other things to consider as you try to lose weight:   Be aware of situations that may give you the urge to overeat, such as eating while watching television. Find ways to avoid these situations. For example, read a book, go for a walk, or do crafts.    Meet with a weight loss support group or friends who are also trying to lose weight. This may help you stay motivated to continue working on your weight loss goals.    © Copyright [employer] 2023 Information is for End User's use only and may not be sold, redistributed or otherwise used for commercial purposes.  The above information is an  only. It is not intended as medical advice for individual conditions or treatments. Talk to your doctor, nurse or pharmacist before following any medical regimen to see if it is safe and effective for you.    Shopping for a Healthy Diet    AMBULATORY CARE:   Why shopping for healthy foods is important:  You may be more likely to follow a healthy meal plan if you have healthy foods available in your home. A healthy meal plan includes a variety of healthy foods from all the food groups. It is also low in unhealthy fats, salt, and added sugar. Healthy foods may decrease your risk for heart disease, osteoporosis (brittle bones), and some types of cancer.       Healthy meal plan:  My Plate is a model for planning healthy meals. It shows the types and amounts of foods that should go on your plate. Fruits and vegetables make up about half of your plate, and grains and protein make up the other half. A serving of dairy is also included. The amount of calories and serving sizes you need depends on your age, gender, weight, and height. Examples of healthy foods are listed below:  Eat a variety of vegetables  such as dark green, red, and orange vegetables. You can also include canned vegetables low in sodium (salt) and frozen vegetables without added butter or sauces.    Eat a variety of fresh fruits , canned fruit in 100% juice, frozen fruit, and dried fruit.    Include whole grains.  At least half of the grains you eat should be whole grains. Examples include whole wheat bread, wheat pasta, brown rice, and whole grain cereals such as oatmeal.    Eat a variety of protein foods such as seafood (fish and shellfish), lean meat, and poultry without skin (turkey and chicken). Examples of lean meats include pork leg, shoulder, or tenderloin, and beef round, sirloin, tenderloin, and extra lean ground beef. Other protein foods include eggs and egg substitutes, beans, peas, soy products, nuts, and seeds.    Choose low-fat dairy products such as skim or 1% milk or low-fat yogurt, cheese, and cottage cheese.       What to do before you go shopping:   Plan your meals.      Plan your shopping around healthy meals and recipes that you will prepare for the week. Switch  ingredients in recipes to lower the total fat and calorie content. For example, if a recipe calls for milk, you can add nonfat or 1% milk instead of whole milk. Use egg substitute in a recipe that calls for whole eggs. Make a list of the foods you will need for your planned meals.    Plan healthy meals that can be made quickly on days when you are extra busy. Another idea is to plan meals that you can make in large batches and freeze. You can thaw and eat these meals on days when you do not have time to cook. For example, spaghetti can be made in several batches at once and frozen.    Find ways to make affordable healthy choices.  Look through [employer] ads for sales. Buy fruits and vegetables when they are in season or buy them at your local farmer's market.    Eat before you go shopping.  Shopping on an empty stomach may cause you to buy unhealthy foods because you feel hungry.    Tips for making healthy choices while you are shopping:   Know where to find healthy foods in the grocery store.  Go to the food sections that are found along the walls of the store first. Healthy foods, such as fruits, vegetables, bread, dairy products, meat, and fish, are usually placed in these areas. The inner aisles have other healthy foods, such as canned and frozen fruits and vegetables, and cereals. However, there are also other less healthy foods in the inner aisles. Some of these foods include packaged foods, snack foods, and desserts. Shopping in the inner aisles last may help you buy fewer of these foods.    Read food labels.  Use the nutrition information on food labels to help you make healthier food choices when you shop.    The serving size  is usually listed in cups or pieces and may include a weight (grams, ounces). It also shows the number of servings that are in a package. Compare the amount that you will eat to the serving size listed. If the package contains 2 servings and you eat the whole package, then you will  eat twice the amount of calories and nutrients listed.    The nutrients  listed on the top section of the label are fat, saturated fat, trans fat, cholesterol, and sodium. Limit these nutrients because eating too much may increase your risk of certain diseases. The total carbohydrates, fiber, and sugars are also listed. The nutrients you should try to get enough of include dietary fiber, vitamin A, vitamin C, calcium, and iron. These nutrients may help you to reduce your risk of diseases such as osteoporosis and heart disease.    The % daily value  (DV) listed on the food label next to the nutrients tells you if a food is low or high in these nutrients. A percent DV of 5% or less means that the food is low in that nutrient. A percent DV of 20% or more means that the food is high in that nutrient.    Keep food safety in mind.  Keep raw, cooked, and ready-to-eat foods separate in your shopping cart. Place raw seafood, meat, and poultry in plastic bags to prevent leakage of juices to other foods. Foods that need to be refrigerated or frozen should be taken home and stored within 2 hours. This prevents the growth of bacteria that can lead to food poisoning.    © Copyright [employer] 2023 Information is for End User's use only and may not be sold, redistributed or otherwise used for commercial purposes.  The above information is an  only. It is not intended as medical advice for individual conditions or treatments. Talk to your doctor, nurse or pharmacist before following any medical regimen to see if it is safe and effective for you.

## 2024-03-29 ENCOUNTER — ANNUAL EXAM (OUTPATIENT)
Dept: OBGYN CLINIC | Facility: CLINIC | Age: 29
End: 2024-03-29
Payer: COMMERCIAL

## 2024-03-29 VITALS
DIASTOLIC BLOOD PRESSURE: 76 MMHG | HEIGHT: 66 IN | BODY MASS INDEX: 41.78 KG/M2 | WEIGHT: 260 LBS | SYSTOLIC BLOOD PRESSURE: 118 MMHG

## 2024-03-29 DIAGNOSIS — Z12.4 SCREENING FOR MALIGNANT NEOPLASM OF CERVIX: ICD-10-CM

## 2024-03-29 DIAGNOSIS — R73.01 IFG (IMPAIRED FASTING GLUCOSE): ICD-10-CM

## 2024-03-29 DIAGNOSIS — Z01.419 WELL WOMAN EXAM WITH ROUTINE GYNECOLOGICAL EXAM: Primary | ICD-10-CM

## 2024-03-29 PROCEDURE — S0610 ANNUAL GYNECOLOGICAL EXAMINA: HCPCS | Performed by: PHYSICIAN ASSISTANT

## 2024-03-29 PROCEDURE — G0145 SCR C/V CYTO,THINLAYER,RESCR: HCPCS | Performed by: PHYSICIAN ASSISTANT

## 2024-03-29 NOTE — PROGRESS NOTES
ASSESSMENT & PLAN:   Diagnoses and all orders for this visit:    Well woman exam with routine gynecological exam  -     Liquid-based pap, screening    Screening for malignant neoplasm of cervix  -     Liquid-based pap, screening    IFG (impaired fasting glucose)  -     Ambulatory Referral to Nutrition Services; Future    BMI 40.0-44.9, adult ([facility])  -     Ambulatory Referral to Nutrition Services; Future          The following were reviewed in today's visit: ASCCP guidelines, Gardisil vaccination, STD testing breast self exam, STD testing, exercise, and healthy diet.    Patient to return to office in yearly for annual exam.     All questions have been answered to her satisfaction.        CC:  Annual Gynecologic Examination  Chief Complaint   Patient presents with    Gynecologic Exam     Patient is here today for her yearly exam, pap due today(21-wnl). Patient is doing well, she has no concerns at this time.        HPI: Cristin De La Cruz is a 29 y.o.  who presents for annual gynecologic examination.  She has the following concerns:  patient has IUD currently. She would like to have another child in the future but not at this time. She would like to be at an ideally lower weight prior to her next conception. She would like to see nutrition.       Health Maintenance:    Exercise: intermittently  Breast exams/breast awareness: yes    Past Medical History:   Diagnosis Date    Anxiety     Asthma     BMI 40.0-44.9, adult (HCC) 2021    Depression     Diabetes mellitus ([facility])     Gestational diabetes    History of ovarian cyst     Hyperlipidemia     Migraine     Mild intermittent asthma     Morbid obesity ([facility])     Other headache syndrome 2021    PID (pelvic inflammatory disease) 2018    Polycystic ovary syndrome     Pre-diabetes     Varicella     had vaccine    Vitamin D deficiency        Past Surgical History:   Procedure Laterality Date    WISDOM TOOTH EXTRACTION         Past OB/Gyn History:    No LMP recorded (lmp unknown). Patient has had an implant.    Last Pap:  : no abnormalities  History of abnormal Pap smear: no  HPV vaccine completed: yes    Patient is currently sexually active.   STD testing: no  Current contraception: IUD      Family History  Family History   Problem Relation Age of Onset    JOSELINE disease Mother     Diabetes unspecified Mother     Asthma Mother     Seizures Father 13    Asperger's syndrome Sister     Autism Brother     No Known Problems Son     Colon cancer Maternal Grandmother     Heart attack Maternal Grandfather     Heart disease Maternal Grandfather     No Known Problems Paternal Grandmother     No Known Problems Paternal Grandfather        Family history of uterine or ovarian cancer: no  Family history of breast cancer: no  Family history of colon cancer: yes    Social History:  Social History     Socioeconomic History    Marital status: Single     Spouse name: Not on file    Number of children: 0    Years of education: Not on file    Highest education level: Not on file   Occupational History    Occupation: Dietary Aide     Employer: [employer]   Tobacco Use    Smoking status: Former     Current packs/day: 0.00     Average packs/day: 0.1 packs/day for 0.4 years     Types: Cigarettes     Start date: 2017     Quit date: 10/1/2017     Years since quittin.4    Smokeless tobacco: Never    Tobacco comments:     Smoked socially    Vaping Use    Vaping status: Never Used   Substance and Sexual Activity    Alcohol use: Yes     Alcohol/week: 1.0 standard drink of alcohol     Types: 1 Glasses of wine per week     Comment: socially    Drug use: Never    Sexual activity: Yes     Partners: Male     Birth control/protection: I.U.D.   Other Topics Concern    Not on file   Social History Narrative    Single    Lives with boyfriend, son    1 Child - 1 Son    Farm      Social Determinants of Health     Financial Resource Strain: Not on file   Food  "Insecurity: Not on file   Transportation Needs: Not on file   Physical Activity: Not on file   Stress: Not on file   Social Connections: Not on file   Intimate Partner Violence: Not on file   Housing Stability: Not on file     Domestic violence screen: negative    Allergies:  No Known Allergies    Medications:    Current Outpatient Medications:     fluticasone (FLONASE) 50 mcg/act nasal spray, 2 sprays into each nostril daily as needed for rhinitis, Disp: 54.6 mL, Rfl: 2    levonorgestrel (MIRENA) 20 MCG/24HR IUD, 1 each by Intrauterine route once, Disp: , Rfl:     metFORMIN (GLUCOPHAGE-XR) 500 mg 24 hr tablet, Take 3 tablets (1,500 mg total) by mouth daily with dinner, Disp: 270 tablet, Rfl: 1    montelukast (SINGULAIR) 10 mg tablet, Take 1 tablet (10 mg total) by mouth daily at bedtime, Disp: 90 tablet, Rfl: 0    multivitamin (THERAGRAN) TABS, Take 1 tablet by mouth daily, Disp: , Rfl:     sertraline (Zoloft) 50 mg tablet, Take 1 tablet (50 mg total) by mouth daily, Disp: 90 tablet, Rfl: 2    omeprazole (PriLOSEC) 20 mg delayed release capsule, Take 1 capsule (20 mg total) by mouth daily, Disp: 90 capsule, Rfl: 0    Review of Systems:  Review of Systems   Constitutional:  Negative for chills, fever and unexpected weight change.   Respiratory:  Negative for shortness of breath.    Cardiovascular:  Negative for chest pain.   Gastrointestinal:  Negative for abdominal pain, diarrhea, nausea and vomiting.   Skin:  Negative for rash.   Psychiatric/Behavioral:  Negative for dysphoric mood. The patient is not nervous/anxious.          Physical Exam:  /76 (BP Location: Right arm, Patient Position: Sitting, Cuff Size: Large)   Ht 5' 6\" (1.676 m)   Wt 118 kg (260 lb)   LMP  (LMP Unknown) Comment: spotting  BMI 41.97 kg/m²    Physical Exam  Constitutional:       Appearance: Normal appearance. She is obese.   Genitourinary:      Vulva and urethral meatus normal.      No lesions in the vagina.      Right Labia: No " rash or lesions.     Left Labia: No lesions or rash.     No vaginal discharge, erythema or bleeding.        Right Adnexa: not tender and no mass present.     Left Adnexa: not tender and no mass present.     No cervical discharge or lesion.      IUD strings visualized.      Uterus is not tender.   Breasts:     Breasts are symmetrical.      Right: No mass or skin change.      Left: No mass or skin change.   HENT:      Head: Normocephalic and atraumatic.   Cardiovascular:      Rate and Rhythm: Normal rate and regular rhythm.      Heart sounds: Normal heart sounds. No murmur heard.     No friction rub. No gallop.   Pulmonary:      Effort: Pulmonary effort is normal.      Breath sounds: Normal breath sounds. No wheezing, rhonchi or rales.   Abdominal:      General: Abdomen is flat. There is no distension.      Palpations: Abdomen is soft.      Tenderness: There is no abdominal tenderness.   Musculoskeletal:      Cervical back: Neck supple.   Lymphadenopathy:      Upper Body:      Right upper body: No axillary adenopathy.      Left upper body: No axillary adenopathy.   Neurological:      General: No focal deficit present.      Mental Status: She is alert.   Skin:     General: Skin is warm and dry.   Psychiatric:         Mood and Affect: Mood normal.         Behavior: Behavior normal.   Vitals reviewed.

## 2024-04-08 LAB
LAB AP GYN PRIMARY INTERPRETATION: NORMAL
Lab: NORMAL

## 2024-05-30 ENCOUNTER — OFFICE VISIT (OUTPATIENT)
Dept: URGENT CARE | Age: 29
End: 2024-05-30
Payer: COMMERCIAL

## 2024-05-30 VITALS
TEMPERATURE: 98.2 F | WEIGHT: 260 LBS | DIASTOLIC BLOOD PRESSURE: 60 MMHG | HEART RATE: 89 BPM | OXYGEN SATURATION: 99 % | BODY MASS INDEX: 41.97 KG/M2 | RESPIRATION RATE: 18 BRPM | SYSTOLIC BLOOD PRESSURE: 115 MMHG

## 2024-05-30 DIAGNOSIS — J06.9 UPPER RESPIRATORY TRACT INFECTION, UNSPECIFIED TYPE: Primary | ICD-10-CM

## 2024-05-30 PROCEDURE — G0382 LEV 3 HOSP TYPE B ED VISIT: HCPCS

## 2024-05-30 PROCEDURE — S9083 URGENT CARE CENTER GLOBAL: HCPCS

## 2024-05-30 RX ORDER — BROMPHENIRAMINE MALEATE, PSEUDOEPHEDRINE HYDROCHLORIDE, AND DEXTROMETHORPHAN HYDROBROMIDE 2; 30; 10 MG/5ML; MG/5ML; MG/5ML
5 SYRUP ORAL 4 TIMES DAILY PRN
Qty: 120 ML | Refills: 0 | Status: SHIPPED | OUTPATIENT
Start: 2024-05-30

## 2024-05-30 RX ORDER — AZITHROMYCIN 250 MG/1
TABLET, FILM COATED ORAL
Qty: 6 TABLET | Refills: 0 | Status: SHIPPED | OUTPATIENT
Start: 2024-05-30 | End: 2024-05-30

## 2024-05-30 RX ORDER — AZITHROMYCIN 250 MG/1
TABLET, FILM COATED ORAL
Qty: 6 TABLET | Refills: 0 | Status: SHIPPED | OUTPATIENT
Start: 2024-05-30 | End: 2024-06-03

## 2024-05-30 RX ORDER — BROMPHENIRAMINE MALEATE, PSEUDOEPHEDRINE HYDROCHLORIDE, AND DEXTROMETHORPHAN HYDROBROMIDE 2; 30; 10 MG/5ML; MG/5ML; MG/5ML
5 SYRUP ORAL 4 TIMES DAILY PRN
Qty: 120 ML | Refills: 0 | Status: SHIPPED | OUTPATIENT
Start: 2024-05-30 | End: 2024-05-30

## 2024-05-30 NOTE — PROGRESS NOTES
[facility]        NAME: Cristin De La Cruz is a 29 y.o. female  : 1995    MRN: [id number]  DATE: May 30, 2024  TIME: 7:58 PM    Assessment and Plan   Upper respiratory tract infection, unspecified type [J06.9]  1. Upper respiratory tract infection, unspecified type  azithromycin (ZITHROMAX) 250 mg tablet    brompheniramine-pseudoephedrine-DM 30-2-10 MG/5ML syrup        Take antibiotic- Zithromax as directed.  Recommend daily probiotic while on antibiotic or eat yogurt with live cultures daily while on antibiotic.       You may take Tylenol or Motrin for pain and fever.    Bromphed for cough and congestion    Flonase- one spray each nostril daily for nasal congestions.    Rest, increase fluids, good hand washing.  Please follow up with your family doctor if no improvement in 7-10 days.     Patient Instructions       Follow up with PCP in 3-5 days.  Proceed to  ER if symptoms worsen.    If tests have been performed at [facility], our office will contact you with results if changes need to be made to the care plan discussed with you at the visit.  You can review your full results on [facility].    Chief Complaint     Chief Complaint   Patient presents with   • Cold Like Symptoms     Symptoms started 7-9 days ago and are getting progressively worse. OTC medication is not providing relief.         History of Present Illness       Pt is a 29 year old male presenting with 10 days of congestion, runny nose, productive cough, and muffled hearing.  She denies fever and chills.  She reports having GI upset approximately 2 weeks prior- symptoms have since resolved.  She has taken tylenol cough and congestion without relief.   She has had numerous sick contacts at home.          Review of Systems   Review of Systems   HENT:  Positive for congestion, postnasal drip, rhinorrhea, sinus pain and sore throat.    Respiratory:  Positive for cough. Negative for shortness of breath.          Current Medications        Current Outpatient Medications:   •  azithromycin (ZITHROMAX) 250 mg tablet, Take 2 tablets today then 1 tablet daily x 4 days, Disp: 6 tablet, Rfl: 0  •  brompheniramine-pseudoephedrine-DM 30-2-10 MG/5ML syrup, Take 5 mL by mouth 4 (four) times a day as needed for cough or congestion, Disp: 120 mL, Rfl: 0  •  fluticasone (FLONASE) 50 mcg/act nasal spray, 2 sprays into each nostril daily as needed for rhinitis, Disp: 54.6 mL, Rfl: 2  •  levonorgestrel (MIRENA) 20 MCG/24HR IUD, 1 each by Intrauterine route once, Disp: , Rfl:   •  metFORMIN (GLUCOPHAGE-XR) 500 mg 24 hr tablet, Take 3 tablets (1,500 mg total) by mouth daily with dinner, Disp: 270 tablet, Rfl: 1  •  montelukast (SINGULAIR) 10 mg tablet, Take 1 tablet (10 mg total) by mouth daily at bedtime, Disp: 90 tablet, Rfl: 0  •  multivitamin (THERAGRAN) TABS, Take 1 tablet by mouth daily, Disp: , Rfl:   •  sertraline (Zoloft) 50 mg tablet, Take 1 tablet (50 mg total) by mouth daily, Disp: 90 tablet, Rfl: 2  •  omeprazole (PriLOSEC) 20 mg delayed release capsule, Take 1 capsule (20 mg total) by mouth daily, Disp: 90 capsule, Rfl: 0    Current Allergies     Allergies as of 05/30/2024   • (No Known Allergies)            The following portions of the patient's history were reviewed and updated as appropriate: allergies, current medications, past family history, past medical history, past social history, past surgical history and problem list.     Past Medical History:   Diagnosis Date   • Anxiety    • Asthma 2005   • BMI 40.0-44.9, adult ([facility]) 03/11/2021   • Depression    • Diabetes mellitus (HCC)     Gestational diabetes   • History of ovarian cyst 2018   • Hyperlipidemia    • Migraine    • Mild intermittent asthma    • Morbid obesity (HCC)    • Other headache syndrome 11/16/2021   • PID (pelvic inflammatory disease) 2018   • Polycystic ovary syndrome    • Pre-diabetes    • Varicella     had vaccine   • Vitamin D deficiency        Past Surgical History:    Procedure Laterality Date   • WISDOM TOOTH EXTRACTION         Family History   Problem Relation Age of Onset   • JOSELINE disease Mother    • Diabetes unspecified Mother    • Asthma Mother    • Seizures Father 13   • Asperger's syndrome Sister    • Autism Brother    • No Known Problems Son    • Colon cancer Maternal Grandmother    • Heart attack Maternal Grandfather    • Heart disease Maternal Grandfather    • No Known Problems Paternal Grandmother    • No Known Problems Paternal Grandfather          Medications have been verified.        Objective   /60   Pulse 89   Temp 98.2 °F (36.8 °C)   Resp 18   Wt 118 kg (260 lb)   SpO2 99%   BMI 41.97 kg/m²   No LMP recorded. Patient has had an implant.       Physical Exam     Physical Exam  Vitals and nursing note reviewed.   Constitutional:       General: She is not in acute distress.     Appearance: Normal appearance. She is normal weight.   HENT:      Head: Normocephalic.      Right Ear: Tympanic membrane normal.      Left Ear: Tympanic membrane normal.      Nose: Congestion and rhinorrhea present.      Mouth/Throat:      Mouth: Mucous membranes are moist.      Pharynx: Posterior oropharyngeal erythema present.      Tonsils: 2+ on the right. 2+ on the left.   Eyes:      General:         Right eye: No discharge.         Left eye: No discharge.      Extraocular Movements: Extraocular movements intact.      Conjunctiva/sclera: Conjunctivae normal.      Pupils: Pupils are equal, round, and reactive to light.   Cardiovascular:      Rate and Rhythm: Normal rate.      Pulses: Normal pulses.   Pulmonary:      Effort: Pulmonary effort is normal. No respiratory distress.      Breath sounds: Normal breath sounds. No stridor. No wheezing, rhonchi or rales.   Chest:      Chest wall: No tenderness.   Abdominal:      General: Abdomen is flat. Bowel sounds are normal.      Palpations: Abdomen is soft.   Skin:     General: Skin is warm and dry.      Capillary Refill: Capillary  refill takes less than 2 seconds.   Neurological:      General: No focal deficit present.      Mental Status: She is alert.

## 2024-05-30 NOTE — PATIENT INSTRUCTIONS
Take antibiotic- Zithromax as directed.  Recommend daily probiotic while on antibiotic or eat yogurt with live cultures daily while on antibiotic.       You may take Tylenol or Motrin for pain and fever.    Bromphed for cough and congestion    Flonase- one spray each nostril daily for nasal congestions.    Rest, increase fluids, good hand washing.  Please follow up with your family doctor if no improvement in 7-10 days.

## 2024-07-12 ENCOUNTER — OFFICE VISIT (OUTPATIENT)
Dept: FAMILY MEDICINE CLINIC | Facility: CLINIC | Age: 29
End: 2024-07-12
Payer: COMMERCIAL

## 2024-07-12 VITALS
RESPIRATION RATE: 16 BRPM | WEIGHT: 252.4 LBS | SYSTOLIC BLOOD PRESSURE: 112 MMHG | DIASTOLIC BLOOD PRESSURE: 78 MMHG | BODY MASS INDEX: 40.56 KG/M2 | TEMPERATURE: 98.4 F | HEART RATE: 88 BPM | HEIGHT: 66 IN | OXYGEN SATURATION: 98 %

## 2024-07-12 DIAGNOSIS — R73.01 IFG (IMPAIRED FASTING GLUCOSE): ICD-10-CM

## 2024-07-12 DIAGNOSIS — R10.13 DYSPEPSIA: ICD-10-CM

## 2024-07-12 DIAGNOSIS — E66.01 CLASS 3 SEVERE OBESITY WITHOUT SERIOUS COMORBIDITY WITH BODY MASS INDEX (BMI) OF 40.0 TO 44.9 IN ADULT, UNSPECIFIED OBESITY TYPE (HCC): ICD-10-CM

## 2024-07-12 DIAGNOSIS — J45.30 MILD PERSISTENT ASTHMA WITHOUT COMPLICATION: ICD-10-CM

## 2024-07-12 DIAGNOSIS — G44.209 TENSION-TYPE HEADACHE, NOT INTRACTABLE, UNSPECIFIED CHRONICITY PATTERN: ICD-10-CM

## 2024-07-12 DIAGNOSIS — Z76.89 ENCOUNTER TO ESTABLISH CARE: Primary | ICD-10-CM

## 2024-07-12 DIAGNOSIS — F32.A DEPRESSION, UNSPECIFIED DEPRESSION TYPE: ICD-10-CM

## 2024-07-12 DIAGNOSIS — F41.9 ANXIETY: ICD-10-CM

## 2024-07-12 PROBLEM — M54.50 LUMBAR BACK PAIN: Status: RESOLVED | Noted: 2022-03-30 | Resolved: 2024-07-12

## 2024-07-12 PROBLEM — R19.7 DIARRHEA: Status: RESOLVED | Noted: 2022-09-20 | Resolved: 2024-07-12

## 2024-07-12 PROBLEM — R63.5 WEIGHT GAIN: Status: RESOLVED | Noted: 2022-09-20 | Resolved: 2024-07-12

## 2024-07-12 PROBLEM — R10.84 GENERALIZED ABDOMINAL PAIN: Status: RESOLVED | Noted: 2022-09-20 | Resolved: 2024-07-12

## 2024-07-12 PROCEDURE — 99214 OFFICE O/P EST MOD 30 MIN: CPT | Performed by: NURSE PRACTITIONER

## 2024-07-12 RX ORDER — FAMOTIDINE 20 MG/1
20 TABLET, FILM COATED ORAL DAILY
Qty: 90 TABLET | Refills: 0 | Status: SHIPPED | OUTPATIENT
Start: 2024-07-12

## 2024-07-12 RX ORDER — ALBUTEROL SULFATE 90 UG/1
2 AEROSOL, METERED RESPIRATORY (INHALATION) EVERY 6 HOURS PRN
COMMUNITY

## 2024-07-12 NOTE — PROGRESS NOTES
Ambulatory Visit  Name: Cristin De La Cruz      : 1995      MRN: [id number]  Encounter Provider: BRIAN Perez  Encounter Date: 2024   Encounter department: [facility]    Assessment & Plan   1. Encounter to establish care    New patient here today to establish care.    2. Dyspepsia  -     famotidine (PEPCID) 20 mg tablet; Take 1 tablet (20 mg total) by mouth daily    Pt does not feel her symptoms are controlled on Omeprazole 20 mg daily. Will switch to Pepcid 20 mg daily. Patient is encouraged to call our office for any questions/concerns, persistent or worsening symptoms. Patient states they understand and agree with treatment plan.     3. Tension-type headache, not intractable, unspecified chronicity pattern    Pt to work on stress reduction, optimal sleep, adequate hydration and nutrition during the day. Pt may continue Excedrin PRN. Pt encouraged to take frequent vision breaks during her work day. Patient is encouraged to call our office for any questions/concerns, persistent or worsening symptoms. Patient states they understand and agree with treatment plan.     4. Class 3 severe obesity without serious comorbidity with body mass index (BMI) of 40.0 to 44.9 in adult, unspecified obesity type (HCC)    Pt believes she is following a well balanced diet. Pt encouraged to start exercising 3-5x per week for 20-30 minutes at a time.    5. Mild persistent asthma without complication    Managed w/ PRN Albuterol.    6. IFG (impaired fasting glucose)    Pt managed on Metformin 1,500 mg daily. Pt had labs previously ordered and she is encouraged to have these done.    7. Anxiety    Managed w/ Sertraline.    8. Depression, unspecified depression type    Same as above.      Pt to f/u PRN.  Pt encouraged to schedule physical when able.      Depression Screening and Follow-up Plan: Patient was screened for depression during today's encounter. They screened  "negative with a PHQ-9 score of 3.      History of Present Illness     Pt presents today for worsening headaches over the last year.  Pt is taking Excedrin 1-2x per week.  She notes her headaches typically occur 10x per month (at least 2x per week).  Her headache pain is located to rudolph temples and forms band like pattern around her head.  Pt was seeing Neurology when she was a child.  She believes she also had MRIs and told everything was normal.  She notes sometimes w/ turning her head her vision gets grainy/black (lasting seconds).  Pt notes this is only w/ her headaches.  No dizziness/lightheadedness.  Pt notes her headaches typically respond to Excedrin and headaches last 1 hour.  She notes sometimes working in front of a computer can make the headaches worse.  Pt does feel like she is more stressed and has some disruptions to her sleep.  She has 16 oz of coffee 2x per week and soda 2x per wek.          Review of Systems  As noted per HPI.    Objective     /78   Pulse 88   Temp 98.4 °F (36.9 °C)   Resp 16   Ht 5' 5.75\" (1.67 m)   Wt 114 kg (252 lb 6.4 oz)   SpO2 98%   BMI 41.05 kg/m²     Physical Exam  Vitals reviewed.   Constitutional:       Appearance: Normal appearance.   Neck:      Comments: Some tension noted to rudolph trapezius muscles  Cardiovascular:      Rate and Rhythm: Normal rate and regular rhythm.      Pulses: Normal pulses.      Heart sounds: Normal heart sounds.   Pulmonary:      Effort: Pulmonary effort is normal.      Breath sounds: Normal breath sounds.   Neurological:      Mental Status: She is alert. Mental status is at baseline. She is disoriented.   Psychiatric:         Mood and Affect: Mood normal.         Behavior: Behavior normal.         Thought Content: Thought content normal.         Judgment: Judgment normal.               "

## 2024-07-22 ENCOUNTER — TELEPHONE (OUTPATIENT)
Age: 29
End: 2024-07-22

## 2024-07-22 DIAGNOSIS — R09.81 SINUS CONGESTION: Primary | ICD-10-CM

## 2024-07-22 RX ORDER — PREDNISONE 20 MG/1
40 TABLET ORAL DAILY
Qty: 10 TABLET | Refills: 0 | Status: SHIPPED | OUTPATIENT
Start: 2024-07-22 | End: 2024-07-27

## 2024-07-22 NOTE — TELEPHONE ENCOUNTER
Recurring sinuses infection pt would like to get something prescribe her face is hurting nose area. Please follow up with pt if something could be sent to the [facility] on [phone] [place] [id number] pt has appt scheuled for physical and stated can't afford another 120$ copay

## 2024-08-12 ENCOUNTER — OFFICE VISIT (OUTPATIENT)
Dept: FAMILY MEDICINE CLINIC | Facility: CLINIC | Age: 29
End: 2024-08-12
Payer: COMMERCIAL

## 2024-08-12 ENCOUNTER — APPOINTMENT (OUTPATIENT)
Dept: LAB | Facility: CLINIC | Age: 29
End: 2024-08-12
Payer: COMMERCIAL

## 2024-08-12 VITALS
HEART RATE: 76 BPM | RESPIRATION RATE: 16 BRPM | OXYGEN SATURATION: 97 % | DIASTOLIC BLOOD PRESSURE: 74 MMHG | BODY MASS INDEX: 41.01 KG/M2 | WEIGHT: 255.2 LBS | HEIGHT: 66 IN | TEMPERATURE: 97.7 F | SYSTOLIC BLOOD PRESSURE: 116 MMHG

## 2024-08-12 DIAGNOSIS — E66.01 CLASS 3 SEVERE OBESITY DUE TO EXCESS CALORIES WITHOUT SERIOUS COMORBIDITY WITH BODY MASS INDEX (BMI) OF 40.0 TO 44.9 IN ADULT (HCC): ICD-10-CM

## 2024-08-12 DIAGNOSIS — R09.A2 GLOBUS SENSATION: ICD-10-CM

## 2024-08-12 DIAGNOSIS — Z00.00 ANNUAL PHYSICAL EXAM: Primary | ICD-10-CM

## 2024-08-12 DIAGNOSIS — L50.2 HIVES DUE TO HEAT EXPOSURE: ICD-10-CM

## 2024-08-12 DIAGNOSIS — R53.83 OTHER FATIGUE: ICD-10-CM

## 2024-08-12 LAB
ALBUMIN SERPL BCG-MCNC: 4.3 G/DL (ref 3.5–5)
ALP SERPL-CCNC: 48 U/L (ref 34–104)
ALT SERPL W P-5'-P-CCNC: 26 U/L (ref 7–52)
ANION GAP SERPL CALCULATED.3IONS-SCNC: 11 MMOL/L (ref 4–13)
AST SERPL W P-5'-P-CCNC: 19 U/L (ref 13–39)
BASOPHILS # BLD AUTO: 0.04 THOUSANDS/ÂΜL (ref 0–0.1)
BASOPHILS NFR BLD AUTO: 1 % (ref 0–1)
BILIRUB SERPL-MCNC: 0.93 MG/DL (ref 0.2–1)
BUN SERPL-MCNC: 14 MG/DL (ref 5–25)
CALCIUM SERPL-MCNC: 9.2 MG/DL (ref 8.4–10.2)
CHLORIDE SERPL-SCNC: 104 MMOL/L (ref 96–108)
CHOLEST SERPL-MCNC: 186 MG/DL
CO2 SERPL-SCNC: 24 MMOL/L (ref 21–32)
CREAT SERPL-MCNC: 0.63 MG/DL (ref 0.6–1.3)
EOSINOPHIL # BLD AUTO: 0.16 THOUSAND/ÂΜL (ref 0–0.61)
EOSINOPHIL NFR BLD AUTO: 2 % (ref 0–6)
ERYTHROCYTE [DISTWIDTH] IN BLOOD BY AUTOMATED COUNT: 13.4 % (ref 11.6–15.1)
GFR SERPL CREATININE-BSD FRML MDRD: 123 ML/MIN/1.73SQ M
GLUCOSE P FAST SERPL-MCNC: 99 MG/DL (ref 65–99)
HCT VFR BLD AUTO: 42 % (ref 34.8–46.1)
HDLC SERPL-MCNC: 53 MG/DL
HGB BLD-MCNC: 13.4 G/DL (ref 11.5–15.4)
IMM GRANULOCYTES # BLD AUTO: 0.02 THOUSAND/UL (ref 0–0.2)
IMM GRANULOCYTES NFR BLD AUTO: 0 % (ref 0–2)
LDLC SERPL CALC-MCNC: 107 MG/DL (ref 0–100)
LYMPHOCYTES # BLD AUTO: 2.26 THOUSANDS/ÂΜL (ref 0.6–4.47)
LYMPHOCYTES NFR BLD AUTO: 32 % (ref 14–44)
MCH RBC QN AUTO: 29.4 PG (ref 26.8–34.3)
MCHC RBC AUTO-ENTMCNC: 31.9 G/DL (ref 31.4–37.4)
MCV RBC AUTO: 92 FL (ref 82–98)
MONOCYTES # BLD AUTO: 0.47 THOUSAND/ÂΜL (ref 0.17–1.22)
MONOCYTES NFR BLD AUTO: 7 % (ref 4–12)
NEUTROPHILS # BLD AUTO: 4.12 THOUSANDS/ÂΜL (ref 1.85–7.62)
NEUTS SEG NFR BLD AUTO: 58 % (ref 43–75)
NONHDLC SERPL-MCNC: 133 MG/DL
NRBC BLD AUTO-RTO: 0 /100 WBCS
PLATELET # BLD AUTO: 282 THOUSANDS/UL (ref 149–390)
PMV BLD AUTO: 10.5 FL (ref 8.9–12.7)
POTASSIUM SERPL-SCNC: 4 MMOL/L (ref 3.5–5.3)
PROT SERPL-MCNC: 7.1 G/DL (ref 6.4–8.4)
RBC # BLD AUTO: 4.56 MILLION/UL (ref 3.81–5.12)
SODIUM SERPL-SCNC: 139 MMOL/L (ref 135–147)
TRIGL SERPL-MCNC: 128 MG/DL
TSH SERPL DL<=0.05 MIU/L-ACNC: 1.51 UIU/ML (ref 0.45–4.5)
WBC # BLD AUTO: 7.07 THOUSAND/UL (ref 4.31–10.16)

## 2024-08-12 PROCEDURE — 99395 PREV VISIT EST AGE 18-39: CPT | Performed by: NURSE PRACTITIONER

## 2024-08-12 PROCEDURE — 85025 COMPLETE CBC W/AUTO DIFF WBC: CPT

## 2024-08-12 PROCEDURE — 36415 COLL VENOUS BLD VENIPUNCTURE: CPT

## 2024-08-12 PROCEDURE — 84443 ASSAY THYROID STIM HORMONE: CPT

## 2024-08-12 PROCEDURE — 80053 COMPREHEN METABOLIC PANEL: CPT

## 2024-08-12 PROCEDURE — 80061 LIPID PANEL: CPT

## 2024-08-12 NOTE — PROGRESS NOTES
Adult Annual Physical  Name: Cristin De La Cruz      : 1995      MRN: [id number]  Encounter Provider: BRIAN Perez  Encounter Date: 2024   Encounter department: [facility]    Immunizations UTD.  Fasting labs UTD. Repeat A1c at next OV.  PAP through GYN.  F/u in 6 monthw w/ A1c that visit or sooner PRN.    Assessment & Plan   1. Annual physical exam    Immunizations and preventive care screenings were discussed with patient today. Appropriate education was printed on patient's after visit summary.    Counseling:  Alcohol/drug use: discussed moderation in alcohol intake, the recommendations for healthy alcohol use, and avoidance of illicit drug use.  Dental Health: discussed importance of regular tooth brushing, flossing, and dental visits.  Injury prevention: discussed safety/seat belts, safety helmets, smoke detectors, carbon dioxide detectors, and smoking near bedding or upholstery.  Sexual health: discussed sexually transmitted diseases, partner selection, use of condoms, avoidance of unintended pregnancy, and contraceptive alternatives.  Exercise: the importance of regular exercise/physical activity was discussed. Recommend exercise 3-5 times per week for at least 30 minutes.       Depression Screening and Follow-up Plan: Patient was screened for depression during today's encounter. They screened negative with a PHQ-9 score of 3.        History of Present Illness     Adult Annual Physical:  Patient presents for annual physical. Pt here today for her annual physical.  Overall she is feeling well.  She did recently establish w/ ENT for her sinus issues.  .     Diet and Physical Activity:  - Diet/Nutrition: well balanced diet, consuming 3-5 servings of fruits/vegetables daily and adequate fiber intake.  - Exercise: no formal exercise, moderate cardiovascular exercise and 30-60 minutes on average.    Depression Screening:    - PHQ-9 Score:  "3    General Health:  - Sleep: 7-8 hours of sleep on average and sleeps well.  - Hearing: normal hearing bilateral ears.  - Vision: goes for regular eye exams, no vision problems and wears glasses.  - Dental: brushes teeth twice daily and regular dental visits.    /GYN Health:    - History of STDs: no     Health:  - History of STDs: no.     Review of Systems   Constitutional: Negative.  Negative for chills and fatigue.   HENT: Negative.     Respiratory: Negative.  Negative for cough and shortness of breath.    Cardiovascular: Negative.  Negative for chest pain.   Gastrointestinal: Negative.    Genitourinary: Negative.    Musculoskeletal: Negative.  Negative for myalgias.   Neurological: Negative.          Objective     /74   Pulse 76   Temp 97.7 °F (36.5 °C)   Resp 16   Ht 5' 5.75\" (1.67 m)   Wt 116 kg (255 lb 3.2 oz)   SpO2 97%   BMI 41.50 kg/m²     Physical Exam  Vitals and nursing note reviewed.   Constitutional:       General: She is not in acute distress.     Appearance: Normal appearance. She is well-developed. She is not ill-appearing.   HENT:      Head: Normocephalic and atraumatic.      Right Ear: Tympanic membrane, ear canal and external ear normal.      Left Ear: Tympanic membrane, ear canal and external ear normal.   Eyes:      Conjunctiva/sclera: Conjunctivae normal.   Neck:      Vascular: No carotid bruit.   Cardiovascular:      Rate and Rhythm: Normal rate and regular rhythm.      Pulses: Normal pulses.      Heart sounds: Normal heart sounds. No murmur heard.  Pulmonary:      Effort: Pulmonary effort is normal. No respiratory distress.      Breath sounds: Normal breath sounds. No wheezing.   Abdominal:      General: There is no distension.      Palpations: Abdomen is soft. There is no mass.      Tenderness: There is no abdominal tenderness. There is no guarding or rebound.      Hernia: No hernia is present.   Musculoskeletal:         General: Normal range of motion.      Cervical " back: Normal range of motion and neck supple.      Right lower leg: No edema.      Left lower leg: No edema.   Skin:     General: Skin is warm and dry.      Capillary Refill: Capillary refill takes less than 2 seconds.   Neurological:      Mental Status: She is alert and oriented to person, place, and time. Mental status is at baseline.      Motor: No weakness.      Gait: Gait normal.   Psychiatric:         Mood and Affect: Mood normal.         Behavior: Behavior normal.         Thought Content: Thought content normal.         Judgment: Judgment normal.

## 2024-09-23 ENCOUNTER — OFFICE VISIT (OUTPATIENT)
Dept: URGENT CARE | Facility: MEDICAL CENTER | Age: 29
End: 2024-09-23
Payer: COMMERCIAL

## 2024-09-23 VITALS
BODY MASS INDEX: 41.56 KG/M2 | HEIGHT: 66 IN | DIASTOLIC BLOOD PRESSURE: 61 MMHG | RESPIRATION RATE: 18 BRPM | HEART RATE: 76 BPM | WEIGHT: 258.6 LBS | SYSTOLIC BLOOD PRESSURE: 124 MMHG | OXYGEN SATURATION: 99 % | TEMPERATURE: 97.8 F

## 2024-09-23 DIAGNOSIS — R05.1 ACUTE COUGH: Primary | ICD-10-CM

## 2024-09-23 LAB
S PYO AG THROAT QL: NEGATIVE
SARS-COV-2 AG UPPER RESP QL IA: NEGATIVE
VALID CONTROL: NORMAL

## 2024-09-23 PROCEDURE — S9083 URGENT CARE CENTER GLOBAL: HCPCS | Performed by: FAMILY MEDICINE

## 2024-09-23 PROCEDURE — 87880 STREP A ASSAY W/OPTIC: CPT | Performed by: FAMILY MEDICINE

## 2024-09-23 PROCEDURE — G0383 LEV 4 HOSP TYPE B ED VISIT: HCPCS | Performed by: FAMILY MEDICINE

## 2024-09-23 PROCEDURE — 87811 SARS-COV-2 COVID19 W/OPTIC: CPT | Performed by: FAMILY MEDICINE

## 2024-09-23 RX ORDER — PREDNISONE 20 MG/1
40 TABLET ORAL DAILY
Qty: 10 TABLET | Refills: 0 | Status: SHIPPED | OUTPATIENT
Start: 2024-09-23 | End: 2024-09-28

## 2024-09-23 RX ORDER — BENZONATATE 200 MG/1
200 CAPSULE ORAL 3 TIMES DAILY PRN
Qty: 20 CAPSULE | Refills: 0 | Status: SHIPPED | OUTPATIENT
Start: 2024-09-23

## 2024-09-23 NOTE — PROGRESS NOTES
[facility]        NAME: Cristin De La Cruz is a 29 y.o. female  : 1995    MRN: [id number]  DATE: 2024  TIME: 4:40 PM    Assessment and Plan   Acute cough [R05.1]  1. Acute cough  POCT rapid ANTIGEN strepA    Poct Covid 19 Rapid Antigen Test    predniSONE 20 mg tablet    benzonatate (TESSALON) 200 MG capsule            Patient Instructions       Follow up with PCP in 3-5 days.  Proceed to  ER if symptoms worsen.    If tests have been performed at [facility], our office will contact you with results if changes need to be made to the care plan discussed with you at the visit.  You can review your full results on [facility].    Chief Complaint     Chief Complaint   Patient presents with    Cough     Started with cough and chest congestion 5 days ago. Sinus pressure         History of Present Illness       Cough  Associated symptoms include headaches and rhinorrhea. Pertinent negatives include no chest pain, ear pain, rash, sore throat or wheezing.   URI   Associated symptoms include congestion, coughing, headaches, rhinorrhea and sinus pain. Pertinent negatives include no abdominal pain, chest pain, diarrhea, dysuria, ear pain, joint pain, joint swelling, nausea, neck pain, plugged ear sensation, rash, sneezing, sore throat, swollen glands, vomiting or wheezing.       Review of Systems   Review of Systems   HENT:  Positive for congestion, rhinorrhea and sinus pain. Negative for ear pain, sneezing and sore throat.    Respiratory:  Positive for cough. Negative for wheezing.    Cardiovascular:  Negative for chest pain.   Gastrointestinal:  Negative for abdominal pain, diarrhea, nausea and vomiting.   Genitourinary:  Negative for dysuria.   Musculoskeletal:  Negative for joint pain and neck pain.   Skin:  Negative for rash.   Neurological:  Positive for headaches.         Current Medications       Current Outpatient Medications:     albuterol (PROVENTIL HFA,VENTOLIN HFA) 90 mcg/act  inhaler, Inhale 2 puffs every 6 (six) hours as needed for wheezing, Disp: , Rfl:     benzonatate (TESSALON) 200 MG capsule, Take 1 capsule (200 mg total) by mouth 3 (three) times a day as needed for cough, Disp: 20 capsule, Rfl: 0    levonorgestrel (MIRENA) 20 MCG/24HR IUD, 1 each by Intrauterine route once, Disp: , Rfl:     metFORMIN (GLUCOPHAGE-XR) 500 mg 24 hr tablet, Take 3 tablets (1,500 mg total) by mouth daily with dinner, Disp: 270 tablet, Rfl: 1    multivitamin (THERAGRAN) TABS, Take 1 tablet by mouth daily, Disp: , Rfl:     predniSONE 20 mg tablet, Take 2 tablets (40 mg total) by mouth daily for 5 days, Disp: 10 tablet, Rfl: 0    sertraline (Zoloft) 50 mg tablet, Take 1 tablet (50 mg total) by mouth daily, Disp: 90 tablet, Rfl: 2    famotidine (PEPCID) 20 mg tablet, Take 1 tablet (20 mg total) by mouth daily (Patient not taking: Reported on 8/12/2024), Disp: 90 tablet, Rfl: 0    Current Allergies     Allergies as of 09/23/2024    (No Known Allergies)            The following portions of the patient's history were reviewed and updated as appropriate: allergies, current medications, past family history, past medical history, past social history, past surgical history and problem list.     Past Medical History:   Diagnosis Date    Anxiety     Asthma 2005    BMI 40.0-44.9, adult ([facility]) 03/11/2021    Depression     Diabetes mellitus ([facility])     Gestational diabetes    History of ovarian cyst 2018    Hyperlipidemia     Migraine     Mild intermittent asthma     Morbid obesity ([facility])     Other headache syndrome 11/16/2021    PID (pelvic inflammatory disease) 2018    Polycystic ovary syndrome     Pre-diabetes     Varicella     had vaccine    Vitamin D deficiency        Past Surgical History:   Procedure Laterality Date    WISDOM TOOTH EXTRACTION         Family History   Problem Relation Age of Onset    JOSELINE disease Mother     Diabetes unspecified Mother     Asthma Mother     Seizures Father 13    Asperger's syndrome  "Sister     Autism Brother     No Known Problems Son     Colon cancer Maternal Grandmother     Heart attack Maternal Grandfather     Heart disease Maternal Grandfather     No Known Problems Paternal Grandmother     No Known Problems Paternal Grandfather          Medications have been verified.        Objective   /61   Pulse 76   Temp 97.8 °F (36.6 °C)   Resp 18   Ht 5' 6\" (1.676 m)   Wt 117 kg (258 lb 9.6 oz)   SpO2 99%   BMI 41.74 kg/m²   No LMP recorded. Patient has had an implant.       Physical Exam     Physical Exam  Vitals reviewed.   Constitutional:       General: She is not in acute distress.     Appearance: Normal appearance. She is not ill-appearing, toxic-appearing or diaphoretic.   HENT:      Head: Normocephalic and atraumatic.      Right Ear: Tympanic membrane normal.      Left Ear: Tympanic membrane normal.      Nose: Nose normal.      Mouth/Throat:      Mouth: Mucous membranes are moist.      Pharynx: No oropharyngeal exudate or posterior oropharyngeal erythema.   Eyes:      Pupils: Pupils are equal, round, and reactive to light.   Cardiovascular:      Rate and Rhythm: Normal rate and regular rhythm.      Heart sounds: No murmur heard.  Pulmonary:      Effort: Pulmonary effort is normal. No respiratory distress.      Comments: Prolonged exp phase  Abdominal:      General: Abdomen is flat.      Palpations: Abdomen is soft.      Tenderness: There is no abdominal tenderness.   Musculoskeletal:         General: Normal range of motion.      Cervical back: Normal range of motion. No rigidity or tenderness.   Lymphadenopathy:      Cervical: No cervical adenopathy.   Skin:     General: Skin is warm and dry.      Capillary Refill: Capillary refill takes less than 2 seconds.   Neurological:      General: No focal deficit present.      Mental Status: She is alert and oriented to person, place, and time. Mental status is at baseline.   Psychiatric:         Mood and Affect: Mood normal.         " Behavior: Behavior normal.         Thought Content: Thought content normal.

## 2024-09-23 NOTE — LETTER
September 23, 2024     Patient: Cristin De La Cruz   YOB: 1995   Date of Visit: 9/23/2024       To Whom it May Concern:    Cristin De La Cruz was seen in my clinic on 9/23/2024. She may return to work on 9/24 .    If you have any questions or concerns, please don't hesitate to call.         Sincerely,          Arlene Ybarra,         CC: No Recipients

## 2024-10-10 DIAGNOSIS — R10.13 DYSPEPSIA: ICD-10-CM

## 2024-10-10 DIAGNOSIS — F41.9 ANXIETY: ICD-10-CM

## 2024-10-10 DIAGNOSIS — R73.01 IFG (IMPAIRED FASTING GLUCOSE): ICD-10-CM

## 2024-10-10 RX ORDER — FAMOTIDINE 20 MG/1
20 TABLET, FILM COATED ORAL DAILY
Qty: 90 TABLET | Refills: 1 | Status: SHIPPED | OUTPATIENT
Start: 2024-10-10

## 2024-10-10 RX ORDER — METFORMIN HCL 500 MG
1500 TABLET, EXTENDED RELEASE 24 HR ORAL
Qty: 270 TABLET | Refills: 0 | Status: SHIPPED | OUTPATIENT
Start: 2024-10-10

## 2024-10-10 NOTE — TELEPHONE ENCOUNTER
Reason for call:   [x] Refill   [] Prior Auth  [] Other:     Office:   [x] PCP/Provider - Neeta Johnston  [] Specialty/Provider -     Medication: famotidine     Dose/Frequency: 20 mg take daily     Quantity: 90    Pharmacy: [facility] [address]    Does the patient have enough for 3 days?   [x] Yes   [] No - Send as HP to POD

## 2024-10-10 NOTE — TELEPHONE ENCOUNTER
Reason for call:   [x] Refill   [] Prior Auth  [] Other:     Office:   [] PCP/Provider -   [x] Specialty/Provider - psychiatry     Medication: metformin 500 mg take 3 tablets daily with dinner       Sertraline 50 mg take one daily        Quantity: 90 day for both     Pharmacy: [facility] [address]    Does the patient have enough for 3 days?   [x] Yes   [] No - Send as HP to POD

## 2024-10-16 ENCOUNTER — HOSPITAL ENCOUNTER (OUTPATIENT)
Dept: RADIOLOGY | Facility: HOSPITAL | Age: 29
Discharge: HOME | End: 2024-10-16
Payer: COMMERCIAL

## 2024-10-16 DIAGNOSIS — R43.8 HYPOSMIA: ICD-10-CM

## 2024-10-16 DIAGNOSIS — R09.81 CHRONIC NASAL CONGESTION: ICD-10-CM

## 2024-10-16 DIAGNOSIS — J32.9 CHRONIC SINUSITIS, UNSPECIFIED LOCATION: ICD-10-CM

## 2024-10-16 PROCEDURE — 70486 CT MAXILLOFACIAL W/O DYE: CPT

## 2024-10-17 ENCOUNTER — OFFICE VISIT (OUTPATIENT)
Dept: URGENT CARE | Facility: MEDICAL CENTER | Age: 29
End: 2024-10-17
Payer: COMMERCIAL

## 2024-10-17 VITALS
DIASTOLIC BLOOD PRESSURE: 65 MMHG | TEMPERATURE: 98.3 F | OXYGEN SATURATION: 97 % | BODY MASS INDEX: 41.32 KG/M2 | SYSTOLIC BLOOD PRESSURE: 123 MMHG | RESPIRATION RATE: 18 BRPM | HEART RATE: 74 BPM | WEIGHT: 256 LBS

## 2024-10-17 DIAGNOSIS — J06.9 UPPER RESPIRATORY TRACT INFECTION, UNSPECIFIED TYPE: Primary | ICD-10-CM

## 2024-10-17 DIAGNOSIS — R05.3 PERSISTENT COUGH: ICD-10-CM

## 2024-10-17 PROCEDURE — G0382 LEV 3 HOSP TYPE B ED VISIT: HCPCS | Performed by: PHYSICIAN ASSISTANT

## 2024-10-17 PROCEDURE — S9083 URGENT CARE CENTER GLOBAL: HCPCS | Performed by: PHYSICIAN ASSISTANT

## 2024-10-17 RX ORDER — AZITHROMYCIN 250 MG/1
250 TABLET, FILM COATED ORAL EVERY 24 HOURS
Qty: 6 TABLET | Refills: 0 | Status: SHIPPED | OUTPATIENT
Start: 2024-10-17 | End: 2024-10-23

## 2024-10-17 NOTE — PROGRESS NOTES
[facility]        NAME: Cristin De La Cruz is a 29 y.o. female  : 1995    MRN: [id number]  DATE: 2024  TIME: 6:48 PM    Assessment and Plan   Upper respiratory tract infection, unspecified type [J06.9]  1. Upper respiratory tract infection, unspecified type        2. Persistent cough  azithromycin (ZITHROMAX) 250 mg tablet            Patient Instructions     Motrin as needed if febrile  Increase fluids  Take Zithromax: 2 today, then 1 days 2-5  Follow-up with PCP if symptoms worsen or persist.       If tests have been performed at [facility], our office will contact you with results if changes need to be made to the care plan discussed with you at the visit.  You can review your full results on [facility].    Chief Complaint     Chief Complaint   Patient presents with    Cold Like Symptoms     Pt. With cough, congestion , body aches for the past week.          History of Present Illness       Cristin is a 29-year-old female who presents with a 6-day history of nasal congestion and persistent cough.  Patient reports her initial symptoms started with a slight runny nose and nasal congestion followed by cough.  Patient reports her cough is worsened and persisted over the past 6 days.  She is cough to gag but no posttussive vomiting.  She reports no fever, chills or bodyaches.  Patient reports no difficulty breathing or shortness of breath.        Review of Systems   Review of Systems   Constitutional: Negative.    HENT:  Positive for congestion.    Respiratory:  Positive for cough.    Cardiovascular: Negative.    Gastrointestinal: Negative.          Current Medications       Current Outpatient Medications:     albuterol (PROVENTIL HFA,VENTOLIN HFA) 90 mcg/act inhaler, Inhale 2 puffs every 6 (six) hours as needed for wheezing, Disp: , Rfl:     azithromycin (ZITHROMAX) 250 mg tablet, Take 1 tablet (250 mg total) by mouth every 24 hours for 6 days, Disp: 6 tablet, Rfl: 0    benzonatate  (TESSALON) 200 MG capsule, Take 1 capsule (200 mg total) by mouth 3 (three) times a day as needed for cough, Disp: 20 capsule, Rfl: 0    famotidine (PEPCID) 20 mg tablet, Take 1 tablet (20 mg total) by mouth daily, Disp: 90 tablet, Rfl: 1    levonorgestrel (MIRENA) 20 MCG/24HR IUD, 1 each by Intrauterine route once, Disp: , Rfl:     metFORMIN (GLUCOPHAGE-XR) 500 mg 24 hr tablet, Take 3 tablets (1,500 mg total) by mouth daily with dinner, Disp: 270 tablet, Rfl: 0    multivitamin (THERAGRAN) TABS, Take 1 tablet by mouth daily, Disp: , Rfl:     sertraline (Zoloft) 50 mg tablet, Take 1 tablet (50 mg total) by mouth daily, Disp: 90 tablet, Rfl: 1    Current Allergies     Allergies as of 10/17/2024    (No Known Allergies)            The following portions of the patient's history were reviewed and updated as appropriate: allergies, current medications, past family history, past medical history, past social history, past surgical history and problem list.     Past Medical History:   Diagnosis Date    Anxiety     Asthma 2005    BMI 40.0-44.9, adult ([facility]) 03/11/2021    Depression     Diabetes mellitus ([facility])     Gestational diabetes    History of ovarian cyst 2018    Hyperlipidemia     Migraine     Mild intermittent asthma     Morbid obesity ([facility])     Other headache syndrome 11/16/2021    PID (pelvic inflammatory disease) 2018    Polycystic ovary syndrome     Pre-diabetes     Varicella     had vaccine    Vitamin D deficiency        Past Surgical History:   Procedure Laterality Date    WISDOM TOOTH EXTRACTION         Family History   Problem Relation Age of Onset    JOSELINE disease Mother     Diabetes unspecified Mother     Asthma Mother     Seizures Father 13    Asperger's syndrome Sister     Autism Brother     No Known Problems Son     Colon cancer Maternal Grandmother     Heart attack Maternal Grandfather     Heart disease Maternal Grandfather     No Known Problems Paternal Grandmother     No Known Problems Paternal Grandfather           Medications have been verified.        Objective   /65   Pulse 74   Temp 98.3 °F (36.8 °C)   Resp 18   Wt 116 kg (256 lb)   SpO2 97%   BMI 41.32 kg/m²   No LMP recorded. Patient has had an implant.       Physical Exam     Physical Exam  Constitutional:       General: She is not in acute distress.     Appearance: Normal appearance. She is not ill-appearing.   HENT:      Head: Normocephalic and atraumatic.      Right Ear: Tympanic membrane and ear canal normal.      Left Ear: Tympanic membrane and ear canal normal.      Nose: Congestion and rhinorrhea present. Rhinorrhea is clear.      Mouth/Throat:      Lips: Pink.      Pharynx: Oropharynx is clear.   Cardiovascular:      Rate and Rhythm: Normal rate and regular rhythm.      Heart sounds: Normal heart sounds, S1 normal and S2 normal. No murmur heard.  Pulmonary:      Effort: Pulmonary effort is normal.      Breath sounds: Normal breath sounds and air entry. No wheezing or rhonchi.   Neurological:      Mental Status: She is alert.       Discussed history and exam findings with patient, concern for possible mycoplasma infection.

## 2024-10-17 NOTE — PATIENT INSTRUCTIONS
Motrin as needed if febrile  Increase fluids  Take Zithromax: 2 today, then 1 days 2-5  Follow-up with PCP if symptoms worsen or persist.

## 2025-01-05 DIAGNOSIS — R73.01 IFG (IMPAIRED FASTING GLUCOSE): ICD-10-CM

## 2025-01-07 RX ORDER — METFORMIN HYDROCHLORIDE 500 MG/1
1500 TABLET, EXTENDED RELEASE ORAL
Qty: 270 TABLET | Refills: 0 | Status: SHIPPED | OUTPATIENT
Start: 2025-01-07

## 2025-01-09 ENCOUNTER — OFFICE VISIT (OUTPATIENT)
Dept: URGENT CARE | Age: 30
End: 2025-01-09
Payer: COMMERCIAL

## 2025-01-09 VITALS
HEART RATE: 86 BPM | RESPIRATION RATE: 18 BRPM | HEIGHT: 66 IN | SYSTOLIC BLOOD PRESSURE: 112 MMHG | WEIGHT: 248 LBS | BODY MASS INDEX: 39.86 KG/M2 | DIASTOLIC BLOOD PRESSURE: 70 MMHG | TEMPERATURE: 97.6 F | OXYGEN SATURATION: 99 %

## 2025-01-09 DIAGNOSIS — J01.90 ACUTE VIRAL SINUSITIS: Primary | ICD-10-CM

## 2025-01-09 DIAGNOSIS — B97.89 ACUTE VIRAL SINUSITIS: Primary | ICD-10-CM

## 2025-01-09 PROCEDURE — S9083 URGENT CARE CENTER GLOBAL: HCPCS | Performed by: STUDENT IN AN ORGANIZED HEALTH CARE EDUCATION/TRAINING PROGRAM

## 2025-01-09 PROCEDURE — G0383 LEV 4 HOSP TYPE B ED VISIT: HCPCS | Performed by: STUDENT IN AN ORGANIZED HEALTH CARE EDUCATION/TRAINING PROGRAM

## 2025-01-09 RX ORDER — FLUTICASONE PROPIONATE 50 MCG
1 SPRAY, SUSPENSION (ML) NASAL 2 TIMES DAILY
Qty: 11.1 ML | Refills: 0 | Status: SHIPPED | OUTPATIENT
Start: 2025-01-09

## 2025-01-09 RX ORDER — BROMPHENIRAMINE MALEATE, PSEUDOEPHEDRINE HYDROCHLORIDE, AND DEXTROMETHORPHAN HYDROBROMIDE 2; 30; 10 MG/5ML; MG/5ML; MG/5ML
5 SYRUP ORAL 4 TIMES DAILY PRN
Qty: 120 ML | Refills: 0 | Status: SHIPPED | OUTPATIENT
Start: 2025-01-09

## 2025-01-09 NOTE — LETTER
January 9, 2025     Patient: Cristin De La Cruz   YOB: 1995   Date of Visit: 1/9/2025       To Whom it May Concern:    Cristin De La Cruz was seen in my clinic on 1/9/2025. She may return to work on 1/10/2025    If you have any questions or concerns, please don't hesitate to call.         Sincerely,          Zahra Santoyo,         CC: No Recipients

## 2025-01-09 NOTE — PATIENT INSTRUCTIONS
AAFP Article from 2020- Clinical Diagnosis of Acute Bacterial Rhinosinusitis  LEELA COTA, [facility], NILDA, AND ULISSES MOON MD, Health Research Board, [employer] in [place], [place], [place]     Acute rhinosinusitis in adults is defined as sinonasal inflammation that lasts less than four weeks and is associated with the sudden onset of symptoms.1 In the 2012 National Health Interview Survey, 12% of respondents reported being diagnosed with rhinosinusitis in the previous 12 months.2 In 2016, there were 8 million [place] ambulatory visits for acute sinusitis.3 Acute bacterial rhinosinusitis develops in only 0.5% to 2% of all upper respiratory tract infections.4  A 2018 Daniel review demonstrated that the potential benefit of antibiotics for the treatment of acute rhinosinusitis, diagnosed clinically or confirmed with imaging, is marginal.5 Without antibiotics, rhinosinusitis resolved in 46% of patients after one week and in 64% of patients after 14 days.5 Antibiotics can shorten time to resolution but in only five to 11 more people per 100 compared with placebo or no treatment.  If tests have been performed at [facility], our office will contact you with results if changes need to be made to the care plan discussed with you at the visit.  You can review your full results on [facility] My[facility].    Follow up with PCP.     If any of the following occur, please report to your nearest ED for evaluation or call 911.   Difficultly breathing or shortness of breath  Chest pain  Acutely worsening symptoms.

## 2025-01-09 NOTE — PROGRESS NOTES
[facility]        NAME: Cristin De La Cruz is a 29 y.o. female  : 1995    MRN: [id number]  DATE: 2025  TIME: 5:38 PM    Assessment and Orders   Acute viral sinusitis [J01.90, B97.89]  1. Acute viral sinusitis  brompheniramine-pseudoephedrine-DM 30-2-10 MG/5ML syrup    fluticasone (FLONASE) 50 mcg/act nasal spray            Plan and Discussion      Symptoms and exam consistent with viral sinusitis. Will treat with oral Bromfed and Flonase.      Risks and benefits discussed. Patient understands and agrees with the plan.     PATIENT INSTRUCTIONS    If tests have been performed at [facility], our office will contact you with results if changes need to be made to the care plan discussed with you at the visit.  You can review your full results on [facility].    Follow up with PCP.     If any of the following occur, please report to your nearest ED for evaluation or call 911.   Difficultly breathing or shortness of breath  Chest pain  Acutely worsening symptoms.         Chief Complaint     Chief Complaint   Patient presents with    Sinusitis     Pt states she has had sinus pressure and congestion for past week. Causing cough and phlegm. Taking mucinex and dayquil .          History of Present Illness       Sinusitis  This is a new problem. The current episode started in the past 7 days. The problem is unchanged. There has been no fever. Associated symptoms include congestion, coughing, ear pain (ear pressure), headaches and sinus pressure. Pertinent negatives include no sore throat. Past treatments include oral decongestants.       Review of Systems   Review of Systems   HENT:  Positive for congestion, ear pain (ear pressure) and sinus pressure. Negative for sore throat.    Respiratory:  Positive for cough.    Neurological:  Positive for headaches.         Current Medications       Current Outpatient Medications:     albuterol (PROVENTIL HFA,VENTOLIN HFA) 90 mcg/act inhaler, Inhale 2 puffs  every 6 (six) hours as needed for wheezing, Disp: , Rfl:     brompheniramine-pseudoephedrine-DM 30-2-10 MG/5ML syrup, Take 5 mL by mouth 4 (four) times a day as needed for cough or congestion, Disp: 120 mL, Rfl: 0    famotidine (PEPCID) 20 mg tablet, Take 1 tablet (20 mg total) by mouth daily, Disp: 90 tablet, Rfl: 1    fluticasone (FLONASE) 50 mcg/act nasal spray, 1 spray into each nostril 2 (two) times a day, Disp: 11.1 mL, Rfl: 0    levonorgestrel (MIRENA) 20 MCG/24HR IUD, 1 each by Intrauterine route once, Disp: , Rfl:     metFORMIN (GLUCOPHAGE-XR) 500 mg 24 hr tablet, TAKE 3 TABLETS (1,500 MG TOTAL) BY MOUTH DAILY WITH DINNER, Disp: 270 tablet, Rfl: 0    multivitamin (THERAGRAN) TABS, Take 1 tablet by mouth daily, Disp: , Rfl:     sertraline (Zoloft) 50 mg tablet, Take 1 tablet (50 mg total) by mouth daily, Disp: 90 tablet, Rfl: 1    benzonatate (TESSALON) 200 MG capsule, Take 1 capsule (200 mg total) by mouth 3 (three) times a day as needed for cough (Patient not taking: Reported on 1/9/2025), Disp: 20 capsule, Rfl: 0    Current Allergies     Allergies as of 01/09/2025    (No Known Allergies)            The following portions of the patient's history were reviewed and updated as appropriate: allergies, current medications, past family history, past medical history, past social history, past surgical history and problem list.     Past Medical History:   Diagnosis Date    Anxiety     Asthma 2005    BMI 40.0-44.9, adult ([facility]) 03/11/2021    Depression     Diabetes mellitus ([facility])     Gestational diabetes    History of ovarian cyst 2018    Hyperlipidemia     Migraine     Mild intermittent asthma     Morbid obesity ([facility])     Other headache syndrome 11/16/2021    PID (pelvic inflammatory disease) 2018    Polycystic ovary syndrome     Pre-diabetes     Varicella     had vaccine    Vitamin D deficiency        Past Surgical History:   Procedure Laterality Date    WISDOM TOOTH EXTRACTION         Family History   Problem  "Relation Age of Onset    JOSELINE disease Mother     Diabetes unspecified Mother     Asthma Mother     Seizures Father 13    Asperger's syndrome Sister     Autism Brother     No Known Problems Son     Colon cancer Maternal Grandmother     Heart attack Maternal Grandfather     Heart disease Maternal Grandfather     No Known Problems Paternal Grandmother     No Known Problems Paternal Grandfather          Medications have been verified.        Objective   /70   Pulse 86   Temp 97.6 °F (36.4 °C)   Resp 18   Ht 5' 6\" (1.676 m)   Wt 112 kg (248 lb)   SpO2 99%   BMI 40.03 kg/m²   No LMP recorded. Patient has had an implant.       Physical Exam     Physical Exam  Constitutional:       General: She is not in acute distress.     Appearance: She is not ill-appearing or toxic-appearing.   HENT:      Head: Normocephalic and atraumatic.      Right Ear: Tympanic membrane and external ear normal.      Left Ear: Tympanic membrane and external ear normal.      Nose: Congestion present.      Comments: Boggy nasal turbinates     Mouth/Throat:      Pharynx: Posterior oropharyngeal erythema present.      Comments: Cobblestone appearance in posterior pharynx  Cardiovascular:      Rate and Rhythm: Normal rate and regular rhythm.   Pulmonary:      Effort: Pulmonary effort is normal. No respiratory distress.      Breath sounds: No wheezing.   Neurological:      General: No focal deficit present.      Mental Status: She is alert and oriented to person, place, and time.   Psychiatric:         Mood and Affect: Mood normal.         Behavior: Behavior normal.         Thought Content: Thought content normal.         Judgment: Judgment normal.               Zahra Santoyo DO"

## 2025-01-13 DIAGNOSIS — R09.81 SINUS CONGESTION: Primary | ICD-10-CM

## 2025-01-13 RX ORDER — PREDNISONE 10 MG/1
TABLET ORAL
Qty: 20 TABLET | Refills: 0 | Status: SHIPPED | OUTPATIENT
Start: 2025-01-13

## 2025-01-29 ENCOUNTER — OFFICE VISIT (OUTPATIENT)
Dept: FAMILY MEDICINE CLINIC | Facility: CLINIC | Age: 30
End: 2025-01-29
Payer: COMMERCIAL

## 2025-01-29 VITALS
WEIGHT: 253 LBS | HEART RATE: 82 BPM | DIASTOLIC BLOOD PRESSURE: 80 MMHG | SYSTOLIC BLOOD PRESSURE: 130 MMHG | BODY MASS INDEX: 40.66 KG/M2 | HEIGHT: 66 IN | RESPIRATION RATE: 16 BRPM | OXYGEN SATURATION: 98 % | TEMPERATURE: 97.8 F

## 2025-01-29 DIAGNOSIS — R51.9 CHRONIC NONINTRACTABLE HEADACHE, UNSPECIFIED HEADACHE TYPE: ICD-10-CM

## 2025-01-29 DIAGNOSIS — H47.10 OPTIC NERVE SWELLING: Primary | ICD-10-CM

## 2025-01-29 DIAGNOSIS — G89.29 CHRONIC NONINTRACTABLE HEADACHE, UNSPECIFIED HEADACHE TYPE: ICD-10-CM

## 2025-01-29 PROCEDURE — 99213 OFFICE O/P EST LOW 20 MIN: CPT | Performed by: PHYSICIAN ASSISTANT

## 2025-01-29 NOTE — PROGRESS NOTES
Assessment/Plan:    Optic nerve swelling - noted on routine eye exam last night, will refer to neuro and order MRI to be completed prior to appt    F/u as needed    Subjective:   Chief Complaint   Patient presents with    Eye Problem     Was seen at eye doctor and it was suspected that pt could have pseudo tumor   Was advised to follow-up      Patient ID: Cristin De La Cruz is a 29 y.o. female.    Patient was at eye doctor for her routine eye appointment. Now that she thinks about it she has been having some shadows in her vision off and on. Also notes seh has been having some ringing in her ears and went to ENT and they said all was normal. She also admits she does have chronic headaches off and on. Also with chronic sinus issues so figured that was the headache.    Saw [facility], [facility]. Dr Lacey Campa.         The following portions of the patient's history were reviewed and updated as appropriate: allergies, current medications, past family history, past medical history, past social history, past surgical history, and problem list.    Past Medical History:   Diagnosis Date    Anxiety     Asthma 2005    BMI 40.0-44.9, adult ([facility]) 03/11/2021    Depression     Diabetes mellitus ([facility])     Gestational diabetes    History of ovarian cyst 2018    Hyperlipidemia     Migraine     Mild intermittent asthma     Morbid obesity ([facility])     Other headache syndrome 11/16/2021    PID (pelvic inflammatory disease) 2018    Polycystic ovary syndrome     Pre-diabetes     Varicella     had vaccine    Vitamin D deficiency      Past Surgical History:   Procedure Laterality Date    WISDOM TOOTH EXTRACTION       Family History   Problem Relation Age of Onset    JOSELINE disease Mother     Diabetes unspecified Mother     Asthma Mother     Seizures Father 13    Asperger's syndrome Sister     Autism Brother     No Known Problems Son     Colon cancer Maternal Grandmother     Heart attack Maternal Grandfather     Heart disease Maternal  Grandfather     No Known Problems Paternal Grandmother     No Known Problems Paternal Grandfather      Social History     Socioeconomic History    Marital status: Single     Spouse name: Not on file    Number of children: 0    Years of education: Not on file    Highest education level: Not on file   Occupational History    Occupation: Dietary Aide     Employer: [employer]   Tobacco Use    Smoking status: Former     Current packs/day: 0.00     Average packs/day: 0.1 packs/day for 0.4 years     Types: Cigarettes     Start date: 2017     Quit date: 10/1/2017     Years since quittin.3    Smokeless tobacco: Never    Tobacco comments:     Smoked socially    Vaping Use    Vaping status: Never Used   Substance and Sexual Activity    Alcohol use: Yes     Alcohol/week: 1.0 standard drink of alcohol     Types: 1 Glasses of wine per week     Comment: socially    Drug use: Never    Sexual activity: Yes     Partners: Male     Birth control/protection: I.U.D.   Other Topics Concern    Not on file   Social History Narrative    Single    Lives with boyfriend, son    1 Child - 1 Son    Farm      Social Drivers of Health     Financial Resource Strain: Not on file   Food Insecurity: Not on file   Transportation Needs: Not on file   Physical Activity: Not on file   Stress: Not on file   Social Connections: Not on file   Intimate Partner Violence: Not on file   Housing Stability: Not on file       Current Outpatient Medications:     albuterol (PROVENTIL HFA,VENTOLIN HFA) 90 mcg/act inhaler, Inhale 2 puffs every 6 (six) hours as needed for wheezing, Disp: , Rfl:     famotidine (PEPCID) 20 mg tablet, Take 1 tablet (20 mg total) by mouth daily, Disp: 90 tablet, Rfl: 1    fluticasone (FLONASE) 50 mcg/act nasal spray, 1 spray into each nostril 2 (two) times a day, Disp: 11.1 mL, Rfl: 0    levonorgestrel (MIRENA) 20 MCG/24HR IUD, 1 each by Intrauterine route once, Disp: , Rfl:     metFORMIN (GLUCOPHAGE-XR) 500  "mg 24 hr tablet, TAKE 3 TABLETS (1,500 MG TOTAL) BY MOUTH DAILY WITH DINNER, Disp: 270 tablet, Rfl: 0    multivitamin (THERAGRAN) TABS, Take 1 tablet by mouth daily, Disp: , Rfl:     sertraline (Zoloft) 50 mg tablet, Take 1 tablet (50 mg total) by mouth daily, Disp: 90 tablet, Rfl: 1    benzonatate (TESSALON) 200 MG capsule, Take 1 capsule (200 mg total) by mouth 3 (three) times a day as needed for cough (Patient not taking: Reported on 1/29/2025), Disp: 20 capsule, Rfl: 0    brompheniramine-pseudoephedrine-DM 30-2-10 MG/5ML syrup, Take 5 mL by mouth 4 (four) times a day as needed for cough or congestion (Patient not taking: Reported on 1/29/2025), Disp: 120 mL, Rfl: 0    predniSONE 10 mg tablet, Take 4 tablets with food on days 1 & 2. Then take 3 tablets with food on days 3 &4. Then take 2 tablets with food on days 5 & 6. Then take 1 tablet with food on days 7 & 8. (Patient not taking: Reported on 1/29/2025), Disp: 20 tablet, Rfl: 0    Review of Systems          Objective:    Vitals:    01/29/25 0848   BP: 130/80   Pulse: 82   Resp: 16   Temp: 97.8 °F (36.6 °C)   TempSrc: Temporal   SpO2: 98%   Weight: 115 kg (253 lb)   Height: 5' 6\" (1.676 m)        Physical Exam  Constitutional:       Appearance: Normal appearance. She is normal weight.   HENT:      Head: Normocephalic and atraumatic.   Neurological:      General: No focal deficit present.      Mental Status: She is alert and oriented to person, place, and time.   Psychiatric:         Mood and Affect: Mood normal.         Behavior: Behavior normal.         Thought Content: Thought content normal.         Judgment: Judgment normal.               "

## 2025-02-03 DIAGNOSIS — H47.10 OPTIC NERVE SWELLING: Primary | ICD-10-CM

## 2025-02-04 ENCOUNTER — APPOINTMENT (OUTPATIENT)
Dept: LAB | Facility: CLINIC | Age: 30
End: 2025-02-04
Payer: COMMERCIAL

## 2025-02-04 DIAGNOSIS — H47.10 OPTIC NERVE SWELLING: ICD-10-CM

## 2025-02-04 LAB
ANION GAP SERPL CALCULATED.3IONS-SCNC: 12 MMOL/L (ref 4–13)
BUN SERPL-MCNC: 12 MG/DL (ref 5–25)
CALCIUM SERPL-MCNC: 9.5 MG/DL (ref 8.4–10.2)
CHLORIDE SERPL-SCNC: 103 MMOL/L (ref 96–108)
CO2 SERPL-SCNC: 25 MMOL/L (ref 21–32)
CREAT SERPL-MCNC: 0.67 MG/DL (ref 0.6–1.3)
GFR SERPL CREATININE-BSD FRML MDRD: 120 ML/MIN/1.73SQ M
GLUCOSE P FAST SERPL-MCNC: 85 MG/DL (ref 65–99)
POTASSIUM SERPL-SCNC: 4.1 MMOL/L (ref 3.5–5.3)
SODIUM SERPL-SCNC: 140 MMOL/L (ref 135–147)

## 2025-02-04 PROCEDURE — 36415 COLL VENOUS BLD VENIPUNCTURE: CPT

## 2025-02-04 PROCEDURE — 80048 BASIC METABOLIC PNL TOTAL CA: CPT

## 2025-02-06 ENCOUNTER — HOSPITAL ENCOUNTER (OUTPATIENT)
Dept: MRI IMAGING | Facility: HOSPITAL | Age: 30
End: 2025-02-06
Payer: COMMERCIAL

## 2025-02-06 DIAGNOSIS — G89.29 CHRONIC NONINTRACTABLE HEADACHE, UNSPECIFIED HEADACHE TYPE: ICD-10-CM

## 2025-02-06 DIAGNOSIS — R51.9 CHRONIC NONINTRACTABLE HEADACHE, UNSPECIFIED HEADACHE TYPE: ICD-10-CM

## 2025-02-06 DIAGNOSIS — H47.10 OPTIC NERVE SWELLING: ICD-10-CM

## 2025-02-06 PROCEDURE — 70553 MRI BRAIN STEM W/O & W/DYE: CPT

## 2025-02-06 PROCEDURE — 70543 MRI ORBT/FAC/NCK W/O &W/DYE: CPT

## 2025-02-06 PROCEDURE — A9585 GADOBUTROL INJECTION: HCPCS | Performed by: PHYSICIAN ASSISTANT

## 2025-02-06 RX ORDER — GADOBUTROL 604.72 MG/ML
11 INJECTION INTRAVENOUS
Status: COMPLETED | OUTPATIENT
Start: 2025-02-06 | End: 2025-02-06

## 2025-02-06 RX ADMIN — GADOBUTROL 11 ML: 604.72 INJECTION INTRAVENOUS at 17:40

## 2025-02-10 ENCOUNTER — RESULTS FOLLOW-UP (OUTPATIENT)
Dept: FAMILY MEDICINE CLINIC | Facility: CLINIC | Age: 30
End: 2025-02-10

## 2025-02-18 ENCOUNTER — OFFICE VISIT (OUTPATIENT)
Dept: NEUROLOGY | Facility: CLINIC | Age: 30
End: 2025-02-18
Payer: COMMERCIAL

## 2025-02-18 VITALS
DIASTOLIC BLOOD PRESSURE: 60 MMHG | HEART RATE: 64 BPM | SYSTOLIC BLOOD PRESSURE: 118 MMHG | BODY MASS INDEX: 41.45 KG/M2 | TEMPERATURE: 97.9 F | WEIGHT: 256.8 LBS

## 2025-02-18 DIAGNOSIS — H47.10 OPTIC NERVE SWELLING: ICD-10-CM

## 2025-02-18 DIAGNOSIS — R51.9 CHRONIC NONINTRACTABLE HEADACHE, UNSPECIFIED HEADACHE TYPE: ICD-10-CM

## 2025-02-18 DIAGNOSIS — G43.709 CHRONIC MIGRAINE WITHOUT AURA WITHOUT STATUS MIGRAINOSUS, NOT INTRACTABLE: Primary | ICD-10-CM

## 2025-02-18 DIAGNOSIS — G89.29 CHRONIC NONINTRACTABLE HEADACHE, UNSPECIFIED HEADACHE TYPE: ICD-10-CM

## 2025-02-18 PROCEDURE — 99204 OFFICE O/P NEW MOD 45 MIN: CPT

## 2025-02-18 RX ORDER — TOPIRAMATE 25 MG/1
25 TABLET, FILM COATED ORAL 2 TIMES DAILY
Qty: 60 TABLET | Refills: 3 | Status: SHIPPED | OUTPATIENT
Start: 2025-02-18

## 2025-02-18 NOTE — PROGRESS NOTES
Name: Cristin De La Cruz      : 1995      MRN: [id number]  Encounter Provider: Jack Mcfadden PA-C  Encounter Date: 2025   Encounter department: [facility]  :  Assessment & Plan  Chronic migraine without aura without status migrainosus, not intractable  I had the pleasure of seeing Cristin today in the office at [facility] in [place].  She is presenting today for an initial new patient consultation in regard to her headaches.  Patient noted that her headaches started at a relatively young age around childhood.  She noted that over the last few months however her headaches are becoming increasingly more frequent.  The patient currently notes having about 16 to 20 days out of the month with some type of headache.  Also having about 8 to 10 days out of the month with more severe debilitating headaches.  The patient notes that the headaches are usually occurring in the afternoon or evening and not usually waking up by headaches in the morning.  She notes that they are usually lasting anywhere between 2 to 3 hours with medication.  She notes that Excedrin migraine and ibuprofen seem to work well at completely aborting the headaches for the most part.  The patient does not have any aura associated with the headaches.  Patient notes throbbing and pounding type pain at the bilateral temples.  Sometimes having pressure behind her eyes.  She does not have any symptoms of blurred vision or vision loss associate with the headaches, not also having any issues with pulsatile tinnitus with the headaches either.  No specific movements seem to make the headaches worse or triggering them to occur.  When headaches are significant they can become worse with sitting to standing.  No positional change noted to be triggering the headaches to occur.  It was noted that the patient ended up seeing an optometrist about 2 to 3 weeks ago and noted to have concerns of  optic disc swelling/papilledema.  She had not seen an ophthalmologist as of yet.  She did have an MRI brain and orbits without and with contrast with concerning findings of prominence of bilateral optic sheath CSF spaces.  Narrowing of the lateral segments of bilateral transverse dural venous sinuses and postcontrast imaging.  Grossly normal optic nerves without appearing swelling or abnormal enhancement.    Based on my evaluation of the patient, seems like the patient is having more migraine headaches rather than headaches related to IIH.  Patient does not have headaches that are very characteristic of IIH at this point in time, and also not having any other symptoms related to blurred vision or loss of vision at this time.  Although, would like for the patient to be evaluated by ophthalmology moving forward to confirm the presence of papilledema/optic disc swelling.  Advised that we could certainly attenuate medication plan to IIH dosing if indicated that the patient does have papilledema.  For the time being however, recommended that the patient trial Topamax 25 mg, taking 1 tablet by mouth twice daily for migraine prevention.  Again advised that we could potentially increase the dosage of Topamax in the future to help with decreasing intracranial pressure related to IIH.  Although for the time being until this is confirmed part of the patient continue with just normal migraine preventative dosing.  She can continue with over-the-counter medications such as ibuprofen and Excedrin Migraine for her headaches as they seem to be effective for her.  Advised the patient to also continue to follow-up dietary/nutrition in regards to further weight management intervention.  Advised patient that significant weight loss can be helpful at preventing reoccurrence of IIH in the future if the patient is determined to have it.  Advised patient to follow-up in approximately 3 months time with Len PIERCE      Orders:    topiramate  (Topamax) 25 mg tablet; Take 1 tablet (25 mg total) by mouth 2 (two) times a day    Ambulatory Referral to Ophthalmology; Future    Optic nerve swelling    Orders:    Ambulatory Referral to Neurology    Ambulatory Referral to Ophthalmology; Future    Chronic nonintractable headache, unspecified headache type    Orders:    Ambulatory Referral to Neurology      Patient Instructions   - At this time, ambulatory referral to ophthalmology has been placed.  Patient notes that she went and saw her optometrist about 2 to 3 weeks ago and noted that she had some optic disc swelling/papilledema.  Would like for the patient to be properly evaluated by Dr. Bobo at [facility] to confirm presence of IIH.  At that time if confirmed to have concern for papilledema/optic disc swelling we will certainly alter our medication therapy to be more attenuated to IIH rather than treating migraines.  - Did advise the patient to continue to work on weight management at this time as well.  Advised patient to continue to follow-up with dietitian/nutritional services.  Advised that if she does have IIH that weight loss can significantly help with improving symptoms and outcome related to IIH.  - Finally, advised the patient to let us know if her headaches continue to worsen in the future and also if she is having any worsening changes to her vision.  If the patient starts noting more episodes of significant blurred vision, visual disturbances/obscurations or having any vision loss she is to let us know as soon as possible.  If the patient has an extended period of vision loss or episodes of vision loss getting more frequent the patient should report to the ED as soon as possible as this may be an ophthalmologic emergency and she may need to have a lumbar puncture to drain her CSF pressure.    Headache Calendar  Please maintain a headache calendar  Consider using phone applications such as Migraine Phong or Migraine  Diary    Headache/migraine treatment:     Rescue medications (for immediate treatment of a headache):   It is ok to take ibuprofen, acetaminophen or naproxen (Advil, Tylenol,  Aleve, Excedrin) if they help your headaches you should limit these to No more than 3 times a week to avoid medication overuse/rebound headaches.     Prescription preventive medications for headaches/migraines   (to take every day to help prevent headaches - not to take at the time of headache):  - Start Topamax 25 mg, take 1 tablet by mouth twice daily for migraine prevention.    *Typically these types of medications take time until you see the benefit, although some may see improvement in days, often it may take weeks, especially if the medication is being titrated up to a beneficial level. Please contact us if there are any concerns or questions regarding the medication.     Over the counter preventive supplements for headaches/migraines (if you try, try for 3 months straight)  (to take every day to help prevent headaches - not to take at the time of headache):  There are combo pills online of these - none of which regulated by FDA and double check dosing - take appropriate dose only once a day- prevent a migraine, migravent, mind ease, migrelief   [] Magnesium 400mg daily (If any diarrhea or upset stomach, decrease dose  as tolerated)  [] Riboflavin (Vitamin B2) 400mg daily (may make your urine bright/neon yellow)  - All supplements can be purchased online      Lifestyle Recommendations:  [x] SLEEP - Maintain a regular sleep schedule: Adults need at least 7-8 hours of uninterrupted a night. Maintain good sleep hygiene:  Going to bed and waking up at consistent times, avoiding excessive daytime naps, avoiding caffeinated beverages in the evening, avoid excessive stimulation in the evening and generally using bed primarily for sleeping.  One hour before bedtime would recommend turning lights down lower, decreasing your activity (may read  quietly, listen to music at a low volume). When you get into bed, should eliminate all technology (no texting, emailing, playing with your phone, iPad or tablet in bed).  [x] HYDRATION - Maintain good hydration.  Drink  2L of fluid a day (4 typical small water bottles)  [x] DIET - Maintain good nutrition. In particular don't skip meals and try and eat healthy balanced meals regularly.  [x] TRIGGERS - Look for other triggers and avoid them: Limit caffeine to 1-2 cups a day or less. Avoid dietary triggers that you have noticed bring on your headaches (this could include aged cheese, peanuts, MSG, aspartame and nitrates).  [x] EXERCISE - physical exercise as we all know is good for you in many ways, and not only is good for your heart, but also is beneficial for your mental health, cognitive health and  chronic pain/headaches. I would encourage at the least 5 days of physical exercise weekly for at least 30 minutes.     Education and Follow-up  [x] Please call with any questions or concerns. Of course if any new concerning symptoms go to the emergency department.  [x] Follow up in 3 months with Len DURAN      History of Present Illness   HPI     Current medical illnesses  or past medical history include headaches, mild persistent asthma, optic nerve swelling, allergic rhinitis, GERD, depression, anxiety, vitamin D deficiency, and morbid obesity      Interval History:    Headaches started at what age? Headaches started at a relatively young age, over the last few months noticed headaches becoming more frequent.    How often do the headaches occur?   - as of 2/18/2025: 16-20/30 days in the month with headaches, 8-10/30 days with more severe headache days in the month   What time of the day do the headaches start?  Afternoon or evening with headaches, not usually waking up with headaches she states  How long do the headaches last? Lasting 2-3 hours with medication, takes 2 hours to decrease in severity. Taking Excedrin  migraine and ibuprofen she states, does seem to work well at completely aborting the headaches.   Are you ever headache free? Yes    Aura? without aura, no significant aura but sometimes having floaters or spots in her vision      Where is your headache located and pain quality? Bilateral temples, throbbing and pounding pain. Sometimes experiencing pressure behind the eyes.   What is the intensity of pain? Worst 9/10, Average: 7/10  Associated symptoms:   [x] Diarrhea  [x] Stiff or sore neck   [x] No blurred vision or loss of vision   [x] Prefer quiet, dark room  [x] Light-headed or dizzy     [x] Tinnitus (baseline)  [x] No pulsatile tinnitus   [x] Hands or feet tingle or feel numb/paresthesias       Things that make the headache worse? No specific movements seem to make the headaches worse or triggering the headaches to occur     Any positional change headaches? When headaches are significant, can become worse from sitting to standing. No positional change triggering the headaches     Headache triggers:  no specific triggers for the headaches    Have you seen someone else for headaches or pain? Yes, she saw a neurologist [facility] when she was much younger. Dr. Philip is who she saw.   Have you had trigger point injection performed and how often? No  Have you had Botox injection performed and how often? No   Have you had epidural injections or transforaminal injections performed? Yes, epidural during child birth in 2021  Are you current pregnant or planning on getting pregnant? Not currently pregnant, and does have the Mirena IUD  Have you ever had any Brain imaging? Yes, MRI brain and orbits wo and with contrast, 02/06/2025    Last eye exam: She saw her optometrist about 2-3 weeks ago. Noted that the patient had some concerns of optic disc swelling/papilledema at the time. Has not seen and ophthalmologist yet.     What medications do you take or have you taken for your headaches?   ABORTIVE:    OTC medications:  Excedrin migraine, ibuprofen   Prescription: None    Past/ failed/contraindicated:  OTC medications: None  Prescription: None    PREVENTIVE:   Zoloft 50 mg daily    Past/ failed/contraindicated:  Cymbalta, Effexor-XR      LIFESTYLE  Sleep   - averages: 7-8 hours of sleep at night  Problems falling asleep?:   No  Problems staying asleep?:  No    - No issues with snoring and no issues with trouble breathing at night    Physical activity: has not been doing too much activity as of late    Water:  ounces of water per day  Caffeine: 1 soda a day or Excedrin migraine    Mood: Does have anxiety disorder, currently Zoloft 50 mg daily and seems to be working for her.     The following portions of the patient's history were reviewed and updated as appropriate: allergies, current medications, past family history, past medical history, past social history, past surgical history and problem list.    Pertinent family history:  Family history of headaches:  no known family members with significant headaches  Any family history of aneurysms - No    Pertinent social history:  Work:    Education: high school diploma and some college  Lives with fiance and son    Illicit Drugs: denies  Alcohol/tobacco: Denies tobacco use, alcohol intake: social drinker     Review of Systems   Constitutional: Negative.  Negative for appetite change, fatigue and fever.   HENT: Negative.  Negative for hearing loss, tinnitus, trouble swallowing and voice change.    Eyes:  Positive for visual disturbance (Spotted vision at times). Negative for photophobia and pain.   Respiratory: Negative.  Negative for shortness of breath.    Cardiovascular: Negative.  Negative for palpitations.   Gastrointestinal: Negative.  Negative for nausea and vomiting.   Endocrine: Negative.  Negative for cold intolerance.   Genitourinary: Negative.  Negative for dysuria, frequency and urgency.   Musculoskeletal:  Negative for back pain, gait  problem, myalgias, neck pain and neck stiffness.   Skin: Negative.  Negative for rash.   Allergic/Immunologic: Negative.    Neurological:  Positive for dizziness (At times), numbness (At times in Feet) and headaches (3-4 times a week, pain located in temples and forehead area). Negative for tremors, seizures, syncope, facial asymmetry, speech difficulty, weakness and light-headedness.   Hematological: Negative.  Does not bruise/bleed easily.   Psychiatric/Behavioral: Negative.  Negative for confusion, hallucinations and sleep disturbance.    All other systems reviewed and are negative.   I have personally reviewed the MA's review of systems and made changes as necessary.    Medical History Reviewed by provider this encounter:     .  Past Medical History   Past Medical History:   Diagnosis Date    Anxiety     Asthma 2005    BMI 40.0-44.9, adult ([facility]) 03/11/2021    Depression     Diabetes mellitus ([facility])     Gestational diabetes    History of ovarian cyst 2018    Hyperlipidemia     Migraine     Mild intermittent asthma     Morbid obesity ([facility])     Other headache syndrome 11/16/2021    PID (pelvic inflammatory disease) 2018    Polycystic ovary syndrome     Pre-diabetes     Varicella     had vaccine    Vitamin D deficiency      Past Surgical History:   Procedure Laterality Date    WISDOM TOOTH EXTRACTION       Family History   Problem Relation Age of Onset    JOSELINE disease Mother     Diabetes unspecified Mother     Asthma Mother     Seizures Father 13    Asperger's syndrome Sister     Autism Brother     No Known Problems Son     Colon cancer Maternal Grandmother     Heart attack Maternal Grandfather     Heart disease Maternal Grandfather     No Known Problems Paternal Grandmother     No Known Problems Paternal Grandfather       reports that she quit smoking about 7 years ago. Her smoking use included cigarettes. She started smoking about 7 years ago. She smoked an average 0.1 packs per day for 0.4 years. She has never used  smokeless tobacco. She reports current alcohol use of about 1.0 standard drink of alcohol per week. She reports that she does not use drugs.  Current Outpatient Medications   Medication Instructions    albuterol (PROVENTIL HFA,VENTOLIN HFA) 90 mcg/act inhaler 2 puffs, Every 6 hours PRN    famotidine (PEPCID) 20 mg, Oral, Daily    fluticasone (FLONASE) 50 mcg/act nasal spray 1 spray, Nasal, 2 times daily    levonorgestrel (MIRENA) 20 MCG/24HR IUD 1 each, Once    metFORMIN (GLUCOPHAGE-XR) 1,500 mg, Oral, Daily with dinner    multivitamin (THERAGRAN) TABS 1 tablet, Daily    sertraline (ZOLOFT) 50 mg, Oral, Daily   No Known Allergies   Current Outpatient Medications on File Prior to Visit   Medication Sig Dispense Refill    albuterol (PROVENTIL HFA,VENTOLIN HFA) 90 mcg/act inhaler Inhale 2 puffs every 6 (six) hours as needed for wheezing      famotidine (PEPCID) 20 mg tablet Take 1 tablet (20 mg total) by mouth daily 90 tablet 1    fluticasone (FLONASE) 50 mcg/act nasal spray 1 spray into each nostril 2 (two) times a day 11.1 mL 0    levonorgestrel (MIRENA) 20 MCG/24HR IUD 1 each by Intrauterine route once      metFORMIN (GLUCOPHAGE-XR) 500 mg 24 hr tablet TAKE 3 TABLETS (1,500 MG TOTAL) BY MOUTH DAILY WITH DINNER 270 tablet 0    multivitamin (THERAGRAN) TABS Take 1 tablet by mouth daily      sertraline (Zoloft) 50 mg tablet Take 1 tablet (50 mg total) by mouth daily 90 tablet 1     No current facility-administered medications on file prior to visit.      Social History     Tobacco Use    Smoking status: Former     Current packs/day: 0.00     Average packs/day: 0.1 packs/day for 0.4 years     Types: Cigarettes     Start date: 2017     Quit date: 10/1/2017     Years since quittin.3    Smokeless tobacco: Never    Tobacco comments:     Smoked socially    Vaping Use    Vaping status: Never Used   Substance and Sexual Activity    Alcohol use: Yes     Alcohol/week: 1.0 standard drink of alcohol     Types: 1 Glasses of  wine per week     Comment: socially    Drug use: Never    Sexual activity: Yes     Partners: Male     Birth control/protection: I.U.D.        Objective   There were no vitals taken for this visit.    Physical Exam  Neurological Exam    Physical Exam:                                                                 Vitals:            Constitutional:    /60 (BP Location: Right arm, Patient Position: Sitting, Cuff Size: Large)   Pulse 64   Temp 97.9 °F (36.6 °C) (Temporal)   Wt 116 kg (256 lb 12.8 oz)   BMI 41.45 kg/m²   BP Readings from Last 3 Encounters:   02/18/25 118/60   01/29/25 130/80   01/09/25 112/70     Pulse Readings from Last 3 Encounters:   02/18/25 64   01/29/25 82   01/09/25 86         Well developed, well nourished, well groomed. No dysmorphic features.       Psychiatric:  Normal behavior and appropriate affect        Neurological Examination:     Mental status/cognitive function:   Orientated to time, place and person. Recent and remote memory intact. Attention span and concentration as well as fund of knowledge are appropriate for age. Normal language and spontaneous speech.    Cranial Nerves:  II-visual fields full.   Fundi poorly visualized due to pupillary constriction  III, IV, VI-Pupils were equal, round, and reactive to light and accomodation. Extraocular movements were full and conjugate without nystagmus. Conjugate gaze, normal smooth pursuits, normal saccades   V-facial sensation symmetric.    VII-facial expression symmetric, intact forehead wrinkle, strong eye closure, symmetric smile    VIII-hearing grossly intact bilaterally   IX, X-palate elevation symmetric, no dysarthria.   XI-shoulder shrug strength intact    XII-tongue protrusion midline.    Motor Exam: symmetric bulk and tone throughout, no pronator drift. Power/strength 5/5 bilateral upper and lower extremities, no atrophy, fasciculations or abnormal movements noted.   Sensory: grossly intact light touch in all extremities.    Reflexes: brachioradialis 1+, biceps 1+, knee 2+ bilaterally  Coordination: Finger nose finger intact bilaterally, no apparent dysmetria, ataxia or tremor noted  Gait: steady casual and tandem gait.      Results/Data:    MRI brain and orbits without and with contrast, 02/06/2025:    IMPRESSION:     1.  Questionable minimal prominence of bilateral optic sheath CSF spaces. Also suggest narrowing of lateral segments of bilateral transverse dural venous sinuses in postcontrast images. Although relatively normal size pituitary gland argues against   intracranial hypertension, given described indeterminate findings and high clinical concern recommend further evaluation with lumbar puncture (CSF pressure assessment).     2.  Grossly normal optic nerves without apparent swelling or abnormal enhancement.    Radiology Results Review: I have reviewed radiology reports from 02/06/2025 including: MRI brain.    Administrative Statements   I have spent a total time of 50 minutes in caring for this patient on the day of the visit/encounter including Diagnostic results, Risks and benefits of tx options, Instructions for management, Patient and family education, Importance of tx compliance, Risk factor reductions, Impressions, Counseling / Coordination of care, Documenting in the medical record, Reviewing/placing orders in the medical record (including tests, medications, and/or procedures), and Obtaining or reviewing history  .

## 2025-02-18 NOTE — ASSESSMENT & PLAN NOTE
I had the pleasure of seeing Cristin today in the office at [facility] in [place].  She is presenting today for an initial new patient consultation in regard to her headaches.  Patient noted that her headaches started at a relatively young age around childhood.  She noted that over the last few months however her headaches are becoming increasingly more frequent.  The patient currently notes having about 16 to 20 days out of the month with some type of headache.  Also having about 8 to 10 days out of the month with more severe debilitating headaches.  The patient notes that the headaches are usually occurring in the afternoon or evening and not usually waking up by headaches in the morning.  She notes that they are usually lasting anywhere between 2 to 3 hours with medication.  She notes that Excedrin migraine and ibuprofen seem to work well at completely aborting the headaches for the most part.  The patient does not have any aura associated with the headaches.  Patient notes throbbing and pounding type pain at the bilateral temples.  Sometimes having pressure behind her eyes.  She does not have any symptoms of blurred vision or vision loss associate with the headaches, not also having any issues with pulsatile tinnitus with the headaches either.  No specific movements seem to make the headaches worse or triggering them to occur.  When headaches are significant they can become worse with sitting to standing.  No positional change noted to be triggering the headaches to occur.  It was noted that the patient ended up seeing an optometrist about 2 to 3 weeks ago and noted to have concerns of optic disc swelling/papilledema.  She had not seen an ophthalmologist as of yet.  She did have an MRI brain and orbits without and with contrast with concerning findings of prominence of bilateral optic sheath CSF spaces.  Narrowing of the lateral segments of bilateral transverse dural venous sinuses and  postcontrast imaging.  Grossly normal optic nerves without appearing swelling or abnormal enhancement.    Based on my evaluation of the patient, seems like the patient is having more migraine headaches rather than headaches related to IIH.  Patient does not have headaches that are very characteristic of IIH at this point in time, and also not having any other symptoms related to blurred vision or loss of vision at this time.  Although, would like for the patient to be evaluated by ophthalmology moving forward to confirm the presence of papilledema/optic disc swelling.  Advised that we could certainly attenuate medication plan to IIH dosing if indicated that the patient does have papilledema.  For the time being however, recommended that the patient trial Topamax 25 mg, taking 1 tablet by mouth twice daily for migraine prevention.  Again advised that we could potentially increase the dosage of Topamax in the future to help with decreasing intracranial pressure related to IIH.  Although for the time being until this is confirmed part of the patient continue with just normal migraine preventative dosing.  She can continue with over-the-counter medications such as ibuprofen and Excedrin Migraine for her headaches as they seem to be effective for her.  Advised the patient to also continue to follow-up dietary/nutrition in regards to further weight management intervention.  Advised patient that significant weight loss can be helpful at preventing reoccurrence of IIH in the future if the patient is determined to have it.  Advised patient to follow-up in approximately 3 months time with Len PIERCE      Orders:    topiramate (Topamax) 25 mg tablet; Take 1 tablet (25 mg total) by mouth 2 (two) times a day    Ambulatory Referral to Ophthalmology; Future

## 2025-02-18 NOTE — PATIENT INSTRUCTIONS
- At this time, ambulatory referral to ophthalmology has been placed.  Patient notes that she went and saw her optometrist about 2 to 3 weeks ago and noted that she had some optic disc swelling/papilledema.  Would like for the patient to be properly evaluated by Dr. Bobo at [facility] to confirm presence of IIH.  At that time if confirmed to have concern for papilledema/optic disc swelling we will certainly alter our medication therapy to be more attenuated to IIH rather than treating migraines.  - Did advise the patient to continue to work on weight management at this time as well.  Advised patient to continue to follow-up with dietitian/nutritional services.  Advised that if she does have IIH that weight loss can significantly help with improving symptoms and outcome related to IIH.  - Finally, advised the patient to let us know if her headaches continue to worsen in the future and also if she is having any worsening changes to her vision.  If the patient starts noting more episodes of significant blurred vision, visual disturbances/obscurations or having any vision loss she is to let us know as soon as possible.  If the patient has an extended period of vision loss or episodes of vision loss getting more frequent the patient should report to the ED as soon as possible as this may be an ophthalmologic emergency and she may need to have a lumbar puncture to drain her CSF pressure.    Headache Calendar  Please maintain a headache calendar  Consider using phone applications such as Migraine Phong or Migraine Diary    Headache/migraine treatment:     Rescue medications (for immediate treatment of a headache):   It is ok to take ibuprofen, acetaminophen or naproxen (Advil, Tylenol,  Aleve, Excedrin) if they help your headaches you should limit these to No more than 3 times a week to avoid medication overuse/rebound headaches.     Prescription preventive medications for headaches/migraines   (to take  every day to help prevent headaches - not to take at the time of headache):  - Start Topamax 25 mg, take 1 tablet by mouth twice daily for migraine prevention.    *Typically these types of medications take time until you see the benefit, although some may see improvement in days, often it may take weeks, especially if the medication is being titrated up to a beneficial level. Please contact us if there are any concerns or questions regarding the medication.     Over the counter preventive supplements for headaches/migraines (if you try, try for 3 months straight)  (to take every day to help prevent headaches - not to take at the time of headache):  There are combo pills online of these - none of which regulated by FDA and double check dosing - take appropriate dose only once a day- prevent a migraine, migravent, mind ease, migrelief   [] Magnesium 400mg daily (If any diarrhea or upset stomach, decrease dose  as tolerated)  [] Riboflavin (Vitamin B2) 400mg daily (may make your urine bright/neon yellow)  - All supplements can be purchased online      Lifestyle Recommendations:  [x] SLEEP - Maintain a regular sleep schedule: Adults need at least 7-8 hours of uninterrupted a night. Maintain good sleep hygiene:  Going to bed and waking up at consistent times, avoiding excessive daytime naps, avoiding caffeinated beverages in the evening, avoid excessive stimulation in the evening and generally using bed primarily for sleeping.  One hour before bedtime would recommend turning lights down lower, decreasing your activity (may read quietly, listen to music at a low volume). When you get into bed, should eliminate all technology (no texting, emailing, playing with your phone, iPad or tablet in bed).  [x] HYDRATION - Maintain good hydration.  Drink  2L of fluid a day (4 typical small water bottles)  [x] DIET - Maintain good nutrition. In particular don't skip meals and try and eat healthy balanced meals regularly.  [x]  TRIGGERS - Look for other triggers and avoid them: Limit caffeine to 1-2 cups a day or less. Avoid dietary triggers that you have noticed bring on your headaches (this could include aged cheese, peanuts, MSG, aspartame and nitrates).  [x] EXERCISE - physical exercise as we all know is good for you in many ways, and not only is good for your heart, but also is beneficial for your mental health, cognitive health and  chronic pain/headaches. I would encourage at the least 5 days of physical exercise weekly for at least 30 minutes.     Education and Follow-up  [x] Please call with any questions or concerns. Of course if any new concerning symptoms go to the emergency department.  [x] Follow up in 3 months with Len DURAN

## 2025-02-24 ENCOUNTER — OFFICE VISIT (OUTPATIENT)
Dept: FAMILY MEDICINE CLINIC | Facility: CLINIC | Age: 30
End: 2025-02-24
Payer: COMMERCIAL

## 2025-02-24 VITALS
HEIGHT: 66 IN | SYSTOLIC BLOOD PRESSURE: 110 MMHG | HEART RATE: 96 BPM | RESPIRATION RATE: 14 BRPM | WEIGHT: 252 LBS | TEMPERATURE: 96.2 F | OXYGEN SATURATION: 98 % | DIASTOLIC BLOOD PRESSURE: 70 MMHG | BODY MASS INDEX: 40.5 KG/M2

## 2025-02-24 DIAGNOSIS — F32.2 SEVERE MAJOR DEPRESSIVE DISORDER (HCC): ICD-10-CM

## 2025-02-24 DIAGNOSIS — J45.30 MILD PERSISTENT ASTHMA WITHOUT COMPLICATION: ICD-10-CM

## 2025-02-24 DIAGNOSIS — R05.1 ACUTE COUGH: Primary | ICD-10-CM

## 2025-02-24 LAB
SARS-COV-2 AG UPPER RESP QL IA: NEGATIVE
VALID CONTROL: NORMAL

## 2025-02-24 PROCEDURE — 99213 OFFICE O/P EST LOW 20 MIN: CPT | Performed by: NURSE PRACTITIONER

## 2025-02-24 PROCEDURE — 87811 SARS-COV-2 COVID19 W/OPTIC: CPT | Performed by: NURSE PRACTITIONER

## 2025-02-24 RX ORDER — PREDNISONE 10 MG/1
TABLET ORAL
Qty: 20 TABLET | Refills: 0 | Status: SHIPPED | OUTPATIENT
Start: 2025-02-24 | End: 2025-03-04 | Stop reason: CLARIF

## 2025-02-24 NOTE — ASSESSMENT & PLAN NOTE
Depression Screening Follow-up Plan: Patient's depression screening was positive with a PHQ-9 score of 6. Patient with underlying depression and was advised to continue current medications as prescribed.       Managed w/ Sertraline. Continue medication.

## 2025-02-24 NOTE — PROGRESS NOTES
"Name: Cristin De La Cruz      : 1995      MRN: [id number]  Encounter Provider: BRIAN Perez  Encounter Date: 2025   Encounter department: [facility]  :  Assessment & Plan  Acute cough    Orders:    POCT Rapid Covid Ag    predniSONE 10 mg tablet; Take 4 tablets with food on days 1 & 2. Then take 3 tablets with food on days 3 &4. Then take 2 tablets with food on days 5 & 6. Then take 1 tablet with food on days 7 & 8.    Rapid covid negative. Most likely common cold. Prednisone course prescribed to help w/ cough and sinus congestion. Medication and s/e reviewed. Rest and fluids encouraged. If symptoms persist or worsen after 10 days, consider antibiotic. Patient is encouraged to call our office for any questions/concerns, persistent or worsening symptoms. Patient states they understand and agree with treatment plan.   Severe major depressive disorder (HCC)  Depression Screening Follow-up Plan: Patient's depression screening was positive with a PHQ-9 score of 6. Patient with underlying depression and was advised to continue current medications as prescribed.       Managed w/ Sertraline. Continue medication.  Mild persistent asthma without complication       Managed w/ PRN Albuterol.        Pt to f/u PRN.        Depression Screening and Follow-up Plan: Patient's depression screening was positive with a PHQ-9 score of 6.   Patient with underlying depression and was advised to continue current medications as prescribed.       History of Present Illness   Pt presents today for a phlegmy cough that started 5 days ago.  Denies fever, chills, body aches.  She does not a irritated throat as well as sinus congestion.  She is taking Mucinex DM which has not helped the cough.  No recent sick contacts.      Review of Systems  As noted per HPI.  Objective   /70   Pulse 96   Temp (!) 96.2 °F (35.7 °C)   Resp 14   Ht 5' 6\" (1.676 m)   Wt 114 kg (252 lb)   " SpO2 98%   BMI 40.67 kg/m²      Physical Exam  Vitals reviewed.   Constitutional:       Appearance: Normal appearance.   HENT:      Right Ear: Tympanic membrane, ear canal and external ear normal.      Left Ear: Tympanic membrane, ear canal and external ear normal.      Nose: Congestion present. No rhinorrhea.      Mouth/Throat:      Pharynx: No oropharyngeal exudate or posterior oropharyngeal erythema.   Cardiovascular:      Rate and Rhythm: Normal rate and regular rhythm.      Pulses: Normal pulses.      Heart sounds: Normal heart sounds.   Pulmonary:      Effort: Pulmonary effort is normal.      Breath sounds: Normal breath sounds.   Lymphadenopathy:      Head:      Right side of head: No tonsillar adenopathy.      Left side of head: No tonsillar adenopathy.   Neurological:      Mental Status: She is alert and oriented to person, place, and time. Mental status is at baseline.   Psychiatric:         Mood and Affect: Mood normal.         Behavior: Behavior normal.         Thought Content: Thought content normal.         Judgment: Judgment normal.

## 2025-02-26 ENCOUNTER — TELEPHONE (OUTPATIENT)
Age: 30
End: 2025-02-26

## 2025-02-26 ENCOUNTER — NEW REFERRAL (OUTPATIENT)
Dept: URBAN - METROPOLITAN AREA CLINIC 6 | Facility: CLINIC | Age: 30
End: 2025-02-26

## 2025-02-26 ENCOUNTER — HOSPITAL ENCOUNTER (EMERGENCY)
Facility: HOSPITAL | Age: 30
Discharge: HOME | End: 2025-02-26
Attending: EMERGENCY MEDICINE
Payer: COMMERCIAL

## 2025-02-26 VITALS
DIASTOLIC BLOOD PRESSURE: 77 MMHG | RESPIRATION RATE: 20 BRPM | HEART RATE: 92 BPM | TEMPERATURE: 96.9 F | SYSTOLIC BLOOD PRESSURE: 121 MMHG | OXYGEN SATURATION: 96 %

## 2025-02-26 DIAGNOSIS — G93.2: ICD-10-CM

## 2025-02-26 DIAGNOSIS — H53.453: ICD-10-CM

## 2025-02-26 DIAGNOSIS — G43.809: ICD-10-CM

## 2025-02-26 DIAGNOSIS — H47.12: ICD-10-CM

## 2025-02-26 DIAGNOSIS — G93.2 IIH (IDIOPATHIC INTRACRANIAL HYPERTENSION): Primary | ICD-10-CM

## 2025-02-26 DIAGNOSIS — E11.9: ICD-10-CM

## 2025-02-26 PROCEDURE — 62270 DX LMBR SPI PNXR: CPT | Performed by: EMERGENCY MEDICINE

## 2025-02-26 PROCEDURE — 99283 EMERGENCY DEPT VISIT LOW MDM: CPT

## 2025-02-26 PROCEDURE — 99205 OFFICE O/P NEW HI 60 MIN: CPT

## 2025-02-26 PROCEDURE — 99285 EMERGENCY DEPT VISIT HI MDM: CPT | Performed by: EMERGENCY MEDICINE

## 2025-02-26 PROCEDURE — 92083 EXTENDED VISUAL FIELD XM: CPT

## 2025-02-26 PROCEDURE — 92133 CPTRZD OPH DX IMG PST SGM ON: CPT

## 2025-02-26 PROCEDURE — 99244 OFF/OP CNSLTJ NEW/EST MOD 40: CPT | Performed by: STUDENT IN AN ORGANIZED HEALTH CARE EDUCATION/TRAINING PROGRAM

## 2025-02-26 RX ORDER — ACETAZOLAMIDE 125 MG/1
500 TABLET ORAL 2 TIMES DAILY
Qty: 240 TABLET | Refills: 0 | Status: SHIPPED | OUTPATIENT
Start: 2025-03-06 | End: 2025-04-05

## 2025-02-26 RX ORDER — IBUPROFEN 400 MG/1
400 TABLET, FILM COATED ORAL ONCE
Status: COMPLETED | OUTPATIENT
Start: 2025-02-26 | End: 2025-02-26

## 2025-02-26 RX ORDER — ACETAZOLAMIDE 125 MG/1
250 TABLET ORAL 2 TIMES DAILY
Qty: 28 TABLET | Refills: 0 | Status: SHIPPED | OUTPATIENT
Start: 2025-02-26 | End: 2025-03-05

## 2025-02-26 RX ADMIN — IBUPROFEN 400 MG: 400 TABLET, FILM COATED ORAL at 13:52

## 2025-02-26 ASSESSMENT — TONOMETRY
OD_IOP_MMHG: 20
OS_IOP_MMHG: 18

## 2025-02-26 ASSESSMENT — VISUAL ACUITY
OD_CC: 20/25-
OS_CC: 20/25-

## 2025-02-26 NOTE — ED ATTENDING ATTESTATION
2/26/2025  I, Pollo Hancock MD, saw and evaluated the patient. I have discussed the patient with the resident/non-physician practitioner and agree with the resident's/non-physician practitioner's findings, Plan of Care, and MDM as documented in the resident's/non-physician practitioner's note, except where noted. All available labs and Radiology studies were reviewed.  I was present for key portions of any procedure(s) performed by the resident/non-physician practitioner and I was immediately available to provide assistance.       At this point I agree with the current assessment done in the Emergency Department.  I have conducted an independent evaluation of this patient a history and physical is as follows:    29-year-old woman presenting with headache and for lumbar puncture.  Patient has been having ongoing issues with headaches and had an MRI earlier this month which was equivocal for findings to suggest IIH.  Was seen by ophthalmology today and they did have findings on my exam concerning for IIH and so patient was sent here.  Her headache is same as usual.  No focal neurologic complaints or focal neurodeficits on exam.  Patient consented to lumbar puncture.  Opening pressure was 30 cm H20, consistent with diagnosis of IIH.  Patient instructed to lay flat for an hour after procedure.  Will discuss with neurology regarding initiation of treatment and follow-up.    ED Course         Critical Care Time  Procedures

## 2025-02-26 NOTE — TELEPHONE ENCOUNTER
1ST ATTEMPT,     Called pt no answer, LMOM. Offer an appt with alex brown pt's established neurologist         Thank you,     Maia CUMMINS/ [facility]/ diopathic Intracranial Hypertension    DC- 2/26/2025    ----- Message from Elizabeth Eastman PA-C sent at 2/26/2025  2:56 PM EST -----  Regarding: Hospital followup  Cristin De La Cruz will need follow-up in in 4 weeks with headache team for Idiopathic Intracranial Hypertension in 30 minute appointment. They will not require outpatient neurological testing.

## 2025-02-26 NOTE — ASSESSMENT & PLAN NOTE
29-year-old female with migraines, diabetes, dyslipidemia, asthma, anxiety, depression, PCOS, morbid obesity, who presents with headaches. She was evaluated by her ophthalmologist and was found to have papilledema OU with concern for IIH, and was advised to present to the ED for further evaluation and treatment.    -MRI brain and orbits with and without contrast on 2/6/2025 demonstrated questionable minimal prominence of bilateral optic sheath CSF spaces, narrowing of lateral segments of bilateral transverse dural venous sinuses in postcontrast images.  Grossly normal optic nerves without apparent swelling or abnormal enhancement.  -OP was 30cm H2O (LP performed in L lateral decub).  -She recently started topiramate and has had no adverse effects on the medication.     Patient denies vision loss.  Neurologic exam nonfocal.    Plan:  -Patient will stop topiramate and start Diamox 250mg BID with plan to increase to 500mg BID in 1 week.   -Potential side effects reviewed with patient.  -Advised to start magnesium supplement (recommended 400mg daily) and B2 400mg daily.  -She was advised to return to the ED immediately should she experience any vision loss.  -She has a Mirena IUD and was advised to contact the neurology office should she plan to get pregnant.  -She will stop drinking soda and was advised to attempt weight loss. She was also advised against heavy alcohol use.  -Will request sooner outpatient follow-up with Neurology.  Patient will also follow-up with her ophthalmologist in 1 month and continue close follow-up with her OB/Gyn.

## 2025-02-26 NOTE — CONSULTS
Consultation - Neurology   Name: Cristin De La Cruz 29 y.o. female I MRN: [id number]  Unit/Bed#: QCD I Date of Admission: 2/26/2025   Date of Service: 2/26/2025 I Hospital Day: 0   Consult to neurology  Consult performed by: Elizabeth Eastman PA-C  Consult ordered by: Pollo Hancock MD        Physician Requesting Evaluation: Pollo Hancock MD   Reason for Evaluation / Principal Problem: concern for IIH    Assessment & Plan  IIH (idiopathic intracranial hypertension)  29-year-old female with migraines, diabetes, dyslipidemia, asthma, anxiety, depression, PCOS, morbid obesity, who presents with headaches. She was evaluated by her ophthalmologist and was found to have papilledema OU with concern for IIH, and was advised to present to the ED for further evaluation and treatment.    -MRI brain and orbits with and without contrast on 2/6/2025 demonstrated questionable minimal prominence of bilateral optic sheath CSF spaces, narrowing of lateral segments of bilateral transverse dural venous sinuses in postcontrast images.  Grossly normal optic nerves without apparent swelling or abnormal enhancement.  -OP was 30cm H2O (LP performed in L lateral decub).  -She recently started topiramate and has had no adverse effects on the medication.     Patient denies vision loss.  Neurologic exam nonfocal.    Plan:  -Patient will stop topiramate and start Diamox 250mg BID with plan to increase to 500mg BID in 1 week.   -Potential side effects reviewed with patient.  -Advised to start magnesium supplement (recommended 400mg daily) and B2 400mg daily.  -She was advised to return to the ED immediately should she experience any vision loss.  -She has a Mirena IUD and was advised to contact the neurology office should she plan to get pregnant.  -She will stop drinking soda and was advised to attempt weight loss. She was also advised against heavy alcohol use.  -Will request sooner outpatient follow-up with Neurology.  Patient will also  "follow-up with her ophthalmologist in 1 month and continue close follow-up with her OB/Gyn.    Cristin De La Cruz will need follow-up in in 4 weeks with headache team for Idiopathic Intracranial Hypertension in 30 minute appointment. They will not require outpatient neurological testing.  Message sent to schedulers.      History of Present Illness   Cristin De La Cruz is a 29 y.o. right handed female with migraines, diabetes, dyslipidemia, asthma, anxiety, depression, PCOS, morbid obesity, who presents with headaches. She was evaluated by her ophthalmologist and was found to have papilledema OU with concern for IIH, and was advised to present to the ED for further evaluation and treatment.    Patient has had increasing frequency of her headaches over the past several months.  She takes her prescribed topiramate and Excedrin Migraine/ibuprofen.  The headache is throbbing/pounding in quality at the bilateral temples, sometimes with pressure behind her eyes.  Headache is worse with any position change, not necessarily worse when lying down. She denies any vision loss/vision darkening. She started taking topiramate a few days ago.  Other than noticing her Coke tastes \"weird\", no side effects.  She has a Mirena IUD and is not planning to attempt pregnancy until at least next year.    MRI brain and orbits with and without contrast on 2/6/2025 demonstrated questionable minimal prominence of bilateral optic sheath CSF spaces, narrowing of lateral segments of bilateral transverse dural venous sinuses in postcontrast images.  Grossly normal optic nerves without apparent swelling or abnormal enhancement.    OP in the ED was 30cm H2O (LP performed in L lateral decub).    Neurologic exam nonfocal.    Review of Systems   Constitutional: Negative.    HENT: Negative.     Eyes: Negative.    Respiratory: Negative.     Cardiovascular: Negative.    Gastrointestinal: Negative.    Endocrine: Negative.    Genitourinary: Negative.  " "  Musculoskeletal: Negative.    Skin:  Negative for rash.   Allergic/Immunologic: Negative.    Neurological:  Positive for headaches.        As above.   Hematological: Negative.    Psychiatric/Behavioral: Negative.          Objective :  Temp:  [96.9 °F (36.1 °C)] 96.9 °F (36.1 °C)  HR:  [92] 92  BP: (121)/(77) 121/77  Resp:  [20] 20  SpO2:  [96 %] 96 %    Physical Exam  General:  Patient is well-developed, morbidly obese BMI 40.67, and in no acute distress.  HEENT:  Head normocephalic.  Eyes anicteric.    Cardiovascular:  With regular rhythm.  Lungs:  Normal effort. Nonlabored breathing.  Extremities:  With no significant edema.    Skin: No rashes.    Neurological Exam AP acting as scribe for attending exam  Neurologic:  Patient is alert, pleasantly interactive, and appropriately conversational.  No obvious symbolic language difficulty or dysarthria, and the patient is fully oriented.    Gait deferred for safety.    Cranial Nerves:   II: Visual fields full to confrontation.   Cannot visualize optic fundi.  III,IV,VI: extraocular movements intact with no nystagmus.   V: Sensation in the V1 through V3 distributions intact to light touch bilaterally.   VII: Face is symmetric with no weakness noted.     Full-strength throughout.    Sensation intact to light touch throughout.    Reflexes 2+ throughout.  No clonus.  Laura's bilaterally negative.    Toe responses are downgoing bilaterally.      Lab Results: I have reviewed the following results:CBC:   , BMP/CMP:       Invalid input(s): \"ALBUMIN\"  No results for input(s): \"WBC\", \"HGB\", \"HCT\", \"PLT\", \"BANDSPCT\", \"SODIUM\", \"K\", \"CL\", \"CO2\", \"BUN\", \"CREATININE\", \"GLUC\", \"CAIONIZED\", \"MG\", \"PHOS\" in the last 72 hours.  Imaging Results Review: I personally reviewed the following image studies in PACS and associated radiology reports: MRI brain. My interpretation of the radiology images/reports is: consistent with [facility].  Other Study Results Review: No additional pertinent " studies reviewed.    VTE Prophylaxis: VTE covered by:    None

## 2025-02-26 NOTE — TELEPHONE ENCOUNTER
02/26/25    Patient called office today retuning Voice Message In-Regards of scheduling HFU Appt.    Offered Next Available in 4-weeks per Discharge Advice, but Patient Kindly Declined due that she won't be in town.     Offered Next Available in April and Patient Accepted.      HFU Scheduled for 04/01/25, 9:30 AM With Mr. Mcfadden at the [place] Location.       Any questions, please contact Patient.  Thank You.

## 2025-02-26 NOTE — DISCHARGE INSTRUCTIONS
Please follow up with your PCP and neurology as discussed     You have been prescribed diamox, take 250mg twice daily for one week, and then 500mg twice daily thereafter. This medication is used to treat idiopathic intracranial hypertension.     Call your doctor or return to the ER if you experience severe pain, vision changes, redness/swelling around the puncture site, or any other concerning symptoms.

## 2025-02-26 NOTE — PROGRESS NOTES
Patient presented to the ED today (2/26) from the ophthalmologist's office. ?LP patient with papilledema.

## 2025-02-26 NOTE — ED PROVIDER NOTES
Time reflects when diagnosis was documented in both MDM as applicable and the Disposition within this note       Time User Action Codes Description Comment    2/26/2025  2:25 PM Kalyan Avila Add [G93.2] IIH (idiopathic intracranial hypertension)           ED Disposition       ED Disposition   Discharge    Condition   Stable    Date/Time   Wed Feb 26, 2025  2:39 PM    Comment   Cristin De La Cruz discharge to home/self care.                   Assessment & Plan       Medical Decision Making  Concern for IIH, LP offered for diagnosis. Risks discussed, pt understands risks and consented.     LP performed in L lateral recumbent position. Opening pressure 30. Pt advised to lay flat for one hour.     Case discussed with neurology, recommend diamox and neurology follow up.     Care plan discussed w/pt, she understands and is agreeable. She will take the diamox as prescribed and will follow up with neurology. Return precautions discussed, all questions answered. Pt stable for discharge.     Risk  Prescription drug management.             Medications   ibuprofen (MOTRIN) tablet 400 mg (400 mg Oral Given 2/26/25 1352)       ED Risk Strat Scores                                                History of Present Illness       Chief Complaint   Patient presents with    Migraine     Pt went to eye doctor today and concerned for IIH. Having eye pressure and migraines.  Has been ongoing and sent here for possible spinal tap.        Past Medical History:   Diagnosis Date    Anxiety     Asthma 2005    BMI 40.0-44.9, adult (HCC) 03/11/2021    Depression     Diabetes mellitus (HCC)     Gestational diabetes    History of ovarian cyst 2018    Hyperlipidemia     Migraine     Mild intermittent asthma     Morbid obesity (HCC)     Other headache syndrome 11/16/2021    PID (pelvic inflammatory disease) 2018    Polycystic ovary syndrome     Pre-diabetes     Varicella     had vaccine    Vitamin D deficiency       Past Surgical History:   Procedure  Laterality Date    WISDOM TOOTH EXTRACTION        Family History   Problem Relation Age of Onset    JOSELINE disease Mother     Diabetes unspecified Mother     Asthma Mother     Seizures Father 13    Asperger's syndrome Sister     Autism Brother     No Known Problems Son     Colon cancer Maternal Grandmother     Heart attack Maternal Grandfather     Heart disease Maternal Grandfather     No Known Problems Paternal Grandmother     No Known Problems Paternal Grandfather       Social History     Tobacco Use    Smoking status: Former     Current packs/day: 0.00     Average packs/day: 0.1 packs/day for 0.4 years     Types: Cigarettes     Start date: 2017     Quit date: 10/1/2017     Years since quittin.4    Smokeless tobacco: Never    Tobacco comments:     Smoked socially    Vaping Use    Vaping status: Never Used   Substance Use Topics    Alcohol use: Yes     Alcohol/week: 1.0 standard drink of alcohol     Types: 1 Glasses of wine per week     Comment: socially    Drug use: Never      E-Cigarette/Vaping    E-Cigarette Use Never User       E-Cigarette/Vaping Substances    Nicotine No     THC No     CBD No     Flavoring No     Other No     Unknown No       I have reviewed and agree with the history as documented.     28 yo F sent in from Ophthalmologist's office over concern for IIH. Pt is being workup up by neurology for migraines and was seen by her ophthalmologist today. Pt was sent in for LP to measure opening pressure after papilledema was seen on exam. Pt currently complains of a mild headache, no focal neuro deficits or new complaints.         Review of Systems   Neurological:  Positive for headaches.           Objective       ED Triage Vitals [25 1223]   Temperature Pulse Blood Pressure Respirations SpO2 Patient Position - Orthostatic VS   (!) 96.9 °F (36.1 °C) 92 121/77 20 96 % --      Temp Source Heart Rate Source BP Location FiO2 (%) Pain Score    Temporal -- -- -- 8      Vitals      Date and Time Temp  Pulse SpO2 Resp BP Pain Score FACES Pain Rating User   02/26/25 1223 96.9 °F (36.1 °C) 92 96 % 20 121/77 8 -- BMM            Physical Exam  Vitals reviewed.   Constitutional:       General: She is not in acute distress.     Appearance: Normal appearance.   HENT:      Head: Normocephalic and atraumatic.      Left Ear: External ear normal.      Nose: Nose normal.      Mouth/Throat:      Mouth: Mucous membranes are moist.      Pharynx: Oropharynx is clear.   Eyes:      Extraocular Movements: Extraocular movements intact.      Conjunctiva/sclera: Conjunctivae normal.      Pupils: Pupils are equal, round, and reactive to light.   Cardiovascular:      Rate and Rhythm: Normal rate and regular rhythm.      Pulses: Normal pulses.      Heart sounds: Normal heart sounds.   Pulmonary:      Effort: Pulmonary effort is normal.      Breath sounds: Normal breath sounds.   Abdominal:      Palpations: Abdomen is soft.      Tenderness: There is no abdominal tenderness.   Musculoskeletal:         General: Normal range of motion.      Cervical back: Normal range of motion.   Skin:     General: Skin is warm and dry.      Capillary Refill: Capillary refill takes less than 2 seconds.   Neurological:      General: No focal deficit present.      Mental Status: She is alert and oriented to person, place, and time.         Results Reviewed       None            No orders to display       Lumbar puncture    Date/Time: 2/26/2025 6:46 PM    Performed by: Kalyan Avila MD  Authorized by: Kalyan Avila MD  Universal Protocol:  Procedure performed by: (Dr Hancock)  Consent: Verbal consent obtained. Written consent obtained.  Risks and benefits: risks, benefits and alternatives were discussed  Consent given by: patient  Patient identity confirmed: verbally with patient    Patient location:  ED  Pre-procedure details:     Preparation: Patient was prepped and draped in usual sterile fashion    Indications:     Indications: evaluation of opening  pressure    Anesthesia (see MAR for exact dosages):     Anesthesia method:  Local infiltration    Local anesthetic:  Lidocaine 1% w/o epi  Procedure details:     Lumbar space:  L4-L5 interspace    Patient position:  L lateral decubitus    Equipment: Lumbar puncture kit used      Needle gauge:  20G x 2.5in    Needle type:  Loly point    Ultrasound guidance: no      Number of attempts:  2    Opening pressure (cm H2O):  30    Fluid appearance:  Clear  Post-procedure:     Puncture site:  Direct pressure applied and adhesive bandage applied    Patient tolerance of procedure:  Tolerated well, no immediate complications    Patient instructed to lie flat for one(1) hour post lumbar puncture.: Yes        ED Medication and Procedure Management   Prior to Admission Medications   Prescriptions Last Dose Informant Patient Reported? Taking?   albuterol (PROVENTIL HFA,VENTOLIN HFA) 90 mcg/act inhaler   Yes No   Sig: Inhale 2 puffs every 6 (six) hours as needed for wheezing   famotidine (PEPCID) 20 mg tablet   No No   Sig: Take 1 tablet (20 mg total) by mouth daily   fluticasone (FLONASE) 50 mcg/act nasal spray   No No   Si spray into each nostril 2 (two) times a day   levonorgestrel (MIRENA) 20 MCG/24HR IUD  Self Yes No   Si each by Intrauterine route once   metFORMIN (GLUCOPHAGE-XR) 500 mg 24 hr tablet   No No   Sig: TAKE 3 TABLETS (1,500 MG TOTAL) BY MOUTH DAILY WITH DINNER   multivitamin (THERAGRAN) TABS  Self Yes No   Sig: Take 1 tablet by mouth daily   predniSONE 10 mg tablet   No No   Sig: Take 4 tablets with food on days 1 & 2. Then take 3 tablets with food on days 3 &4. Then take 2 tablets with food on days 5 & 6. Then take 1 tablet with food on days 7 & 8.   sertraline (Zoloft) 50 mg tablet   No No   Sig: Take 1 tablet (50 mg total) by mouth daily   topiramate (Topamax) 25 mg tablet   No No   Sig: Take 1 tablet (25 mg total) by mouth 2 (two) times a day      Facility-Administered Medications: None     Discharge  Medication List as of 2/26/2025  2:48 PM        START taking these medications    Details   !! acetaZOLAMIDE (DIAMOX) 125 mg tablet Take 2 tablets (250 mg total) by mouth 2 (two) times a day for 7 days, Starting Wed 2/26/2025, Until Wed 3/5/2025, Normal      !! acetaZOLAMIDE (DIAMOX) 125 mg tablet Take 4 tablets (500 mg total) by mouth 2 (two) times a day Do not start before March 6, 2025., Starting Thu 3/6/2025, Until Sat 4/5/2025, Normal       !! - Potential duplicate medications found. Please discuss with provider.        CONTINUE these medications which have NOT CHANGED    Details   albuterol (PROVENTIL HFA,VENTOLIN HFA) 90 mcg/act inhaler Inhale 2 puffs every 6 (six) hours as needed for wheezing, Historical Med      famotidine (PEPCID) 20 mg tablet Take 1 tablet (20 mg total) by mouth daily, Starting Thu 10/10/2024, Normal      fluticasone (FLONASE) 50 mcg/act nasal spray 1 spray into each nostril 2 (two) times a day, Starting Thu 1/9/2025, Normal      levonorgestrel (MIRENA) 20 MCG/24HR IUD 1 each by Intrauterine route once, Historical Med      metFORMIN (GLUCOPHAGE-XR) 500 mg 24 hr tablet TAKE 3 TABLETS (1,500 MG TOTAL) BY MOUTH DAILY WITH DINNER, Starting Tue 1/7/2025, Normal      multivitamin (THERAGRAN) TABS Take 1 tablet by mouth daily, Historical Med      predniSONE 10 mg tablet Take 4 tablets with food on days 1 & 2. Then take 3 tablets with food on days 3 &4. Then take 2 tablets with food on days 5 & 6. Then take 1 tablet with food on days 7 & 8., Normal      sertraline (Zoloft) 50 mg tablet Take 1 tablet (50 mg total) by mouth daily, Starting Thu 10/10/2024, Normal      topiramate (Topamax) 25 mg tablet Take 1 tablet (25 mg total) by mouth 2 (two) times a day, Starting Tue 2/18/2025, Normal           No discharge procedures on file.  ED SEPSIS DOCUMENTATION   Time reflects when diagnosis was documented in both MDM as applicable and the Disposition within this note       Time User Action Codes  Description Comment    2/26/2025  2:25 PM Kalyan Avila Add [G93.2] IIH (idiopathic intracranial hypertension)                  Kalyan Avila MD  02/26/25 1848

## 2025-02-27 DIAGNOSIS — F41.9 ANXIETY: ICD-10-CM

## 2025-03-04 ENCOUNTER — OFFICE VISIT (OUTPATIENT)
Dept: FAMILY MEDICINE CLINIC | Facility: CLINIC | Age: 30
End: 2025-03-04
Payer: COMMERCIAL

## 2025-03-04 VITALS
DIASTOLIC BLOOD PRESSURE: 76 MMHG | OXYGEN SATURATION: 98 % | HEART RATE: 79 BPM | BODY MASS INDEX: 40.34 KG/M2 | RESPIRATION RATE: 16 BRPM | SYSTOLIC BLOOD PRESSURE: 112 MMHG | TEMPERATURE: 97.8 F | HEIGHT: 66 IN | WEIGHT: 251 LBS

## 2025-03-04 DIAGNOSIS — R73.01 IFG (IMPAIRED FASTING GLUCOSE): ICD-10-CM

## 2025-03-04 DIAGNOSIS — G93.2 IIH (IDIOPATHIC INTRACRANIAL HYPERTENSION): Primary | ICD-10-CM

## 2025-03-04 LAB — SL AMB POCT HEMOGLOBIN AIC: 5.7 (ref ?–6.5)

## 2025-03-04 PROCEDURE — 99213 OFFICE O/P EST LOW 20 MIN: CPT | Performed by: NURSE PRACTITIONER

## 2025-03-04 PROCEDURE — 83036 HEMOGLOBIN GLYCOSYLATED A1C: CPT | Performed by: NURSE PRACTITIONER

## 2025-03-04 NOTE — PROGRESS NOTES
"Name: Cristin De La Cruz      : 1995      MRN: [id number]  Encounter Provider: BRIAN Perez  Encounter Date: 3/4/2025   Encounter department: [facility]  :  Assessment & Plan  IIH (idiopathic intracranial hypertension)       Diagnosed in ER on . Pt managed on Acetazolamide and has f/u scheduled w/ Neurology. Patient is encouraged to call our office for any questions/concerns, persistent or worsening symptoms. Patient states they understand and agree with treatment plan.   IFG (impaired fasting glucose)    Orders:    POCT hemoglobin A1c    A1c consistent at 5.7%. Continue Metformin 1,500 mg daily in the evening.        Pt to f/u PRN & for her annual physical in 2025.       History of Present Illness   Pt presents today for ER follow up after being evaluated at [facility] for concern for IIH on 25.  Pt was evaluated by an optometrist earlier this ear and was suspected to have questionable pseudotumor.  She then had MRI performed of her brain/orbits and was evaluated by Neurology who referred her back to her eye doctor.  She was then reevaluated on  by her eye doctor and encouraged to go to ER for concern of possible IIH given papilledema findings on exam.  Pt underwent LP and was found to have an opening pressure of 30.   Pt was taken off her Topamax and was placed on Acetazolamide.   Pt does have f/u w/ her Neurology provider in April.  Overall she is feeling better.  She does note some soreness to her back where she underwent her LP.        Review of Systems  As noted per HPI.  Objective   /76   Pulse 79   Temp 97.8 °F (36.6 °C) (Temporal)   Resp 16   Ht 5' 6\" (1.676 m)   Wt 114 kg (251 lb)   LMP 2025 (Exact Date)   SpO2 98%   BMI 40.51 kg/m²      Physical Exam  Vitals reviewed.   Constitutional:       Appearance: Normal appearance.   Cardiovascular:      Rate and Rhythm: Normal rate and " regular rhythm.      Pulses: Normal pulses.      Heart sounds: Normal heart sounds.   Pulmonary:      Effort: Pulmonary effort is normal.      Breath sounds: Normal breath sounds.   Skin:     Comments: No redness, swelling, heat or discharge noted around the site of previous lumbar puncture   Neurological:      Mental Status: She is alert and oriented to person, place, and time. Mental status is at baseline.   Psychiatric:         Mood and Affect: Mood normal.         Behavior: Behavior normal.         Thought Content: Thought content normal.         Judgment: Judgment normal.

## 2025-03-04 NOTE — ASSESSMENT & PLAN NOTE
Diagnosed in ER on 02/26. Pt managed on Acetazolamide and has f/u scheduled w/ Neurology. Patient is encouraged to call our office for any questions/concerns, persistent or worsening symptoms. Patient states they understand and agree with treatment plan.

## 2025-03-08 ENCOUNTER — NURSE TRIAGE (OUTPATIENT)
Dept: OTHER | Facility: OTHER | Age: 30
End: 2025-03-08

## 2025-03-08 DIAGNOSIS — J01.10 ACUTE FRONTAL SINUSITIS, RECURRENCE NOT SPECIFIED: Primary | ICD-10-CM

## 2025-03-08 RX ORDER — AZITHROMYCIN 250 MG/1
TABLET, FILM COATED ORAL
Qty: 6 TABLET | Refills: 0 | Status: SHIPPED | OUTPATIENT
Start: 2025-03-08 | End: 2025-03-12

## 2025-03-08 NOTE — TELEPHONE ENCOUNTER
"Regarding: Cold like symptoms /bad cough/green mucus/ congestion  ----- Message from Gabriela MANNING sent at 3/8/2025  9:24 AM EST -----  \"I have a really bad cough since 2/22 and it is hurting. I also have green mucus, and sinus congestion. I would like something put in to help with symptoms\"    "

## 2025-03-08 NOTE — TELEPHONE ENCOUNTER
"FOLLOW UP: NA    REASON FOR CONVERSATION: Cough    SYMPTOMS: Productive cough green yellow, nasal congestion, sinus pressure    OTHER: Hx of asthma. Was seen in office on 2.4.25 and prescribed prednisone, which states did not really help. She is asking if antibiotic can be called into [facility] [place]. Spoke with Ria Castorena PA-C, she gave verbal order for azithromycin (ZITHROMAX) 250 mg tablet Take 2 tablets by mouth today then 1 tablet daily x 4 days (Z-mana). Prescription sent to [facility], per patient request. If not feel better after medication treatment call office. Patient made aware and verbalized understanding. Advised to call back anytime with questions or concerns.     DISPOSITION: Call PCP Now  // See PCP Within 24 Hours        Reason for Disposition   [1] Continuous (nonstop) coughing interferes with work or school AND [2] no improvement using cough treatment per Care Advice    Answer Assessment - Initial Assessment Questions  1. ONSET: \"When did the cough begin?\"       All started approximately 3 weeks ago  2. SEVERITY: \"How bad is the cough today?\"       Frequent  3. SPUTUM: \"Describe the color of your sputum\" (e.g., none, dry cough; clear, white, yellow, green)      Greenish yellowish thick  4. HEMOPTYSIS: \"Are you coughing up any blood?\" If Yes, ask: \"How much?\" (e.g., flecks, streaks, tablespoons, etc.)      Denies  5. DIFFICULTY BREATHING: \"Are you having difficulty breathing?\" If Yes, ask: \"How bad is it?\" (e.g., mild, moderate, severe)       After a coughing fit, breathing harder  6. FEVER: \"Do you have a fever?\" If Yes, ask: \"What is your temperature, how was it measured, and when did it start?\"      Denies  7. CARDIAC HISTORY: \"Do you have any history of heart disease?\" (e.g., heart attack, congestive heart failure)       Denies  8. LUNG HISTORY: \"Do you have any history of lung disease?\"  (e.g., pulmonary embolus, asthma, emphysema)      HX of asthma  9. PE RISK FACTORS: \"Do you have " "a history of blood clots?\" (or: recent major surgery, recent prolonged travel, bedridden)      Denies  10. OTHER SYMPTOMS: \"Do you have any other symptoms?\" (e.g., runny nose, wheezing, chest pain)        Mild wheezing, mild chest pain from coughing, Nasal congestion and sinus pressure  11. PREGNANCY: \"Is there any chance you are pregnant?\" \"When was your last menstrual period?\"        Denies. LMP 2 weeks ago  12. TRAVEL: \"Have you traveled out of the country in the last month?\" (e.g., travel history, exposures)        Denies    Protocols used: Cough - Acute Productive-Adult-AH    "

## 2025-04-01 ENCOUNTER — OFFICE VISIT (OUTPATIENT)
Dept: NEUROLOGY | Facility: CLINIC | Age: 30
End: 2025-04-01
Payer: COMMERCIAL

## 2025-04-01 VITALS
BODY MASS INDEX: 40.19 KG/M2 | WEIGHT: 249 LBS | SYSTOLIC BLOOD PRESSURE: 106 MMHG | HEART RATE: 62 BPM | TEMPERATURE: 97.9 F | DIASTOLIC BLOOD PRESSURE: 58 MMHG

## 2025-04-01 DIAGNOSIS — H47.10 OPTIC NERVE SWELLING: ICD-10-CM

## 2025-04-01 DIAGNOSIS — G93.2 IDIOPATHIC INTRACRANIAL HYPERTENSION: Primary | ICD-10-CM

## 2025-04-01 DIAGNOSIS — G43.709 CHRONIC MIGRAINE WITHOUT AURA WITHOUT STATUS MIGRAINOSUS, NOT INTRACTABLE: ICD-10-CM

## 2025-04-01 PROCEDURE — 99214 OFFICE O/P EST MOD 30 MIN: CPT

## 2025-04-01 RX ORDER — ACETAZOLAMIDE 250 MG/1
500 TABLET ORAL 2 TIMES DAILY
Qty: 360 TABLET | Refills: 3 | Status: SHIPPED | OUTPATIENT
Start: 2025-04-01

## 2025-04-01 NOTE — ASSESSMENT & PLAN NOTE
I had the pleasure of seeing Cristin today in the office at [facility] in [place].  She is presenting today for an office visit follow-up appointment in regards to her idiopathic intracranial hypertension.  Since the last visit the patient ended up receiving an ophthalmology evaluation from Dr. Bobo about all my associates.  It was noted that the patient was found to have papilledema with concern for IIH.  Patient was advised to present to the ED for further treatment and evaluation.  The patient had received a lumbar puncture in which the opening pressure was 30 cm H2O, therefore at that time confirming suspicion for IIH.  The patient was started on topiramate after last appointment to potentially help treat migraines along with any potential concern for IIH.  Patient was instructed to stop topiramate and start Diamox 250 mg twice daily with plan to increase to 500 mg twice daily as well.  The patient is presenting today and doing extremely well since being in the ED.  She notes that her headaches have significantly decreased.  She notes that she is having about 2 headache days a week rather than 5 or more headache days that she was having at the previous appointment.  The patient is still taking the Diamox 500 mg twice daily.  The patient notes that she is still continuing to try to work on weight loss at this time as she notes that this is important for helping reduce the risk of IIH representing in the future.  Advised patient I would like for her to complete metabolic panel before her next visit to evaluate her electrolyte levels and make sure that there is no significant abnormalities from continuing taking Diamox.  Advised that she is to follow-up with Dr. Bobo at [place] [facility] as recommended for further evaluation and monitoring of her papilledema.  Advised patient that we would keep on the same regimen for now and if she had any worsening headaches or loss of vision or  changes in vision in the future we would certainly evaluate this further.  Patient would potentially like to consider switch to Topamax in the future as she continues to experience paresthesias rather consistently with the Diamox.  No other questions or concerns.  Advised patient to follow-up in approximately 4 months time with Len DURAN.      Orders:    acetaZOLAMIDE (DIAMOX) 250 mg tablet; Take 2 tablets (500 mg total) by mouth 2 (two) times a day    Basic metabolic panel; Future

## 2025-04-01 NOTE — PROGRESS NOTES
Name: Cristin De La Cruz      : 1995      MRN: [id number]  Encounter Provider: Jack Mcfadden PA-C  Encounter Date: 2025   Encounter department: [facility]  :  Assessment & Plan  Idiopathic intracranial hypertension  I had the pleasure of seeing Cristin today in the office at [facility] in [place].  She is presenting today for an office visit follow-up appointment in regards to her idiopathic intracranial hypertension.  Since the last visit the patient ended up receiving an ophthalmology evaluation from Dr. Bobo about all my associates.  It was noted that the patient was found to have papilledema with concern for IIH.  Patient was advised to present to the ED for further treatment and evaluation.  The patient had received a lumbar puncture in which the opening pressure was 30 cm H2O, therefore at that time confirming suspicion for IIH.  The patient was started on topiramate after last appointment to potentially help treat migraines along with any potential concern for IIH.  Patient was instructed to stop topiramate and start Diamox 250 mg twice daily with plan to increase to 500 mg twice daily as well.  The patient is presenting today and doing extremely well since being in the ED.  She notes that her headaches have significantly decreased.  She notes that she is having about 2 headache days a week rather than 5 or more headache days that she was having at the previous appointment.  The patient is still taking the Diamox 500 mg twice daily.  The patient notes that she is still continuing to try to work on weight loss at this time as she notes that this is important for helping reduce the risk of IIH representing in the future.  Advised patient I would like for her to complete metabolic panel before her next visit to evaluate her electrolyte levels and make sure that there is no significant abnormalities from continuing taking Diamox.  Advised  that she is to follow-up with Dr. Bobo at [facility] as recommended for further evaluation and monitoring of her papilledema.  Advised patient that we would keep on the same regimen for now and if she had any worsening headaches or loss of vision or changes in vision in the future we would certainly evaluate this further.  Patient would potentially like to consider switch to Topamax in the future as she continues to experience paresthesias rather consistently with the Diamox.  No other questions or concerns.  Advised patient to follow-up in approximately 4 months time with Len PIERCE      Orders:    acetaZOLAMIDE (DIAMOX) 250 mg tablet; Take 2 tablets (500 mg total) by mouth 2 (two) times a day    Basic metabolic panel; Future    Optic nerve swelling  - Continue to follow with Dr. Bobo at [facility] as recommended       Chronic migraine without aura without status migrainosus, not intractable           Patient Instructions   - At this time, would like for the patient to continue with Diamox 500 mg twice daily.  Will be sending prescription for Diamox 250 mg tablets the patient will take 2 tablets by mouth twice daily for continuation of prevention for IIH and reducing intracranial pressure.  - Advised the patient to continue to follow with Dr. Bobo and [facility] in regards to further evaluation of IIH.  Would encourage patient to attend any follow-up appointments that are required with Dr. Bobo in the future.  As of right now she is doing well and not having any visual disturbances and her headaches have seemed to decrease.  Would like to continue to follow her progress via regular eye exams moving forward.  - Would like for the patient to receive a basic metabolic panel before her next visit here in the office.  Would like for the patient to have this completed and that way we can see if there are any electrolyte deficiencies or any other lab abnormalities associated  with the patient's use of Diamox.  Certainly if there are electrolyte abnormalities or the patient is still continuing with paresthesias with the medication we may look into initiating Topamax next time instead.  - Would encouraged patient to continue to work on her weight management journey.  Would encourage her to continue with regular diet and exercise.  Encouraged patient to work on weight loss as well as this will help reduce chance of IIH recurrence in the future.  - Would advise the patient again to look out for any worsening headaches or worsening pressure behind her eyes.  Also look out for any significant visual symptoms such as blurry vision or loss of vision in the future.  If she is having any concerns in regards to worsening symptoms that may be related to IIH, she should let me know as soon as possible.  If she is having any vision loss for an extended period of time then she should definitely report to the ED as soon as possible.    Would like for the patient to follow-up in approximately 4 months time with Len DURAN.  If she has any questions or concerns she can always call the office or she can send me a message directly via [employer].     History of Present Illness   HPI     For Review:    The patient was last seen in the office on 02/18/2025 by myself.  At the time the last visit the patient was presenting for initial new patient consultation in regard to her headaches.  It was noted that her headaches started at a relatively young age.  It was noted over the last few months her headaches were increasing to become more frequent.  She was having anywhere between 16 to 20 days out of the month with some type of headache.  She noted that anywhere between 8 to 10 days out of the month for more severe debilitating headaches.  The patient did not have any aura associated with the headaches.  She noted throbbing and pounding pain at the bilateral temples.  Sometimes having pressure  behind her eyes.  It was noted that the patient does not have any symptoms of blurred vision or vision loss with headaches.  She was not having any other issues with pulsatile tinnitus with headaches.  No specific movements seem to trigger the headaches or make them worse.  She noted that headaches were usually worse from sitting to standing.  No positional change seem to be triggering the headaches either.  Was noted that the patient had not seen ophthalmology as of yet.  Her optometrist had noted about 2 to 3 weeks ago that there was some concern for optic disc swelling/papilledema.  She had an MRI brain and orbits without and with contrast which did show concerning findings for prominence of bilateral optic nerve sheath CSF spaces.  Narrowing of the lateral segments of the bilateral transverse dural venous sinuses and postcontrast imaging.  Grossly normal optic nerves without appearing swelling or abnormal enhancement.    The time of the last visit it seemed more likely that the patient was likely suffering from migraine headaches rather than IIH.  Although the patient did have concerns due to optometry evaluated show and I wanted the patient to be seen by ophthalmology as soon as possible.  Recommended that the patient try a medication that may work for migraine headaches and also for IIH.  Started the patient on Topamax 25 mg told her to take 1 tablet by mouth twice daily for migraine prevention.  Again advised patient we could increase the dosage in the future if it was found that she did have concerns for increasing intracranial pressure.  Advised patient she could still use over-the-counter medications like ibuprofen or Excedrin for abortive therapy.    The patient was seen in the ED on 02/26/2025 and consulted by neurology.  At that time patient ended up receiving a lumbar puncture which showed opening pressure of 30 cm H2O.  It was noted that the patient recently started topiramate and had no adverse  reactions to the medication.  Patient denied any vision loss.  She had nonfocal neurologic exam.  Was recommended that the patient's stop topiramate and start Diamox 250 mg twice daily and was to increase to 500 mg twice daily in 1 week.  The patient was advised at the time to start magnesium and B2 supplementation at 400 mg daily for each medication.  She was advised to continue to work on weight loss and advised against heavy alcohol use for concern of IIH.      Current medical illnesses: headaches, mild persistent asthma, optic nerve swelling, allergic rhinitis, GERD, depression, anxiety, vitamin D deficiency, and morbid obesity       What medications do you take or have you taken for your headaches?   Current Preventive:   Diamox 500 mg twice daily, Zoloft 50 mg daily    Current Abortive:   Excedrin Migraine, ibuprofen    Prior Preventive:   Topamax (switched for Diamox), Cymbalta, Effexor XR    Prior Abortive:   None    Interval updates as of 4/1/2025:    Has been experiencing numbness and tingling sensation with the Diamox. She does notice more numbness and tingling when waking up in the morning. Still taking Diamox 500 mg twice daily. Headaches have significantly decreased since the last visit. Having 2 headache days a week rather than 5 or more headache days in the week before.  She has not been noticing any issues with her vision.  Not experiencing any blurred vision, double vision or loss of vision since the lumbar puncture. Not having any other visual disturbances or visual obscurations at this time. Since lumbar puncture, has been doing much better with headaches. Does not feel as much pressure as behind the eyes she states. Strictly ibuprofen will help abort her headaches when they occur. She is trying to work on her diet, trying to lose weight at this time. Still working on controlling her carb and starch intake, she does go walking quite a bit when the weather is nicer outside. Has lost some weight  since last visit even.     Review of Systems   Constitutional:  Positive for appetite change (Eating less but states in good way, noticed this when started meds). Negative for fatigue and fever.   HENT: Negative.  Negative for hearing loss, tinnitus, trouble swallowing and voice change.    Eyes: Negative.  Negative for photophobia, pain and visual disturbance.   Respiratory: Negative.  Negative for shortness of breath.    Cardiovascular: Negative.  Negative for palpitations.   Gastrointestinal: Negative.  Negative for nausea and vomiting.   Endocrine: Negative.  Negative for cold intolerance.   Genitourinary: Negative.  Negative for dysuria, frequency and urgency.   Musculoskeletal:  Negative for back pain, gait problem, myalgias, neck pain and neck stiffness.   Skin: Negative.  Negative for rash.   Allergic/Immunologic: Negative.    Neurological:  Positive for dizziness (At times a little bit), numbness (W/ meds notices toes / Feet and Finger/ Hands) and headaches (Has Decreased but still occurs , went from 5+ a week to about 2 a week). Negative for tremors, seizures, syncope, facial asymmetry, speech difficulty, weakness and light-headedness.   Hematological: Negative.  Does not bruise/bleed easily.   Psychiatric/Behavioral: Negative.  Negative for confusion, hallucinations and sleep disturbance.    All other systems reviewed and are negative.   I have personally reviewed the MA's review of systems and made changes as necessary.    Medical History Reviewed by provider this encounter:  Tobacco  Allergies  Meds  Problems  Med Hx  Surg Hx  Fam Hx     .  Past Medical History   Past Medical History:   Diagnosis Date    Anxiety     Asthma 2005    BMI 40.0-44.9, adult (HCC) 03/11/2021    Depression     Diabetes mellitus (HCC)     Gestational diabetes    History of ovarian cyst 2018    Hyperlipidemia     Migraine     Mild intermittent asthma     Morbid obesity (HCC)     Other headache syndrome 11/16/2021    PID  (pelvic inflammatory disease) 2018    Polycystic ovary syndrome     Pre-diabetes     Varicella     had vaccine    Vitamin D deficiency      Past Surgical History:   Procedure Laterality Date    WISDOM TOOTH EXTRACTION       Family History   Problem Relation Age of Onset    JOSELINE disease Mother     Diabetes unspecified Mother     Asthma Mother     Seizures Father 13    Asperger's syndrome Sister     Autism Brother     No Known Problems Son     Colon cancer Maternal Grandmother     Heart attack Maternal Grandfather     Heart disease Maternal Grandfather     No Known Problems Paternal Grandmother     No Known Problems Paternal Grandfather       reports that she quit smoking about 7 years ago. Her smoking use included cigarettes. She started smoking about 7 years ago. She smoked an average 0.1 packs per day for 0.4 years. She has never used smokeless tobacco. She reports current alcohol use of about 1.0 standard drink of alcohol per week. She reports that she does not use drugs.  Current Outpatient Medications   Medication Instructions    acetaZOLAMIDE (DIAMOX) 500 mg, Oral, 2 times daily    albuterol (PROVENTIL HFA,VENTOLIN HFA) 90 mcg/act inhaler 2 puffs, Every 6 hours PRN    fluticasone (FLONASE) 50 mcg/act nasal spray 1 spray, Nasal, 2 times daily    levonorgestrel (MIRENA) 20 MCG/24HR IUD 1 each, Once    metFORMIN (GLUCOPHAGE-XR) 1,500 mg, Oral, Daily with dinner    multivitamin (THERAGRAN) TABS 1 tablet, Daily    sertraline (ZOLOFT) 50 mg, Oral, Daily   No Known Allergies   Current Outpatient Medications on File Prior to Visit   Medication Sig Dispense Refill    albuterol (PROVENTIL HFA,VENTOLIN HFA) 90 mcg/act inhaler Inhale 2 puffs every 6 (six) hours as needed for wheezing      fluticasone (FLONASE) 50 mcg/act nasal spray 1 spray into each nostril 2 (two) times a day 11.1 mL 0    levonorgestrel (MIRENA) 20 MCG/24HR IUD 1 each by Intrauterine route once      metFORMIN (GLUCOPHAGE-XR) 500 mg 24 hr tablet TAKE 3  TABLETS (1,500 MG TOTAL) BY MOUTH DAILY WITH DINNER 270 tablet 0    multivitamin (THERAGRAN) TABS Take 1 tablet by mouth daily      sertraline (Zoloft) 50 mg tablet Take 1 tablet (50 mg total) by mouth daily 90 tablet 0    [DISCONTINUED] acetaZOLAMIDE (DIAMOX) 125 mg tablet Take 4 tablets (500 mg total) by mouth 2 (two) times a day Do not start before 2025. 240 tablet 0    [DISCONTINUED] acetaZOLAMIDE (DIAMOX) 125 mg tablet Take 2 tablets (250 mg total) by mouth 2 (two) times a day for 7 days (Patient not taking: Reported on 2025) 28 tablet 0     No current facility-administered medications on file prior to visit.      Social History     Tobacco Use    Smoking status: Former     Current packs/day: 0.00     Average packs/day: 0.1 packs/day for 0.4 years     Types: Cigarettes     Start date: 2017     Quit date: 10/1/2017     Years since quittin.5    Smokeless tobacco: Never    Tobacco comments:     Smoked socially    Vaping Use    Vaping status: Never Used   Substance and Sexual Activity    Alcohol use: Yes     Alcohol/week: 1.0 standard drink of alcohol     Types: 1 Glasses of wine per week     Comment: socially    Drug use: Never    Sexual activity: Yes     Partners: Male     Birth control/protection: I.U.D.        Objective   Temp 97.9 °F (36.6 °C) (Temporal)   Wt 113 kg (249 lb)   BMI 40.19 kg/m²     Physical Exam  Neurological Exam    Physical Exam:                                                                 Vitals:            Constitutional:    /58 (BP Location: Right arm, Patient Position: Sitting, Cuff Size: Large)   Pulse 62   Temp 97.9 °F (36.6 °C) (Temporal)   Wt 113 kg (249 lb)   BMI 40.19 kg/m²   BP Readings from Last 3 Encounters:   25 106/58   25 112/76   25 121/77     Pulse Readings from Last 3 Encounters:   25 62   25 79   25 92         Well developed, well nourished, well groomed. No dysmorphic features.       Psychiatric:   Normal behavior and appropriate affect        Neurological Examination:     Mental status/cognitive function:   Orientated to time, place and person. Recent and remote memory intact. Attention span and concentration as well as fund of knowledge are appropriate for age. Normal language and spontaneous speech.    Cranial Nerves:  II-visual fields full.   Fundi poorly visualized due to pupillary constriction  III, IV, VI-Pupils were equal, round, and reactive to light and accomodation. Extraocular movements were full and conjugate without nystagmus. Conjugate gaze, normal smooth pursuits, normal saccades   V-facial sensation symmetric.    VII-facial expression symmetric, intact forehead wrinkle, strong eye closure, symmetric smile    VIII-hearing grossly intact bilaterally   IX, X-palate elevation symmetric, no dysarthria.   XI-shoulder shrug strength intact    XII-tongue protrusion midline.    Motor Exam: symmetric bulk and tone throughout, no pronator drift. Power/strength 5/5 bilateral upper and lower extremities, no atrophy, fasciculations or abnormal movements noted.   Sensory: grossly intact light touch in all extremities.   Reflexes: brachioradialis 2+, biceps 2+, knee 2+ bilaterally  Coordination: Finger nose finger intact bilaterally, no apparent dysmetria, ataxia or tremor noted  Gait: steady casual and tandem gait.      Administrative Statements   I have spent a total time of 30 minutes in caring for this patient on the day of the visit/encounter including Diagnostic results, Risks and benefits of tx options, Instructions for management, Patient and family education, Importance of tx compliance, Risk factor reductions, Impressions, Counseling / Coordination of care, Documenting in the medical record, Reviewing/placing orders in the medical record (including tests, medications, and/or procedures), and Obtaining or reviewing history  .

## 2025-04-01 NOTE — PATIENT INSTRUCTIONS
- At this time, would like for the patient to continue with Diamox 500 mg twice daily.  Will be sending prescription for Diamox 250 mg tablets the patient will take 2 tablets by mouth twice daily for continuation of prevention for IIH and reducing intracranial pressure.  - Advised the patient to continue to follow with Dr. Bobo and [facility] in regards to further evaluation of IIH.  Would encourage patient to attend any follow-up appointments that are required with Dr. Bobo in the future.  As of right now she is doing well and not having any visual disturbances and her headaches have seemed to decrease.  Would like to continue to follow her progress via regular eye exams moving forward.  - Would like for the patient to receive a basic metabolic panel before her next visit here in the office.  Would like for the patient to have this completed and that way we can see if there are any electrolyte deficiencies or any other lab abnormalities associated with the patient's use of Diamox.  Certainly if there are electrolyte abnormalities or the patient is still continuing with paresthesias with the medication we may look into initiating Topamax next time instead.  - Would encouraged patient to continue to work on her weight management journey.  Would encourage her to continue with regular diet and exercise.  Encouraged patient to work on weight loss as well as this will help reduce chance of IIH recurrence in the future.  - Would advise the patient again to look out for any worsening headaches or worsening pressure behind her eyes.  Also look out for any significant visual symptoms such as blurry vision or loss of vision in the future.  If she is having any concerns in regards to worsening symptoms that may be related to IIH, she should let me know as soon as possible.  If she is having any vision loss for an extended period of time then she should definitely report to the ED as soon as possible.    Would  like for the patient to follow-up in approximately 4 months time with Len DURAN.  If she has any questions or concerns she can always call the office or she can send me a message directly via [employer].

## 2025-04-10 ENCOUNTER — ESTABLISHED COMPREHENSIVE EXAM (OUTPATIENT)
Dept: URBAN - METROPOLITAN AREA CLINIC 6 | Facility: CLINIC | Age: 30
End: 2025-04-10

## 2025-04-10 DIAGNOSIS — G93.2: ICD-10-CM

## 2025-04-10 DIAGNOSIS — H47.12: ICD-10-CM

## 2025-04-10 DIAGNOSIS — G43.809: ICD-10-CM

## 2025-04-10 DIAGNOSIS — H53.453: ICD-10-CM

## 2025-04-10 PROCEDURE — 92014 COMPRE OPH EXAM EST PT 1/>: CPT

## 2025-04-10 PROCEDURE — 92133 CPTRZD OPH DX IMG PST SGM ON: CPT | Mod: NC

## 2025-04-10 PROCEDURE — 92250 FUNDUS PHOTOGRAPHY W/I&R: CPT

## 2025-04-10 ASSESSMENT — TONOMETRY
OS_IOP_MMHG: 21
OD_IOP_MMHG: 17

## 2025-04-10 ASSESSMENT — VISUAL ACUITY
OU_CC: J1+
OD_CC: 20/25-2
OS_CC: 20/30+2
OU_CC: 20/25

## 2025-04-19 ENCOUNTER — HOSPITAL ENCOUNTER (EMERGENCY)
Dept: HOSPITAL 99 - EMR | Age: 30
Discharge: HOME | End: 2025-04-19
Payer: COMMERCIAL

## 2025-04-19 VITALS — SYSTOLIC BLOOD PRESSURE: 116 MMHG | DIASTOLIC BLOOD PRESSURE: 76 MMHG

## 2025-04-19 VITALS — SYSTOLIC BLOOD PRESSURE: 110 MMHG | DIASTOLIC BLOOD PRESSURE: 64 MMHG

## 2025-04-19 VITALS — DIASTOLIC BLOOD PRESSURE: 68 MMHG | SYSTOLIC BLOOD PRESSURE: 104 MMHG

## 2025-04-19 VITALS — BODY MASS INDEX: 40.2 KG/M2
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
## 2025-04-19 DIAGNOSIS — T78.40XA: Primary | ICD-10-CM

## 2025-04-19 LAB
ALBUMIN SERPL-MCNC: 4.1 G/DL (ref 3.5–5)
ALP SERPL-CCNC: 62 U/L (ref 38–126)
ALT SERPL-CCNC: 18 U/L (ref 0–35)
AST SERPL-CCNC: 17 U/L (ref 14–36)
BUN SERPL-MCNC: 16 MG/DL (ref 7–17)
CALCIUM SERPL-MCNC: 9.4 MG/DL (ref 8.4–10.2)
CHLORIDE SERPL-SCNC: 114 MMOL/L (ref 98–107)
CO2 SERPL-SCNC: 14 MMOL/L (ref 22–30)
EGFR: > 60
ERYTHROCYTE [DISTWIDTH] IN BLOOD BY AUTOMATED COUNT: 13.5 % (ref 11.5–14.5)
ESTIMATED CREATININE CLEARANCE: 107 ML/MIN
GLUCOSE SERPL-MCNC: 189 MG/DL (ref 70–99)
HCT VFR BLD AUTO: 40.6 % (ref 37–47)
HGB BLD-MCNC: 14.5 G/DL (ref 12–16)
MCHC RBC AUTO-ENTMCNC: 35.7 G/DL (ref 33–37)
MCV RBC AUTO: 86.4 FL (ref 81–99)
NRBC BLD AUTO-RTO: 0 %
PLATELET # BLD AUTO: 300 10^3/UL (ref 130–400)
POTASSIUM SERPL-SCNC: 3.5 MMOL/L (ref 3.5–5.1)
PROT SERPL-MCNC: 6.6 G/DL (ref 6.3–8.2)
SODIUM SERPL-SCNC: 142 MMOL/L (ref 135–145)

## 2025-04-19 PROCEDURE — 96374 THER/PROPH/DIAG INJ IV PUSH: CPT

## 2025-04-19 PROCEDURE — 96375 TX/PRO/DX INJ NEW DRUG ADDON: CPT

## 2025-04-19 PROCEDURE — 99283 EMERGENCY DEPT VISIT LOW MDM: CPT

## 2025-04-19 PROCEDURE — 96361 HYDRATE IV INFUSION ADD-ON: CPT

## 2025-04-19 RX ADMIN — DIPHENHYDRAMINE HYDROCHLORIDE 25 MG: 50 INJECTION, SOLUTION INTRAMUSCULAR; INTRAVENOUS at 19:06

## 2025-04-19 RX ADMIN — FAMOTIDINE 20 MG: 10 INJECTION INTRAVENOUS at 17:32

## 2025-04-19 RX ADMIN — SODIUM CHLORIDE 1000: 900 INJECTION, SOLUTION INTRAVENOUS at 17:32

## 2025-04-20 DIAGNOSIS — F41.9 ANXIETY: ICD-10-CM

## 2025-04-21 ENCOUNTER — TELEPHONE (OUTPATIENT)
Age: 30
End: 2025-04-21

## 2025-04-21 NOTE — TELEPHONE ENCOUNTER
LM informing patient that Neeta is out of the office until Wednesday. Please advise if ER follow-up on Monday is something we could do virtually or if she should keep this as in-office appt. Pt also requesting allergist recommendation.

## 2025-04-21 NOTE — TELEPHONE ENCOUNTER
Patient called to schedule an ER follow up with Neeta. She mentioned she was referred to an allergist and wants a list of recommendations from Neeta of allergists that she can go to that is not [facility]. Patient is also requesting her ER follow up be virtual. Please advise back to patient to further assist.

## 2025-04-24 ENCOUNTER — TELEPHONE (OUTPATIENT)
Age: 30
End: 2025-04-24

## 2025-04-24 DIAGNOSIS — J30.9 ALLERGIC RHINITIS, UNSPECIFIED SEASONALITY, UNSPECIFIED TRIGGER: Primary | ICD-10-CM

## 2025-04-24 NOTE — TELEPHONE ENCOUNTER
CM notified that after peer to peer patient was denied coverage for IRP.    CM met with patient at bedside introduced self and explained role. CM explained the appeal process to the patient. Patient is agreeable to appeal and signed AOR form.    CM faxed signed AOR form, facesheet, H&P, Physical Medicine and Rehab consult note, hospitalist note, consult notes, and PT/OT notes to [employer] Appeals department at [phone] to initiate the appeal process.     Member ID [id number]  Denial case # [id number]    CM will continue to follow for expedited appeal results and will call [phone] daily for appeal updates.    Libertad ERNANDEZN, RN   [facility]  [phone]     Patient needs an ambulatory referral.  She said it was the doctors preference.  There is a referral in the patient's chart dated 06/27/2022 for the same practice, but the patient said she probably didn't go back then.  Patient asked if the referral can be uploaded to her MyChart.       Dr. Nanci Tejada, DO   [facility]   [address] [place] [address], [address]  [place] [id number] [phone]

## 2025-04-25 ENCOUNTER — RA CDI HCC (OUTPATIENT)
Dept: OTHER | Facility: HOSPITAL | Age: 30
End: 2025-04-25

## 2025-04-25 NOTE — PROGRESS NOTES
Please review if this dx is applicable to the patient's condition and assess and document, if applicable in next visit        [id number]    HCC coding opportunities          Chart Reviewed number of suggestions sent to Provider: 1     Patients Insurance        Commercial Insurance: [employer]

## 2025-04-28 ENCOUNTER — TELEMEDICINE (OUTPATIENT)
Dept: FAMILY MEDICINE CLINIC | Facility: CLINIC | Age: 30
End: 2025-04-28
Payer: COMMERCIAL

## 2025-04-28 VITALS — WEIGHT: 242 LBS | BODY MASS INDEX: 38.89 KG/M2 | HEIGHT: 66 IN

## 2025-04-28 DIAGNOSIS — T78.40XA ALLERGIC REACTION, INITIAL ENCOUNTER: Primary | ICD-10-CM

## 2025-04-28 PROCEDURE — 99213 OFFICE O/P EST LOW 20 MIN: CPT | Performed by: NURSE PRACTITIONER

## 2025-04-28 RX ORDER — EPINEPHRINE 0.3 MG/.3ML
INJECTION SUBCUTANEOUS
COMMUNITY
Start: 2025-04-20

## 2025-04-28 NOTE — PROGRESS NOTES
"Virtual Regular VisitName: Cristin De La Cruz      : 1995      MRN: [id number]  Encounter Provider: BRIAN Perez  Encounter Date: 2025   Encounter department: [facility]  :  Assessment & Plan  Allergic reaction, initial encounter       Pt encouraged to have Epipen on hand PRN. Pt may use Benadryl PRN. Pt to follow up with Allergist. Patient is encouraged to call our office for any questions/concerns, persistent or worsening symptoms. Patient states they understand and agree with treatment plan.         Pt to f/u PRN.      History of Present Illness     Pt presents for TCM visit after going to [facility] for allergy symptoms.  On , while she was outside of [facility] with her son, she started to feel itchy.  Pt notes she develops hives to her arms, lip swelling and hoarse voice.  She called EMS and was given Epi, Benadryl and Decadron via EMS.  She was discharged w/ recommendations to take Benadryl RTC x 24 hours, Prednisone course and given Epi pen.  Pt was evaluated by [facility] in the past and was told she did not have any allergies.  She does have appt to see Allergist in July.        Review of Systems  As noted per HPI.  Objective   Ht 5' 6\" (1.676 m)   Wt 110 kg (242 lb)   BMI 39.06 kg/m²     Physical Exam  Vitals reviewed.   Constitutional:       Appearance: Normal appearance.   Pulmonary:      Effort: Pulmonary effort is normal.   Neurological:      Mental Status: She is alert and oriented to person, place, and time. Mental status is at baseline.   Psychiatric:         Mood and Affect: Mood normal.         Behavior: Behavior normal.         Thought Content: Thought content normal.         Judgment: Judgment normal.         Administrative Statements   Encounter provider BRIAN Perez    The Patient is located at Home and in the following state in which I hold an active license [place].    The patient was " identified by name and date of birth. Cristin De La Cruz was informed that this is a telemedicine visit and that the visit is being conducted through the Epic Embedded platform. She agrees to proceed..  My office door was closed. No one else was in the room.  She acknowledged consent and understanding of privacy and security of the video platform. The patient has agreed to participate and understands they can discontinue the visit at any time.    I have spent a total time of 15 minutes in caring for this patient on the day of the visit/encounter including Diagnostic results, Risks and benefits of tx options, Instructions for management, and Impressions, not including the time spent for establishing the audio/video connection.

## 2025-05-05 ENCOUNTER — ANNUAL EXAM (OUTPATIENT)
Dept: OBGYN CLINIC | Facility: CLINIC | Age: 30
End: 2025-05-05
Payer: COMMERCIAL

## 2025-05-05 VITALS
SYSTOLIC BLOOD PRESSURE: 116 MMHG | HEIGHT: 66 IN | WEIGHT: 242 LBS | BODY MASS INDEX: 38.89 KG/M2 | DIASTOLIC BLOOD PRESSURE: 66 MMHG

## 2025-05-05 DIAGNOSIS — Z12.4 ENCOUNTER FOR SCREENING FOR MALIGNANT NEOPLASM OF CERVIX: ICD-10-CM

## 2025-05-05 DIAGNOSIS — Z11.51 SCREENING FOR HPV (HUMAN PAPILLOMAVIRUS): ICD-10-CM

## 2025-05-05 DIAGNOSIS — Z01.419 WELL WOMAN EXAM WITH ROUTINE GYNECOLOGICAL EXAM: Primary | ICD-10-CM

## 2025-05-05 PROCEDURE — G0145 SCR C/V CYTO,THINLAYER,RESCR: HCPCS | Performed by: OBSTETRICS & GYNECOLOGY

## 2025-05-05 PROCEDURE — S0612 ANNUAL GYNECOLOGICAL EXAMINA: HCPCS | Performed by: OBSTETRICS & GYNECOLOGY

## 2025-05-05 NOTE — PROGRESS NOTES
ASSESSMENT & PLAN:   Diagnoses and all orders for this visit:    Well woman exam with routine gynecological exam  -     Liquid-based pap, screening    Encounter for screening for malignant neoplasm of cervix  -     Liquid-based pap, screening    Screening for HPV (human papillomavirus)  -     Liquid-based pap, screening          The following were reviewed in today's visit: ASCCP guidelines, Gardasil vaccination, STD testing breast self exam, family planning choices, exercise, and healthy diet.    Patient to return to office in yearly for annual exam.     All questions have been answered to her satisfaction.        CC:  Annual Gynecologic Examination  Chief Complaint   Patient presents with    Gynecologic Exam     Pt is here for her yearly exam. Pap due.  Pt does not want to see Dr. Caprice Tolliver pap 2021   Mirena placed 2021         HPI: Cristin De La Cruz is a 30 y.o.  who presents for annual gynecologic examination.  She has the following concerns:      Considering becoming pregnant in the future.   Recently diagnosed with IIH.   Wants to work on weight loss. Ideally wants to lose 50lb before becoming pregnant again.   Has IUD in place. Considering removing at the end of this year.     Health Maintenance:    Exercise: frequently  Breast exams/breast awareness: yes    Past Medical History:   Diagnosis Date    Anxiety     Asthma     BMI 40.0-44.9, adult (HCC) 2021    Depression     Diabetes mellitus (HCC)     Gestational diabetes    History of ovarian cyst     Hyperlipidemia     Ingrown toenail     It has been an ongoing problem for a couple years.    Migraine     Mild intermittent asthma     Morbid obesity (HCC)     Other headache syndrome 2021    PID (pelvic inflammatory disease) 2018    Polycystic ovary syndrome     Pre-diabetes     Skin tag     A few years ago    Varicella     had vaccine    Vitamin D deficiency        Past Surgical History:   Procedure Laterality Date     Patient/Caregiver/Family/Facility findings/issues during SN visit:  Pt alert and comfortbale at this time. Decreased appetite according to daughter.    Medication refills ordered this visit: none    Medications reconciled and all medications are available in the home this visit.  The following education was provided regarding medications, medication interactions, and look alike medications:.  Response to teaching: fair. Medications are effective at this time.      Supplies by type and quantity ordered this visit include: NA    Consulted medical director/attending physician regarding: NA    Instructed patient/family/caregiver on 24-hour hospice availability and phone number.    Plan for next visit:  assessment, education WISDOM TOOTH EXTRACTION         Past OB/Gyn History:   No LMP recorded. Patient has had an implant.    Last Pap:  : no abnormalities  History of abnormal Pap smear: no  HPV vaccine completed: yes    Patient is currently sexually active.   STD testing: no  Current contraception:IUD - Mirena      Family History  Family History   Problem Relation Age of Onset    JOSELINE disease Mother     Diabetes unspecified Mother     Asthma Mother     Neuropathy Mother     Seizures Father 13    Asperger's syndrome Sister     Autism Brother     No Known Problems Son     Colon cancer Maternal Grandmother     Heart attack Maternal Grandfather     Heart disease Maternal Grandfather     No Known Problems Paternal Grandmother     No Known Problems Paternal Grandfather        Family history of uterine or ovarian cancer: no  Family history of breast cancer: no  Family history of colon cancer: no    Social History:  Social History     Socioeconomic History    Marital status: Single     Spouse name: Not on file    Number of children: 0    Years of education: Not on file    Highest education level: Not on file   Occupational History    Occupation: Dietary Aide     Employer: [employer]   Tobacco Use    Smoking status: Former     Current packs/day: 0.00     Average packs/day: 0.1 packs/day for 0.4 years     Types: Cigarettes     Start date: 2017     Quit date: 10/1/2017     Years since quittin.5    Smokeless tobacco: Never    Tobacco comments:     Smoked socially    Vaping Use    Vaping status: Never Used   Substance and Sexual Activity    Alcohol use: Yes     Alcohol/week: 1.0 standard drink of alcohol     Types: 1 Glasses of wine per week     Comment: socially    Drug use: Never    Sexual activity: Yes     Partners: Male     Birth control/protection: I.U.D.   Other Topics Concern    Not on file   Social History Narrative    Single    Lives with boyfriend, son    1 Child - 1 Son    Farm      Social Drivers of  "Health     Financial Resource Strain: Not on file   Food Insecurity: Not on file   Transportation Needs: Not on file   Physical Activity: Not on file   Stress: Not on file   Social Connections: Not on file   Intimate Partner Violence: Not on file   Housing Stability: Not on file     Domestic violence screen: negative    Allergies:  No Known Allergies    Medications:    Current Outpatient Medications:     acetaZOLAMIDE (DIAMOX) 250 mg tablet, Take 2 tablets (500 mg total) by mouth 2 (two) times a day, Disp: 360 tablet, Rfl: 3    albuterol (PROVENTIL HFA,VENTOLIN HFA) 90 mcg/act inhaler, Inhale 2 puffs every 6 (six) hours as needed for wheezing, Disp: , Rfl:     EPINEPHrine (EPIPEN) 0.3 mg/0.3 mL SOAJ, 0.3 MG (0.3 ML) INTRAMUSCULARLY ONCE, Disp: , Rfl:     fluticasone (FLONASE) 50 mcg/act nasal spray, 1 spray into each nostril 2 (two) times a day, Disp: 11.1 mL, Rfl: 0    levonorgestrel (MIRENA) 20 MCG/24HR IUD, 1 each by Intrauterine route once, Disp: , Rfl:     metFORMIN (GLUCOPHAGE-XR) 500 mg 24 hr tablet, TAKE 3 TABLETS (1,500 MG TOTAL) BY MOUTH DAILY WITH DINNER, Disp: 270 tablet, Rfl: 0    multivitamin (THERAGRAN) TABS, Take 1 tablet by mouth daily, Disp: , Rfl:     sertraline (ZOLOFT) 50 mg tablet, TAKE 1 TABLET BY MOUTH EVERY DAY, Disp: 90 tablet, Rfl: 1    Review of Systems:  Review of Systems   Constitutional:  Negative for chills and fever.   Respiratory:  Negative for shortness of breath.    Cardiovascular:  Negative for chest pain.   Gastrointestinal:  Negative for abdominal pain, nausea and vomiting.   Genitourinary:  Negative for vaginal bleeding, vaginal discharge and vaginal pain.   Musculoskeletal:  Negative for back pain.   Skin:  Negative for rash.   Neurological:  Negative for dizziness and light-headedness.   Psychiatric/Behavioral:  The patient is not nervous/anxious.          Physical Exam:  /66 (BP Location: Right arm, Patient Position: Sitting, Cuff Size: Large)   Ht 5' 6\" (1.676 m)   " Wt 110 kg (242 lb)   BMI 39.06 kg/m²    Physical Exam  Constitutional:       General: She is not in acute distress.     Appearance: Normal appearance. She is not ill-appearing, toxic-appearing or diaphoretic.   Genitourinary:      Rectum and urethral meatus normal.      No lesions in the vagina.      Right Labia: No rash, tenderness, lesions, skin changes or Bartholin's cyst.     Left Labia: No tenderness, lesions, skin changes, Bartholin's cyst or rash.     No labial fusion noted.      No vaginal discharge, erythema, tenderness, bleeding or ulceration.      No vaginal prolapse present.     No vaginal atrophy present.       Right Adnexa: not tender, not full, not palpable, no mass present and not absent.     Left Adnexa: not tender, not full, not palpable, no mass present and not absent.     Cervix is parous.      No cervical motion tenderness, discharge, friability, lesion, polyp, nabothian cyst, eversion or elongation.      IUD strings visualized.      No parametrium nodularity or thickening present.     Uterus is not enlarged, fixed, tender, irregular or prolapsed.      No uterine mass detected.     Uterus is not absent.     No urethral tenderness present.   Breasts:     Breasts are symmetrical.      Breasts are soft.     Right: Normal. No swelling, bleeding, inverted nipple, mass, nipple discharge, skin change or tenderness.      Left: Normal. No swelling, bleeding, inverted nipple, mass, nipple discharge, skin change or tenderness.   HENT:      Head: Normocephalic and atraumatic.   Cardiovascular:      Rate and Rhythm: Normal rate and regular rhythm.      Pulses: Normal pulses.   Pulmonary:      Effort: Pulmonary effort is normal. No respiratory distress.      Breath sounds: Normal breath sounds. No stridor. No wheezing or rhonchi.   Chest:      Chest wall: No tenderness.   Abdominal:      General: There is no distension.      Palpations: Abdomen is soft. There is no mass.      Tenderness: There is no  abdominal tenderness. There is no guarding or rebound.      Hernia: No hernia is present.   Musculoskeletal:         General: Normal range of motion.   Lymphadenopathy:      Upper Body:      Right upper body: No supraclavicular or axillary adenopathy.      Left upper body: No supraclavicular or axillary adenopathy.   Neurological:      General: No focal deficit present.      Mental Status: She is alert. Mental status is at baseline.   Skin:     General: Skin is warm and dry.   Psychiatric:         Mood and Affect: Mood normal.         Behavior: Behavior normal.         Thought Content: Thought content normal.         Judgment: Judgment normal.

## 2025-05-08 LAB
LAB AP GYN PRIMARY INTERPRETATION: NORMAL
Lab: NORMAL

## 2025-05-09 ENCOUNTER — RESULTS FOLLOW-UP (OUTPATIENT)
Dept: OBGYN CLINIC | Facility: CLINIC | Age: 30
End: 2025-05-09

## 2025-05-22 DIAGNOSIS — R73.01 IFG (IMPAIRED FASTING GLUCOSE): ICD-10-CM

## 2025-05-22 RX ORDER — METFORMIN HYDROCHLORIDE 500 MG/1
1500 TABLET, EXTENDED RELEASE ORAL
Qty: 270 TABLET | Refills: 1 | Status: SHIPPED | OUTPATIENT
Start: 2025-05-22

## 2025-05-22 NOTE — TELEPHONE ENCOUNTER
Reason for call:   [x] Refill   [] Prior Auth  [] Other:     Office:   [x] PCP/Provider - Supplee  [] Specialty/Provider -     Medication: Metformin XR 500mg    Dose/Frequency: 3 tab with dinner    Quantity: 270    Pharmacy: [facility] #[id number] - [place] - [id number] .[phone]     Local Pharmacy   Does the patient have enough for 3 days?   [] Yes   [x] No - Send as HP to POD

## 2025-06-01 DIAGNOSIS — J45.30 MILD PERSISTENT ASTHMA WITHOUT COMPLICATION: Primary | ICD-10-CM

## 2025-06-01 DIAGNOSIS — F41.9 ANXIETY: ICD-10-CM

## 2025-06-02 RX ORDER — ALBUTEROL SULFATE 90 UG/1
2 INHALANT RESPIRATORY (INHALATION) EVERY 6 HOURS PRN
Qty: 8 G | Refills: 0 | Status: SHIPPED | OUTPATIENT
Start: 2025-06-02

## 2025-06-24 DIAGNOSIS — J45.30 MILD PERSISTENT ASTHMA WITHOUT COMPLICATION: ICD-10-CM

## 2025-06-25 RX ORDER — ALBUTEROL SULFATE 90 UG/1
INHALANT RESPIRATORY (INHALATION)
Qty: 8 G | Refills: 3 | Status: SHIPPED | OUTPATIENT
Start: 2025-06-25

## 2025-07-02 ENCOUNTER — NURSE TRIAGE (OUTPATIENT)
Age: 30
End: 2025-07-02

## 2025-07-02 NOTE — TELEPHONE ENCOUNTER
"REASON FOR CONVERSATION: Sinusitis    SYMPTOMS: Patient called with 7/10 sinus pain around nose, eyes, and in glabellar area. Also endorses cough with yellow/green mucous, bilateral earache, fatigue, and headache. Symptoms started 1 week ago but are worse over the last 2-3 days. Denies fever. No available appointments today or tomorrow, advised urgent care visit today. Cristin verbalized understanding and agreeable to treatment plan.     OTHER HEALTH INFORMATION: N/A    PROTOCOL DISPOSITION: Go to Office Now/Urgent Care    CARE ADVICE PROVIDED: Go to Urgent Care Now    PRACTICE FOLLOW-UP: N/A    Reason for Disposition   SEVERE sinus pain (e.g., excruciating)    Answer Assessment - Initial Assessment Questions  1. LOCATION: \"Where does it hurt?\"       Across nose, around eyes, in between eyes  2. ONSET: \"When did the sinus pain start?\"  (e.g., hours, days)       1 week ago, worse for the last 2-3 days  3. SEVERITY: \"How bad is the pain?\"   (Scale 0-10; or none, mild, moderate or severe)      7/10  4. RECURRENT SYMPTOM: \"Have you ever had sinus problems before?\" If Yes, ask: \"When was the last time?\" and \"What happened that time?\"       Yes, going to allergy doctor in a few weeks   5. NASAL CONGESTION: \"Is the nose blocked?\" If Yes, ask: \"Can you open it or must you breathe through your mouth?\"      Denies  6. NASAL DISCHARGE: \"Do you have discharge from your nose?\" If so ask, \"What color?\"      Green/yellow mucous  7. FEVER: \"Do you have a fever?\" If Yes, ask: \"What is it, how was it measured, and when did it start?\"       Denies  8. OTHER SYMPTOMS: \"Do you have any other symptoms?\" (e.g., sore throat, cough, earache, difficulty breathing)      Bilateral earache, headache, fatigue, cough  9. PREGNANCY: \"Is there any chance you are pregnant?\" \"When was your last menstrual period?\"      N/A    Protocols used: Sinus Pain or Congestion-Adult-[place]    "

## 2025-07-03 ENCOUNTER — OFFICE VISIT (OUTPATIENT)
Dept: URGENT CARE | Age: 30
End: 2025-07-03
Payer: COMMERCIAL

## 2025-07-03 VITALS
SYSTOLIC BLOOD PRESSURE: 116 MMHG | RESPIRATION RATE: 20 BRPM | TEMPERATURE: 97.3 F | DIASTOLIC BLOOD PRESSURE: 64 MMHG | HEART RATE: 70 BPM | OXYGEN SATURATION: 100 %

## 2025-07-03 DIAGNOSIS — J01.90 ACUTE SINUSITIS, RECURRENCE NOT SPECIFIED, UNSPECIFIED LOCATION: Primary | ICD-10-CM

## 2025-07-03 PROCEDURE — G0382 LEV 3 HOSP TYPE B ED VISIT: HCPCS

## 2025-07-03 PROCEDURE — S9083 URGENT CARE CENTER GLOBAL: HCPCS

## 2025-07-03 RX ORDER — FLUTICASONE PROPIONATE 50 MCG
1 SPRAY, SUSPENSION (ML) NASAL DAILY
Qty: 9.9 ML | Refills: 0 | Status: SHIPPED | OUTPATIENT
Start: 2025-07-03

## 2025-07-03 RX ORDER — AZITHROMYCIN 250 MG/1
TABLET, FILM COATED ORAL
Qty: 6 TABLET | Refills: 0 | Status: SHIPPED | OUTPATIENT
Start: 2025-07-03 | End: 2025-07-07

## 2025-07-03 NOTE — PROGRESS NOTES
[facility]  Name: Cristin De La Cruz      : 1995      MRN: [id number]  Encounter Provider: BRIAN Chiang  Encounter Date: 7/3/2025   [facility]  :  Assessment & Plan  Acute sinusitis, recurrence not specified, unspecified location    Orders:  •  azithromycin (ZITHROMAX) 250 mg tablet; Take 2 tablets today then 1 tablet daily x 4 days  •  fluticasone (FLONASE) 50 mcg/act nasal spray; 1 spray into each nostril daily    Take antibiotic- Zithromax as directed.  Recommend daily probiotic while on antibiotic or eat yogurt with live cultures daily while on antibiotic.       You may take Tylenol or Motrin for pain and fever.    Nasal saline spray and rinses as needed.  Humidifier at night.     Flonase- one spray each nostril daily for nasal congestions.    Rest, increase fluids, good hand washing.  Please follow up with your family doctor if no improvement in 7-10 days.      Patient Instructions  Follow up with PCP in 3-5 days.  Proceed to  ER if symptoms worsen.    If tests are performed, our office will contact you with results only if changes need to made to the care plan discussed with you at the visit. You can review your full results on [facility].    Chief Complaint:   Chief Complaint   Patient presents with   • Cough   • Headache   • Sore Throat     Cough, nasal congestion, headache and sore throat since 8 days.     History of Present Illness   Pt is a 30 year old female presenting with 8 days of sore throat, body aches, sinus pressure, headaches, and fatigue.  She reports taking sudafed, tylenol and motrin without relief.  She denies CP, SOB, difficulty breathing, fever or chills, nausea, vomiting, or diarrhea.  She denies sick contacts.      Cough  Associated symptoms include headaches and a sore throat. Pertinent negatives include no chest pain, chills, fever, rhinorrhea, shortness of breath or wheezing.   Headache  Sore Throat   Associated  symptoms include coughing and headaches. Pertinent negatives include no congestion, shortness of breath or stridor.         Review of Systems   Constitutional:  Positive for fatigue. Negative for chills and fever.   HENT:  Positive for sore throat. Negative for congestion, rhinorrhea, sinus pressure and sinus pain.    Respiratory:  Positive for cough. Negative for choking, chest tightness, shortness of breath, wheezing and stridor.    Cardiovascular:  Negative for chest pain and palpitations.   Neurological:  Positive for headaches. Negative for dizziness and light-headedness.     Past Medical History   Past Medical History[1]  Past Surgical History[2]  Family History[3]  she reports that she quit smoking about 7 years ago. Her smoking use included cigarettes. She started smoking about 8 years ago. She smoked an average 0.1 packs per day for 0.4 years. She has never used smokeless tobacco. She reports current alcohol use of about 1.0 standard drink of alcohol per week. She reports that she does not use drugs.  Current Outpatient Medications   Medication Instructions   • acetaZOLAMIDE (DIAMOX) 500 mg, Oral, 2 times daily   • albuterol (PROVENTIL HFA,VENTOLIN HFA) 90 mcg/act inhaler INHALE 2 PUFFS EVERY 6 HOURS AS NEEDED FOR WHEEZING   • azithromycin (ZITHROMAX) 250 mg tablet Take 2 tablets today then 1 tablet daily x 4 days   • EPINEPHrine (EPIPEN) 0.3 mg/0.3 mL SOAJ 0.3 MG (0.3 ML) INTRAMUSCULARLY ONCE   • fluticasone (FLONASE) 50 mcg/act nasal spray 1 spray, Nasal, 2 times daily   • fluticasone (FLONASE) 50 mcg/act nasal spray 1 spray, Nasal, Daily   • levonorgestrel (MIRENA) 20 MCG/24HR IUD 1 each, Once   • metFORMIN (GLUCOPHAGE-XR) 1,500 mg, Oral, Daily with dinner   • multivitamin (THERAGRAN) TABS 1 tablet, Daily   • sertraline (ZOLOFT) 50 mg, Oral, Daily   Allergies[4]     Objective   /64   Pulse 70   Temp (!) 97.3 °F (36.3 °C) (Tympanic)   Resp 20   SpO2 100%      Physical Exam  Vitals and nursing  "note reviewed.   Constitutional:       Appearance: She is well-developed. She is obese.   HENT:      Right Ear: Tympanic membrane normal.      Left Ear: Tympanic membrane normal.      Nose: Congestion and rhinorrhea present.      Right Turbinates: Enlarged and swollen.      Left Turbinates: Enlarged and swollen.      Right Sinus: Maxillary sinus tenderness and frontal sinus tenderness present.      Left Sinus: Maxillary sinus tenderness and frontal sinus tenderness present.      Mouth/Throat:      Mouth: Mucous membranes are moist.      Pharynx: Posterior oropharyngeal erythema present.      Tonsils: No tonsillar exudate. 0 on the right. 0 on the left.      Comments: Halitosis.    Cardiovascular:      Rate and Rhythm: Normal rate and regular rhythm.      Heart sounds: Normal heart sounds.   Pulmonary:      Effort: Pulmonary effort is normal. No respiratory distress.      Breath sounds: Normal breath sounds. No stridor. No wheezing, rhonchi or rales.   Chest:      Chest wall: No tenderness.   Abdominal:      Palpations: Abdomen is soft.   Lymphadenopathy:      Cervical: No cervical adenopathy.     Neurological:      Mental Status: She is alert.         Portions of the record may have been created with voice recognition software.  Occasional wrong word or \"sound a like\" substitutions may have occurred due to the inherent limitations of voice recognition software.  Read the chart carefully and recognize, using context, where substitutions have occurred.         [1]  Past Medical History:  Diagnosis Date   • Anxiety    • Asthma 2005   • BMI 40.0-44.9, adult ([facility]) 03/11/2021   • Depression    • Diabetes mellitus ([facility])     Gestational diabetes   • History of ovarian cyst 2018   • Hyperlipidemia    • Ingrown toenail     It has been an ongoing problem for a couple years.   • Migraine    • Mild intermittent asthma    • Morbid obesity ([facility])    • Other headache syndrome 11/16/2021   • PID (pelvic inflammatory disease) 2018   • " Polycystic ovary syndrome    • Pre-diabetes    • Skin tag     A few years ago   • Varicella     had vaccine   • Vitamin D deficiency    [2]  Past Surgical History:  Procedure Laterality Date   • WISDOM TOOTH EXTRACTION     [3]  Family History  Problem Relation Name Age of Onset   • JOSELINE disease Mother Melissa De La Cruz    • Diabetes unspecified Mother Melissa De La Cruz    • Asthma Mother Melissa De La Cruz    • Neuropathy Mother Melissa De La Cruz    • Seizures Father Ramesh De La Cruz 13   • Asperger's syndrome Sister Fanta    • Autism Brother Lazarus    • No Known Problems Son Tam    • Colon cancer Maternal Grandmother Darby Zoë    • Heart attack Maternal Grandfather Mukesh Camp    • Heart disease Maternal Grandfather Mukesh Camp    • No Known Problems Paternal Grandmother     • No Known Problems Paternal Grandfather     [4]  No Known Allergies

## 2025-07-03 NOTE — PATIENT INSTRUCTIONS
Take antibiotic- Zithromax as directed.  Recommend daily probiotic while on antibiotic or eat yogurt with live cultures daily while on antibiotic.       You may take Tylenol or Motrin for pain and fever.    Nasal saline spray and rinses as needed.  Humidifier at night.     Flonase- one spray each nostril daily for nasal congestions.    Rest, increase fluids, good hand washing.  Please follow up with your family doctor if no improvement in 7-10 days.

## 2025-07-16 ENCOUNTER — TRANSCRIBE ORDERS (OUTPATIENT)
Dept: LAB | Facility: CLINIC | Age: 30
End: 2025-07-16

## 2025-07-16 ENCOUNTER — APPOINTMENT (OUTPATIENT)
Dept: LAB | Facility: CLINIC | Age: 30
End: 2025-07-16
Payer: COMMERCIAL

## 2025-07-16 DIAGNOSIS — Z91.89 HISTORY OF WEIGHT CHANGE: ICD-10-CM

## 2025-07-16 DIAGNOSIS — M79.10 MYALGIA: ICD-10-CM

## 2025-07-16 DIAGNOSIS — M25.50 ARTHRALGIA, UNSPECIFIED JOINT: ICD-10-CM

## 2025-07-16 DIAGNOSIS — E55.9 VITAMIN D DEFICIENCY: ICD-10-CM

## 2025-07-16 DIAGNOSIS — T78.3XXA ANGIOEDEMA, INITIAL ENCOUNTER: ICD-10-CM

## 2025-07-16 DIAGNOSIS — G93.2 IDIOPATHIC INTRACRANIAL HYPERTENSION: ICD-10-CM

## 2025-07-16 DIAGNOSIS — M79.10 MYALGIA: Primary | ICD-10-CM

## 2025-07-16 LAB
25(OH)D3 SERPL-MCNC: 27.7 NG/ML (ref 30–100)
ANION GAP SERPL CALCULATED.3IONS-SCNC: 11 MMOL/L (ref 4–13)
BUN SERPL-MCNC: 12 MG/DL (ref 5–25)
C4 SERPL-MCNC: 59 MG/DL (ref 19–52)
CALCIUM SERPL-MCNC: 9.4 MG/DL (ref 8.4–10.2)
CHLORIDE SERPL-SCNC: 107 MMOL/L (ref 96–108)
CO2 SERPL-SCNC: 20 MMOL/L (ref 21–32)
CREAT SERPL-MCNC: 0.72 MG/DL (ref 0.6–1.3)
CRP SERPL QL: 2.8 MG/L
D DIMER PPP FEU-MCNC: <0.27 UG/ML FEU
GFR SERPL CREATININE-BSD FRML MDRD: 114 ML/MIN/1.73SQ M
GLUCOSE P FAST SERPL-MCNC: 80 MG/DL (ref 65–99)
POTASSIUM SERPL-SCNC: 3.7 MMOL/L (ref 3.5–5.3)
RHEUMATOID FACT SERPL-ACNC: <10 IU/ML
SODIUM SERPL-SCNC: 138 MMOL/L (ref 135–147)
TSH SERPL DL<=0.05 MIU/L-ACNC: 2.19 UIU/ML (ref 0.45–4.5)

## 2025-07-16 PROCEDURE — 83520 IMMUNOASSAY QUANT NOS NONAB: CPT

## 2025-07-16 PROCEDURE — 82306 VITAMIN D 25 HYDROXY: CPT

## 2025-07-16 PROCEDURE — 82785 ASSAY OF IGE: CPT

## 2025-07-16 PROCEDURE — 85379 FIBRIN DEGRADATION QUANT: CPT

## 2025-07-16 PROCEDURE — 80048 BASIC METABOLIC PNL TOTAL CA: CPT

## 2025-07-16 PROCEDURE — 84443 ASSAY THYROID STIM HORMONE: CPT

## 2025-07-16 PROCEDURE — 86160 COMPLEMENT ANTIGEN: CPT

## 2025-07-16 PROCEDURE — 36415 COLL VENOUS BLD VENIPUNCTURE: CPT

## 2025-07-16 PROCEDURE — 84432 ASSAY OF THYROGLOBULIN: CPT

## 2025-07-16 PROCEDURE — 86140 C-REACTIVE PROTEIN: CPT

## 2025-07-16 PROCEDURE — 86376 MICROSOMAL ANTIBODY EACH: CPT

## 2025-07-16 PROCEDURE — 86431 RHEUMATOID FACTOR QUANT: CPT

## 2025-07-16 PROCEDURE — 86800 THYROGLOBULIN ANTIBODY: CPT

## 2025-07-17 LAB
THYROGLOB AB SERPL-ACNC: <1 IU/ML (ref 0–0.9)
THYROGLOB SERPL-MCNC: 7.2 NG/ML (ref 1.5–38.5)
THYROPEROXIDASE AB SERPL-ACNC: 11 IU/ML (ref 0–34)

## 2025-07-19 LAB — TRYPTASE SERPL-MCNC: 7.5 UG/L (ref 2.2–13.2)

## 2025-07-21 LAB — TOTAL IGE SMQN RAST: 159 KU/L (ref 0–113)

## 2025-07-29 ENCOUNTER — TELEPHONE (OUTPATIENT)
Age: 30
End: 2025-07-29

## 2025-07-30 DIAGNOSIS — G93.2 IDIOPATHIC INTRACRANIAL HYPERTENSION: ICD-10-CM

## 2025-07-30 RX ORDER — ACETAZOLAMIDE 250 MG/1
500 TABLET ORAL 2 TIMES DAILY
Qty: 360 TABLET | Refills: 0 | Status: CANCELLED | OUTPATIENT
Start: 2025-07-30

## 2025-08-07 ENCOUNTER — OFFICE VISIT (OUTPATIENT)
Dept: URGENT CARE | Facility: CLINIC | Age: 30
End: 2025-08-07
Payer: COMMERCIAL

## 2025-08-07 VITALS
WEIGHT: 245 LBS | HEART RATE: 64 BPM | DIASTOLIC BLOOD PRESSURE: 84 MMHG | HEIGHT: 66 IN | BODY MASS INDEX: 39.37 KG/M2 | RESPIRATION RATE: 15 BRPM | SYSTOLIC BLOOD PRESSURE: 126 MMHG | OXYGEN SATURATION: 98 % | TEMPERATURE: 97 F

## 2025-08-07 DIAGNOSIS — J34.89 SINUS PRESSURE: Primary | ICD-10-CM

## 2025-08-07 PROCEDURE — S9083 URGENT CARE CENTER GLOBAL: HCPCS

## 2025-08-07 PROCEDURE — G0382 LEV 3 HOSP TYPE B ED VISIT: HCPCS

## 2025-08-07 RX ORDER — METHYLPREDNISOLONE 4 MG/1
TABLET ORAL
Qty: 1 EACH | Refills: 0 | Status: SHIPPED | OUTPATIENT
Start: 2025-08-07

## 2025-08-08 ENCOUNTER — TELEPHONE (OUTPATIENT)
Age: 30
End: 2025-08-08

## 2025-08-11 ENCOUNTER — NURSE TRIAGE (OUTPATIENT)
Age: 30
End: 2025-08-11

## 2025-08-11 ENCOUNTER — OFFICE VISIT (OUTPATIENT)
Dept: FAMILY MEDICINE CLINIC | Facility: CLINIC | Age: 30
End: 2025-08-11
Payer: COMMERCIAL

## 2025-08-20 ENCOUNTER — ESTABLISHED COMPREHENSIVE EXAM (OUTPATIENT)
Dept: URBAN - METROPOLITAN AREA CLINIC 6 | Facility: CLINIC | Age: 30
End: 2025-08-20

## 2025-08-20 DIAGNOSIS — H53.453: ICD-10-CM

## 2025-08-20 DIAGNOSIS — G43.809: ICD-10-CM

## 2025-08-20 DIAGNOSIS — G93.2: ICD-10-CM

## 2025-08-20 DIAGNOSIS — H47.12: ICD-10-CM

## 2025-08-20 PROCEDURE — 92014 COMPRE OPH EXAM EST PT 1/>: CPT

## 2025-08-20 PROCEDURE — 92083 EXTENDED VISUAL FIELD XM: CPT

## 2025-08-20 PROCEDURE — 92133 CPTRZD OPH DX IMG PST SGM ON: CPT

## 2025-08-20 ASSESSMENT — TONOMETRY
OS_IOP_MMHG: 20
OD_IOP_MMHG: 14

## 2025-08-20 ASSESSMENT — VISUAL ACUITY
OS_CC: 20/20
OD_CC: 20/20